# Patient Record
Sex: FEMALE | Race: OTHER | HISPANIC OR LATINO
[De-identification: names, ages, dates, MRNs, and addresses within clinical notes are randomized per-mention and may not be internally consistent; named-entity substitution may affect disease eponyms.]

---

## 2018-02-05 ENCOUNTER — RESULT REVIEW (OUTPATIENT)
Age: 63
End: 2018-02-05

## 2018-05-09 ENCOUNTER — APPOINTMENT (OUTPATIENT)
Dept: CARDIOLOGY | Facility: CLINIC | Age: 63
End: 2018-05-09

## 2018-05-09 VITALS
BODY MASS INDEX: 25.76 KG/M2 | HEIGHT: 62 IN | TEMPERATURE: 98.3 F | SYSTOLIC BLOOD PRESSURE: 126 MMHG | WEIGHT: 140 LBS | DIASTOLIC BLOOD PRESSURE: 65 MMHG | OXYGEN SATURATION: 99 % | HEART RATE: 81 BPM

## 2018-05-09 DIAGNOSIS — Z87.39 PERSONAL HISTORY OF OTHER DISEASES OF THE MUSCULOSKELETAL SYSTEM AND CONNECTIVE TISSUE: ICD-10-CM

## 2018-05-09 DIAGNOSIS — Z87.440 PERSONAL HISTORY OF URINARY (TRACT) INFECTIONS: ICD-10-CM

## 2018-05-09 DIAGNOSIS — Z86.39 PERSONAL HISTORY OF OTHER ENDOCRINE, NUTRITIONAL AND METABOLIC DISEASE: ICD-10-CM

## 2018-05-09 DIAGNOSIS — Z92.89 PERSONAL HISTORY OF OTHER MEDICAL TREATMENT: ICD-10-CM

## 2018-05-09 DIAGNOSIS — Z83.49 FAMILY HISTORY OF OTHER ENDOCRINE, NUTRITIONAL AND METABOLIC DISEASES: ICD-10-CM

## 2018-05-09 DIAGNOSIS — R51 HEADACHE: ICD-10-CM

## 2018-05-09 DIAGNOSIS — Z83.3 FAMILY HISTORY OF DIABETES MELLITUS: ICD-10-CM

## 2018-05-09 DIAGNOSIS — Z82.49 FAMILY HISTORY OF ISCHEMIC HEART DISEASE AND OTHER DISEASES OF THE CIRCULATORY SYSTEM: ICD-10-CM

## 2018-05-09 RX ORDER — IBUPROFEN 200 MG/1
200 TABLET, COATED ORAL
Refills: 0 | Status: ACTIVE | COMMUNITY

## 2018-05-10 ENCOUNTER — NON-APPOINTMENT (OUTPATIENT)
Age: 63
End: 2018-05-10

## 2018-05-10 PROBLEM — Z82.49 FAMILY HISTORY OF HYPERTENSION: Status: ACTIVE | Noted: 2018-05-10

## 2018-05-10 PROBLEM — Z92.89 HISTORY OF PFTS: Status: RESOLVED | Noted: 2018-05-10 | Resolved: 2018-05-10

## 2018-05-10 PROBLEM — Z86.39 HISTORY OF TYPE 2 DIABETES MELLITUS: Status: RESOLVED | Noted: 2018-05-10 | Resolved: 2018-05-10

## 2018-05-10 PROBLEM — Z83.3 FAMILY HISTORY OF TYPE 2 DIABETES MELLITUS: Status: ACTIVE | Noted: 2018-05-10

## 2018-05-10 PROBLEM — Z82.49 FAMILY HISTORY OF MYOCARDIAL INFARCTION: Status: ACTIVE | Noted: 2018-05-10

## 2018-05-10 PROBLEM — R51 CHRONIC HEADACHES: Status: RESOLVED | Noted: 2018-05-10 | Resolved: 2018-05-10

## 2018-05-10 PROBLEM — Z87.39 HISTORY OF ARTHRITIS: Status: RESOLVED | Noted: 2018-05-10 | Resolved: 2018-05-10

## 2018-05-10 PROBLEM — Z87.440 HISTORY OF UTI: Status: RESOLVED | Noted: 2018-05-10 | Resolved: 2018-05-10

## 2018-05-10 PROBLEM — Z83.49 FAMILY HISTORY OF HYPERLIPIDEMIA: Status: ACTIVE | Noted: 2018-05-10

## 2018-06-05 ENCOUNTER — APPOINTMENT (OUTPATIENT)
Dept: CARDIOLOGY | Facility: CLINIC | Age: 63
End: 2018-06-05

## 2018-06-14 ENCOUNTER — APPOINTMENT (OUTPATIENT)
Dept: CARDIOLOGY | Facility: CLINIC | Age: 63
End: 2018-06-14

## 2018-06-14 VITALS
DIASTOLIC BLOOD PRESSURE: 68 MMHG | WEIGHT: 145 LBS | BODY MASS INDEX: 26.52 KG/M2 | SYSTOLIC BLOOD PRESSURE: 138 MMHG | OXYGEN SATURATION: 99 % | HEART RATE: 82 BPM

## 2018-06-14 DIAGNOSIS — Z87.09 PERSONAL HISTORY OF OTHER DISEASES OF THE RESPIRATORY SYSTEM: ICD-10-CM

## 2018-11-15 ENCOUNTER — RECORD ABSTRACTING (OUTPATIENT)
Age: 63
End: 2018-11-15

## 2018-11-15 DIAGNOSIS — Z78.9 OTHER SPECIFIED HEALTH STATUS: ICD-10-CM

## 2018-11-15 DIAGNOSIS — N30.90 CYSTITIS, UNSPECIFIED W/OUT HEMATURIA: ICD-10-CM

## 2018-11-15 DIAGNOSIS — Z86.79 PERSONAL HISTORY OF OTHER DISEASES OF THE CIRCULATORY SYSTEM: ICD-10-CM

## 2018-11-15 DIAGNOSIS — R10.9 UNSPECIFIED ABDOMINAL PAIN: ICD-10-CM

## 2018-11-15 DIAGNOSIS — Z83.3 FAMILY HISTORY OF DIABETES MELLITUS: ICD-10-CM

## 2018-11-15 RX ORDER — ADHESIVE TAPE 3"X 2.3 YD
500 TAPE, NON-MEDICATED TOPICAL
Refills: 0 | Status: ACTIVE | COMMUNITY

## 2018-12-14 ENCOUNTER — APPOINTMENT (OUTPATIENT)
Dept: CARDIOLOGY | Facility: CLINIC | Age: 63
End: 2018-12-14

## 2018-12-28 ENCOUNTER — MEDICATION RENEWAL (OUTPATIENT)
Age: 63
End: 2018-12-28

## 2019-01-03 ENCOUNTER — RECORD ABSTRACTING (OUTPATIENT)
Age: 64
End: 2019-01-03

## 2019-01-07 ENCOUNTER — APPOINTMENT (OUTPATIENT)
Dept: FAMILY MEDICINE | Facility: CLINIC | Age: 64
End: 2019-01-07
Payer: MEDICARE

## 2019-01-07 VITALS
WEIGHT: 137 LBS | OXYGEN SATURATION: 100 % | DIASTOLIC BLOOD PRESSURE: 70 MMHG | SYSTOLIC BLOOD PRESSURE: 110 MMHG | BODY MASS INDEX: 25.21 KG/M2 | HEIGHT: 62 IN | HEART RATE: 69 BPM

## 2019-01-07 PROCEDURE — 36415 COLL VENOUS BLD VENIPUNCTURE: CPT

## 2019-01-07 PROCEDURE — 99214 OFFICE O/P EST MOD 30 MIN: CPT | Mod: 25

## 2019-01-07 RX ORDER — ATENOLOL AND CHLORTHALIDONE 100; 25 MG/1; MG/1
100-25 TABLET ORAL
Refills: 0 | Status: DISCONTINUED | COMMUNITY
End: 2019-01-07

## 2019-01-07 RX ORDER — ASPIRIN 81 MG
81 TABLET, DELAYED RELEASE (ENTERIC COATED) ORAL DAILY
Refills: 0 | Status: DISCONTINUED | COMMUNITY
End: 2019-01-07
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
## 2019-01-07 NOTE — PHYSICAL EXAM
[No Acute Distress] : no acute distress [Well Nourished] : well nourished [Well Developed] : well developed [Well-Appearing] : well-appearing [No JVD] : no jugular venous distention [Supple] : supple [No Respiratory Distress] : no respiratory distress  [Normal Rate] : normal rate  [Regular Rhythm] : with a regular rhythm [Normal S1, S2] : normal S1 and S2 [Speech Grossly Normal] : speech grossly normal [Normal Affect] : the affect was normal [Normal Mood] : the mood was normal [Normal Insight/Judgement] : insight and judgment were intact [de-identified] : Pain with movement of the upper back and neck.  Tightness in neck and shoulder muscles

## 2019-01-07 NOTE — REVIEW OF SYSTEMS
[Fatigue] : fatigue [Joint Pain] : joint pain [Joint Stiffness] : joint stiffness [Muscle Pain] : muscle pain [Back Pain] : back pain [Negative] : Psychiatric

## 2019-01-09 ENCOUNTER — APPOINTMENT (OUTPATIENT)
Dept: PAIN MANAGEMENT | Facility: CLINIC | Age: 64
End: 2019-01-09
Payer: MEDICARE

## 2019-01-09 ENCOUNTER — RECORD ABSTRACTING (OUTPATIENT)
Age: 64
End: 2019-01-09

## 2019-01-09 VITALS
SYSTOLIC BLOOD PRESSURE: 126 MMHG | WEIGHT: 138 LBS | HEIGHT: 62 IN | BODY MASS INDEX: 25.4 KG/M2 | DIASTOLIC BLOOD PRESSURE: 74 MMHG

## 2019-01-09 DIAGNOSIS — Z86.39 PERSONAL HISTORY OF OTHER ENDOCRINE, NUTRITIONAL AND METABOLIC DISEASE: ICD-10-CM

## 2019-01-09 DIAGNOSIS — Z82.49 FAMILY HISTORY OF ISCHEMIC HEART DISEASE AND OTHER DISEASES OF THE CIRCULATORY SYSTEM: ICD-10-CM

## 2019-01-09 DIAGNOSIS — Z78.9 OTHER SPECIFIED HEALTH STATUS: ICD-10-CM

## 2019-01-09 DIAGNOSIS — Z83.3 FAMILY HISTORY OF DIABETES MELLITUS: ICD-10-CM

## 2019-01-09 PROCEDURE — 99204 OFFICE O/P NEW MOD 45 MIN: CPT

## 2019-01-09 NOTE — REVIEW OF SYSTEMS
[Abdominal Pain] : abdominal pain [Constipation] : constipation [Joint Pain] : joint pain [Negative] : Heme/Lymph [Back Pain] : back pain [Neck Pain] : neck pain [Muscle Pain] : muscle pain [Joint Stiffness] : joint stiffness [Decreased ROM] : decreased range of motion [FreeTextEntry2] : fatigue [FreeTextEntry5] : chest pain, sob, swelling,palpitations [FreeTextEntry7] : nausea [FreeTextEntry9] : joint swelling

## 2019-01-09 NOTE — CONSULT LETTER
[Dear  ___] : Dear  [unfilled], [Consult Letter:] : I had the pleasure of evaluating your patient, [unfilled]. [Consult Closing:] : Thank you very much for allowing me to participate in the care of this patient.  If you have any questions, please do not hesitate to contact me. [Sincerely,] : Sincerely, [FreeTextEntry3] : Blake Chandler
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
## 2019-01-09 NOTE — PHYSICAL EXAM
[de-identified] : Constitutional: Well-developed, in no acute distress\par Eyes: Pupil- Right: normal, Left: normal\par Neck exam: Inspection normal\par Respiratory: Normal effort, speaking in full sentences\par Cardiovascular: Regular rate and rhythm\par Vascular: Dorsal pedis pulses normal and equal bilaterally\par Abdomen: Inspection normal, no distension\par Skin: Inspection normal, no bruising noted\par Musculoskeletal:\par Cervical Spine:   \par Gait: Antalgic\par Inspection: Normal curvature, no abnormal kyphosis or scoliosis\par \par Facet loading: pain bilaterally\par \par Palpation:\par Cervical paraspinal muscles: pain bilaterally\par 		\par Muscle Strength:\par Deltoid: 5/5 bilaterally\par Biceps: 5/5 bilaterally\par Triceps: 5/5 bilaterally\par Adductor pollicis: 5/5 bilaterally\par \par Sensation: normal and equal in bilateral upper extremities\par \par Reflexes:\par Biceps (C5): 2+ bilaterally\par Brachioradialis (C6): 2+ bilaterally\par Triceps (C7): 2+ bilaterally\par \par Extremity: no edema noted\par Neurological: Memory normal, AAO x 3, Cranial nerves II - XII grossly normal\par Psychiatric: Appropriate mood and affect, oriented to time, place, person, and situation\par
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
## 2019-01-10 LAB
ALBUMIN SERPL ELPH-MCNC: 4.3 G/DL
ALP BLD-CCNC: 57 U/L
ALT SERPL-CCNC: 20 U/L
ANION GAP SERPL CALC-SCNC: 13 MMOL/L
AST SERPL-CCNC: 20 U/L
BASOPHILS # BLD AUTO: 0.03 K/UL
BASOPHILS NFR BLD AUTO: 0.4 %
BILIRUB SERPL-MCNC: 0.3 MG/DL
BUN SERPL-MCNC: 15 MG/DL
CALCIUM SERPL-MCNC: 9.6 MG/DL
CHLORIDE SERPL-SCNC: 102 MMOL/L
CHOLEST SERPL-MCNC: 133 MG/DL
CHOLEST/HDLC SERPL: 3 RATIO
CO2 SERPL-SCNC: 28 MMOL/L
CREAT SERPL-MCNC: 0.55 MG/DL
EOSINOPHIL # BLD AUTO: 0.11 K/UL
EOSINOPHIL NFR BLD AUTO: 1.3 %
GLUCOSE SERPL-MCNC: 79 MG/DL
HBA1C MFR BLD HPLC: 7.1 %
HCT VFR BLD CALC: 37.4 %
HDLC SERPL-MCNC: 45 MG/DL
HGB BLD-MCNC: 11.8 G/DL
IMM GRANULOCYTES NFR BLD AUTO: 0.1 %
LDLC SERPL CALC-MCNC: 62 MG/DL
LYMPHOCYTES # BLD AUTO: 2.64 K/UL
LYMPHOCYTES NFR BLD AUTO: 31.2 %
MAN DIFF?: NORMAL
MCHC RBC-ENTMCNC: 26.6 PG
MCHC RBC-ENTMCNC: 31.6 GM/DL
MCV RBC AUTO: 84.4 FL
MONOCYTES # BLD AUTO: 0.53 K/UL
MONOCYTES NFR BLD AUTO: 6.3 %
NEUTROPHILS # BLD AUTO: 5.13 K/UL
NEUTROPHILS NFR BLD AUTO: 60.7 %
PLATELET # BLD AUTO: 328 K/UL
POTASSIUM SERPL-SCNC: 4.1 MMOL/L
PROT SERPL-MCNC: 6.9 G/DL
RBC # BLD: 4.43 M/UL
RBC # FLD: 15 %
SODIUM SERPL-SCNC: 143 MMOL/L
TRIGL SERPL-MCNC: 131 MG/DL
TSH SERPL-ACNC: 0.31 UIU/ML
WBC # FLD AUTO: 8.45 K/UL

## 2019-01-14 ENCOUNTER — LABORATORY RESULT (OUTPATIENT)
Age: 64
End: 2019-01-14

## 2019-01-14 ENCOUNTER — APPOINTMENT (OUTPATIENT)
Dept: FAMILY MEDICINE | Facility: CLINIC | Age: 64
End: 2019-01-14
Payer: MEDICARE

## 2019-01-14 VITALS
HEART RATE: 72 BPM | TEMPERATURE: 97.9 F | SYSTOLIC BLOOD PRESSURE: 124 MMHG | WEIGHT: 138 LBS | HEIGHT: 62 IN | BODY MASS INDEX: 25.4 KG/M2 | DIASTOLIC BLOOD PRESSURE: 86 MMHG

## 2019-01-14 DIAGNOSIS — K59.09 OTHER CONSTIPATION: ICD-10-CM

## 2019-01-14 DIAGNOSIS — K57.32 DIVERTICULITIS OF LARGE INTESTINE W/OUT PERFORATION OR ABSCESS W/OUT BLEEDING: ICD-10-CM

## 2019-01-14 DIAGNOSIS — R10.9 UNSPECIFIED ABDOMINAL PAIN: ICD-10-CM

## 2019-01-14 DIAGNOSIS — K57.90 DIVERTICULOSIS OF INTESTINE, PART UNSPECIFIED, W/OUT PERFORATION OR ABSCESS W/OUT BLEEDING: ICD-10-CM

## 2019-01-14 LAB
BILIRUB UR QL STRIP: NEGATIVE
CLARITY UR: NORMAL
GLUCOSE UR-MCNC: NEGATIVE
HCG UR QL: 0.2 EU/DL
HGB UR QL STRIP.AUTO: NORMAL
KETONES UR-MCNC: NEGATIVE
LEUKOCYTE ESTERASE UR QL STRIP: NEGATIVE
NITRITE UR QL STRIP: NEGATIVE
PH UR STRIP: 6.5
PROT UR STRIP-MCNC: NEGATIVE
SP GR UR STRIP: 1.01

## 2019-01-14 PROCEDURE — 81003 URINALYSIS AUTO W/O SCOPE: CPT | Mod: QW

## 2019-01-14 PROCEDURE — 99214 OFFICE O/P EST MOD 30 MIN: CPT | Mod: 25

## 2019-01-14 NOTE — PLAN
[FreeTextEntry1] : Increase fiber and fluids in her diet. Avoid seeds and nuts. If sxs persist or worsen, she should follow-up either in the office or the ER.

## 2019-01-14 NOTE — REVIEW OF SYSTEMS
[Abdominal Pain] : abdominal pain [Constipation] : constipation [Back Pain] : back pain [Negative] : Psychiatric [Fever] : no fever [Chills] : no chills [Nausea] : no nausea [Vomiting] : no vomiting
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
## 2019-01-14 NOTE — PHYSICAL EXAM
[No Acute Distress] : no acute distress [Well Nourished] : well nourished [Well Developed] : well developed [Clear to Auscultation] : lungs were clear to auscultation bilaterally [Normal Rate] : normal rate  [Regular Rhythm] : with a regular rhythm [Normal S1, S2] : normal S1 and S2 [Soft] : abdomen soft [de-identified] : Tenderness in RUQ, LUQ and LLQ. [de-identified] : left flank tenderness; tenderness along lumbar spine with palpation.

## 2019-01-14 NOTE — DATA REVIEWED
[FreeTextEntry1] : POCT UA: Positive for blood ( known hx of microscopic hematuria)- discussed with patient.

## 2019-01-15 LAB
APPEARANCE: CLEAR
BILIRUBIN URINE: NEGATIVE
BLOOD URINE: ABNORMAL
COLOR: YELLOW
GLUCOSE QUALITATIVE U: NEGATIVE MG/DL
KETONES URINE: NEGATIVE
LEUKOCYTE ESTERASE URINE: NEGATIVE
NITRITE URINE: NEGATIVE
PH URINE: 6.5
PROTEIN URINE: NEGATIVE MG/DL
SPECIFIC GRAVITY URINE: 1.01
UROBILINOGEN URINE: NEGATIVE MG/DL

## 2019-01-16 ENCOUNTER — NON-APPOINTMENT (OUTPATIENT)
Age: 64
End: 2019-01-16

## 2019-01-16 ENCOUNTER — APPOINTMENT (OUTPATIENT)
Dept: CARDIOLOGY | Facility: CLINIC | Age: 64
End: 2019-01-16
Payer: MEDICARE

## 2019-01-16 VITALS
OXYGEN SATURATION: 99 % | WEIGHT: 139.44 LBS | BODY MASS INDEX: 25.5 KG/M2 | SYSTOLIC BLOOD PRESSURE: 122 MMHG | HEART RATE: 76 BPM | DIASTOLIC BLOOD PRESSURE: 62 MMHG

## 2019-01-16 DIAGNOSIS — Z87.898 PERSONAL HISTORY OF OTHER SPECIFIED CONDITIONS: ICD-10-CM

## 2019-01-16 PROCEDURE — 93000 ELECTROCARDIOGRAM COMPLETE: CPT

## 2019-01-16 PROCEDURE — 99214 OFFICE O/P EST MOD 30 MIN: CPT

## 2019-01-21 ENCOUNTER — APPOINTMENT (OUTPATIENT)
Dept: PAIN MANAGEMENT | Facility: CLINIC | Age: 64
End: 2019-01-21

## 2019-01-28 ENCOUNTER — RESULT REVIEW (OUTPATIENT)
Age: 64
End: 2019-01-28

## 2019-01-31 ENCOUNTER — APPOINTMENT (OUTPATIENT)
Dept: PAIN MANAGEMENT | Facility: CLINIC | Age: 64
End: 2019-01-31
Payer: MEDICARE

## 2019-01-31 VITALS
DIASTOLIC BLOOD PRESSURE: 80 MMHG | BODY MASS INDEX: 25.58 KG/M2 | WEIGHT: 139 LBS | SYSTOLIC BLOOD PRESSURE: 122 MMHG | HEIGHT: 62 IN

## 2019-01-31 PROCEDURE — 99214 OFFICE O/P EST MOD 30 MIN: CPT

## 2019-01-31 NOTE — DATA REVIEWED
[FreeTextEntry1] : MRI CERVICAL SPINE (01/28/19):					\par \par There is reversal of the cervical lordosis. There is minimal degenerative retrolisthesis of C3 on C4 and C4 on C5. The marrow signal is overall within normal limits with no focal marrow replacing lesion identified. The vertebral body heights are maintained and there is no evidence of acute fracture or subluxation. There is no abnormal vertebral or paraspinal edema. The visualized paraspinal soft tissues appear grossly unremarkable.\par \par No abnormal signal is identified in the cervical spinal cord. The visualized posterior fossa structures are unremarkable.\par \par C2-3: No significant disc bulge or herniation. No significant central canal or foraminal stenosis.\par \par C3-4: Central disc protrusion superimposed on mild disc bulge with marginal and uncovertebral osteophyte formation. No significant central canal stenosis, however disc herniation mildly impinges upon the ventral spinal cord. Moderate left foraminal stenosis and mild right foraminal stenosis.\par \par C4-5: Right paracentral disc/osteophyte superimposed on a mild disc bulge with marginal osteophyte formation. No significant central canal stenosis, however disc/osteophyte mildly impinges upon the right ventral spinal cord. Moderate bilateral foraminal stenosis.\par \par C5-6: Mild disc bulge with marginal and uncovertebral osteophyte formation. No significant central canal stenosis. Mild/moderate right foraminal stenosis and mild left foraminal stenosis.\par \par C6-7: Mild disc bulge. No significant central canal or foraminal stenosis.\par \par C7-T1: Minimal disc bulge. No significant central canal or foraminal stenosis.\par \par \par \par IMPRESSION:\par \par Cervical spondylosis with central disc herniation at C3-4 and right paracentral disc/osteophyte at C4-5. No significant central canal stenosis, however there is mild impingement of the ventral spinal cord at C3-4 and C4-5. Varying degrees of foraminal stenosis as above, most pronounced at C3-4 and the left and C4-5 bilaterally.\par

## 2019-01-31 NOTE — REVIEW OF SYSTEMS
[Abdominal Pain] : abdominal pain [Constipation] : constipation [Back Pain] : back pain [Neck Pain] : neck pain [Muscle Pain] : muscle pain [Joint Pain] : joint pain [Joint Stiffness] : joint stiffness [Decreased ROM] : decreased range of motion [Negative] : Heme/Lymph [FreeTextEntry2] : fatigue [FreeTextEntry5] : chest pain, sob, swelling,palpitations [FreeTextEntry7] : nausea [FreeTextEntry9] : joint swelling

## 2019-01-31 NOTE — PHYSICAL EXAM
[de-identified] : Constitutional: Well-developed, in no acute distress\par Eyes: Pupil- Right: normal, Left: normal\par Neck exam: Inspection normal\par Respiratory: Normal effort, speaking in full sentences\par Cardiovascular: Regular rate and rhythm\par Vascular: Dorsal pedis pulses normal and equal bilaterally\par Abdomen: Inspection normal, no distension\par Skin: Inspection normal, no bruising noted\par Musculoskeletal:\par Cervical Spine:   \par Gait: Antalgic\par Inspection: Normal curvature, no abnormal kyphosis or scoliosis\par \par Facet loading: pain bilaterally\par \par Palpation:\par Cervical paraspinal muscles: pain bilaterally\par 		\par Muscle Strength:\par Deltoid: 5/5 bilaterally\par Biceps: 5/5 bilaterally\par Triceps: 5/5 bilaterally\par Adductor pollicis: 5/5 bilaterally\par \par Sensation: normal and equal in bilateral upper extremities\par \par Reflexes:\par Biceps (C5): 2+ bilaterally\par Brachioradialis (C6): 2+ bilaterally\par Triceps (C7): 2+ bilaterally\par \par Extremity: no edema noted\par Neurological: Memory normal, AAO x 3, Cranial nerves II - XII grossly normal\par Psychiatric: Appropriate mood and affect, oriented to time, place, person, and situation\par
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
## 2019-02-08 ENCOUNTER — APPOINTMENT (OUTPATIENT)
Dept: CARDIOLOGY | Facility: CLINIC | Age: 64
End: 2019-02-08
Payer: MEDICARE

## 2019-02-08 VITALS
HEART RATE: 74 BPM | BODY MASS INDEX: 25.24 KG/M2 | WEIGHT: 138 LBS | OXYGEN SATURATION: 98 % | DIASTOLIC BLOOD PRESSURE: 78 MMHG | SYSTOLIC BLOOD PRESSURE: 130 MMHG

## 2019-02-08 DIAGNOSIS — R20.0 ANESTHESIA OF SKIN: ICD-10-CM

## 2019-02-08 PROBLEM — Z87.898 HISTORY OF PALPITATIONS: Status: RESOLVED | Noted: 2019-01-16 | Resolved: 2019-02-08

## 2019-02-08 PROCEDURE — 93000 ELECTROCARDIOGRAM COMPLETE: CPT

## 2019-02-08 PROCEDURE — 99215 OFFICE O/P EST HI 40 MIN: CPT

## 2019-02-08 NOTE — REASON FOR VISIT
[Dyspnea] : dyspnea [Hyperlipidemia] : hyperlipidemia [Hypertension] : hypertension [Palpitations] : palpitations [Follow-Up - Clinic] : a clinic follow-up of [Chest Pain] : chest pain

## 2019-02-08 NOTE — REVIEW OF SYSTEMS
[Lower Back Pain] : lower back pain [Joint Pain] : joint pain [see HPI] : see HPI [Negative] : Heme/Lymph

## 2019-02-11 ENCOUNTER — CLINICAL ADVICE (OUTPATIENT)
Age: 64
End: 2019-02-11

## 2019-02-14 ENCOUNTER — APPOINTMENT (OUTPATIENT)
Dept: CARDIOLOGY | Facility: CLINIC | Age: 64
End: 2019-02-14
Payer: MEDICARE

## 2019-02-14 VITALS
HEIGHT: 62 IN | DIASTOLIC BLOOD PRESSURE: 69 MMHG | HEART RATE: 72 BPM | WEIGHT: 138 LBS | BODY MASS INDEX: 25.4 KG/M2 | SYSTOLIC BLOOD PRESSURE: 120 MMHG | OXYGEN SATURATION: 100 %

## 2019-02-14 DIAGNOSIS — R07.89 OTHER CHEST PAIN: ICD-10-CM

## 2019-02-14 PROCEDURE — 99214 OFFICE O/P EST MOD 30 MIN: CPT

## 2019-02-15 ENCOUNTER — INBOUND DOCUMENT (OUTPATIENT)
Age: 64
End: 2019-02-15

## 2019-02-25 PROBLEM — R07.89 ATYPICAL CHEST PAIN: Status: RESOLVED | Noted: 2018-05-10 | Resolved: 2019-02-25
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
## 2019-02-25 NOTE — REASON FOR VISIT
[Follow-Up - From Hospitalization] : follow-up of a recent hospitalization for [Chest Pain] : chest pain [Admitted for Heart Failure] : patient was not admitted for heart failure [FreeTextEntry1] : TIA
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
## 2019-02-25 NOTE — HISTORY OF PRESENT ILLNESS
[FreeTextEntry1] : The patient walked in to be seen because of chest pain\par \par She said that yesterday she was walking up [address] and had to sit because of chest pain under her left breast.  Again, she had another episode which woke her up this morning about 1:00, in the same area, worse with deep breathing and reproducible with manual pressure. The pain lasted about 20 minutes and went away\par She woke up otherwise okay. At 9 AM she developed numbness in her right upper extremity, from the fingers up to the shoulder, her neck, the right side of her face up to her cheeks as well as her whole tongue.  She denies any other symptoms except for the bit of nausea but no dizziness or vertiginous symptoms\par \par She is currently chest pain free\par
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
## 2019-02-25 NOTE — PHYSICAL EXAM
[General Appearance - Well Developed] : well developed [General Appearance - Well Nourished] : well nourished [Normal Conjunctiva] : the conjunctiva exhibited no abnormalities [Auscultation Breath Sounds / Voice Sounds] : lungs were clear to auscultation bilaterally [Heart Rate And Rhythm] : heart rate and rhythm were normal [Murmurs] : no murmurs present [Bowel Sounds] : normal bowel sounds [Abdomen Soft] : soft [Abdomen Tenderness] : non-tender [Abnormal Walk] : normal gait [Nail Clubbing] : no clubbing of the fingernails [Skin Color & Pigmentation] : normal skin color and pigmentation [Oriented To Time, Place, And Person] : oriented to person, place, and time [FreeTextEntry1] : tenderness to manual pressure under left breast

## 2019-02-25 NOTE — HISTORY OF PRESENT ILLNESS
[FreeTextEntry1] : She was sent by me to the hospital on 2/8/19 with neurological symptoms concerning for CVA. Specifically, she has numbness in her right upper extremity, right neck/right face and tongue. She was seen by neurology and felt to have had a TIA. \par \par Her symptoms are mostly resolved and she is to follow up with Dr. Yi\par "Little CP', headaches\par \par Allergy symptoms -. zyrtec
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
## 2019-02-25 NOTE — PHYSICAL EXAM
[General Appearance - Well Developed] : well developed [General Appearance - Well Nourished] : well nourished [Normal Conjunctiva] : the conjunctiva exhibited no abnormalities [Auscultation Breath Sounds / Voice Sounds] : lungs were clear to auscultation bilaterally [Heart Rate And Rhythm] : heart rate and rhythm were normal [Murmurs] : no murmurs present [Bowel Sounds] : normal bowel sounds [Abdomen Soft] : soft [Abdomen Tenderness] : non-tender [Abnormal Walk] : normal gait [Nail Clubbing] : no clubbing of the fingernails [Skin Color & Pigmentation] : normal skin color and pigmentation [Oriented To Time, Place, And Person] : oriented to person, place, and time [FreeTextEntry1] : No JVd

## 2019-03-04 ENCOUNTER — RESULT REVIEW (OUTPATIENT)
Age: 64
End: 2019-03-04

## 2019-03-10 ENCOUNTER — RX RENEWAL (OUTPATIENT)
Age: 64
End: 2019-03-10

## 2019-03-21 ENCOUNTER — RX RENEWAL (OUTPATIENT)
Age: 64
End: 2019-03-21

## 2019-03-27 ENCOUNTER — APPOINTMENT (OUTPATIENT)
Dept: OBGYN | Facility: CLINIC | Age: 64
End: 2019-03-27
Payer: MEDICARE

## 2019-03-27 VITALS
SYSTOLIC BLOOD PRESSURE: 114 MMHG | DIASTOLIC BLOOD PRESSURE: 66 MMHG | HEIGHT: 62 IN | BODY MASS INDEX: 25.4 KG/M2 | WEIGHT: 138 LBS

## 2019-03-27 PROCEDURE — 99396 PREV VISIT EST AGE 40-64: CPT

## 2019-03-27 PROCEDURE — 81003 URINALYSIS AUTO W/O SCOPE: CPT | Mod: QW

## 2019-04-05 LAB
BILIRUB UR QL STRIP: NEGATIVE
CLARITY UR: CLEAR
COLLECTION METHOD: NORMAL
GLUCOSE UR-MCNC: 100
HCG UR QL: 0.2 EU/DL
HGB UR QL STRIP.AUTO: NORMAL
KETONES UR-MCNC: NEGATIVE
LEUKOCYTE ESTERASE UR QL STRIP: NEGATIVE
NITRITE UR QL STRIP: NEGATIVE
PH UR STRIP: 8.5
PROT UR STRIP-MCNC: NEGATIVE
SP GR UR STRIP: 1.01

## 2019-04-23 ENCOUNTER — APPOINTMENT (OUTPATIENT)
Dept: CARDIOLOGY | Facility: CLINIC | Age: 64
End: 2019-04-23

## 2019-05-23 ENCOUNTER — APPOINTMENT (OUTPATIENT)
Dept: FAMILY MEDICINE | Facility: CLINIC | Age: 64
End: 2019-05-23
Payer: MEDICARE

## 2019-05-23 VITALS
SYSTOLIC BLOOD PRESSURE: 140 MMHG | DIASTOLIC BLOOD PRESSURE: 84 MMHG | TEMPERATURE: 97.9 F | WEIGHT: 138 LBS | HEART RATE: 81 BPM | HEIGHT: 62 IN | BODY MASS INDEX: 25.4 KG/M2

## 2019-05-23 PROCEDURE — 36415 COLL VENOUS BLD VENIPUNCTURE: CPT

## 2019-05-23 PROCEDURE — 99214 OFFICE O/P EST MOD 30 MIN: CPT | Mod: 25

## 2019-05-23 NOTE — PHYSICAL EXAM
[No Acute Distress] : no acute distress [Well Nourished] : well nourished [Well Developed] : well developed [Normal Outer Ear/Nose] : the outer ears and nose were normal in appearance [Normal Oropharynx] : the oropharynx was normal [No JVD] : no jugular venous distention [No Respiratory Distress] : no respiratory distress  [Clear to Auscultation] : lungs were clear to auscultation bilaterally [No Accessory Muscle Use] : no accessory muscle use [Normal Rate] : normal rate  [Regular Rhythm] : with a regular rhythm [Normal S1, S2] : normal S1 and S2 [No Edema] : there was no peripheral edema [Normal Gait] : normal gait [Coordination Grossly Intact] : coordination grossly intact [Speech Grossly Normal] : speech grossly normal [Normal Affect] : the affect was normal [Normal Insight/Judgement] : insight and judgment were intact [de-identified] : Nasal d/c, discomfort to palp of frontal sinuses

## 2019-05-23 NOTE — REVIEW OF SYSTEMS
[Fatigue] : fatigue [Pain] : pain [Itching] : itching [Earache] : earache [Hoarseness] : hoarseness [Nasal Discharge] : nasal discharge [Postnasal Drip] : postnasal drip [Wheezing] : wheezing [Joint Pain] : joint pain [Joint Stiffness] : joint stiffness [Muscle Pain] : muscle pain [Negative] : Musculoskeletal

## 2019-05-23 NOTE — HISTORY OF PRESENT ILLNESS
[FreeTextEntry8] : Pt here for f/u.\mallory has DM.  Takes meds as directed.  No SE or problems.  Wonders if she should be using a glucometer. Feels like diabetes is well controlled. \par Sometimes checks sugars with 's glucometer.  120 in the morning.  \par c/o 2 weeks with symptoms. Some laryngitis.  Saw allergist recently.  Got two different pills--xyzal and singuair.  Takes two of each daily.  Still getting symptoms.\par Also uses flonase nasal spray..\par Some chest congestion.  Some tighter breathing. Has used an inhaler that helped.\mallory Takes all other meds as directed.  Feels well.\par Multiple joints across her body continue to ache.  Has seen Dr. Moncada in past and has another appointment coming up.\par

## 2019-06-05 ENCOUNTER — RX RENEWAL (OUTPATIENT)
Age: 64
End: 2019-06-05

## 2019-06-05 ENCOUNTER — APPOINTMENT (OUTPATIENT)
Dept: RHEUMATOLOGY | Facility: CLINIC | Age: 64
End: 2019-06-05
Payer: MEDICARE

## 2019-06-05 VITALS
DIASTOLIC BLOOD PRESSURE: 62 MMHG | HEIGHT: 62 IN | BODY MASS INDEX: 25.4 KG/M2 | WEIGHT: 138 LBS | SYSTOLIC BLOOD PRESSURE: 120 MMHG

## 2019-06-05 DIAGNOSIS — R52 PAIN, UNSPECIFIED: ICD-10-CM

## 2019-06-05 DIAGNOSIS — M19.042 PRIMARY OSTEOARTHRITIS, RIGHT HAND: ICD-10-CM

## 2019-06-05 DIAGNOSIS — M19.041 PRIMARY OSTEOARTHRITIS, RIGHT HAND: ICD-10-CM

## 2019-06-05 PROCEDURE — 96372 THER/PROPH/DIAG INJ SC/IM: CPT | Mod: RT

## 2019-06-05 PROCEDURE — 99213 OFFICE O/P EST LOW 20 MIN: CPT | Mod: 25

## 2019-06-05 RX ORDER — KETOROLAC TROMETHAMINE 30 MG/ML
30 INJECTION, SOLUTION INTRAMUSCULAR; INTRAVENOUS
Qty: 1 | Refills: 0 | Status: COMPLETED | OUTPATIENT
Start: 2019-06-05

## 2019-06-05 RX ORDER — METHYLPRED ACET/NACL,ISO-OS/PF 40 MG/ML
40 VIAL (ML) INJECTION
Qty: 1 | Refills: 0 | Status: COMPLETED | OUTPATIENT
Start: 2019-06-05

## 2019-06-05 RX ADMIN — METHYLPREDNISOLONE ACETATE 0 MG/ML: 40 INJECTION, SUSPENSION INTRA-ARTICULAR; INTRALESIONAL; INTRAMUSCULAR; SOFT TISSUE at 00:00

## 2019-06-05 RX ADMIN — KETOROLAC TROMETHAMINE 0 MG/ML: 30 INJECTION, SOLUTION INTRAMUSCULAR; INTRAVENOUS at 00:00

## 2019-06-05 NOTE — REVIEW OF SYSTEMS
[Feeling Poorly] : feeling poorly [Joint Pain] : joint pain [Feeling Tired] : feeling tired [Negative] : Psychiatric [FreeTextEntry9] : muscle pains

## 2019-06-05 NOTE — HISTORY OF PRESENT ILLNESS
[FreeTextEntry1] : 64 yo female here for f/u joint pains.  She reports pain in her legs, back, neck/trapezius, arms.\par Dr. Yi prescribed Gabapentin 100 mg TID, which doesn't help.  She needs to take Advil 3 tabs, which sometimes  helps.  She has been on Gabapentin for about a year.

## 2019-06-05 NOTE — PROCEDURE
[FreeTextEntry1] : Area cleaned with alcohol. Depomedrol 40 mg and Toradol 30 mg [id number] deltoid.Pt tolerated procedure well.

## 2019-06-06 LAB
ALBUMIN SERPL ELPH-MCNC: 4.6 G/DL
ALP BLD-CCNC: 63 U/L
ALT SERPL-CCNC: 15 U/L
ANION GAP SERPL CALC-SCNC: 14 MMOL/L
AST SERPL-CCNC: 17 U/L
BILIRUB SERPL-MCNC: 0.2 MG/DL
BUN SERPL-MCNC: 16 MG/DL
CALCIUM SERPL-MCNC: 10.2 MG/DL
CHLORIDE SERPL-SCNC: 101 MMOL/L
CHOLEST SERPL-MCNC: 135 MG/DL
CHOLEST/HDLC SERPL: 3.2 RATIO
CO2 SERPL-SCNC: 27 MMOL/L
CREAT SERPL-MCNC: 0.66 MG/DL
ESTIMATED AVERAGE GLUCOSE: 163 MG/DL
GLUCOSE SERPL-MCNC: 92 MG/DL
HBA1C MFR BLD HPLC: 7.3 %
HDLC SERPL-MCNC: 42 MG/DL
LDLC SERPL CALC-MCNC: 69 MG/DL
POTASSIUM SERPL-SCNC: 3.6 MMOL/L
PROT SERPL-MCNC: 6.9 G/DL
SODIUM SERPL-SCNC: 142 MMOL/L
TRIGL SERPL-MCNC: 120 MG/DL
TSH SERPL-ACNC: 1.39 UIU/ML

## 2019-06-15 ENCOUNTER — RX RENEWAL (OUTPATIENT)
Age: 64
End: 2019-06-15

## 2019-09-20 ENCOUNTER — APPOINTMENT (OUTPATIENT)
Dept: RHEUMATOLOGY | Facility: CLINIC | Age: 64
End: 2019-09-20
Payer: MEDICARE

## 2019-09-20 VITALS
HEIGHT: 62 IN | WEIGHT: 132 LBS | SYSTOLIC BLOOD PRESSURE: 100 MMHG | DIASTOLIC BLOOD PRESSURE: 70 MMHG | BODY MASS INDEX: 24.29 KG/M2

## 2019-09-20 DIAGNOSIS — M15.0 PRIMARY GENERALIZED (OSTEO)ARTHRITIS: ICD-10-CM

## 2019-09-20 PROCEDURE — 36415 COLL VENOUS BLD VENIPUNCTURE: CPT

## 2019-09-20 PROCEDURE — 99214 OFFICE O/P EST MOD 30 MIN: CPT | Mod: 25

## 2019-09-20 RX ORDER — DULOXETINE HYDROCHLORIDE 30 MG/1
30 CAPSULE, DELAYED RELEASE PELLETS ORAL
Qty: 90 | Refills: 1 | Status: DISCONTINUED | COMMUNITY
Start: 2019-06-05 | End: 2019-09-20

## 2019-09-23 LAB
ALBUMIN SERPL ELPH-MCNC: 4.1 G/DL
ALP BLD-CCNC: 51 U/L
ALT SERPL-CCNC: 12 U/L
ANION GAP SERPL CALC-SCNC: 15 MMOL/L
AST SERPL-CCNC: 16 U/L
BASOPHILS # BLD AUTO: 0.03 K/UL
BASOPHILS NFR BLD AUTO: 0.4 %
BILIRUB SERPL-MCNC: 0.3 MG/DL
BUN SERPL-MCNC: 14 MG/DL
CALCIUM SERPL-MCNC: 9.8 MG/DL
CHLORIDE SERPL-SCNC: 101 MMOL/L
CO2 SERPL-SCNC: 28 MMOL/L
CREAT SERPL-MCNC: 0.58 MG/DL
CRP SERPL-MCNC: <0.1 MG/DL
EOSINOPHIL # BLD AUTO: 0.12 K/UL
EOSINOPHIL NFR BLD AUTO: 1.6 %
ERYTHROCYTE [SEDIMENTATION RATE] IN BLOOD BY WESTERGREN METHOD: 11 MM/HR
GLUCOSE SERPL-MCNC: 128 MG/DL
HCT VFR BLD CALC: 36.2 %
HGB BLD-MCNC: 11 G/DL
IMM GRANULOCYTES NFR BLD AUTO: 0.3 %
LYMPHOCYTES # BLD AUTO: 2.16 K/UL
LYMPHOCYTES NFR BLD AUTO: 29.5 %
MAN DIFF?: NORMAL
MCHC RBC-ENTMCNC: 25.4 PG
MCHC RBC-ENTMCNC: 30.4 GM/DL
MCV RBC AUTO: 83.6 FL
MONOCYTES # BLD AUTO: 0.66 K/UL
MONOCYTES NFR BLD AUTO: 9 %
NEUTROPHILS # BLD AUTO: 4.32 K/UL
NEUTROPHILS NFR BLD AUTO: 59.2 %
PLATELET # BLD AUTO: 280 K/UL
POTASSIUM SERPL-SCNC: 3.6 MMOL/L
PROT SERPL-MCNC: 6.6 G/DL
RBC # BLD: 4.33 M/UL
RBC # FLD: 16.6 %
RHEUMATOID FACT SER QL: <10 IU/ML
SODIUM SERPL-SCNC: 144 MMOL/L
WBC # FLD AUTO: 7.31 K/UL

## 2019-09-24 NOTE — HISTORY OF PRESENT ILLNESS
[FreeTextEntry1] : She took Duloxetine 30 mg one time and developed vomiting and abdominal pain.\par She c/o pain in her arms and legs.  Her left arm hurts more than the right and interferes with sleep.  She has low back pain that radiates down the back of her right thigh.\par She takes Gabapentin 300 mg TID with no benefit.  She also c/o frequent headaches in the back of her head.  When she touches her scalp it hurts.  Today she has a temporal headache.\par She sees Dr. Montoya for glaucoma and has been prescribed drops.\par She had participated in PT for her left shoulder pain, which relieved the pain, but after completing the course, the pain returned.  She does do exercises at home.

## 2019-10-02 LAB
CCP AB SER IA-ACNC: <8 UNITS
RF+CCP IGG SER-IMP: NEGATIVE

## 2019-10-07 ENCOUNTER — APPOINTMENT (OUTPATIENT)
Dept: FAMILY MEDICINE | Facility: CLINIC | Age: 64
End: 2019-10-07
Payer: MEDICARE

## 2019-10-07 VITALS
BODY MASS INDEX: 25.03 KG/M2 | DIASTOLIC BLOOD PRESSURE: 70 MMHG | HEIGHT: 62 IN | SYSTOLIC BLOOD PRESSURE: 120 MMHG | WEIGHT: 136 LBS | HEART RATE: 79 BPM | OXYGEN SATURATION: 99 %

## 2019-10-07 DIAGNOSIS — R10.12 LEFT UPPER QUADRANT PAIN: ICD-10-CM

## 2019-10-07 PROCEDURE — 36415 COLL VENOUS BLD VENIPUNCTURE: CPT

## 2019-10-07 PROCEDURE — 99214 OFFICE O/P EST MOD 30 MIN: CPT | Mod: 25

## 2019-10-07 RX ORDER — ATENOLOL AND CHLORTHALIDONE 100; 25 MG/1; MG/1
100-25 TABLET ORAL
Refills: 0 | Status: DISCONTINUED | COMMUNITY
End: 2019-10-07

## 2019-10-07 NOTE — HISTORY OF PRESENT ILLNESS
[FreeTextEntry1] : Follow up on diabetes and medications [de-identified] : Pt here for f/u.\par Recently had UTI.  Treated by gyn.  Pt took all abx and feels well.\par Saw Dr. Moncada.  Had lab tests for inflammation.  Still has joint and extremity pain.  Intermittent.\par Was in hospital in 2017 for a pain in the spine, left flank.  Had sepsis.  Had to get PICC line for IV abx.  Still has intermittent pain in the LUQ of the abdomen at same site.  Less than once a week.  Lasts short amount of time.  No bloating.  No changes in stools.  No n/f/f/c.\par Taking diabetes meds as directed.  Watches what she eats. Feels educated about DM.\par Declines flu shot.

## 2019-10-07 NOTE — PHYSICAL EXAM
[Normal] : soft, non-tender, non-distended, no masses palpated, no HSM and normal bowel sounds [de-identified] : Focal area of pain in the LUQ.  Non radiating.  Only discomfort is to deep palpation. No guiarding.  No masses.

## 2019-10-08 LAB
CHOLEST SERPL-MCNC: 160 MG/DL
CHOLEST/HDLC SERPL: 3.5 RATIO
ESTIMATED AVERAGE GLUCOSE: 160 MG/DL
HBA1C MFR BLD HPLC: 7.2 %
HDLC SERPL-MCNC: 46 MG/DL
LDLC SERPL CALC-MCNC: 64 MG/DL
TRIGL SERPL-MCNC: 249 MG/DL

## 2019-10-18 ENCOUNTER — NON-APPOINTMENT (OUTPATIENT)
Age: 64
End: 2019-10-18

## 2019-10-18 ENCOUNTER — APPOINTMENT (OUTPATIENT)
Dept: CARDIOLOGY | Facility: CLINIC | Age: 64
End: 2019-10-18
Payer: MEDICARE

## 2019-10-18 VITALS
DIASTOLIC BLOOD PRESSURE: 66 MMHG | OXYGEN SATURATION: 99 % | HEART RATE: 78 BPM | WEIGHT: 139 LBS | SYSTOLIC BLOOD PRESSURE: 116 MMHG | BODY MASS INDEX: 25.42 KG/M2

## 2019-10-18 PROCEDURE — 93000 ELECTROCARDIOGRAM COMPLETE: CPT

## 2019-10-18 PROCEDURE — 99215 OFFICE O/P EST HI 40 MIN: CPT

## 2019-10-18 NOTE — DISCUSSION/SUMMARY
[FreeTextEntry1] : \par \par 2. SOB.  Again, patient has a long history of shortness of breath/dyspnea on exertion, with unremarkable workup in the past except for mild pulmonary hypertension. She had normal PFTs back in 2004\par I repeated the workup as mentioned above. \par The echo from 6/6/18 showed normal pulmonary pressure, and the stress echo showed no ischemia\par In 6/18, she reported improvement in her dyspnea, without any intervention. I wondered if it has to do with her allergies\par Today, she cont to deny sign dyspnea, except for episodes which sound like PND -> not every night and no other symptoms/signs of fluid overload -> clinically, no CHF however\par Rec:\par Same medical regimen\par \par \par \par 4.  Hyperlipidemia\par Treated\par Lipids from 1/7/19 showed chol 133, trigl 131, HDL 45, LDL 62 (from 82 in 2017)\par Rec:\par same management\par \par 5.  Palpitations\par Long history of palpitations, intermittent\par Rec:\par Follow\par \par

## 2019-10-18 NOTE — PHYSICAL EXAM
[General Appearance - Well Developed] : well developed [General Appearance - Well Nourished] : well nourished [Normal Conjunctiva] : the conjunctiva exhibited no abnormalities [Auscultation Breath Sounds / Voice Sounds] : lungs were clear to auscultation bilaterally [Murmurs] : no murmurs present [Heart Rate And Rhythm] : heart rate and rhythm were normal [Bowel Sounds] : normal bowel sounds [Abdomen Soft] : soft [Abdomen Tenderness] : non-tender [Abnormal Walk] : normal gait [Oriented To Time, Place, And Person] : oriented to person, place, and time [Skin Color & Pigmentation] : normal skin color and pigmentation [Nail Clubbing] : no clubbing of the fingernails [FreeTextEntry1] : tenderness to manual pressure under left breast

## 2019-10-18 NOTE — REASON FOR VISIT
[Follow-Up - Clinic] : a clinic follow-up of [Chest Pain] : chest pain [Hypertension] : hypertension [Hyperlipidemia] : hyperlipidemia [FreeTextEntry1] : hx TIA, DM

## 2019-10-18 NOTE — HISTORY OF PRESENT ILLNESS
[FreeTextEntry1] : Doing well\par [place] 3 months (came back on 9/18/19)\par \par Had CP 2 weeks ago, intermittently, at rest, better with NSAIDs\par Walks for about 1 hr, a few times a week, w/o symptoms \par \par Sees Dr Sams for allergies\par \par Headache all the time -. sees Dr Yi\par \par Has ingrown nail in right big toe -. seeing Podiatrist

## 2019-11-20 ENCOUNTER — APPOINTMENT (OUTPATIENT)
Dept: RHEUMATOLOGY | Facility: CLINIC | Age: 64
End: 2019-11-20

## 2019-11-25 ENCOUNTER — APPOINTMENT (OUTPATIENT)
Dept: FAMILY MEDICINE | Facility: CLINIC | Age: 64
End: 2019-11-25
Payer: MEDICARE

## 2019-11-25 VITALS
SYSTOLIC BLOOD PRESSURE: 124 MMHG | WEIGHT: 139 LBS | HEART RATE: 72 BPM | HEIGHT: 62 IN | DIASTOLIC BLOOD PRESSURE: 78 MMHG | BODY MASS INDEX: 25.58 KG/M2

## 2019-11-25 PROCEDURE — 99397 PER PM REEVAL EST PAT 65+ YR: CPT

## 2019-11-25 RX ORDER — GABAPENTIN 100 MG/1
100 CAPSULE ORAL 3 TIMES DAILY
Refills: 0 | Status: DISCONTINUED | COMMUNITY
End: 2019-11-25

## 2019-11-25 NOTE — PHYSICAL EXAM
[Pedal Pulses Present] : the pedal pulses are present [No Edema] : there was no peripheral edema [No Extremity Clubbing/Cyanosis] : no extremity clubbing/cyanosis [Normal] : affect was normal and insight and judgment were intact

## 2019-11-25 NOTE — REVIEW OF SYSTEMS
[Joint Pain] : joint pain [Chest Pain] : chest pain [Fatigue] : fatigue [Negative] : Psychiatric [FreeTextEntry9] : Chronic pain

## 2019-11-25 NOTE — HISTORY OF PRESENT ILLNESS
[FreeTextEntry1] : PE [de-identified] : Pt here for PE.\par Feels well.\par c/o two days with some headache.  Saw Dr. Yi for headache.  Got gabapentin but not helping with headache.  Stopped gabapentin.  Wants to see a new neurologist for a second opinion.\par Had seen Dr. Alston in past for kidneys.  Just saw her last week.  Did urine testing and cystoscopy and was told it was normal.  Told her to f/u in 6 months.\par Pt reports she never takes immunizations.  Aware of pneumoccal, influenze, and zoster.  Refuses all three.\par Had a bone density test over ten years ago.  None since.  Thinks the test was normal back then.  Not sure why she got it at the time.

## 2019-11-25 NOTE — HEALTH RISK ASSESSMENT
[Patient reported colonoscopy was normal] : Patient reported colonoscopy was normal [ColonoscopyDate] : 04/15 [ColonoscopyComments] : Dr. Bull.  Every 5 years.

## 2019-12-03 ENCOUNTER — NON-APPOINTMENT (OUTPATIENT)
Age: 64
End: 2019-12-03

## 2019-12-03 ENCOUNTER — APPOINTMENT (OUTPATIENT)
Dept: CARDIOLOGY | Facility: CLINIC | Age: 64
End: 2019-12-03
Payer: MEDICARE

## 2019-12-03 VITALS
OXYGEN SATURATION: 100 % | DIASTOLIC BLOOD PRESSURE: 76 MMHG | WEIGHT: 137.06 LBS | SYSTOLIC BLOOD PRESSURE: 143 MMHG | HEART RATE: 71 BPM | BODY MASS INDEX: 25.07 KG/M2

## 2019-12-03 PROCEDURE — 93000 ELECTROCARDIOGRAM COMPLETE: CPT

## 2019-12-03 PROCEDURE — 99215 OFFICE O/P EST HI 40 MIN: CPT

## 2019-12-03 NOTE — PHYSICAL EXAM
[General Appearance - Well Developed] : well developed [General Appearance - Well Nourished] : well nourished [Normal Conjunctiva] : the conjunctiva exhibited no abnormalities [Heart Rate And Rhythm] : heart rate and rhythm were normal [Auscultation Breath Sounds / Voice Sounds] : lungs were clear to auscultation bilaterally [Murmurs] : no murmurs present [Bowel Sounds] : normal bowel sounds [Abdomen Soft] : soft [Abdomen Tenderness] : non-tender [Abnormal Walk] : normal gait [Nail Clubbing] : no clubbing of the fingernails [Skin Color & Pigmentation] : normal skin color and pigmentation [Oriented To Time, Place, And Person] : oriented to person, place, and time [FreeTextEntry1] : NCAT

## 2019-12-03 NOTE — HISTORY OF PRESENT ILLNESS
[FreeTextEntry1] : This past Sunday, she had severe headache (had had it all week, but worse that day), followed by SOB -> chest pressure and dizziness\par BP was 163/63, P 47\par \par Has continued with headaches since\par Also , has continued with chest pain since, continuously\par \par She has an appointment in 1/120 with neuro for headaches\par \par She tries to walk about 3 times a week without exertional chest pain

## 2019-12-16 ENCOUNTER — RX RENEWAL (OUTPATIENT)
Age: 64
End: 2019-12-16

## 2019-12-16 ENCOUNTER — APPOINTMENT (OUTPATIENT)
Dept: NEUROLOGY | Facility: CLINIC | Age: 64
End: 2019-12-16
Payer: MEDICARE

## 2019-12-16 VITALS
OXYGEN SATURATION: 97 % | BODY MASS INDEX: 25.03 KG/M2 | DIASTOLIC BLOOD PRESSURE: 58 MMHG | TEMPERATURE: 97.2 F | HEIGHT: 62 IN | SYSTOLIC BLOOD PRESSURE: 112 MMHG | WEIGHT: 136 LBS | HEART RATE: 85 BPM

## 2019-12-16 DIAGNOSIS — G44.40 DRUG-INDUCED HEADACHE, NOT ELSEWHERE CLASSIFIED, NOT INTRACTABLE: ICD-10-CM

## 2019-12-16 DIAGNOSIS — Z87.442 PERSONAL HISTORY OF URINARY CALCULI: ICD-10-CM

## 2019-12-16 PROCEDURE — 99215 OFFICE O/P EST HI 40 MIN: CPT

## 2019-12-16 RX ORDER — LEVOCETIRIZINE DIHYDROCHLORIDE 5 MG/1
5 TABLET ORAL
Qty: 60 | Refills: 0 | Status: DISCONTINUED | COMMUNITY
Start: 2019-03-13 | End: 2019-12-16

## 2019-12-16 RX ORDER — LORATADINE 5 MG
17 TABLET,CHEWABLE ORAL
Refills: 0 | Status: DISCONTINUED | COMMUNITY
End: 2019-12-16

## 2019-12-16 NOTE — REVIEW OF SYSTEMS
[Feeling Poorly] : feeling poorly [Feeling Tired] : feeling tired [Migraine Headache] : migraine headaches [Cluster Headache] : cluster headaches [Dizziness] : dizziness [Tension Headache] : tension-type headaches [Chest Pain] : chest pain [Palpitations] : palpitations [Joint Pain] : joint pain [Limb Pain] : limb pain [Easy Bruising] : a tendency for easy bruising [Joint Stiffness] : joint stiffness [Feelings Of Weakness] : feelings of weakness [Negative] : Integumentary [FreeTextEntry2] : Weakness [FreeTextEntry5] : Chest pressure, Leg swelling [FreeTextEntry8] : blood in urine [FreeTextEntry7] : Nausea [FreeTextEntry9] : Neck pain, Back pain

## 2019-12-16 NOTE — REASON FOR VISIT
[Consultation] : a consultation visit [Family Member] : family member [FreeTextEntry1] : Pt is having headaches everyday. Symptoms of naeusea, dizziness, off balance, sensitivity to light and shaking.

## 2019-12-16 NOTE — PHYSICAL EXAM
[FreeTextEntry1] : Physical examination \par General: No acute distress, Awake, Alert. \par Fundus: Unable\par Neck: no Carotid bruit. \par Cardiovascular: Normal rate, Regular rhythm, No murmur. \par Chest - clear bilaterally\par \par Mental status \par Awake, alert, and oriented to person, time and place, Normal attention span and concentration, Recent and remote memory intact, Language intact, Fund of knowledge intact. \par Cranial Nerves \par II: VFF \par III, IV, VI: PERRL, EOMI. \par V: Facial sensation is normal B/L. \par VII: Facial strength is normal B/L. \par VIII: Gross hearing is intact. \par IX, X: Palate is midline and elevates symmetrically. \par XI: Trapezius normal strength. \par XII: Tongue midline without atrophy or fasciculations. \par \par Motor exam \par Muscle tone - no evidence of rigidity or resistance in all 4 extremities. \par No atrophy or fasciculations \par Muscle Strength: arms and legs, proximal and distal flexors and extensors are normal \par No UE drift.\par \par Reflexes \par All present, normal, and symmetrical. \par Plantars right: mute. \par Plantars left: mute. \par Absent ankle jerks. \par \par Coordination \par Finger to nose: Normal. \par Heel to shin: Normal. \par \par Sensory \par Intact sensation to vibration and cold.\par \par Gait \par Normal\par \par Bilateral trapezius muscle spasm.

## 2019-12-16 NOTE — ASSESSMENT
[FreeTextEntry1] : She has severe medication overuse component to her headaches. She also describes migraines with aura. I explained to her that if she continues to take Motrin or Advil at the rate she is currently taking it will be very harmful for her kidneys as well as her GI system. She is to refrain from taking these medications as obviously they're not helping and making her headaches more refractory period for prevention, she will take amitriptyline. I advised her to drink more water daily. \par Continue Aspirin 81 mg daily.\par She will see me in follow up in 2 months.

## 2019-12-16 NOTE — HISTORY OF PRESENT ILLNESS
[FreeTextEntry1] : This is a 56-year-old right-handed woman who is being seen in neurologic consultation for evaluation of headaches. Interestingly she saw me for a TIA in February, 2019 [facility].Patient states she's had headaches for 5-6 years. She notes almost daily headaches. These can be unilateral or bilateral frontal and occipital and location. There associated with seeing lights in her visual field most of the time but not always. There is intense light sensitivity. She has dizziness and nausea. She does endorse some neck pain with her headaches. She takes Motrin and Advil which helps transiently. Sometimes she takes 3 tablets daily. Other times she will take 3 tablets 3-4 times a day. She does not drink enough water. She did not sleep very well at night according to her daughter and according to the patient herself. She has never tried preventative medications before.\par MRI of the brain and CT angiogram head and neck were normal in February, 2019 the hospital.

## 2020-01-06 ENCOUNTER — APPOINTMENT (OUTPATIENT)
Dept: CARDIOLOGY | Facility: CLINIC | Age: 65
End: 2020-01-06
Payer: MEDICARE

## 2020-01-06 VITALS
WEIGHT: 139 LBS | HEART RATE: 79 BPM | DIASTOLIC BLOOD PRESSURE: 70 MMHG | BODY MASS INDEX: 25.42 KG/M2 | SYSTOLIC BLOOD PRESSURE: 112 MMHG | OXYGEN SATURATION: 100 %

## 2020-01-06 PROCEDURE — 99214 OFFICE O/P EST MOD 30 MIN: CPT

## 2020-01-06 NOTE — HISTORY OF PRESENT ILLNESS
[FreeTextEntry1] : Patient saw Dr. Renteria for her migraines. She was started on amitriptyline 10 mg which was then increased to 20 mg after a week\par Patient says she started having good relief at the 20 mg dose. She gets mild symptoms but nothing like what she had before. She was dizzy one day last week , rather than on a daily basis like she used to be with her headaches\par No other symptoms\par No recurrent chest pain

## 2020-01-06 NOTE — REASON FOR VISIT
[Follow-Up - Clinic] : a clinic follow-up of [Hyperlipidemia] : hyperlipidemia [Chest Pain] : chest pain [Hypertension] : hypertension [FreeTextEntry1] : DM

## 2020-01-06 NOTE — PHYSICAL EXAM
[General Appearance - Well Developed] : well developed [Normal Conjunctiva] : the conjunctiva exhibited no abnormalities [General Appearance - Well Nourished] : well nourished [Auscultation Breath Sounds / Voice Sounds] : lungs were clear to auscultation bilaterally [Heart Rate And Rhythm] : heart rate and rhythm were normal [Murmurs] : no murmurs present [FreeTextEntry1] : tenderness to manual pressure under left breast [Bowel Sounds] : normal bowel sounds [Abdomen Soft] : soft [Abdomen Tenderness] : non-tender [Nail Clubbing] : no clubbing of the fingernails [Abnormal Walk] : normal gait [Oriented To Time, Place, And Person] : oriented to person, place, and time [Skin Color & Pigmentation] : normal skin color and pigmentation

## 2020-02-04 ENCOUNTER — APPOINTMENT (OUTPATIENT)
Age: 65
End: 2020-02-04
Payer: MEDICARE

## 2020-02-04 VITALS
HEART RATE: 92 BPM | BODY MASS INDEX: 25.4 KG/M2 | DIASTOLIC BLOOD PRESSURE: 68 MMHG | OXYGEN SATURATION: 100 % | SYSTOLIC BLOOD PRESSURE: 110 MMHG | WEIGHT: 138 LBS | HEIGHT: 62 IN | TEMPERATURE: 98.4 F

## 2020-02-04 VITALS — HEART RATE: 96 BPM | SYSTOLIC BLOOD PRESSURE: 112 MMHG | OXYGEN SATURATION: 100 % | DIASTOLIC BLOOD PRESSURE: 70 MMHG

## 2020-02-04 VITALS — DIASTOLIC BLOOD PRESSURE: 66 MMHG | SYSTOLIC BLOOD PRESSURE: 106 MMHG

## 2020-02-04 DIAGNOSIS — Z87.19 PERSONAL HISTORY OF OTHER DISEASES OF THE DIGESTIVE SYSTEM: ICD-10-CM

## 2020-02-04 PROCEDURE — 99213 OFFICE O/P EST LOW 20 MIN: CPT

## 2020-02-04 NOTE — HISTORY OF PRESENT ILLNESS
[FreeTextEntry8] : 64 yr old female with history of migraines, arthritis, type 2 diabetes \par presents with 4 days history of improving intermittent dizziness, headache and single day of abdominal pain \par Saturday \par headache- bilateral intermittent improved with medication; anterior posterior; POUND SNOOP neg \par no headache today; This morning had headache  which again imrpoved not as bad as Saturday headache\par dizziness\par describes as light headed not associated with movement \par improves with sitting \par no nausea ,vomiting dirrhea\par no fever \par multiple changes in medication including two antihistamine medications and amitryptiline for migraines \par \par Sunday \par stomach pain- largely resolved  since Sunday \par no vomit  \par 2 hrs after eating that day \par none today \par had decreased appetite\par \par Patient notes chronic dry mouth \par sometimes tongue sticks to roof of mouth \par no dry eyes really no difficulty swallowing

## 2020-02-04 NOTE — PHYSICAL EXAM
[Normal Oropharynx] : the oropharynx was normal [Normal TMs] : both tympanic membranes were normal [No JVD] : no jugular venous distention [No Lymphadenopathy] : no lymphadenopathy [Supple] : supple [Normal] : normal gait, coordination grossly intact, no focal deficits and deep tendon reflexes were 2+ and symmetric [1+] : left 1+ [de-identified] : dry mucous membranes

## 2020-02-05 LAB
ALBUMIN SERPL ELPH-MCNC: 4.4 G/DL
ALP BLD-CCNC: 56 U/L
ALT SERPL-CCNC: 12 U/L
ANION GAP SERPL CALC-SCNC: 19 MMOL/L
AST SERPL-CCNC: 15 U/L
BASOPHILS # BLD AUTO: 0.04 K/UL
BASOPHILS NFR BLD AUTO: 0.5 %
BILIRUB SERPL-MCNC: 0.2 MG/DL
BUN SERPL-MCNC: 12 MG/DL
CALCIUM SERPL-MCNC: 9.4 MG/DL
CHLORIDE SERPL-SCNC: 99 MMOL/L
CO2 SERPL-SCNC: 25 MMOL/L
CREAT SERPL-MCNC: 0.58 MG/DL
EOSINOPHIL # BLD AUTO: 0.1 K/UL
EOSINOPHIL NFR BLD AUTO: 1.3 %
GLUCOSE SERPL-MCNC: 186 MG/DL
HCT VFR BLD CALC: 37 %
HGB BLD-MCNC: 11.1 G/DL
IMM GRANULOCYTES NFR BLD AUTO: 0.1 %
LYMPHOCYTES # BLD AUTO: 2.28 K/UL
LYMPHOCYTES NFR BLD AUTO: 30.4 %
MAN DIFF?: NORMAL
MCHC RBC-ENTMCNC: 23.7 PG
MCHC RBC-ENTMCNC: 30 GM/DL
MCV RBC AUTO: 79.1 FL
MONOCYTES # BLD AUTO: 0.58 K/UL
MONOCYTES NFR BLD AUTO: 7.7 %
NEUTROPHILS # BLD AUTO: 4.49 K/UL
NEUTROPHILS NFR BLD AUTO: 60 %
PLATELET # BLD AUTO: 358 K/UL
POTASSIUM SERPL-SCNC: 3.7 MMOL/L
PROT SERPL-MCNC: 6.7 G/DL
RBC # BLD: 4.68 M/UL
RBC # FLD: 16.2 %
SODIUM SERPL-SCNC: 143 MMOL/L
WBC # FLD AUTO: 7.5 K/UL

## 2020-02-19 ENCOUNTER — APPOINTMENT (OUTPATIENT)
Dept: GASTROENTEROLOGY | Facility: CLINIC | Age: 65
End: 2020-02-19
Payer: MEDICARE

## 2020-02-19 ENCOUNTER — APPOINTMENT (OUTPATIENT)
Dept: NEUROLOGY | Facility: CLINIC | Age: 65
End: 2020-02-19
Payer: MEDICARE

## 2020-02-19 VITALS
BODY MASS INDEX: 25.4 KG/M2 | WEIGHT: 138 LBS | DIASTOLIC BLOOD PRESSURE: 64 MMHG | SYSTOLIC BLOOD PRESSURE: 110 MMHG | TEMPERATURE: 98 F | HEART RATE: 75 BPM | HEIGHT: 62 IN

## 2020-02-19 VITALS
HEART RATE: 90 BPM | DIASTOLIC BLOOD PRESSURE: 70 MMHG | OXYGEN SATURATION: 98 % | BODY MASS INDEX: 25.4 KG/M2 | HEIGHT: 62 IN | SYSTOLIC BLOOD PRESSURE: 108 MMHG | WEIGHT: 138 LBS

## 2020-02-19 PROCEDURE — 99214 OFFICE O/P EST MOD 30 MIN: CPT

## 2020-02-19 NOTE — CONSULT LETTER
[Dear  ___] : Dear  [unfilled], [Courtesy Letter:] : I had the pleasure of seeing your patient, [unfilled], in my office today. [Please see my note below.] : Please see my note below. [Consult Closing:] : Thank you very much for allowing me to participate in the care of this patient.  If you have any questions, please do not hesitate to contact me. [FreeTextEntry3] : Cisco Bull MD\par tel: [phone]\par fax: [phone]\par  [Sincerely,] : Sincerely,

## 2020-02-19 NOTE — REASON FOR VISIT
[Follow-Up: _____] : a [unfilled] follow-up visit [Family Member] : family member [FreeTextEntry1] : Follow up

## 2020-02-19 NOTE — ASSESSMENT
[FreeTextEntry1] : 1. ANEMIA:  EGD  and colonoscopy planned\par \par 2. GERD:  Dietary and lifestyle modification\par \par 3. CONSTIPATION:  Increase dietary water and fiber

## 2020-02-19 NOTE — REVIEW OF SYSTEMS
[Feeling Poorly] : feeling poorly [Feeling Tired] : feeling tired [Dizziness] : dizziness [Cluster Headache] : cluster headaches [Tension Headache] : tension-type headaches [Migraine Headache] : migraine headaches [Chest Pain] : chest pain [Palpitations] : palpitations [Joint Pain] : joint pain [Joint Stiffness] : joint stiffness [Feelings Of Weakness] : feelings of weakness [Limb Pain] : limb pain [Easy Bruising] : a tendency for easy bruising [Negative] : Integumentary [FreeTextEntry2] : Weakness [FreeTextEntry5] : Chest pressure, Leg swelling [FreeTextEntry9] : Neck pain, Back pain [FreeTextEntry7] : Nausea [FreeTextEntry8] : blood in urine

## 2020-02-19 NOTE — HISTORY OF PRESENT ILLNESS
[FreeTextEntry1] : 2/19/2020\par Reports essential resolution of her headaches. She will now have maybe one headache every other week. Mild dizziness. She is endorsing some dryness in her mouth.\par \par 12/16/19\par This is a 56-year-old right-handed woman who is being seen in neurologic consultation for evaluation of headaches. Interestingly she saw me for a TIA in February, 2019 [facility].Patient states she's had headaches for 5-6 years. She notes almost daily headaches. These can be unilateral or bilateral frontal and occipital and location. There associated with seeing lights in her visual field most of the time but not always. There is intense light sensitivity. She has dizziness and nausea. She does endorse some neck pain with her headaches. She takes Motrin and Advil which helps transiently. Sometimes she takes 3 tablets daily. Other times she will take 3 tablets 3-4 times a day. She does not drink enough water. She did not sleep very well at night according to her daughter and according to the patient herself. She has never tried preventative medications before.\par MRI of the brain and CT angiogram head and neck were normal in February, 2019 the hospital.

## 2020-02-19 NOTE — HISTORY OF PRESENT ILLNESS
[de-identified] : Presents on referral Aleisha for a microcytic  mild anemia. additionally c/o long standing constipation and epigastric discomfort ( mild) . Denies nausea, vomiting, fever, chills, melena, hematemesis, BRBPR. Last seen 12/2017 in  follow up for rt. back / rt. flank pain that prompted ER visit 12/4/17.  CT scan notable for fatty liver (report reviewed by me) . Pain was eventually alleviated by a large bowel movement ( after mag. citrate). EGD / colonoscopy 4/2015 revealed H. pylori negative gastritis / esophagitis / hemorrhoids and diverticulosis. Indication for procedures was constipation / bloating / dysphagia. Digestive advantage recommended as well with Linzess to be considered if sxs persisted. Admitted to minimal water / fiber daily. Seen in 12/12/2011 for persistent epigastric pain. Omeprazole was increased to BID. SONO/CAT scan was significant for fatty liver and a fatty growth on the kidney for which she was referred to Dr. Alston. At the 2011 visit the patient reported a EGD / colonoscopy in 2009 but did not know the name of the gastroenterologist. An EGD / colonoscopy performed by me in 2004/2002 respectively revealed melanosis coli and gastritis / hiatal hernia. \par

## 2020-02-19 NOTE — ASSESSMENT
[FreeTextEntry1] : Discuss medication overuse again. She reassures me she's not taking too much Tylenol or Motrin.\par She may decrease amitriptyline to 10 mg at night and see if this improves her dry mouth. If she gets more headaches, she will increase it back up to 20 mg daily. She will see me in 6 months.\par Continue Aspirin 81 mg daily.

## 2020-02-19 NOTE — PHYSICAL EXAM
[FreeTextEntry1] : Physical examination \par General: No acute distress, Awake, Alert. \par Cardiovascular: Normal rate, Regular rhythm, No murmur. \par Chest - clear bilaterally\par \par Mental status \par Awake, alert, and oriented to person, time and place, Normal attention span and concentration, Recent and remote memory intact, Language intact, Fund of knowledge intact. \par Cranial Nerves \par II: VFF \par III, IV, VI: PERRL, EOMI. \par V: Facial sensation is normal B/L. \par VII: Facial strength is normal B/L. \par VIII: Gross hearing is intact. \par IX, X: Palate is midline and elevates symmetrically. \par XI: Trapezius normal strength. \par XII: Tongue midline without atrophy or fasciculations. \par \par Motor exam \par Muscle tone - no evidence of rigidity or resistance in all 4 extremities. \par No atrophy or fasciculations \par Muscle Strength: arms and legs, proximal and distal flexors and extensors are normal \par No UE drift.\par \par \par Coordination \par Finger to nose: Normal. \par Heel to shin: Normal. \par \par \par Gait \par Normal\par \par Bilateral trapezius muscle spasm.

## 2020-03-05 ENCOUNTER — APPOINTMENT (OUTPATIENT)
Dept: OBGYN | Facility: CLINIC | Age: 65
End: 2020-03-05

## 2020-03-16 ENCOUNTER — RESULT REVIEW (OUTPATIENT)
Age: 65
End: 2020-03-16

## 2020-03-17 ENCOUNTER — APPOINTMENT (OUTPATIENT)
Dept: GASTROENTEROLOGY | Facility: HOSPITAL | Age: 65
End: 2020-03-17

## 2020-04-13 ENCOUNTER — APPOINTMENT (OUTPATIENT)
Dept: CARDIOLOGY | Facility: CLINIC | Age: 65
End: 2020-04-13

## 2020-05-14 PROBLEM — R10.12 LEFT UPPER QUADRANT PAIN: Status: ACTIVE | Noted: 2019-10-07

## 2020-05-24 ENCOUNTER — TRANSCRIPTION ENCOUNTER (OUTPATIENT)
Age: 65
End: 2020-05-24

## 2020-06-04 ENCOUNTER — NON-APPOINTMENT (OUTPATIENT)
Age: 65
End: 2020-06-04

## 2020-06-04 ENCOUNTER — APPOINTMENT (OUTPATIENT)
Dept: CARDIOLOGY | Facility: CLINIC | Age: 65
End: 2020-06-04
Payer: MEDICARE

## 2020-06-04 VITALS
WEIGHT: 137 LBS | BODY MASS INDEX: 25.06 KG/M2 | SYSTOLIC BLOOD PRESSURE: 112 MMHG | HEART RATE: 93 BPM | OXYGEN SATURATION: 98 % | DIASTOLIC BLOOD PRESSURE: 70 MMHG

## 2020-06-04 PROCEDURE — 93000 ELECTROCARDIOGRAM COMPLETE: CPT | Mod: 59

## 2020-06-04 PROCEDURE — 0296T: CPT

## 2020-06-04 PROCEDURE — 99214 OFFICE O/P EST MOD 30 MIN: CPT

## 2020-06-04 NOTE — HISTORY OF PRESENT ILLNESS
[FreeTextEntry1] : Recently, she started having episodes where she wakes up in the middle of the night -> feeling heart beating slowly, and needing to take deep breath -> resume sleep after drinking some water\par It happed 4 times 2 weeks ago, 3 times last week and twice this week.\par No orthopnea or AMBROSE, SOB \par \par She used to have the above in the past -. had sleep study\par \par No CP\par Walks 1 hr to and from daughter's house every day -. no CP, SOB

## 2020-06-04 NOTE — PHYSICAL EXAM
[General Appearance - Well Developed] : well developed [Normal Conjunctiva] : the conjunctiva exhibited no abnormalities [General Appearance - Well Nourished] : well nourished [Auscultation Breath Sounds / Voice Sounds] : lungs were clear to auscultation bilaterally [Murmurs] : no murmurs present [Heart Rate And Rhythm] : heart rate and rhythm were normal [Bowel Sounds] : normal bowel sounds [Abdomen Soft] : soft [Abdomen Tenderness] : non-tender [Abnormal Walk] : normal gait [Nail Clubbing] : no clubbing of the fingernails [Oriented To Time, Place, And Person] : oriented to person, place, and time [Skin Color & Pigmentation] : normal skin color and pigmentation [FreeTextEntry1] : tenderness to manual pressure under left breast

## 2020-06-11 ENCOUNTER — LABORATORY RESULT (OUTPATIENT)
Age: 65
End: 2020-06-11

## 2020-06-11 ENCOUNTER — APPOINTMENT (OUTPATIENT)
Dept: FAMILY MEDICINE | Facility: CLINIC | Age: 65
End: 2020-06-11
Payer: MEDICARE

## 2020-06-11 VITALS
SYSTOLIC BLOOD PRESSURE: 128 MMHG | DIASTOLIC BLOOD PRESSURE: 76 MMHG | HEIGHT: 62 IN | HEART RATE: 88 BPM | OXYGEN SATURATION: 98 % | BODY MASS INDEX: 25.21 KG/M2 | WEIGHT: 137 LBS

## 2020-06-11 DIAGNOSIS — R51 HEADACHE: ICD-10-CM

## 2020-06-11 DIAGNOSIS — M54.2 CERVICALGIA: ICD-10-CM

## 2020-06-11 PROCEDURE — 99214 OFFICE O/P EST MOD 30 MIN: CPT | Mod: 25

## 2020-06-11 PROCEDURE — 36415 COLL VENOUS BLD VENIPUNCTURE: CPT

## 2020-06-11 NOTE — HISTORY OF PRESENT ILLNESS
[FreeTextEntry1] : Follow up on multiple complaints [de-identified] : Pt here for f/u.\par c/o pain in the anterior neck for a week.  Takes motrin, tylenol, advil, aleve.  Helps for two hours then comes back. Independent of moving or swallowing (although those can make it worse).   Also behind the left ear lobe on the skull.  For a week.  [place]\par Had allergic reaction a week ago.  Probably after eating virgie seeds.  The mouth and tongue swole up.  Went to urgent care.  they gave her four injections (epi?  steroid?).  Took OTC \par Saw neuro.  Got pills for HA.  Takes them at night.  Helping a lot.\par Wearing Zio patch for eight days.\par Taking meds as directed for diabetes.  Watches her nutrition.  Exercising and staying active.\par Taking BP med.  No SE or concerns.

## 2020-06-11 NOTE — PHYSICAL EXAM
[Normal] : affect was normal and insight and judgment were intact [de-identified] : Discomfort to palpation of the area just lateral to the left of the thyroid tissue.  No nodules or enlarged tissue [de-identified] : Area behind left ear with normal exam even at site of pt's perceived pain [de-identified] : Has Zio patch in place on left chest wall

## 2020-06-16 ENCOUNTER — RESULT REVIEW (OUTPATIENT)
Age: 65
End: 2020-06-16

## 2020-06-18 LAB
ALBUMIN SERPL ELPH-MCNC: 4.3 G/DL
ALP BLD-CCNC: 58 U/L
ALT SERPL-CCNC: 16 U/L
ANION GAP SERPL CALC-SCNC: 16 MMOL/L
AST SERPL-CCNC: 20 U/L
BASOPHILS # BLD AUTO: 0.02 K/UL
BASOPHILS NFR BLD AUTO: 0.3 %
BILIRUB SERPL-MCNC: 0.4 MG/DL
BUN SERPL-MCNC: 10 MG/DL
CALCIUM SERPL-MCNC: 9.2 MG/DL
CHLORIDE SERPL-SCNC: 101 MMOL/L
CHOLEST SERPL-MCNC: 132 MG/DL
CHOLEST/HDLC SERPL: 2.7 RATIO
CO2 SERPL-SCNC: 24 MMOL/L
CREAT SERPL-MCNC: 0.67 MG/DL
EOSINOPHIL # BLD AUTO: 0.08 K/UL
EOSINOPHIL NFR BLD AUTO: 1.1 %
ESTIMATED AVERAGE GLUCOSE: 163 MG/DL
GLUCOSE SERPL-MCNC: 95 MG/DL
HBA1C MFR BLD HPLC: 7.3 %
HCT VFR BLD CALC: 39.6 %
HDLC SERPL-MCNC: 48 MG/DL
HGB BLD-MCNC: 11.3 G/DL
IMM GRANULOCYTES NFR BLD AUTO: 0.1 %
LDLC SERPL CALC-MCNC: 58 MG/DL
LYMPHOCYTES # BLD AUTO: 1.86 K/UL
LYMPHOCYTES NFR BLD AUTO: 26.6 %
MAN DIFF?: NORMAL
MCHC RBC-ENTMCNC: 24.6 PG
MCHC RBC-ENTMCNC: 28.5 GM/DL
MCV RBC AUTO: 86.3 FL
MONOCYTES # BLD AUTO: 0.53 K/UL
MONOCYTES NFR BLD AUTO: 7.6 %
NEUTROPHILS # BLD AUTO: 4.49 K/UL
NEUTROPHILS NFR BLD AUTO: 64.3 %
PLATELET # BLD AUTO: 318 K/UL
POTASSIUM SERPL-SCNC: 4.3 MMOL/L
PROT SERPL-MCNC: 6.5 G/DL
RBC # BLD: 4.59 M/UL
RBC # FLD: 17.4 %
SODIUM SERPL-SCNC: 142 MMOL/L
TRIGL SERPL-MCNC: 127 MG/DL
TSH SERPL-ACNC: 0.83 UIU/ML
WBC # FLD AUTO: 6.99 K/UL

## 2020-06-23 ENCOUNTER — RESULT REVIEW (OUTPATIENT)
Age: 65
End: 2020-06-23

## 2020-06-24 ENCOUNTER — APPOINTMENT (OUTPATIENT)
Dept: NEUROLOGY | Facility: CLINIC | Age: 65
End: 2020-06-24
Payer: MEDICARE

## 2020-06-24 DIAGNOSIS — Z98.890 OTHER SPECIFIED POSTPROCEDURAL STATES: ICD-10-CM

## 2020-06-24 DIAGNOSIS — G44.209 TENSION-TYPE HEADACHE, UNSPECIFIED, NOT INTRACTABLE: ICD-10-CM

## 2020-06-24 PROCEDURE — 99442: CPT

## 2020-06-25 ENCOUNTER — RESULT REVIEW (OUTPATIENT)
Age: 65
End: 2020-06-25

## 2020-06-25 PROBLEM — Z98.890 HISTORY OF HOLTER MONITORING: Status: RESOLVED | Noted: 2020-06-25 | Resolved: 2020-06-25

## 2020-06-25 PROBLEM — G44.209 TENSION HEADACHE: Status: ACTIVE | Noted: 2020-06-25

## 2020-06-25 NOTE — ASSESSMENT
[FreeTextEntry1] : For now she'll continue amitriptyline 20 mg at bedtime. She will see me in followup in a month and if her headaches are still just as frequently we may need to increase or change the medication.

## 2020-06-25 NOTE — HISTORY OF PRESENT ILLNESS
[Other Location: e.g. Home (Enter Location, City,State)___] : at [unfilled] [Home] : at home, [unfilled] , at the time of the visit. [Verbal consent obtained from patient] : the patient, [unfilled] [FreeTextEntry1] : In February, patient reported that her headaches were in good control. Over the last 3 weeks she has been having a sore throat and difficulty swallowing. She underwent ultrasound of the thyroid which shows an abnormality. She is scheduled for a biopsy this Friday. Her headaches have increased since then. She thinks it may be due to the stress she is experiencing from her current diagnosis and workup. She does not have any significant neck pain. Intermittently she has pain behind the right ear. The headaches continue to be bifrontal. They are helped with Tylenol or Advil. She has been compliant with amitriptyline. She would like a refill on this.

## 2020-06-26 ENCOUNTER — RESULT REVIEW (OUTPATIENT)
Age: 65
End: 2020-06-26

## 2020-07-01 ENCOUNTER — LABORATORY RESULT (OUTPATIENT)
Age: 65
End: 2020-07-01

## 2020-07-01 ENCOUNTER — APPOINTMENT (OUTPATIENT)
Dept: ENDOCRINOLOGY | Facility: CLINIC | Age: 65
End: 2020-07-01
Payer: MEDICARE

## 2020-07-01 VITALS
OXYGEN SATURATION: 98 % | WEIGHT: 135 LBS | HEIGHT: 62 IN | DIASTOLIC BLOOD PRESSURE: 78 MMHG | BODY MASS INDEX: 24.84 KG/M2 | SYSTOLIC BLOOD PRESSURE: 120 MMHG | HEART RATE: 86 BPM

## 2020-07-01 PROCEDURE — 99203 OFFICE O/P NEW LOW 30 MIN: CPT | Mod: 25

## 2020-07-01 PROCEDURE — 36415 COLL VENOUS BLD VENIPUNCTURE: CPT

## 2020-07-02 LAB — ERYTHROCYTE [SEDIMENTATION RATE] IN BLOOD BY WESTERGREN METHOD: 39 MM/HR

## 2020-07-02 NOTE — PHYSICAL EXAM
[Alert] : alert [Well Nourished] : well nourished [Well Developed] : well developed [No Acute Distress] : no acute distress [Healthy Appearance] : healthy appearance [No Lid Lag] : no lid lag [No Proptosis] : no proptosis [Regular Rhythm] : with a regular rhythm [No Respiratory Distress] : no respiratory distress [Normal Rate] : heart rate was normal [No Stigmata of Cushings Syndrome] : no stigmata of Cushings Syndrome [No Tremors] : no tremors [No Involuntary Movements] : no involuntary movements were seen [Normal Insight/Judgement] : insight and judgment were intact [Oriented x3] : oriented to person, place, and time [Normal Affect] : the affect was normal [Normal Mood] : the mood was normal [de-identified] : Thyroid is enlarged, L > R , tender to palpation

## 2020-07-02 NOTE — HISTORY OF PRESENT ILLNESS
[FreeTextEntry1] : Jul 01, 2020      in person     \par \par PCP:  Dr. Meenu Weaver\par           Neuro:  Dr. Jarad Renteria (for HA)\par           Card:  Dr. Adrienne Pugh (palpitations, wake her from sleep)\par           GI:  Dr. Cisco Bull (anemia)\par           Rheum:  Dr. Sophia Moncada (joint pain)\par           Gyn:  Dr. Gladys Moran\par \par CC:  Tender swollen thyroid\par         (Type 2 diabetes, A1c 7.3%)\par       \par 65 yo accompanied by her daughter.   Patient reports that she started to notice discomfort and swelling in the area of the thyroid about a month ago.  While the tenderness and discomfort was bilateral, it was more noticeable on the left.   The swelling and pain are still present, she reports that both are improving.   She is currently taking Tylenol with some benefit.    There is no previous history of thyroid problem.   Her daughter has been treated for a thyroid condition.  There is no history of face or neck irradiation.  \par \par Lab tests on\par 6/11/2020 included\par TSH 0.83\par \par 6/16/2020 Ultrasound Thyroid at [facility]:\par .\par "The right thyroid lobe measures 4.6 x 1.2 x 2 cm, the isthmus up to 2 mm AP in the midline, and the left thyroid lobe measures 3.8 x 1.4 x 2.1 cm. A large poorly-defined heterogeneous hypoechoic area in the left mid-lower pole measures 3 x 1.3 x 1.3 cm. A couple of small hypoechoic right mid pole nodules measure 4 x 2 x 3 mm and 4 x 2 x 3 mm. Several small left cervical lymph nodes measure 1.0 x 0.6 x 0.9 cm, 1.3 x 0.6 x 0.9 cm, and 1.1 x 0.5 x 0.5 cm, and are diffusely hypoechoic, without clearly defined echogenic indira.\par \par \par IMPRESSION: In view of the history of left-sided neck pain, the large poorly-defined heterogeneous hypoechoic area in the left mid-lower pole may be secondary to thyroiditis, with the possibility of mass not excluded. Several small left-sided lymph nodes could be inflammatory or neoplastic.\par \par \par ***Electronically Signed ***\par -----------------------------------------------\par Demond Valdez MD              06/16/20  "\par \par .\par 6/29/2020 underwent FNAB of the ~ 3 cm L thyroid lesion:\par \par "FINAL DIAGNOSIS\par  \par Left thyroid mid to lower pole area:\par BENIGN FOLLICULAR NODULE WITH CYSTIC CHANGE ([place] Category II)\par A few Hurthle cells and crushed lymphocytes, suggestive of chronic lymphocytic thyroiditis.\par  \par \par MICROSCOPIC DESCRIPTION  \par  \par The Diff-Quik and Papanicolaou stained direct smears show sheets and groups of benign follicular cells with small nuclei, inconspicuous nucleoli and moderate cytoplasm in a hemorrhagic background with bare nuclei, macrophages and thin colloid. Mild metaplastic changes and rare crushed lymphoid cells are noted. "\par \par On exam, the thyroid is slightly enlarged, L more than R and is slightly tender to palpation.  \par \par \par Impression:  Her careful evaluation is most consistent with subacute thyroiditis.  The inflammation of subacute thyroiditis usually causes enough release of thyroid hormone to cause suppression of TSH, which has not yet been seen here, so close follow up by means of history, exam, labs and ultrasound will be the current strategy.    The trajectory of her symptoms appears to be that of improvement, so that is reassuring.   Symptoms from subacute thyroiditis may take several months to completely resolve.  \par \par Plan:  Repeat TSH today, repeat US thyroid in about 4-5 weeks and ROV here after that.

## 2020-07-02 NOTE — ASSESSMENT
[FreeTextEntry1] : Symptoms of thyroid swelling, tenderness, cytology report all consistent with subacute thyroiditis; however, it\par is not so common for TSH to not be suppressed.   Close serial monitoring will be the strategy with repeat US thyroid in 4-5 weeks and ROV after that.     Another TSH today.

## 2020-07-02 NOTE — CONSULT LETTER
[Dear  ___] : Dear  [unfilled], [Please see my note below.] : Please see my note below. [Consult Letter:] : I had the pleasure of evaluating your patient, [unfilled]. [Consult Closing:] : Thank you very much for allowing me to participate in the care of this patient.  If you have any questions, please do not hesitate to contact me. [Sincerely,] : Sincerely, [FreeTextEntry3] : James Hellerman, MD

## 2020-07-26 ENCOUNTER — RX RENEWAL (OUTPATIENT)
Age: 65
End: 2020-07-26

## 2020-07-29 ENCOUNTER — RESULT REVIEW (OUTPATIENT)
Age: 65
End: 2020-07-29

## 2020-08-03 ENCOUNTER — APPOINTMENT (OUTPATIENT)
Dept: ENDOCRINOLOGY | Facility: CLINIC | Age: 65
End: 2020-08-03
Payer: MEDICARE

## 2020-08-03 VITALS
SYSTOLIC BLOOD PRESSURE: 118 MMHG | HEART RATE: 82 BPM | WEIGHT: 135 LBS | HEIGHT: 63 IN | OXYGEN SATURATION: 98 % | BODY MASS INDEX: 23.92 KG/M2 | DIASTOLIC BLOOD PRESSURE: 70 MMHG

## 2020-08-03 PROCEDURE — 36415 COLL VENOUS BLD VENIPUNCTURE: CPT

## 2020-08-03 PROCEDURE — 99214 OFFICE O/P EST MOD 30 MIN: CPT | Mod: 25

## 2020-08-07 NOTE — ASSESSMENT
[FreeTextEntry1] : Appears to be an atypical form of subacute thyroiditis, "marching" from L to R and without suppressed TSH but with rising ESR.  \par Close monitoring will be the strategy

## 2020-08-07 NOTE — HISTORY OF PRESENT ILLNESS
[FreeTextEntry1] : Aug 03, 2020   in person\par \par PCP:  Dr. Meenu Weaver\par           Neuro:  Dr. Jarad Renteria (for HA)\par           Card:  Dr. Adrienne Pugh (palpitations, wake her from sleep)\par           GI:  Dr. Cisco Bull (anemia)\par           Rheum:  Dr. Sophia Moncada (joint pain)\par           Gyn:  Dr. Gladys Moran\par \par CC:  Tender swollen thyroid\par         (Type 2 diabetes, A1c 7.3%)\par       \par \par Recent labs at [facility] from\par 7/29/2020 include:\par \par ESR 52 (up from 39 on July 2 and 11 on Sep 20)\par TSH  1.2\par Thyroid antibodies (TPO, TgAb) remain negative\par \par Biopsy of left thyroid "nodule":  "benign follicular nodule with cystic change"\par \par Follow up US:\par \par CLINICAL HISTORY: Painful enlarged thyroid with nodules, recent benign biopsy of a left lower pole masslike asymmetry\par \par COMPARISON: 6/26/2020\par \par FINDINGS:\par \par Thyroid isthmus measures 2.8 mm.\par \par Right lobe of the thyroid measures 4.9 x 1.7 x 1.7 cm and is diffusely heterogeneous with interval development of a masslike area of asymmetry in the midpole measuring 2.4 x 1.4 x 1.3 cm.\par \par Left lobe of the thyroid measures 3.7 x 1.5 x 1.5 cm diffusely heterogeneous with previously biopsied area of masslike asymmetrical density now measuring 2.7 x 1.3 x 1.3 cm. This is not significantly changed.\par \par There is mild increased vascularity of the thyroid.\par \par Small lymph nodes are seen in the cervical chain bilaterally largest on the left measuring 7.7 x 5.6 x 5.1 mm and the largest on the right measuring 7.9 x 6.8 x 4.6 mm.\par \par \par IMPRESSION: Heterogeneous thyroid gland is again seen with interval development of a masslike area of asymmetry in the right mid pole thyroid gland. No change in the left lobe of the thyroid.\par \par Recommend short interval follow-up perhaps in 3-6 months time.\par \par ***Electronically Signed ***\par -----------------------------------------------\par Eric Pablo MD              07/29/20 \par \par \par Jul 01, 2020      in person     \par \par PCP:  Dr. Meenu Weaver\par           Neuro:  Dr. Jarad Renteria (for HA)\par           Card:  Dr. Adrienne Pugh (palpitations, wake her from sleep)\par           GI:  Dr. Cisco Bull (anemia)\par           Rheum:  Dr. Sophia Moncada (joint pain)\par           Gyn:  Dr. Gladys Moran\par \par CC:  Tender swollen thyroid\par         (Type 2 diabetes, A1c 7.3%)\par       \par 65 yo accompanied by her daughter.   Patient reports that she started to notice discomfort and swelling in the area of the thyroid about a month ago.  While the tenderness and discomfort was bilateral, it was more noticeable on the left.   The swelling and pain are still present, she reports that both are improving.   She is currently taking Tylenol with some benefit.    There is no previous history of thyroid problem.   Her daughter has been treated for a thyroid condition.  There is no history of face or neck irradiation.  \par \par Lab tests on\par 6/11/2020 included\par TSH 0.83\par \par 6/16/2020 Ultrasound Thyroid at [facility]:\par .\par "The right thyroid lobe measures 4.6 x 1.2 x 2 cm, the isthmus up to 2 mm AP in the midline, and the left thyroid lobe measures 3.8 x 1.4 x 2.1 cm. A large poorly-defined heterogeneous hypoechoic area in the left mid-lower pole measures 3 x 1.3 x 1.3 cm. A couple of small hypoechoic right mid pole nodules measure 4 x 2 x 3 mm and 4 x 2 x 3 mm. Several small left cervical lymph nodes measure 1.0 x 0.6 x 0.9 cm, 1.3 x 0.6 x 0.9 cm, and 1.1 x 0.5 x 0.5 cm, and are diffusely hypoechoic, without clearly defined echogenic indira.\par \par \par IMPRESSION: In view of the history of left-sided neck pain, the large poorly-defined heterogeneous hypoechoic area in the left mid-lower pole may be secondary to thyroiditis, with the possibility of mass not excluded. Several small left-sided lymph nodes could be inflammatory or neoplastic.\par \par \par ***Electronically Signed ***\par -----------------------------------------------\par Demond Valdez MD              06/16/20  "\par \par .\par 6/29/2020 underwent FNAB of the ~ 3 cm L thyroid lesion:\par \par "FINAL DIAGNOSIS\par  \par Left thyroid mid to lower pole area:\par BENIGN FOLLICULAR NODULE WITH CYSTIC CHANGE ([place] Category II)\par A few Hurthle cells and crushed lymphocytes, suggestive of chronic lymphocytic thyroiditis.\par  \par \par MICROSCOPIC DESCRIPTION  \par  \par The Diff-Quik and Papanicolaou stained direct smears show sheets and groups of benign follicular cells with small nuclei, inconspicuous nucleoli and moderate cytoplasm in a hemorrhagic background with bare nuclei, macrophages and thin colloid. Mild metaplastic changes and rare crushed lymphoid cells are noted. "\par \par On exam, the thyroid is slightly enlarged, L more than R and is slightly tender to palpation.  \par \par \par Impression:  Her careful evaluation is most consistent with subacute thyroiditis.  The inflammation of subacute thyroiditis usually causes enough release of thyroid hormone to cause suppression of TSH, which has not yet been seen here, so close follow up by means of history, exam, labs and ultrasound will be the current strategy.    The trajectory of her symptoms appears to be that of improvement, so that is reassuring.   Symptoms from subacute thyroiditis may take several months to completely resolve.  \par \par Plan:  Repeat TSH today, repeat US thyroid in about 4-5 weeks and ROV here after that.

## 2020-08-14 ENCOUNTER — RX RENEWAL (OUTPATIENT)
Age: 65
End: 2020-08-14

## 2020-08-16 ENCOUNTER — RX RENEWAL (OUTPATIENT)
Age: 65
End: 2020-08-16

## 2020-08-16 LAB
ANION GAP SERPL CALC-SCNC: 15 MMOL/L
BUN SERPL-MCNC: 16 MG/DL
CALCIUM SERPL-MCNC: 10.2 MG/DL
CHLORIDE SERPL-SCNC: 100 MMOL/L
CO2 SERPL-SCNC: 26 MMOL/L
CREAT SERPL-MCNC: 0.75 MG/DL
ERYTHROCYTE [SEDIMENTATION RATE] IN BLOOD BY WESTERGREN METHOD: 52 MM/HR
FRUCTOSAMINE SERPL-MCNC: 243 UMOL/L
GLUCOSE SERPL-MCNC: 191 MG/DL
POTASSIUM SERPL-SCNC: 4.8 MMOL/L
SODIUM SERPL-SCNC: 141 MMOL/L
THYROGLOB AB SERPL-ACNC: <20 IU/ML
THYROGLOB AB SERPL-ACNC: <20 IU/ML
THYROPEROXIDASE AB SERPL IA-ACNC: <10 IU/ML
THYROPEROXIDASE AB SERPL IA-ACNC: <10 IU/ML
TSH SERPL-ACNC: 1.2 UIU/ML
TSI ACT/NOR SER: <0.1 IU/L

## 2020-08-25 ENCOUNTER — APPOINTMENT (OUTPATIENT)
Dept: CARDIOLOGY | Facility: CLINIC | Age: 65
End: 2020-08-25
Payer: MEDICARE

## 2020-08-25 ENCOUNTER — NON-APPOINTMENT (OUTPATIENT)
Age: 65
End: 2020-08-25

## 2020-08-25 VITALS
OXYGEN SATURATION: 100 % | HEART RATE: 83 BPM | SYSTOLIC BLOOD PRESSURE: 119 MMHG | DIASTOLIC BLOOD PRESSURE: 61 MMHG | TEMPERATURE: 98.2 F | WEIGHT: 132 LBS | BODY MASS INDEX: 23.38 KG/M2

## 2020-08-25 PROCEDURE — 99215 OFFICE O/P EST HI 40 MIN: CPT

## 2020-08-25 PROCEDURE — 93000 ELECTROCARDIOGRAM COMPLETE: CPT

## 2020-08-25 NOTE — PHYSICAL EXAM
[General Appearance - Well Developed] : well developed [General Appearance - Well Nourished] : well nourished [Normal Conjunctiva] : the conjunctiva exhibited no abnormalities [Auscultation Breath Sounds / Voice Sounds] : lungs were clear to auscultation bilaterally [Murmurs] : no murmurs present [Heart Rate And Rhythm] : heart rate and rhythm were normal [Abdomen Soft] : soft [Bowel Sounds] : normal bowel sounds [Abdomen Tenderness] : non-tender [Abnormal Walk] : normal gait [Nail Clubbing] : no clubbing of the fingernails [Skin Color & Pigmentation] : normal skin color and pigmentation [Oriented To Time, Place, And Person] : oriented to person, place, and time [FreeTextEntry1] : tenderness to manual pressure under left breast

## 2020-08-25 NOTE — HISTORY OF PRESENT ILLNESS
[FreeTextEntry1] : Patient reports dyspnea at rest and on exertion, for the last week or so\par Still with the night time episodes which she reported last time and which has been going on for a while -. as mentioned last time, she said she used to have them -. ha sleep study -> results\par \par No weight gain \par Mild AMBROSE for a week, but no orthopnea or PND.  AMBROSE worse yesterday\par No CP\par "A lot of palpitations", lasting up to 20 minutes.  Didn't have them when she carried the monitor\par "Subacute thyroiditis" -.> seeing Dr Hellerman\par \par She's also been having abdominal pain and headaches

## 2020-08-26 ENCOUNTER — RESULT REVIEW (OUTPATIENT)
Age: 65
End: 2020-08-26

## 2020-09-15 ENCOUNTER — RESULT REVIEW (OUTPATIENT)
Age: 65
End: 2020-09-15

## 2020-09-15 ENCOUNTER — LABORATORY RESULT (OUTPATIENT)
Age: 65
End: 2020-09-15

## 2020-09-17 ENCOUNTER — APPOINTMENT (OUTPATIENT)
Dept: CARDIOLOGY | Facility: CLINIC | Age: 65
End: 2020-09-17
Payer: MEDICARE

## 2020-09-17 DIAGNOSIS — R94.39 ABNORMAL RESULT OF OTHER CARDIOVASCULAR FUNCTION STUDY: ICD-10-CM

## 2020-09-17 DIAGNOSIS — Z92.89 PERSONAL HISTORY OF OTHER MEDICAL TREATMENT: ICD-10-CM

## 2020-09-17 PROCEDURE — 93015 CV STRESS TEST SUPVJ I&R: CPT

## 2020-09-22 ENCOUNTER — APPOINTMENT (OUTPATIENT)
Dept: ENDOCRINOLOGY | Facility: CLINIC | Age: 65
End: 2020-09-22
Payer: MEDICARE

## 2020-09-22 VITALS
DIASTOLIC BLOOD PRESSURE: 80 MMHG | OXYGEN SATURATION: 98 % | HEIGHT: 63 IN | SYSTOLIC BLOOD PRESSURE: 140 MMHG | HEART RATE: 82 BPM | WEIGHT: 134 LBS | BODY MASS INDEX: 23.74 KG/M2

## 2020-09-22 PROCEDURE — 99214 OFFICE O/P EST MOD 30 MIN: CPT

## 2020-09-23 LAB
ALBUMIN SERPL ELPH-MCNC: 4.6 G/DL
ALP BLD-CCNC: 56 U/L
ALT SERPL-CCNC: 22 U/L
ANION GAP SERPL CALC-SCNC: 13 MMOL/L
AST SERPL-CCNC: 23 U/L
BASOPHILS # BLD AUTO: 0.04 K/UL
BASOPHILS NFR BLD AUTO: 0.5 %
BILIRUB DIRECT SERPL-MCNC: 0.1 MG/DL
BILIRUB INDIRECT SERPL-MCNC: 0.2 MG/DL
BILIRUB SERPL-MCNC: 0.3 MG/DL
BUN SERPL-MCNC: 15 MG/DL
CALCIUM SERPL-MCNC: 10.6 MG/DL
CHLORIDE SERPL-SCNC: 100 MMOL/L
CO2 SERPL-SCNC: 25 MMOL/L
CREAT SERPL-MCNC: 0.82 MG/DL
EOSINOPHIL # BLD AUTO: 0.11 K/UL
EOSINOPHIL NFR BLD AUTO: 1.4 %
ERYTHROCYTE [SEDIMENTATION RATE] IN BLOOD BY WESTERGREN METHOD: 37 MM/HR
FRUCTOSAMINE SERPL-MCNC: 223 UMOL/L
GLUCOSE SERPL-MCNC: 214 MG/DL
HCT VFR BLD CALC: 39.2 %
HGB BLD-MCNC: 11.6 G/DL
IMM GRANULOCYTES NFR BLD AUTO: 0.5 %
LYMPHOCYTES # BLD AUTO: 1.95 K/UL
LYMPHOCYTES NFR BLD AUTO: 24.8 %
MAN DIFF?: NORMAL
MCHC RBC-ENTMCNC: 24.5 PG
MCHC RBC-ENTMCNC: 29.6 GM/DL
MCV RBC AUTO: 82.7 FL
MONOCYTES # BLD AUTO: 0.52 K/UL
MONOCYTES NFR BLD AUTO: 6.6 %
NEUTROPHILS # BLD AUTO: 5.2 K/UL
NEUTROPHILS NFR BLD AUTO: 66.2 %
PLATELET # BLD AUTO: 345 K/UL
POTASSIUM SERPL-SCNC: 4.2 MMOL/L
PROT SERPL-MCNC: 6.7 G/DL
RBC # BLD: 4.74 M/UL
RBC # FLD: 15.9 %
SODIUM SERPL-SCNC: 138 MMOL/L
T3 SERPL-MCNC: 96 NG/DL
T4 SERPL-MCNC: 8.9 UG/DL
THYROGLOB AB SERPL IA-ACNC: 20 IU/ML
THYROGLOB AB SERPL-ACNC: 52.3 IU/ML
THYROPEROXIDASE AB SERPL IA-ACNC: <10 IU/ML
TSH SERPL-ACNC: 2.1 UIU/ML
WBC # FLD AUTO: 7.86 K/UL

## 2020-09-23 NOTE — ASSESSMENT
[FreeTextEntry1] : Subacute thyroiditis is the working diagnosis.\par Continued close surveillance by means of history, exam, follow up ultrasound thyroid and lab tests will be the strategy for now.

## 2020-09-23 NOTE — PHYSICAL EXAM
[Alert] : alert [Well Nourished] : well nourished [Healthy Appearance] : healthy appearance [No Acute Distress] : no acute distress [Well Developed] : well developed [No Proptosis] : no proptosis [No Lid Lag] : no lid lag [No Neck Mass] : no neck mass was observed [No Respiratory Distress] : no respiratory distress [Normal Rate] : heart rate was normal [Regular Rhythm] : with a regular rhythm [No Stigmata of Cushings Syndrome] : no stigmata of Cushings Syndrome [No Involuntary Movements] : no involuntary movements were seen [No Tremors] : no tremors [Oriented x3] : oriented to person, place, and time [Normal Affect] : the affect was normal [Normal Insight/Judgement] : insight and judgment were intact [Normal Mood] : the mood was normal [de-identified] : Thyroid is enlarged, L > R , tender to palpation

## 2020-09-23 NOTE — HISTORY OF PRESENT ILLNESS
[FreeTextEntry1] : Sep 22, 2020   in person accompanied by her daughter, Emelina.   \par \par PCP:  Dr. Meenu Weaver\par           Neuro:  Dr. Jarad Renteria (for HA)\par           Card:  Dr. Adrienne Pugh (palpitations, wake her from sleep)\par           GI:  Dr. Cicso Bull (anemia)\par           Rheum:  Dr. Sophia Moncada (joint pain)\par           Gyn:  Dr. Gladys Moran\par \par CC:  Tender swollen thyroid   (her daughter is treated for Graves disease)\par         (Type 2 diabetes, A1c 7.3%)\par       \par 66 yo Type 2 diabetes, visits in follow up for swollen, tender thyroid.  Symptoms began on Left side of thyroid and then marched to the Right.  \par \par Most recent lab tests on\par September 15, 2020 include\par ESR 37 (had been 52 July 29, 39 July 2 and 11 Sep 20, 2019\par T4 8.9\par calcium 10.6 ***\par TSH 2.1 (up from 1.2 ion July 29, 0.70 July 2)\par TPO remains neg\par TgAb 52.3 (<10.0);  had been < 20 previously\par Tg 20 (<1.8)   [of course abs may impact\par \par \par Impression:  In general, thyroid enlargement appears to be slowly resolving.  US thyroid no longer demonstrates mass R thyroid lobe.    Her findings are most consistent with subacute thyroiditis; however, there are many atypical features, including the lack of TSH suppression and the unusually long course.    Continued close monitoring will be the strategy, including f/u US thyroid and lab tests.  \par \par Aug 03, 2020   in person\par \par PCP:  Dr. Meenu Weaver\par           Neuro:  Dr. Jarad Renteria (for HA)\par           Card:  Dr. Adrienne Pugh (palpitations, wake her from sleep)\par           GI:  Dr. Cisco Bull (anemia)\par           Rheum:  Dr. Sophia Moncada (joint pain)\par           Gyn:  Dr. Gladys Moran\par \par CC:  Tender swollen thyroid\par         (Type 2 diabetes, A1c 7.3%)\par       \par \par Recent labs at [facility] from\par 7/29/2020 include:\par \par ESR 52 (up from 39 on July 2 and 11 on Sep 20)\par TSH  1.2\par Thyroid antibodies (TPO, TgAb) remain negative\par \par Biopsy of left thyroid "nodule":  "benign follicular nodule with cystic change"\par \par Follow up US:\par \par CLINICAL HISTORY: Painful enlarged thyroid with nodules, recent benign biopsy of a left lower pole masslike asymmetry\par \par COMPARISON: 6/26/2020\par \par FINDINGS:\par \par Thyroid isthmus measures 2.8 mm.\par \par Right lobe of the thyroid measures 4.9 x 1.7 x 1.7 cm and is diffusely heterogeneous with interval development of a masslike area of asymmetry in the midpole measuring 2.4 x 1.4 x 1.3 cm.\par \par Left lobe of the thyroid measures 3.7 x 1.5 x 1.5 cm diffusely heterogeneous with previously biopsied area of masslike asymmetrical density now measuring 2.7 x 1.3 x 1.3 cm. This is not significantly changed.\par \par There is mild increased vascularity of the thyroid.\par \par Small lymph nodes are seen in the cervical chain bilaterally largest on the left measuring 7.7 x 5.6 x 5.1 mm and the largest on the right measuring 7.9 x 6.8 x 4.6 mm.\par \par \par IMPRESSION: Heterogeneous thyroid gland is again seen with interval development of a masslike area of asymmetry in the right mid pole thyroid gland. No change in the left lobe of the thyroid.\par \par Recommend short interval follow-up perhaps in 3-6 months time.\par \par ***Electronically Signed ***\par -----------------------------------------------\par Eric Pablo MD              07/29/20 \par \par \par Jul 01, 2020      in person     \par \par PCP:  Dr. Meenu Weaver\par           Neuro:  Dr. Jarad Renteria (for HA)\par           Card:  Dr. Adrienne Pugh (palpitations, wake her from sleep)\par           GI:  Dr. Cisco Bull (anemia)\par           Rheum:  Dr. Sophia Moncada (joint pain)\par           Gyn:  Dr. Gladys Moran\par \par CC:  Tender swollen thyroid\par         (Type 2 diabetes, A1c 7.3%)\par       \par 65 yo accompanied by her daughter.   Patient reports that she started to notice discomfort and swelling in the area of the thyroid about a month ago.  While the tenderness and discomfort was bilateral, it was more noticeable on the left.   The swelling and pain are still present, she reports that both are improving.   She is currently taking Tylenol with some benefit.    There is no previous history of thyroid problem.   Her daughter has been treated for a thyroid condition.  There is no history of face or neck irradiation.  \par \par Lab tests on\par 6/11/2020 included\par TSH 0.83\par \par 6/16/2020 Ultrasound Thyroid at [place]:\par .\par "The right thyroid lobe measures 4.6 x 1.2 x 2 cm, the isthmus up to 2 mm AP in the midline, and the left thyroid lobe measures 3.8 x 1.4 x 2.1 cm. A large poorly-defined heterogeneous hypoechoic area in the left mid-lower pole measures 3 x 1.3 x 1.3 cm. A couple of small hypoechoic right mid pole nodules measure 4 x 2 x 3 mm and 4 x 2 x 3 mm. Several small left cervical lymph nodes measure 1.0 x 0.6 x 0.9 cm, 1.3 x 0.6 x 0.9 cm, and 1.1 x 0.5 x 0.5 cm, and are diffusely hypoechoic, without clearly defined echogenic indira.\par \par \par IMPRESSION: In view of the history of left-sided neck pain, the large poorly-defined heterogeneous hypoechoic area in the left mid-lower pole may be secondary to thyroiditis, with the possibility of mass not excluded. Several small left-sided lymph nodes could be inflammatory or neoplastic.\par \par \par ***Electronically Signed ***\par -----------------------------------------------\par Demond Valdez MD              06/16/20  "\par \par .\par 6/29/2020 underwent FNAB of the ~ 3 cm L thyroid lesion:\par \par "FINAL DIAGNOSIS\par  \par Left thyroid mid to lower pole area:\par BENIGN FOLLICULAR NODULE WITH CYSTIC CHANGE ([place] Category II)\par A few Hurthle cells and crushed lymphocytes, suggestive of chronic lymphocytic thyroiditis.\par  \par \par MICROSCOPIC DESCRIPTION  \par  \par The Diff-Quik and Papanicolaou stained direct smears show sheets and groups of benign follicular cells with small nuclei, inconspicuous nucleoli and moderate cytoplasm in a hemorrhagic background with bare nuclei, macrophages and thin colloid. Mild metaplastic changes and rare crushed lymphoid cells are noted. "\par \par On exam, the thyroid is slightly enlarged, L more than R and is slightly tender to palpation.  \par \par \par Impression:  Her careful evaluation is most consistent with subacute thyroiditis.  The inflammation of subacute thyroiditis usually causes enough release of thyroid hormone to cause suppression of TSH, which has not yet been seen here, so close follow up by means of history, exam, labs and ultrasound will be the current strategy.    The trajectory of her symptoms appears to be that of improvement, so that is reassuring.   Symptoms from subacute thyroiditis may take several months to completely resolve.  \par \par Plan:  Repeat TSH today, repeat US thyroid in about 4-5 weeks and ROV here after that.

## 2020-09-29 ENCOUNTER — APPOINTMENT (OUTPATIENT)
Dept: CARDIOLOGY | Facility: CLINIC | Age: 65
End: 2020-09-29
Payer: MEDICARE

## 2020-09-29 PROCEDURE — 93306 TTE W/DOPPLER COMPLETE: CPT

## 2020-10-02 ENCOUNTER — RESULT REVIEW (OUTPATIENT)
Age: 65
End: 2020-10-02

## 2020-10-02 DIAGNOSIS — Z92.89 PERSONAL HISTORY OF OTHER MEDICAL TREATMENT: ICD-10-CM

## 2020-10-06 ENCOUNTER — APPOINTMENT (OUTPATIENT)
Dept: CARDIOLOGY | Facility: CLINIC | Age: 65
End: 2020-10-06

## 2020-10-06 ENCOUNTER — APPOINTMENT (OUTPATIENT)
Dept: CARDIOLOGY | Facility: CLINIC | Age: 65
End: 2020-10-06
Payer: MEDICARE

## 2020-10-06 ENCOUNTER — NON-APPOINTMENT (OUTPATIENT)
Age: 65
End: 2020-10-06

## 2020-10-06 VITALS
WEIGHT: 140 LBS | SYSTOLIC BLOOD PRESSURE: 140 MMHG | TEMPERATURE: 98.7 F | BODY MASS INDEX: 24.8 KG/M2 | OXYGEN SATURATION: 100 % | DIASTOLIC BLOOD PRESSURE: 68 MMHG | HEART RATE: 98 BPM

## 2020-10-06 VITALS
SYSTOLIC BLOOD PRESSURE: 140 MMHG | WEIGHT: 140 LBS | TEMPERATURE: 98.7 F | DIASTOLIC BLOOD PRESSURE: 68 MMHG | BODY MASS INDEX: 24.8 KG/M2 | OXYGEN SATURATION: 100 % | HEART RATE: 98 BPM | RESPIRATION RATE: 14 BRPM

## 2020-10-06 DIAGNOSIS — Z92.89 PERSONAL HISTORY OF OTHER MEDICAL TREATMENT: ICD-10-CM

## 2020-10-06 PROCEDURE — 99214 OFFICE O/P EST MOD 30 MIN: CPT

## 2020-10-06 NOTE — HISTORY OF PRESENT ILLNESS
[FreeTextEntry1] : Still with MCWILLIAMS, also at rest sometimes\par She doesn't snore\par \par She walks 2-3 days a week or an hour, to her daughter's house

## 2020-10-14 ENCOUNTER — APPOINTMENT (OUTPATIENT)
Dept: INTERNAL MEDICINE | Facility: CLINIC | Age: 65
End: 2020-10-14
Payer: MEDICARE

## 2020-10-14 VITALS
SYSTOLIC BLOOD PRESSURE: 120 MMHG | HEART RATE: 76 BPM | OXYGEN SATURATION: 99 % | DIASTOLIC BLOOD PRESSURE: 80 MMHG | WEIGHT: 140 LBS | HEIGHT: 63 IN | BODY MASS INDEX: 24.8 KG/M2

## 2020-10-14 PROCEDURE — 99203 OFFICE O/P NEW LOW 30 MIN: CPT

## 2020-10-14 NOTE — PHYSICAL EXAM
[No Acute Distress] : no acute distress [Well Nourished] : well nourished [Well Developed] : well developed [Well-Appearing] : well-appearing [Normal Outer Ear/Nose] : the outer ears and nose were normal in appearance [Normal Sclera/Conjunctiva] : normal sclera/conjunctiva [PERRL] : pupils equal round and reactive to light [EOMI] : extraocular movements intact [No JVD] : no jugular venous distention [Normal Oropharynx] : the oropharynx was normal [Supple] : supple [Thyroid Normal, No Nodules] : the thyroid was normal and there were no nodules present [No Lymphadenopathy] : no lymphadenopathy [No Respiratory Distress] : no respiratory distress  [No Accessory Muscle Use] : no accessory muscle use [Clear to Auscultation] : lungs were clear to auscultation bilaterally [Normal Rate] : normal rate  [Regular Rhythm] : with a regular rhythm [Normal S1, S2] : normal S1 and S2 [No Murmur] : no murmur heard [No Carotid Bruits] : no carotid bruits [No Varicosities] : no varicosities [No Abdominal Bruit] : a ~M bruit was not heard ~T in the abdomen [Pedal Pulses Present] : the pedal pulses are present [No Edema] : there was no peripheral edema [No Palpable Aorta] : no palpable aorta [Soft] : abdomen soft [No Extremity Clubbing/Cyanosis] : no extremity clubbing/cyanosis [Non Tender] : non-tender [No Masses] : no abdominal mass palpated [No HSM] : no HSM [Non-distended] : non-distended [Normal Posterior Cervical Nodes] : no posterior cervical lymphadenopathy [Normal Bowel Sounds] : normal bowel sounds [Normal Anterior Cervical Nodes] : no anterior cervical lymphadenopathy [No CVA Tenderness] : no CVA  tenderness [No Spinal Tenderness] : no spinal tenderness [No Rash] : no rash [Grossly Normal Strength/Tone] : grossly normal strength/tone [No Joint Swelling] : no joint swelling [No Focal Deficits] : no focal deficits [Coordination Grossly Intact] : coordination grossly intact [Normal Affect] : the affect was normal [Normal Gait] : normal gait [Normal Insight/Judgement] : insight and judgment were intact

## 2020-10-14 NOTE — HISTORY OF PRESENT ILLNESS
[FreeTextEntry1] : Evaluation for dyspnea [de-identified] : Patient is a 65-year-old female who complains of shortness of breath episodic over the past year or 2. She is a nonsmoker. She rarely coughs. She rarely wheezes. She complains of symptoms of intermittent shortness of breath which occur possibly once or twice per week. These episodes occur both at rest and occasionally with exertion. The episodes can occur again even when she is just watching television. She will suddenly get short of breath and appeared to hyperventilate. Her cardiac workup is essentially unremarkable. The chest x-ray on August 26 is within normal limits. She does have a history of allergies to grass tree, and with rhinitis. She gets very tired and fatigued when she walks a block or 2 as well as occasional shortness of breath. Her past medical history is significant for diabetes hyperlipidemia hypertension and thyroid nodule. Her medications were reviewed. The patient does not exercise at all. She is a retired . Review of systems the patient complains of feeling generally very tired with chronic fatigue. Her legs feel heavy with walking. Current O2 sat 99 repair there is no history of anxiety or depression.

## 2020-10-14 NOTE — REVIEW OF SYSTEMS
[Fatigue] : fatigue [Shortness Of Breath] : shortness of breath [Muscle Weakness] : muscle weakness [Negative] : Psychiatric

## 2020-10-14 NOTE — ASSESSMENT
[FreeTextEntry1] : The etiology of dyspnea is unclear. Lung fields are perfectly clear with an O2 sat of 99 at rest. Chest x-ray is within normal limits. I question whether we may be dealing with psychogenic dyspnea. This is based on the finding of dyspnea which occurs at rest. Patient also probably has an element of deconditioning as she does not exercise very much at all. I will complete pulmonary function tests for further evaluation. I will discuss this case with the patient and her daughter after the PFTs performed.

## 2020-10-19 ENCOUNTER — LABORATORY RESULT (OUTPATIENT)
Age: 65
End: 2020-10-19

## 2020-10-19 ENCOUNTER — RESULT REVIEW (OUTPATIENT)
Age: 65
End: 2020-10-19

## 2020-10-26 ENCOUNTER — RESULT REVIEW (OUTPATIENT)
Age: 65
End: 2020-10-26

## 2020-10-27 RX ORDER — OMEPRAZOLE 20 MG/1
20 CAPSULE, DELAYED RELEASE ORAL
Qty: 90 | Refills: 3 | Status: DISCONTINUED | COMMUNITY
Start: 2019-12-16 | End: 2020-10-27

## 2020-11-17 ENCOUNTER — APPOINTMENT (OUTPATIENT)
Dept: ENDOCRINOLOGY | Facility: CLINIC | Age: 65
End: 2020-11-17
Payer: MEDICARE

## 2020-11-17 PROCEDURE — 99443: CPT

## 2020-11-17 NOTE — HISTORY OF PRESENT ILLNESS
[Home] : at home, [unfilled] , at the time of the visit. [Medical Office: ([facility])___] : at the medical office located in  [Verbal consent obtained from patient] : the patient, [unfilled] [FreeTextEntry1] : Nov 17, 2020     Teoephone     \par \par PCP:  Dr. Meenu Weaver\par           Neuro:  Dr. Jarad Renteria (for HA)\par           Card:  Dr. Adrienne Pugh (palpitations, wake her from sleep)\par           GI:  Dr. Cisco Bull (anemia)\par           Rheum:  Dr. Sophia Moncada (joint pain)\par           Gyn:  Dr. Gladys Moran\par \par CC:  Tender swollen thyroid   (her daughter is treated for Graves disease)\par         (Type 2 diabetes, A1c 7.3%)\par       \par 66 yo who presented with a tender swollen thyroid with symptoms "marching" across the gland from L to R.\par Today has no symptoms, but last night she had some symptoms.\par \par Trajectory of ESR was\par 9/19  11;  7/2/20  39;  7/29/20  52;  9/15/20  37   10/19/20  19 ***      \par \par \par ESR Trajectory:\par 7/2/20  < 20;  7/29/20:  < 20;  9/15/20:  52;  10/19/20:  50.4   ****\par \par 9/15/20   PC ;      10/19/10    (post prandial) ***\par \par TSH:   0.31 - 2.1     (never suppressed)  \par \par 10/19/2020 Ultrasound thyroid:  heterogeneous gland.  R lobe 4.9 cm;   L lobe 4 cm\par       Has had 5 US thyroid and FNAB of heterogeneous area L lobe when there was tenderness in that region.  \par \par Impression:  Although she has had the thyroid condition for several months, and it behaves like an unusual variant of subacute (painful) thyroiditis, the tSH has never been low.     The ESR went up and came down, the TPO ab went from negative to positive, all common with subacute thyroiditis.   "Marching" across the gland is also not that unusual.   In sum, she reports, that even though she still gets some pain, it is much better than when this all started.     So continued active surveiillance will be the strategy.\par \par Incidentally noted is the elevation of post prandial glucose, so I have taken the liberty of asking her to return for instruction in slef blood glucose monitoring as well as some dietary overview.  \par \par \par \par \par \par Sep 22, 2020   in person accompanied by her daughter, Emelina.   \par \par PCP:  Dr. Meenu Weaver\par           Neuro:  Dr. Jarad Renteria (for HA)\par           Card:  Dr. Adrienne Pugh (palpitations, wake her from sleep)\par           GI:  Dr. Cisco Bull (anemia)\par           Rheum:  Dr. Sophia Moncada (joint pain)\par           Gyn:  Dr. Gladys Moran\par \par CC:  Tender swollen thyroid   (her daughter is treated for Graves disease)\par         (Type 2 diabetes, A1c 7.3%)\par       \par 66 yo Type 2 diabetes, visits in follow up for swollen, tender thyroid.  Symptoms began on Left side of thyroid and then marched to the Right.  \par \par Most recent lab tests on\par September 15, 2020 include\par ESR 37 (had been 52 July 29, 39 July 2 and 11 Sep 20, 2019\par T4 8.9\par calcium 10.6 ***\par TSH 2.1 (up from 1.2 ion July 29, 0.70 July 2)\par TPO remains neg\par TgAb 52.3 (<10.0);  had been < 20 previously\par Tg 20 (<1.8)   [of course abs may impact\par \par \par Impression:  In general, thyroid enlargement appears to be slowly resolving.  US thyroid no longer demonstrates mass R thyroid lobe.    Her findings are most consistent with subacute thyroiditis; however, there are many atypical features, including the lack of TSH suppression and the unusually long course.    Continued close monitoring will be the strategy, including f/u US thyroid and lab tests.  \par \par Aug 03, 2020   in person\par \par PCP:  Dr. Meenu Weaver\par           Neuro:  Dr. Jarad Renteria (for HA)\par           Card:  Dr. Adrienne Pugh (palpitations, wake her from sleep)\par           GI:  Dr. Cisco Bull (anemia)\par           Rheum:  Dr. Sophia Moncada (joint pain)\par           Gyn:  Dr. Gladys Moran\par \par CC:  Tender swollen thyroid\par         (Type 2 diabetes, A1c 7.3%)\par       \par \par Recent labs at [facility] from\par 7/29/2020 include:\par \par ESR 52 (up from 39 on July 2 and 11 on Sep 20)\par TSH  1.2\par Thyroid antibodies (TPO, TgAb) remain negative\par \par Biopsy of left thyroid "nodule":  "benign follicular nodule with cystic change"\par \par Follow up US:\par \par CLINICAL HISTORY: Painful enlarged thyroid with nodules, recent benign biopsy of a left lower pole masslike asymmetry\par \par COMPARISON: 6/26/2020\par \par FINDINGS:\par \par Thyroid isthmus measures 2.8 mm.\par \par Right lobe of the thyroid measures 4.9 x 1.7 x 1.7 cm and is diffusely heterogeneous with interval development of a masslike area of asymmetry in the midpole measuring 2.4 x 1.4 x 1.3 cm.\par \par Left lobe of the thyroid measures 3.7 x 1.5 x 1.5 cm diffusely heterogeneous with previously biopsied area of masslike asymmetrical density now measuring 2.7 x 1.3 x 1.3 cm. This is not significantly changed.\par \par There is mild increased vascularity of the thyroid.\par \par Small lymph nodes are seen in the cervical chain bilaterally largest on the left measuring 7.7 x 5.6 x 5.1 mm and the largest on the right measuring 7.9 x 6.8 x 4.6 mm.\par \par \par IMPRESSION: Heterogeneous thyroid gland is again seen with interval development of a masslike area of asymmetry in the right mid pole thyroid gland. No change in the left lobe of the thyroid.\par \par Recommend short interval follow-up perhaps in 3-6 months time.\par \par ***Electronically Signed ***\par -----------------------------------------------\par Eric Pablo MD              07/29/20 \par \par \par Jul 01, 2020      in person     \par \par PCP:  Dr. Meenu Weaver\par           Neuro:  Dr. Jarad Renteria (for HA)\par           Card:  Dr. Adrienne Pugh (palpitations, wake her from sleep)\par           GI:  Dr. Cisco Bull (anemia)\par           Rheum:  Dr. Sophia Moncada (joint pain)\par           Gyn:  Dr. Gladys Moran\par \par CC:  Tender swollen thyroid\par         (Type 2 diabetes, A1c 7.3%)\par       \par 65 yo accompanied by her daughter.   Patient reports that she started to notice discomfort and swelling in the area of the thyroid about a month ago.  While the tenderness and discomfort was bilateral, it was more noticeable on the left.   The swelling and pain are still present, she reports that both are improving.   She is currently taking Tylenol with some benefit.    There is no previous history of thyroid problem.   Her daughter has been treated for a thyroid condition.  There is no history of face or neck irradiation.  \par \par Lab tests on\par 6/11/2020 included\par TSH 0.83\par \par 6/16/2020 Ultrasound Thyroid at [facility]:\par .\par "The right thyroid lobe measures 4.6 x 1.2 x 2 cm, the isthmus up to 2 mm AP in the midline, and the left thyroid lobe measures 3.8 x 1.4 x 2.1 cm. A large poorly-defined heterogeneous hypoechoic area in the left mid-lower pole measures 3 x 1.3 x 1.3 cm. A couple of small hypoechoic right mid pole nodules measure 4 x 2 x 3 mm and 4 x 2 x 3 mm. Several small left cervical lymph nodes measure 1.0 x 0.6 x 0.9 cm, 1.3 x 0.6 x 0.9 cm, and 1.1 x 0.5 x 0.5 cm, and are diffusely hypoechoic, without clearly defined echogenic indira.\par \par \par IMPRESSION: In view of the history of left-sided neck pain, the large poorly-defined heterogeneous hypoechoic area in the left mid-lower pole may be secondary to thyroiditis, with the possibility of mass not excluded. Several small left-sided lymph nodes could be inflammatory or neoplastic.\par \par \par ***Electronically Signed ***\par -----------------------------------------------\par Demond Valdez MD              06/16/20  "\par \par .\par 6/29/2020 underwent FNAB of the ~ 3 cm L thyroid lesion:\par \par "FINAL DIAGNOSIS\par  \par Left thyroid mid to lower pole area:\par BENIGN FOLLICULAR NODULE WITH CYSTIC CHANGE ([place] Category II)\par A few Hurthle cells and crushed lymphocytes, suggestive of chronic lymphocytic thyroiditis.\par  \par \par MICROSCOPIC DESCRIPTION  \par  \par The Diff-Quik and Papanicolaou stained direct smears show sheets and groups of benign follicular cells with small nuclei, inconspicuous nucleoli and moderate cytoplasm in a hemorrhagic background with bare nuclei, macrophages and thin colloid. Mild metaplastic changes and rare crushed lymphoid cells are noted. "\par \par On exam, the thyroid is slightly enlarged, L more than R and is slightly tender to palpation.  \par \par \par Impression:  Her careful evaluation is most consistent with subacute thyroiditis.  The inflammation of subacute thyroiditis usually causes enough release of thyroid hormone to cause suppression of TSH, which has not yet been seen here, so close follow up by means of history, exam, labs and ultrasound will be the current strategy.    The trajectory of her symptoms appears to be that of improvement, so that is reassuring.   Symptoms from subacute thyroiditis may take several months to completely resolve.  \par \par Plan:  Repeat TSH today, repeat US thyroid in about 4-5 weeks and ROV here after that.

## 2020-12-03 ENCOUNTER — APPOINTMENT (OUTPATIENT)
Dept: ENDOCRINOLOGY | Facility: CLINIC | Age: 65
End: 2020-12-03
Payer: MEDICARE

## 2020-12-03 VITALS
DIASTOLIC BLOOD PRESSURE: 80 MMHG | BODY MASS INDEX: 24.8 KG/M2 | HEIGHT: 63 IN | SYSTOLIC BLOOD PRESSURE: 135 MMHG | WEIGHT: 140 LBS | OXYGEN SATURATION: 99 % | HEART RATE: 89 BPM

## 2020-12-03 PROCEDURE — 99214 OFFICE O/P EST MOD 30 MIN: CPT | Mod: 25

## 2020-12-03 PROCEDURE — 36415 COLL VENOUS BLD VENIPUNCTURE: CPT

## 2020-12-03 PROCEDURE — 99072 ADDL SUPL MATRL&STAF TM PHE: CPT

## 2020-12-03 NOTE — HISTORY OF PRESENT ILLNESS
[FreeTextEntry1] : Dec 03, 2020   in person\par \par PCP:  Dr. Meenu Weaver\par           Neuro:  Dr. Jarad Renteria (for HA)\par           Card:  Dr. Adrienne Pugh (palpitations, wake her from sleep)\par           GI:  Dr. Cisco Bull (anemia)\par           Rheum:  Dr. Sophia Moncada (joint pain)\par           Gyn:  Dr. Gladys Moran\par \par CC:  Tender swollen thyroid   (her daughter is treated for Graves disease)\par         (Type 2 diabetes, 1/2/2017 A1c 8.3 %)\par       \par Followed for tender thyroid.  Pain has marched across the gland similar to subacute thyroiditis.\par The TSH has never been suppressed.\par The ESR went up and came down followed by positive TPO abs.\par \par : :\par Constitutional:  Alert, well nourished, healthy appearance, no acute distress \par Eyes:  No proptosis, no stare\par Thyroid:\par Pulmonary:  No respiratory distress, no accessory muscle use; normal rate and effort\par Cardiac:  Normal rate\par Vascular: \par Endocrine:  No stigmata of Cushing’s Syndrome\par Musculoskeletal:  Normal gait, no involuntary movements\par Neurology:  No tremors\par Affect/Mood/Psych:  Oriented x 3; normal affect, normal insight/judgement, normal mood \par .\par  Impression:  She feels thyroid discomfort has continued to slowly improve.\par \par Plan:  TFTs today and ROV early March 2021.  \par \par \par The right thyroid lobe measures 4.9 x 1.6 x 1.7 cm, the isthmus up to 3 mm AP in the midline, and the left thyroid lobe measures 4 x 1.2 x 1.6 cm. There has been no change in lobulated thyroid contour and diffuse heterogeneity of thyroid parenchymal echogenicity. Color-flow Doppler evaluation demonstrates mild hypervascularity of the gland. . There are several very small colloid cyst in the left lobe.\par \par \par IMPRESSION: No significant change from 9/15/2020.\par \par \par ***Electronically Signed ***\par -----------------------------------------------\par Demond Valdez MD              10/19/20 \par \par \par Nov 17, 2020     Te;ephone     \par \par PCP:  Dr. Meenu Weaver\par           Neuro:  Dr. Jarad Renteria (for HA)\par           Card:  Dr. Adrienne Pugh (palpitations, wake her from sleep)\par           GI:  Dr. Cisco Bull (anemia)\par           Rheum:  Dr. Sophia Moncada (joint pain)\par           Gyn:  Dr. Gladys Moran\par \par CC:  Tender swollen thyroid   (her daughter is treated for Graves disease)\par         (Type 2 diabetes, A1c 7.3%)\par       \par 66 yo who presented with a tender swollen thyroid with symptoms "marching" across the gland from L to R.\par Today has no symptoms, but last night she had some symptoms.\par \par Trajectory of ESR was\par 9/19  11;  7/2/20  39;  7/29/20  52;  9/15/20  37   10/19/20  19 ***      \par \par \par ESR Trajectory:\par 7/2/20  < 20;  7/29/20:  < 20;  9/15/20:  52;  10/19/20:  50.4   ****\par \par 9/15/20   PC ;      10/19/10    (post prandial) ***\par \par TSH:   0.31 - 2.1     (never suppressed)  \par \par 10/19/2020 Ultrasound thyroid:  heterogeneous gland.  R lobe 4.9 cm;   L lobe 4 cm\par       Has had 5 US thyroid and FNAB of heterogeneous area L lobe when there was tenderness in that region.  \par \par Impression:  Although she has had the thyroid condition for several months, and it behaves like an unusual variant of subacute (painful) thyroiditis, the tSH has never been low.     The ESR went up and came down, the TPO ab went from negative to positive, all common with subacute thyroiditis.   "Marching" across the gland is also not that unusual.   In sum, she reports, that even though she still gets some pain, it is much better than when this all started.     So continued active surveiillance will be the strategy.\par \par Incidentally noted is the elevation of post prandial glucose, so I have taken the liberty of asking her to return for instruction in slef blood glucose monitoring as well as some dietary overview.  \par \par \par \par \par \par Sep 22, 2020   in person accompanied by her daughter, Emelina.   \par \par PCP:  Dr. Meenu Weaver\par           Neuro:  Dr. Jarad Renteria (for HA)\par           Card:  Dr. Adrienne Pugh (palpitations, wake her from sleep)\par           GI:  Dr. Cisco Bull (anemia)\par           Rheum:  Dr. Sophia Moncada (joint pain)\par           Gyn:  Dr. Gladys Moran\par \par CC:  Tender swollen thyroid   (her daughter is treated for Graves disease)\par         (Type 2 diabetes, A1c 7.3%)\par       \par 66 yo Type 2 diabetes, visits in follow up for swollen, tender thyroid.  Symptoms began on Left side of thyroid and then marched to the Right.  \par \par Most recent lab tests on\par September 15, 2020 include\par ESR 37 (had been 52 July 29, 39 July 2 and 11 Sep 20, 2019\par T4 8.9\par calcium 10.6 ***\par TSH 2.1 (up from 1.2 ion July 29, 0.70 July 2)\par TPO remains neg\par TgAb 52.3 (<10.0);  had been < 20 previously\par Tg 20 (<1.8)   [of course abs may impact\par \par \par Impression:  In general, thyroid enlargement appears to be slowly resolving.  US thyroid no longer demonstrates mass R thyroid lobe.    Her findings are most consistent with subacute thyroiditis; however, there are many atypical features, including the lack of TSH suppression and the unusually long course.    Continued close monitoring will be the strategy, including f/u US thyroid and lab tests.  \par \par Aug 03, 2020   in person\par \par PCP:  Dr. Meenu Weaver\par           Neuro:  Dr. Jarad Renteria (for HA)\par           Card:  Dr. Adrienne Pugh (palpitations, wake her from sleep)\par           GI:  Dr. Cisco Bull (anemia)\par           Rheum:  Dr. Sophia Moncada (joint pain)\par           Gyn:  Dr. Gladys Moran\par \par CC:  Tender swollen thyroid\par         (Type 2 diabetes, A1c 7.3%)\par       \par \par Recent labs at [facility] from\par 7/29/2020 include:\par \par ESR 52 (up from 39 on July 2 and 11 on Sep 20)\par TSH  1.2\par Thyroid antibodies (TPO, TgAb) remain negative\par \par Biopsy of left thyroid "nodule":  "benign follicular nodule with cystic change"\par \par Follow up US:\par \par CLINICAL HISTORY: Painful enlarged thyroid with nodules, recent benign biopsy of a left lower pole masslike asymmetry\par \par COMPARISON: 6/26/2020\par \par FINDINGS:\par \par Thyroid isthmus measures 2.8 mm.\par \par Right lobe of the thyroid measures 4.9 x 1.7 x 1.7 cm and is diffusely heterogeneous with interval development of a masslike area of asymmetry in the midpole measuring 2.4 x 1.4 x 1.3 cm.\par \par Left lobe of the thyroid measures 3.7 x 1.5 x 1.5 cm diffusely heterogeneous with previously biopsied area of masslike asymmetrical density now measuring 2.7 x 1.3 x 1.3 cm. This is not significantly changed.\par \par There is mild increased vascularity of the thyroid.\par \par Small lymph nodes are seen in the cervical chain bilaterally largest on the left measuring 7.7 x 5.6 x 5.1 mm and the largest on the right measuring 7.9 x 6.8 x 4.6 mm.\par \par \par IMPRESSION: Heterogeneous thyroid gland is again seen with interval development of a masslike area of asymmetry in the right mid pole thyroid gland. No change in the left lobe of the thyroid.\par \par Recommend short interval follow-up perhaps in 3-6 months time.\par \par ***Electronically Signed ***\par -----------------------------------------------\par Eric Pablo MD              07/29/20 \par \par \par Jul 01, 2020      in person     \par \par PCP:  Dr. Meenu Weaver\par           Neuro:  Dr. Jarad Renteria (for HA)\par           Card:  Dr. Adrienne Pugh (palpitations, wake her from sleep)\par           GI:  Dr. Cisco Bull (anemia)\par           Rheum:  Dr. Sophia Moncada (joint pain)\par           Gyn:  Dr. Gladys Moran\par \par CC:  Tender swollen thyroid\par         (Type 2 diabetes, A1c 7.3%)\par       \par 65 yo accompanied by her daughter.   Patient reports that she started to notice discomfort and swelling in the area of the thyroid about a month ago.  While the tenderness and discomfort was bilateral, it was more noticeable on the left.   The swelling and pain are still present, she reports that both are improving.   She is currently taking Tylenol with some benefit.    There is no previous history of thyroid problem.   Her daughter has been treated for a thyroid condition.  There is no history of face or neck irradiation.  \par \par Lab tests on\par 6/11/2020 included\par TSH 0.83\par \par 6/16/2020 Ultrasound Thyroid at [facility]:\par .\par "The right thyroid lobe measures 4.6 x 1.2 x 2 cm, the isthmus up to 2 mm AP in the midline, and the left thyroid lobe measures 3.8 x 1.4 x 2.1 cm. A large poorly-defined heterogeneous hypoechoic area in the left mid-lower pole measures 3 x 1.3 x 1.3 cm. A couple of small hypoechoic right mid pole nodules measure 4 x 2 x 3 mm and 4 x 2 x 3 mm. Several small left cervical lymph nodes measure 1.0 x 0.6 x 0.9 cm, 1.3 x 0.6 x 0.9 cm, and 1.1 x 0.5 x 0.5 cm, and are diffusely hypoechoic, without clearly defined echogenic indira.\par \par \par IMPRESSION: In view of the history of left-sided neck pain, the large poorly-defined heterogeneous hypoechoic area in the left mid-lower pole may be secondary to thyroiditis, with the possibility of mass not excluded. Several small left-sided lymph nodes could be inflammatory or neoplastic.\par \par \par ***Electronically Signed ***\par -----------------------------------------------\par Demond Valdez MD              06/16/20  "\par \par .\par 6/29/2020 underwent FNAB of the ~ 3 cm L thyroid lesion:\par \par "FINAL DIAGNOSIS\par  \par Left thyroid mid to lower pole area:\par BENIGN FOLLICULAR NODULE WITH CYSTIC CHANGE ([place] Category II)\par A few Hurthle cells and crushed lymphocytes, suggestive of chronic lymphocytic thyroiditis.\par  \par \par MICROSCOPIC DESCRIPTION  \par  \par The Diff-Quik and Papanicolaou stained direct smears show sheets and groups of benign follicular cells with small nuclei, inconspicuous nucleoli and moderate cytoplasm in a hemorrhagic background with bare nuclei, macrophages and thin colloid. Mild metaplastic changes and rare crushed lymphoid cells are noted. "\par \par On exam, the thyroid is slightly enlarged, L more than R and is slightly tender to palpation.  \par \par \par Impression:  Her careful evaluation is most consistent with subacute thyroiditis.  The inflammation of subacute thyroiditis usually causes enough release of thyroid hormone to cause suppression of TSH, which has not yet been seen here, so close follow up by means of history, exam, labs and ultrasound will be the current strategy.    The trajectory of her symptoms appears to be that of improvement, so that is reassuring.   Symptoms from subacute thyroiditis may take several months to completely resolve.  \par \par Plan:  Repeat TSH today, repeat US thyroid in about 4-5 weeks and ROV here after that.

## 2020-12-03 NOTE — ASSESSMENT
[FreeTextEntry1] : Diabetes \par Subacute thyroiditis.\par Continued surveillance\par ROV March 2021

## 2020-12-22 ENCOUNTER — APPOINTMENT (OUTPATIENT)
Dept: OBGYN | Facility: CLINIC | Age: 65
End: 2020-12-22
Payer: MEDICARE

## 2020-12-22 VITALS
SYSTOLIC BLOOD PRESSURE: 120 MMHG | WEIGHT: 140 LBS | HEIGHT: 63 IN | TEMPERATURE: 98.7 F | BODY MASS INDEX: 24.8 KG/M2 | DIASTOLIC BLOOD PRESSURE: 80 MMHG

## 2020-12-22 PROCEDURE — 99072 ADDL SUPL MATRL&STAF TM PHE: CPT

## 2020-12-22 PROCEDURE — G0101: CPT

## 2020-12-28 NOTE — HISTORY OF PRESENT ILLNESS
[FreeTextEntry1] : 65 y o P6 female presents for routine annual GYN care. Her last annual GYN visit was in 3/2019 and last pap smear was several years ago and benign.\par She is s/p SONIDO several years ago due to fibroids and no longer requires pap smears.\par She states she is well and overall in good health. She reports normal bowel and bladder function. She is sexually active intermittently in a stable monogamous relationship with her spouse without complaints.\par Breast imaging in 3/2020 was benign BI-RADS 1\par Colonoscopy/endoscopy in 3/2020 was benign

## 2021-01-04 NOTE — REASON FOR VISIT
Detail Level: Detailed Quality 130: Documentation Of Current Medications In The Medical Record: Current Medications Documented Quality 226: Preventive Care And Screening: Tobacco Use: Screening And Cessation Intervention: Tobacco Screening not Performed for Unknown Reasons [Follow-Up: _____] : a [unfilled] follow-up visit Quality 110: Preventive Care And Screening: Influenza Immunization: Influenza immunization was not ordered or administered, reason not given Quality 431: Preventive Care And Screening: Unhealthy Alcohol Use - Screening: Unhealthy alcohol use screening not performed, reason not otherwise specified

## 2021-01-07 ENCOUNTER — NON-APPOINTMENT (OUTPATIENT)
Age: 66
End: 2021-01-07

## 2021-01-07 ENCOUNTER — APPOINTMENT (OUTPATIENT)
Dept: CARDIOLOGY | Facility: CLINIC | Age: 66
End: 2021-01-07
Payer: MEDICARE

## 2021-01-07 VITALS
DIASTOLIC BLOOD PRESSURE: 63 MMHG | TEMPERATURE: 97.7 F | SYSTOLIC BLOOD PRESSURE: 116 MMHG | BODY MASS INDEX: 24.45 KG/M2 | HEART RATE: 84 BPM | WEIGHT: 138 LBS | OXYGEN SATURATION: 100 %

## 2021-01-07 PROCEDURE — 99072 ADDL SUPL MATRL&STAF TM PHE: CPT

## 2021-01-07 PROCEDURE — 99214 OFFICE O/P EST MOD 30 MIN: CPT

## 2021-01-07 PROCEDURE — 93000 ELECTROCARDIOGRAM COMPLETE: CPT

## 2021-01-07 NOTE — HISTORY OF PRESENT ILLNESS
[FreeTextEntry1] : Pt saw Dr Prakash in 10/20 -. ? etiology of dyspnea\par PFTs on 10/29/20 were normal \par \par Still with [facility] \par Used to walk daily until 10/20.  Not now.\par \par No CP\par Palpitations for 10-20 minutes at rest -. self=resolved\par Drinks 2 small cups a day

## 2021-01-25 ENCOUNTER — APPOINTMENT (OUTPATIENT)
Dept: FAMILY MEDICINE | Facility: CLINIC | Age: 66
End: 2021-01-25
Payer: MEDICARE

## 2021-01-25 VITALS
TEMPERATURE: 97.6 F | DIASTOLIC BLOOD PRESSURE: 80 MMHG | WEIGHT: 138 LBS | HEART RATE: 80 BPM | BODY MASS INDEX: 24.45 KG/M2 | HEIGHT: 63 IN | SYSTOLIC BLOOD PRESSURE: 120 MMHG

## 2021-01-25 DIAGNOSIS — Z11.59 ENCOUNTER FOR SCREENING FOR OTHER VIRAL DISEASES: ICD-10-CM

## 2021-01-25 PROCEDURE — G0009: CPT

## 2021-01-25 PROCEDURE — 90732 PPSV23 VACC 2 YRS+ SUBQ/IM: CPT

## 2021-01-25 PROCEDURE — 99214 OFFICE O/P EST MOD 30 MIN: CPT | Mod: 25

## 2021-01-25 PROCEDURE — 36415 COLL VENOUS BLD VENIPUNCTURE: CPT

## 2021-01-25 PROCEDURE — 99072 ADDL SUPL MATRL&STAF TM PHE: CPT

## 2021-01-25 RX ORDER — KETOROLAC TROMETHAMINE 5 MG/ML
0.5 SOLUTION OPHTHALMIC
Qty: 5 | Refills: 0 | Status: ACTIVE | COMMUNITY
Start: 2021-01-21

## 2021-01-25 RX ORDER — KETOROLAC TROMETHAMINE 4 MG/ML
0.4 SOLUTION/ DROPS OPHTHALMIC
Qty: 5 | Refills: 0 | Status: DISCONTINUED | COMMUNITY
Start: 2018-08-07 | End: 2021-01-25

## 2021-01-25 NOTE — HISTORY OF PRESENT ILLNESS
[FreeTextEntry1] : Pt here for f/u [de-identified] : Pt here for f/u.\par Saw cards and endo recently.\par Worries about hepatitis.  Wants to be tested today.\par Recently saw pulm, cards, endo (for thyroid).\par Wants pneumonia and flu shots today.\par Hasn't received shot against Shingles or COVID\par Pt continues with pain in arms and legs.  Ongoing.  Intermittent. Not worsening.\par Taking all meds as directed.\par Is seeing rheumatologist for arthritis.  Using topical cream on the knees.  Feels well.\par

## 2021-01-29 ENCOUNTER — MED ADMIN CHARGE (OUTPATIENT)
Age: 66
End: 2021-01-29

## 2021-01-29 LAB
ALBUMIN SERPL ELPH-MCNC: 4.7 G/DL
ALP BLD-CCNC: 63 U/L
ALT SERPL-CCNC: 17 U/L
ANION GAP SERPL CALC-SCNC: 16 MMOL/L
AST SERPL-CCNC: 19 U/L
BILIRUB SERPL-MCNC: 0.4 MG/DL
BUN SERPL-MCNC: 15 MG/DL
CALCIUM SERPL-MCNC: 10.5 MG/DL
CHLORIDE SERPL-SCNC: 99 MMOL/L
CHOLEST SERPL-MCNC: 160 MG/DL
CO2 SERPL-SCNC: 24 MMOL/L
CREAT SERPL-MCNC: 0.82 MG/DL
ESTIMATED AVERAGE GLUCOSE: 157 MG/DL
GLUCOSE SERPL-MCNC: 124 MG/DL
HBA1C MFR BLD HPLC: 7.1 %
HBV CORE IGG+IGM SER QL: NONREACTIVE
HBV SURFACE AB SER QL: NONREACTIVE
HBV SURFACE AG SER QL: NONREACTIVE
HCV AB SER QL: NONREACTIVE
HCV S/CO RATIO: 0.64 S/CO
HDLC SERPL-MCNC: 48 MG/DL
HEPATITIS A IGG ANTIBODY: REACTIVE
LDLC SERPL CALC-MCNC: 77 MG/DL
NONHDLC SERPL-MCNC: 112 MG/DL
POTASSIUM SERPL-SCNC: 4.1 MMOL/L
PROT SERPL-MCNC: 7 G/DL
SODIUM SERPL-SCNC: 140 MMOL/L
TRIGL SERPL-MCNC: 174 MG/DL

## 2021-02-08 ENCOUNTER — NON-APPOINTMENT (OUTPATIENT)
Age: 66
End: 2021-02-08

## 2021-03-02 ENCOUNTER — APPOINTMENT (OUTPATIENT)
Dept: ENDOCRINOLOGY | Facility: CLINIC | Age: 66
End: 2021-03-02
Payer: MEDICARE

## 2021-03-02 VITALS
DIASTOLIC BLOOD PRESSURE: 70 MMHG | OXYGEN SATURATION: 99 % | BODY MASS INDEX: 24.8 KG/M2 | HEIGHT: 63 IN | WEIGHT: 140 LBS | SYSTOLIC BLOOD PRESSURE: 120 MMHG | HEART RATE: 54 BPM

## 2021-03-02 DIAGNOSIS — Z86.39 PERSONAL HISTORY OF OTHER ENDOCRINE, NUTRITIONAL AND METABOLIC DISEASE: ICD-10-CM

## 2021-03-02 LAB
ANION GAP SERPL CALC-SCNC: 13 MMOL/L
BUN SERPL-MCNC: 13 MG/DL
CALCIUM SERPL-MCNC: 10.2 MG/DL
CHLORIDE SERPL-SCNC: 100 MMOL/L
CO2 SERPL-SCNC: 27 MMOL/L
CREAT SERPL-MCNC: 0.73 MG/DL
CREAT SPEC-SCNC: 85 MG/DL
ERYTHROCYTE [SEDIMENTATION RATE] IN BLOOD BY WESTERGREN METHOD: 18 MM/HR
ESTIMATED AVERAGE GLUCOSE: 151 MG/DL
GLUCOSE SERPL-MCNC: 179 MG/DL
HBA1C MFR BLD HPLC: 6.9 %
MICROALBUMIN 24H UR DL<=1MG/L-MCNC: <1.2 MG/DL
MICROALBUMIN/CREAT 24H UR-RTO: NORMAL MG/G
POTASSIUM SERPL-SCNC: 4.2 MMOL/L
SODIUM SERPL-SCNC: 141 MMOL/L
T3 SERPL-MCNC: 92 NG/DL
T4 SERPL-MCNC: 8.5 UG/DL
THYROGLOB AB SERPL-ACNC: 83 IU/ML
THYROPEROXIDASE AB SERPL IA-ACNC: <10 IU/ML
TSH SERPL-ACNC: 1.28 UIU/ML

## 2021-03-02 PROCEDURE — 99072 ADDL SUPL MATRL&STAF TM PHE: CPT

## 2021-03-02 PROCEDURE — 36415 COLL VENOUS BLD VENIPUNCTURE: CPT

## 2021-03-02 PROCEDURE — 99214 OFFICE O/P EST MOD 30 MIN: CPT | Mod: 25

## 2021-03-05 PROBLEM — Z86.39 HISTORY OF HYPOGLYCEMIA: Status: RESOLVED | Noted: 2021-03-02 | Resolved: 2021-03-05

## 2021-03-05 LAB
T4 SERPL-MCNC: 8.1 UG/DL
TSH SERPL-ACNC: 1 UIU/ML

## 2021-03-05 NOTE — HISTORY OF PRESENT ILLNESS
[FreeTextEntry1] : Mar 02, 2021    in person\par \par PCP:  Dr. Meenu Weaver\par           Neuro:  Dr. Jarad Renteria (for HA)\par           Card:  Dr. Adrienne Pugh (palpitations, wake her from sleep)\par           GI:  Dr. Cisco Bull (anemia)\par \par           Rheum:  Dr. Sophia Moncada (joint pain)\par           Gyn:  Dr. Gladys Moran\par \par CC:  Tender swollen thyroid  [started ~ May 2020] (her daughter is treated for Graves disease)\par         (Type 2 diabetes, 1/2/2017 A1c 8.3 %)\par       \par Followed for tender thyroid.  Pain has marched across the gland similar to subacute thyroiditis.  Associated with development of elevated ESR (9/19: 11,  7/2/20:  39,  8/29/20:  52) that then returned to normal.   Also developed anti-thyroglobulin antibodies (7/2/20 < 20 -> 12/4/20: 83).  However, apparently never hyperthyroid enough to generate a suppressed TSH.\par \par In the initial phase US thyroid 6/16/20 showed a large poorly defined heterogeneous hypoelchoic area in the left mid lower pole felt possibly related to thyroiditis but possibility of mass "not excluded" and FNAB revealed no suspicious cytology.\par \par On exam, thyroid normal size, non-tender, without palpable nodule or lymphadenopathy.\par \par Impression:  An atypical presentation of subacute thyroiditis and although she still notes occasional discomfort in the region of the thyroid, this is infrequent and much milder than when she first presented.   \par \par Plan:  TFTs today and the long term strategy will be surveillance with follow up here in about 4 - 6 months.  \par \par \par \par Dec 03, 2020   in person\par \par PCP:  Dr. Meenu Weaver\par           Neuro:  Dr. Jarad Renteria (for HA)\par           Card:  Dr. Adrienne Pugh (palpitations, wake her from sleep)\par           GI:  Dr. Cisco Bull (anemia)\par           Rheum:  Dr. Sophia Moncada (joint pain)\par           Gyn:  Dr. Gladys Moran\par \par CC:  Tender swollen thyroid   (her daughter is treated for Graves disease)\par         (Type 2 diabetes, 1/2/2017 A1c 8.3 %)\par       \par Followed for tender thyroid.  Pain has marched across the gland (from L to R) similar to subacute thyroiditis.\par The TSH has never been suppressed.\par The ESR went up and came down followed by positive TPO abs.\par \par : :\par Constitutional:  Alert, well nourished, healthy appearance, no acute distress \par Eyes:  No proptosis, no stare\par Thyroid:\par Pulmonary:  No respiratory distress, no accessory muscle use; normal rate and effort\par Cardiac:  Normal rate\par Vascular: \par Endocrine:  No stigmata of Cushing’s Syndrome\par Musculoskeletal:  Normal gait, no involuntary movements\par Neurology:  No tremors\par Affect/Mood/Psych:  Oriented x 3; normal affect, normal insight/judgement, normal mood \par .\par  Impression:  She feels thyroid discomfort has continued to slowly improve.\par \par Plan:  TFTs today and ROV early March 2021.  \par \par \par The right thyroid lobe measures 4.9 x 1.6 x 1.7 cm, the isthmus up to 3 mm AP in the midline, and the left thyroid lobe measures 4 x 1.2 x 1.6 cm. There has been no change in lobulated thyroid contour and diffuse heterogeneity of thyroid parenchymal echogenicity. Color-flow Doppler evaluation demonstrates mild hypervascularity of the gland. . There are several very small colloid cyst in the left lobe.\par \par \par IMPRESSION: No significant change from 9/15/2020.\par \par \par ***Electronically Signed ***\par -----------------------------------------------\par Demond Valdez MD              10/19/20 \par \par \par Nov 17, 2020     Te;ephone     \par \par PCP:  Dr. Meenu Weaver\par           Neuro:  Dr. Jarad Renteria (for HA)\par           Card:  Dr. Adrienne Pugh (palpitations, wake her from sleep)\par           GI:  Dr. Cisco Bull (anemia)\par           Rheum:  Dr. Sophia Moncada (joint pain)\par           Gyn:  Dr. Gladys Moran\par \par CC:  Tender swollen thyroid   (her daughter is treated for Graves disease)\par         (Type 2 diabetes, A1c 7.3%)\par       \par 64 yo who presented with a tender swollen thyroid with symptoms "marching" across the gland from L to R.\par Today has no symptoms, but last night she had some symptoms.\par \par Trajectory of ESR was\par 9/19  11;  7/2/20  39;  7/29/20  52;  9/15/20  37   10/19/20  19 ***      \par \par \par ESR Trajectory:\par 7/2/20  < 20;  7/29/20:  < 20;  9/15/20:  52;  10/19/20:  50.4   ****\par \par 9/15/20   PC ;      10/19/10    (post prandial) ***\par \par TSH:   0.31 - 2.1     (never suppressed)  \par \par 10/19/2020 Ultrasound thyroid:  heterogeneous gland.  R lobe 4.9 cm;   L lobe 4 cm\par       Has had 5 US thyroid and FNAB of heterogeneous area L lobe when there was tenderness in that region.  \par \par Impression:  Although she has had the thyroid condition for several months, and it behaves like an unusual variant of subacute (painful) thyroiditis, the tSH has never been low.     The ESR went up and came down, the TPO ab went from negative to positive, all common with subacute thyroiditis.   "Marching" across the gland is also not that unusual.   In sum, she reports, that even though she still gets some pain, it is much better than when this all started.     So continued active surveiillance will be the strategy.\par \par Incidentally noted is the elevation of post prandial glucose, so I have taken the liberty of asking her to return for instruction in slef blood glucose monitoring as well as some dietary overview.  \par \par \par \par \par \par Sep 22, 2020   in person accompanied by her daughter, Emelina.   \par \par PCP:  Dr. Meenu Weaver\par           Neuro:  Dr. Jarad Renteria (for HA)\par           Card:  Dr. Adrienne Pugh (palpitations, wake her from sleep)\par           GI:  Dr. Cisco Bull (anemia)\par           Rheum:  Dr. Sophia Moncada (joint pain)\par           Gyn:  Dr. Gladys Moran\par \par CC:  Tender swollen thyroid   (her daughter is treated for Graves disease)\par         (Type 2 diabetes, A1c 7.3%)\par       \par 64 yo Type 2 diabetes, visits in follow up for swollen, tender thyroid.  Symptoms began on Left side of thyroid and then marched to the Right.  \par \par Most recent lab tests on\par September 15, 2020 include\par ESR 37 (had been 52 July 29, 39 July 2 and 11 Sep 20, 2019\par T4 8.9\par calcium 10.6 ***\par TSH 2.1 (up from 1.2 ion July 29, 0.70 July 2)\par TPO remains neg\par TgAb 52.3 (<10.0);  had been < 20 previously\par Tg 20 (<1.8)   [of course abs may impact\par \par \par Impression:  In general, thyroid enlargement appears to be slowly resolving.  US thyroid no longer demonstrates mass R thyroid lobe.    Her findings are most consistent with subacute thyroiditis; however, there are many atypical features, including the lack of TSH suppression and the unusually long course.    Continued close monitoring will be the strategy, including f/u US thyroid and lab tests.  \par \par Aug 03, 2020   in person\par \par PCP:  Dr. Meenu Weaver\par           Neuro:  Dr. Jaard Renteria (for HA)\par           Card:  Dr. Adrienne Pugh (palpitations, wake her from sleep)\par           GI:  Dr. Cisco Bull (anemia)\par           Rheum:  Dr. Sophia Moncada (joint pain)\par           Gyn:  Dr. Gladys Moran\par \par CC:  Tender swollen thyroid\par         (Type 2 diabetes, A1c 7.3%)\par       \par \par Recent labs at [facility] from\par 7/29/2020 include:\par \par ESR 52 (up from 39 on July 2 and 11 on Sep 20)\par TSH  1.2\par Thyroid antibodies (TPO, TgAb) remain negative\par \par Biopsy of left thyroid "nodule":  "benign follicular nodule with cystic change"\par \par Follow up US:\par \par CLINICAL HISTORY: Painful enlarged thyroid with nodules, recent benign biopsy of a left lower pole masslike asymmetry\par \par COMPARISON: 6/26/2020\par \par FINDINGS:\par \par Thyroid isthmus measures 2.8 mm.\par \par Right lobe of the thyroid measures 4.9 x 1.7 x 1.7 cm and is diffusely heterogeneous with interval development of a masslike area of asymmetry in the midpole measuring 2.4 x 1.4 x 1.3 cm.\par \par Left lobe of the thyroid measures 3.7 x 1.5 x 1.5 cm diffusely heterogeneous with previously biopsied area of masslike asymmetrical density now measuring 2.7 x 1.3 x 1.3 cm. This is not significantly changed.\par \par There is mild increased vascularity of the thyroid.\par \par Small lymph nodes are seen in the cervical chain bilaterally largest on the left measuring 7.7 x 5.6 x 5.1 mm and the largest on the right measuring 7.9 x 6.8 x 4.6 mm.\par \par \par IMPRESSION: Heterogeneous thyroid gland is again seen with interval development of a masslike area of asymmetry in the right mid pole thyroid gland. No change in the left lobe of the thyroid.\par \par Recommend short interval follow-up perhaps in 3-6 months time.\par \par ***Electronically Signed ***\par -----------------------------------------------\par Eric Pablo MD              07/29/20 \par \par \par Jul 01, 2020      in person     \par \par PCP:  Dr. Meenu Weaver\par           Neuro:  Dr. Jarad Renteria (for HA)\par           Card:  Dr. Adrienne Pugh (palpitations, wake her from sleep)\par           GI:  Dr. Cisco Bull (anemia)\par           Rheum:  Dr. Sophia Moncada (joint pain)\par           Gyn:  Dr. Gladys Moran\par \par CC:  Tender swollen thyroid\par         (Type 2 diabetes, A1c 7.3%)\par       \par 63 yo accompanied by her daughter.   Patient reports that she started to notice discomfort and swelling in the area of the thyroid about a month ago.  While the tenderness and discomfort was bilateral, it was more noticeable on the left.   The swelling and pain are still present, she reports that both are improving.   She is currently taking Tylenol with some benefit.    There is no previous history of thyroid problem.   Her daughter has been treated for a thyroid condition.  There is no history of face or neck irradiation.  \par \par Lab tests on\par 6/11/2020 included\par TSH 0.83\par \par 6/16/2020 Ultrasound Thyroid at [facility]:\par .\par "The right thyroid lobe measures 4.6 x 1.2 x 2 cm, the isthmus up to 2 mm AP in the midline, and the left thyroid lobe measures 3.8 x 1.4 x 2.1 cm. A large poorly-defined heterogeneous hypoechoic area in the left mid-lower pole measures 3 x 1.3 x 1.3 cm. A couple of small hypoechoic right mid pole nodules measure 4 x 2 x 3 mm and 4 x 2 x 3 mm. Several small left cervical lymph nodes measure 1.0 x 0.6 x 0.9 cm, 1.3 x 0.6 x 0.9 cm, and 1.1 x 0.5 x 0.5 cm, and are diffusely hypoechoic, without clearly defined echogenic indira.\par \par \par IMPRESSION: In view of the history of left-sided neck pain, the large poorly-defined heterogeneous hypoechoic area in the left mid-lower pole may be secondary to thyroiditis, with the possibility of mass not excluded. Several small left-sided lymph nodes could be inflammatory or neoplastic.\par \par \par ***Electronically Signed ***\par -----------------------------------------------\par Demond Valdez MD              06/16/20  "\par \par .\par 6/29/2020 underwent FNAB of the ~ 3 cm L thyroid lesion:\par \par "FINAL DIAGNOSIS\par  \par Left thyroid mid to lower pole area:\par BENIGN FOLLICULAR NODULE WITH CYSTIC CHANGE ([place] Category II)\par A few Hurthle cells and crushed lymphocytes, suggestive of chronic lymphocytic thyroiditis.\par  \par \par MICROSCOPIC DESCRIPTION  \par  \par The Diff-Quik and Papanicolaou stained direct smears show sheets and groups of benign follicular cells with small nuclei, inconspicuous nucleoli and moderate cytoplasm in a hemorrhagic background with bare nuclei, macrophages and thin colloid. Mild metaplastic changes and rare crushed lymphoid cells are noted. "\par \par On exam, the thyroid is slightly enlarged, L more than R and is slightly tender to palpation.  \par \par \par Impression:  Her careful evaluation is most consistent with subacute thyroiditis.  The inflammation of subacute thyroiditis usually causes enough release of thyroid hormone to cause suppression of TSH, which has not yet been seen here, so close follow up by means of history, exam, labs and ultrasound will be the current strategy.    The trajectory of her symptoms appears to be that of improvement, so that is reassuring.   Symptoms from subacute thyroiditis may take several months to completely resolve.  \par \par Plan:  Repeat TSH today, repeat US thyroid in about 4-5 weeks and ROV here after that.

## 2021-03-05 NOTE — HISTORY OF PRESENT ILLNESS
[FreeTextEntry1] : Mar 02, 2021    in person\par \par PCP:  Dr. Meenu Weaver\par           Neuro:  Dr. Jarad Renteria (for HA)\par           Card:  Dr. Adrienne Pugh (palpitations, wake her from sleep)\par           GI:  Dr. Cisco Bull (anemia)\par \par           Rheum:  Dr. Sophia Moncada (joint pain)\par           Gyn:  Dr. Gladys Moran\par \par CC:  Tender swollen thyroid  [started ~ May 2020] (her daughter is treated for Graves disease)\par         (Type 2 diabetes, 1/2/2017 A1c 8.3 %)\par       \par Followed for tender thyroid.  Pain has marched across the gland similar to subacute thyroiditis.  Associated with development of elevated ESR (9/19: 11,  7/2/20:  39,  8/29/20:  52) that then returned to normal.   Also developed anti-thyroglobulin antibodies (7/2/20 < 20 -> 12/4/20: 83).  However, apparently never hyperthyroid enough to generate a suppressed TSH.\par \par In the initial phase US thyroid 6/16/20 showed a large poorly defined heterogeneous hypoelchoic area in the left mid lower pole felt possibly related to thyroiditis but possibility of mass "not excluded" and FNAB revealed no suspicious cytology.\par \par On exam, thyroid normal size, non-tender, without palpable nodule or lymphadenopathy.\par \par Impression:  An atypical presentation of subacute thyroiditis and although she still notes occasional discomfort in the region of the thyroid, this is infrequent and much milder than when she first presented.   \par \par Plan:  TFTs today and the long term strategy will be surveillance with follow up here in about 4 - 6 months.  \par \par \par \par Dec 03, 2020   in person\par \par PCP:  Dr. Meenu Weaver\par           Neuro:  Dr. Jarad Renteria (for HA)\par           Card:  Dr. Adrienne Pugh (palpitations, wake her from sleep)\par           GI:  Dr. Cisco Bull (anemia)\par           Rheum:  Dr. Sophia Moncada (joint pain)\par           Gyn:  Dr. Gladys Moran\par \par CC:  Tender swollen thyroid   (her daughter is treated for Graves disease)\par         (Type 2 diabetes, 1/2/2017 A1c 8.3 %)\par       \par Followed for tender thyroid.  Pain has marched across the gland (from L to R) similar to subacute thyroiditis.\par The TSH has never been suppressed.\par The ESR went up and came down followed by positive TPO abs.\par \par : :\par Constitutional:  Alert, well nourished, healthy appearance, no acute distress \par Eyes:  No proptosis, no stare\par Thyroid:\par Pulmonary:  No respiratory distress, no accessory muscle use; normal rate and effort\par Cardiac:  Normal rate\par Vascular: \par Endocrine:  No stigmata of Cushing’s Syndrome\par Musculoskeletal:  Normal gait, no involuntary movements\par Neurology:  No tremors\par Affect/Mood/Psych:  Oriented x 3; normal affect, normal insight/judgement, normal mood \par .\par  Impression:  She feels thyroid discomfort has continued to slowly improve.\par \par Plan:  TFTs today and ROV early March 2021.  \par \par \par The right thyroid lobe measures 4.9 x 1.6 x 1.7 cm, the isthmus up to 3 mm AP in the midline, and the left thyroid lobe measures 4 x 1.2 x 1.6 cm. There has been no change in lobulated thyroid contour and diffuse heterogeneity of thyroid parenchymal echogenicity. Color-flow Doppler evaluation demonstrates mild hypervascularity of the gland. . There are several very small colloid cyst in the left lobe.\par \par \par IMPRESSION: No significant change from 9/15/2020.\par \par \par ***Electronically Signed ***\par -----------------------------------------------\par Demond Valdez MD              10/19/20 \par \par \par Nov 17, 2020     Te;ephone     \par \par PCP:  Dr. Meenu Weaver\par           Neuro:  Dr. Jarad Renteria (for HA)\par           Card:  Dr. Adrienne Pugh (palpitations, wake her from sleep)\par           GI:  Dr. Cisco Bull (anemia)\par           Rheum:  Dr. Sophia Moncada (joint pain)\par           Gyn:  Dr. Gladys Moran\par \par CC:  Tender swollen thyroid   (her daughter is treated for Graves disease)\par         (Type 2 diabetes, A1c 7.3%)\par       \par 64 yo who presented with a tender swollen thyroid with symptoms "marching" across the gland from L to R.\par Today has no symptoms, but last night she had some symptoms.\par \par Trajectory of ESR was\par 9/19  11;  7/2/20  39;  7/29/20  52;  9/15/20  37   10/19/20  19 ***      \par \par \par ESR Trajectory:\par 7/2/20  < 20;  7/29/20:  < 20;  9/15/20:  52;  10/19/20:  50.4   ****\par \par 9/15/20   PC ;      10/19/10    (post prandial) ***\par \par TSH:   0.31 - 2.1     (never suppressed)  \par \par 10/19/2020 Ultrasound thyroid:  heterogeneous gland.  R lobe 4.9 cm;   L lobe 4 cm\par       Has had 5 US thyroid and FNAB of heterogeneous area L lobe when there was tenderness in that region.  \par \par Impression:  Although she has had the thyroid condition for several months, and it behaves like an unusual variant of subacute (painful) thyroiditis, the tSH has never been low.     The ESR went up and came down, the TPO ab went from negative to positive, all common with subacute thyroiditis.   "Marching" across the gland is also not that unusual.   In sum, she reports, that even though she still gets some pain, it is much better than when this all started.     So continued active surveiillance will be the strategy.\par \par Incidentally noted is the elevation of post prandial glucose, so I have taken the liberty of asking her to return for instruction in slef blood glucose monitoring as well as some dietary overview.  \par \par \par \par \par \par Sep 22, 2020   in person accompanied by her daughter, Emelina.   \par \par PCP:  Dr. Meenu Weaver\par           Neuro:  Dr. Jarad Renteria (for HA)\par           Card:  Dr. Adrienne Pugh (palpitations, wake her from sleep)\par           GI:  Dr. Cisco Bull (anemia)\par           Rheum:  Dr. Sophia Moncada (joint pain)\par           Gyn:  Dr. Gladys Moran\par \par CC:  Tender swollen thyroid   (her daughter is treated for Graves disease)\par         (Type 2 diabetes, A1c 7.3%)\par       \par 64 yo Type 2 diabetes, visits in follow up for swollen, tender thyroid.  Symptoms began on Left side of thyroid and then marched to the Right.  \par \par Most recent lab tests on\par September 15, 2020 include\par ESR 37 (had been 52 July 29, 39 July 2 and 11 Sep 20, 2019\par T4 8.9\par calcium 10.6 ***\par TSH 2.1 (up from 1.2 ion July 29, 0.70 July 2)\par TPO remains neg\par TgAb 52.3 (<10.0);  had been < 20 previously\par Tg 20 (<1.8)   [of course abs may impact\par \par \par Impression:  In general, thyroid enlargement appears to be slowly resolving.  US thyroid no longer demonstrates mass R thyroid lobe.    Her findings are most consistent with subacute thyroiditis; however, there are many atypical features, including the lack of TSH suppression and the unusually long course.    Continued close monitoring will be the strategy, including f/u US thyroid and lab tests.  \par \par Aug 03, 2020   in person\par \par PCP:  Dr. Meenu Weaver\par           Neuro:  Dr. Jarad Renteria (for HA)\par           Card:  Dr. Adrienne Pugh (palpitations, wake her from sleep)\par           GI:  Dr. Cisco Bull (anemia)\par           Rheum:  Dr. Sophia Moncada (joint pain)\par           Gyn:  Dr. Gladys Moran\par \par CC:  Tender swollen thyroid\par         (Type 2 diabetes, A1c 7.3%)\par       \par \par Recent labs at [facility] from\par 7/29/2020 include:\par \par ESR 52 (up from 39 on July 2 and 11 on Sep 20)\par TSH  1.2\par Thyroid antibodies (TPO, TgAb) remain negative\par \par Biopsy of left thyroid "nodule":  "benign follicular nodule with cystic change"\par \par Follow up US:\par \par CLINICAL HISTORY: Painful enlarged thyroid with nodules, recent benign biopsy of a left lower pole masslike asymmetry\par \par COMPARISON: 6/26/2020\par \par FINDINGS:\par \par Thyroid isthmus measures 2.8 mm.\par \par Right lobe of the thyroid measures 4.9 x 1.7 x 1.7 cm and is diffusely heterogeneous with interval development of a masslike area of asymmetry in the midpole measuring 2.4 x 1.4 x 1.3 cm.\par \par Left lobe of the thyroid measures 3.7 x 1.5 x 1.5 cm diffusely heterogeneous with previously biopsied area of masslike asymmetrical density now measuring 2.7 x 1.3 x 1.3 cm. This is not significantly changed.\par \par There is mild increased vascularity of the thyroid.\par \par Small lymph nodes are seen in the cervical chain bilaterally largest on the left measuring 7.7 x 5.6 x 5.1 mm and the largest on the right measuring 7.9 x 6.8 x 4.6 mm.\par \par \par IMPRESSION: Heterogeneous thyroid gland is again seen with interval development of a masslike area of asymmetry in the right mid pole thyroid gland. No change in the left lobe of the thyroid.\par \par Recommend short interval follow-up perhaps in 3-6 months time.\par \par ***Electronically Signed ***\par -----------------------------------------------\par Eric Pablo MD              07/29/20 \par \par \par Jul 01, 2020      in person     \par \par PCP:  Dr. Meenu Weaver\par           Neuro:  Dr. Jarad Renteria (for HA)\par           Card:  Dr. Adrienne Pugh (palpitations, wake her from sleep)\par           GI:  Dr. Cisco Bull (anemia)\par           Rheum:  Dr. Sophia Moncada (joint pain)\par           Gyn:  Dr. Gladys Moran\par \par CC:  Tender swollen thyroid\par         (Type 2 diabetes, A1c 7.3%)\par       \par 63 yo accompanied by her daughter.   Patient reports that she started to notice discomfort and swelling in the area of the thyroid about a month ago.  While the tenderness and discomfort was bilateral, it was more noticeable on the left.   The swelling and pain are still present, she reports that both are improving.   She is currently taking Tylenol with some benefit.    There is no previous history of thyroid problem.   Her daughter has been treated for a thyroid condition.  There is no history of face or neck irradiation.  \par \par Lab tests on\par 6/11/2020 included\par TSH 0.83\par \par 6/16/2020 Ultrasound Thyroid at [facility]:\par .\par "The right thyroid lobe measures 4.6 x 1.2 x 2 cm, the isthmus up to 2 mm AP in the midline, and the left thyroid lobe measures 3.8 x 1.4 x 2.1 cm. A large poorly-defined heterogeneous hypoechoic area in the left mid-lower pole measures 3 x 1.3 x 1.3 cm. A couple of small hypoechoic right mid pole nodules measure 4 x 2 x 3 mm and 4 x 2 x 3 mm. Several small left cervical lymph nodes measure 1.0 x 0.6 x 0.9 cm, 1.3 x 0.6 x 0.9 cm, and 1.1 x 0.5 x 0.5 cm, and are diffusely hypoechoic, without clearly defined echogenic indira.\par \par \par IMPRESSION: In view of the history of left-sided neck pain, the large poorly-defined heterogeneous hypoechoic area in the left mid-lower pole may be secondary to thyroiditis, with the possibility of mass not excluded. Several small left-sided lymph nodes could be inflammatory or neoplastic.\par \par \par ***Electronically Signed ***\par -----------------------------------------------\par Demond Valdez MD              06/16/20  "\par \par .\par 6/29/2020 underwent FNAB of the ~ 3 cm L thyroid lesion:\par \par "FINAL DIAGNOSIS\par  \par Left thyroid mid to lower pole area:\par BENIGN FOLLICULAR NODULE WITH CYSTIC CHANGE ([place] Category II)\par A few Hurthle cells and crushed lymphocytes, suggestive of chronic lymphocytic thyroiditis.\par  \par \par MICROSCOPIC DESCRIPTION  \par  \par The Diff-Quik and Papanicolaou stained direct smears show sheets and groups of benign follicular cells with small nuclei, inconspicuous nucleoli and moderate cytoplasm in a hemorrhagic background with bare nuclei, macrophages and thin colloid. Mild metaplastic changes and rare crushed lymphoid cells are noted. "\par \par On exam, the thyroid is slightly enlarged, L more than R and is slightly tender to palpation.  \par \par \par Impression:  Her careful evaluation is most consistent with subacute thyroiditis.  The inflammation of subacute thyroiditis usually causes enough release of thyroid hormone to cause suppression of TSH, which has not yet been seen here, so close follow up by means of history, exam, labs and ultrasound will be the current strategy.    The trajectory of her symptoms appears to be that of improvement, so that is reassuring.   Symptoms from subacute thyroiditis may take several months to completely resolve.  \par \par Plan:  Repeat TSH today, repeat US thyroid in about 4-5 weeks and ROV here after that.

## 2021-03-06 ENCOUNTER — NON-APPOINTMENT (OUTPATIENT)
Age: 66
End: 2021-03-06

## 2021-03-10 ENCOUNTER — RX RENEWAL (OUTPATIENT)
Age: 66
End: 2021-03-10

## 2021-03-11 DIAGNOSIS — M75.42 IMPINGEMENT SYNDROME OF LEFT SHOULDER: ICD-10-CM

## 2021-03-18 ENCOUNTER — RESULT REVIEW (OUTPATIENT)
Age: 66
End: 2021-03-18

## 2021-03-18 ENCOUNTER — APPOINTMENT (OUTPATIENT)
Dept: INTERNAL MEDICINE | Facility: CLINIC | Age: 66
End: 2021-03-18

## 2021-04-01 ENCOUNTER — APPOINTMENT (OUTPATIENT)
Dept: INTERNAL MEDICINE | Facility: CLINIC | Age: 66
End: 2021-04-01
Payer: MEDICARE

## 2021-04-01 PROCEDURE — 90746 HEPB VACCINE 3 DOSE ADULT IM: CPT

## 2021-04-01 PROCEDURE — 99072 ADDL SUPL MATRL&STAF TM PHE: CPT

## 2021-04-01 PROCEDURE — 90471 IMMUNIZATION ADMIN: CPT

## 2021-04-25 ENCOUNTER — LABORATORY RESULT (OUTPATIENT)
Age: 66
End: 2021-04-25

## 2021-04-27 ENCOUNTER — APPOINTMENT (OUTPATIENT)
Dept: RHEUMATOLOGY | Facility: CLINIC | Age: 66
End: 2021-04-27
Payer: MEDICARE

## 2021-04-27 VITALS
HEIGHT: 63 IN | DIASTOLIC BLOOD PRESSURE: 80 MMHG | BODY MASS INDEX: 24.8 KG/M2 | SYSTOLIC BLOOD PRESSURE: 110 MMHG | WEIGHT: 140 LBS

## 2021-04-27 DIAGNOSIS — M76.61 ACHILLES TENDINITIS, RIGHT LEG: ICD-10-CM

## 2021-04-27 PROCEDURE — 99072 ADDL SUPL MATRL&STAF TM PHE: CPT

## 2021-04-27 PROCEDURE — 99213 OFFICE O/P EST LOW 20 MIN: CPT

## 2021-05-01 PROBLEM — M76.61 ACHILLES TENDINITIS OF RIGHT LOWER EXTREMITY: Status: ACTIVE | Noted: 2021-05-01

## 2021-05-01 NOTE — HISTORY OF PRESENT ILLNESS
[FreeTextEntry1] : Last seen 2019 for FM.  Has not followed up since.\par She c/o pain in her arms, back, legs and hands.  She notes increased bumps of her DIP's.\par She takes Advil, Tylenol or Bufferin, which helps for 2-3 hours, and then the pain worsens.\par Pain interferes with her sleep.\par Diclofenac gel helps.  She uses 2-3 times a day.\par \par She was unable to tolerate Cymbalta.  She stopped taking Gabapentin.  She was on it for 2 years, but stopped this year bc she ran out of meds.  She was taking Gabapentin 100 mg TID.  No side effects.\par \par + swelling of hands\par No stiffness\par No rashes\par \par In Oct 2020 she developed pain in her left Achilles.  It was very swollen.  She did PT for 6 weeks and the swelling has decreased.

## 2021-05-03 ENCOUNTER — RX RENEWAL (OUTPATIENT)
Age: 66
End: 2021-05-03

## 2021-05-04 ENCOUNTER — APPOINTMENT (OUTPATIENT)
Dept: ENDOCRINOLOGY | Facility: CLINIC | Age: 66
End: 2021-05-04
Payer: MEDICARE

## 2021-05-04 VITALS
WEIGHT: 138 LBS | OXYGEN SATURATION: 98 % | HEIGHT: 63 IN | HEART RATE: 103 BPM | BODY MASS INDEX: 24.45 KG/M2 | DIASTOLIC BLOOD PRESSURE: 72 MMHG | SYSTOLIC BLOOD PRESSURE: 120 MMHG

## 2021-05-04 PROCEDURE — 99214 OFFICE O/P EST MOD 30 MIN: CPT

## 2021-05-04 PROCEDURE — 99072 ADDL SUPL MATRL&STAF TM PHE: CPT

## 2021-05-04 NOTE — HISTORY OF PRESENT ILLNESS
[FreeTextEntry1] : May 04, 2021    in prson\par \par PCP:  Dr. Meenu Weaver\par           Neuro:  Dr. Jarad Renteria (for HA)\par           Card:  Dr. Adrienne Pugh (palpitations, wake her from sleep)\par           GI:  Dr. Cisco Bull (anemia)\par           Rheum:  Dr. Sophia Moncada (arthritis)\par \par           Rheum:  Dr. Sophia Moncada (joint pain)\par           Gyn:  Dr. Gladys Moran\par \par CC:  Tender swollen thyroid  [started ~ May 2020] (her daughter is treated for Graves disease)\par         (Type 2 diabetes, 1/2/2017 A1c 8.3 %)\par       \par Followed for tender thyroid.  Pain has marched across the gland similar to subacute thyroiditis.  \par \par Most recent lab tests at [facility] on\par 4/26/201 included:\par \par TSH 1.10   (lowest was 0.31 in Jan 2019)\par glucose 170 mg/dl\par insulin 38 (not fasting)\par \par On exam, thyroid slightly tender.\par No palpable nodules or adenopathy.\par \par Impression:  "Smouldering thyroiditis" slowly stuttering into baseline.\par Most recent US thyroid from 10/19/2020: diffuse heterogeneity of echogenicity, mild hypervascularity....no significant change from 9/15/2020"\par \par Doing well.\par Euthyroid.\par Reassuring US thyroid.\par \par Plan:  f/u US thyroid by 9 2021 and ROV October.\par She borrows her 's OneTouch Ultra, but will try to get her own and test sugars at different times of the day.\par If results not in range and not amenable to dietary change, additional medications which appear to be covedred include\par Farxiga, Trulicity.  \par \par She reports  mg/dl while on metformin 1000 mg BID.  \par \par \par \par \par \par \par \par \par Mar 02, 2021    in person\par \par PCP:  Dr. Meenu Weaver\par           Neuro:  Dr. Jarad Renteria (for HA)\par           Card:  Dr. Adrienne Pugh (palpitations, wake her from sleep)\par           GI:  Dr. Cisco Bull (anemia)\par \par           Rheum:  Dr. Sophia Moncada (joint pain)\par           Gyn:  Dr. Gladys Moran\par \par CC:  Tender swollen thyroid  [started ~ May 2020] (her daughter is treated for Graves disease)\par         (Type 2 diabetes, 1/2/2017 A1c 8.3 %)\par       \par Followed for tender thyroid.  Pain has marched across the gland similar to subacute thyroiditis.  Associated with development of elevated ESR (9/19: 11,  7/2/20:  39,  8/29/20:  52) that then returned to normal.   Also developed anti-thyroglobulin antibodies (7/2/20 < 20 -> 12/4/20: 83).  However, apparently never hyperthyroid enough to generate a suppressed TSH.\par \par In the initial phase US thyroid 6/16/20 showed a large poorly defined heterogeneous hypoelchoic area in the left mid lower pole felt possibly related to thyroiditis but possibility of mass "not excluded" and FNAB revealed no suspicious cytology.\par \par On exam, thyroid normal size, non-tender, without palpable nodule or lymphadenopathy.\par \par Impression:  An atypical presentation of subacute thyroiditis and although she still notes occasional discomfort in the region of the thyroid, this is infrequent and much milder than when she first presented.   \par \par Plan:  TFTs today and the long term strategy will be surveillance with follow up here in about 4 - 6 months.  \par \par \par \par Dec 03, 2020   in person\par \par PCP:  Dr. Meenu Weaver\par           Neuro:  Dr. Jarad Renteria (for HA)\par           Card:  Dr. Adrienne Pugh (palpitations, wake her from sleep)\par           GI:  Dr. Cisco Bull (anemia)\par           Rheum:  Dr. Sophia Moncada (joint pain)\par           Gyn:  Dr. Gladys Moran\par \par CC:  Tender swollen thyroid   (her daughter is treated for Graves disease)\par         (Type 2 diabetes, 1/2/2017 A1c 8.3 %)\par       \par Followed for tender thyroid.  Pain has marched across the gland (from L to R) similar to subacute thyroiditis.\par The TSH has never been suppressed.\par The ESR went up and came down followed by positive TPO abs.\par \par : :\par Constitutional:  Alert, well nourished, healthy appearance, no acute distress \par Eyes:  No proptosis, no stare\par Thyroid:\par Pulmonary:  No respiratory distress, no accessory muscle use; normal rate and effort\par Cardiac:  Normal rate\par Vascular: \par Endocrine:  No stigmata of Cushing’s Syndrome\par Musculoskeletal:  Normal gait, no involuntary movements\par Neurology:  No tremors\par Affect/Mood/Psych:  Oriented x 3; normal affect, normal insight/judgement, normal mood \par .\par  Impression:  She feels thyroid discomfort has continued to slowly improve.\par \par Plan:  TFTs today and ROV early March 2021.  \par \par \par The right thyroid lobe measures 4.9 x 1.6 x 1.7 cm, the isthmus up to 3 mm AP in the midline, and the left thyroid lobe measures 4 x 1.2 x 1.6 cm. There has been no change in lobulated thyroid contour and diffuse heterogeneity of thyroid parenchymal echogenicity. Color-flow Doppler evaluation demonstrates mild hypervascularity of the gland. . There are several very small colloid cyst in the left lobe.\par \par \par IMPRESSION: No significant change from 9/15/2020.\par \par \par ***Electronically Signed ***\par -----------------------------------------------\par Demond Valdez MD              10/19/20 \par \par \par Nov 17, 2020     Te;ephone     \par \par PCP:  Dr. Meenu Weaver\par           Neuro:  Dr. Jarad Renteria (for HA)\par           Card:  Dr. Adrienne Pugh (palpitations, wake her from sleep)\par           GI:  Dr. Cisco Bull (anemia)\par           Rheum:  Dr. Sophia Moncada (joint pain)\par           Gyn:  Dr. Gladys Moran\par \par CC:  Tender swollen thyroid   (her daughter is treated for Graves disease)\par         (Type 2 diabetes, A1c 7.3%)\par       \par 66 yo who presented with a tender swollen thyroid with symptoms "marching" across the gland from L to R.\par Today has no symptoms, but last night she had some symptoms.\par \par Trajectory of ESR was\par 9/19  11;  7/2/20  39;  7/29/20  52;  9/15/20  37   10/19/20  19 ***      \par \par \par ESR Trajectory:\par 7/2/20  < 20;  7/29/20:  < 20;  9/15/20:  52;  10/19/20:  50.4   ****\par \par 9/15/20   PC ;      10/19/10    (post prandial) ***\par \par TSH:   0.31 - 2.1     (never suppressed)  \par \par 10/19/2020 Ultrasound thyroid:  heterogeneous gland.  R lobe 4.9 cm;   L lobe 4 cm\par       Has had 5 US thyroid and FNAB of heterogeneous area L lobe when there was tenderness in that region.  \par \par Impression:  Although she has had the thyroid condition for several months, and it behaves like an unusual variant of subacute (painful) thyroiditis, the tSH has never been low.     The ESR went up and came down, the TPO ab went from negative to positive, all common with subacute thyroiditis.   "Marching" across the gland is also not that unusual.   In sum, she reports, that even though she still gets some pain, it is much better than when this all started.     So continued active surveiillance will be the strategy.\par \par Incidentally noted is the elevation of post prandial glucose, so I have taken the liberty of asking her to return for instruction in slef blood glucose monitoring as well as some dietary overview.  \par \par \par \par \par \par Sep 22, 2020   in person accompanied by her daughter, Emelina.   \par \par PCP:  Dr. Meenu Weaver\par           Neuro:  Dr. Jarad Renteria (for HA)\par           Card:  Dr. Adrienne Pugh (palpitations, wake her from sleep)\par           GI:  Dr. Cisco Bull (anemia)\par           Rheum:  Dr. Sophia Moncada (joint pain)\par           Gyn:  Dr. Gladys Moran\par \par CC:  Tender swollen thyroid   (her daughter is treated for Graves disease)\par         (Type 2 diabetes, A1c 7.3%)\par       \par 66 yo Type 2 diabetes, visits in follow up for swollen, tender thyroid.  Symptoms began on Left side of thyroid and then marched to the Right.  \par \par Most recent lab tests on\par September 15, 2020 include\par ESR 37 (had been 52 July 29, 39 July 2 and 11 Sep 20, 2019\par T4 8.9\par calcium 10.6 ***\par TSH 2.1 (up from 1.2 ion July 29, 0.70 July 2)\par TPO remains neg\par TgAb 52.3 (<10.0);  had been < 20 previously\par Tg 20 (<1.8)   [of course abs may impact\par \par \par Impression:  In general, thyroid enlargement appears to be slowly resolving.  US thyroid no longer demonstrates mass R thyroid lobe.    Her findings are most consistent with subacute thyroiditis; however, there are many atypical features, including the lack of TSH suppression and the unusually long course.    Continued close monitoring will be the strategy, including f/u US thyroid and lab tests.  \par \par Aug 03, 2020   in person\par \par PCP:  Dr. Meenu Weaver\par           Neuro:  Dr. Jarad Renteria (for HA)\par           Card:  Dr. Adrienne Pugh (palpitations, wake her from sleep)\par           GI:  Dr. Cisco Bull (anemia)\par           Rheum:  Dr. Sophia Moncada (joint pain)\par           Gyn:  Dr. Gladys Moran\par \par CC:  Tender swollen thyroid\par         (Type 2 diabetes, A1c 7.3%)\par       \par \par Recent labs at [facility] from\par 7/29/2020 include:\par \par ESR 52 (up from 39 on July 2 and 11 on Sep 20)\par TSH  1.2\par Thyroid antibodies (TPO, TgAb) remain negative\par \par Biopsy of left thyroid "nodule":  "benign follicular nodule with cystic change"\par \par Follow up US:\par \par CLINICAL HISTORY: Painful enlarged thyroid with nodules, recent benign biopsy of a left lower pole masslike asymmetry\par \par COMPARISON: 6/26/2020\par \par FINDINGS:\par \par Thyroid isthmus measures 2.8 mm.\par \par Right lobe of the thyroid measures 4.9 x 1.7 x 1.7 cm and is diffusely heterogeneous with interval development of a masslike area of asymmetry in the midpole measuring 2.4 x 1.4 x 1.3 cm.\par \par Left lobe of the thyroid measures 3.7 x 1.5 x 1.5 cm diffusely heterogeneous with previously biopsied area of masslike asymmetrical density now measuring 2.7 x 1.3 x 1.3 cm. This is not significantly changed.\par \par There is mild increased vascularity of the thyroid.\par \par Small lymph nodes are seen in the cervical chain bilaterally largest on the left measuring 7.7 x 5.6 x 5.1 mm and the largest on the right measuring 7.9 x 6.8 x 4.6 mm.\par \par \par IMPRESSION: Heterogeneous thyroid gland is again seen with interval development of a masslike area of asymmetry in the right mid pole thyroid gland. No change in the left lobe of the thyroid.\par \par Recommend short interval follow-up perhaps in 3-6 months time.\par \par ***Electronically Signed ***\par -----------------------------------------------\par Eric Pablo MD              07/29/20 \par \par \par Jul 01, 2020      in person     \par \par PCP:  Dr. Meenu Weaver\par           Neuro:  Dr. Jarad Renteria (for HA)\par           Card:  Dr. Adrienne Pugh (palpitations, wake her from sleep)\par           GI:  Dr. Cisco Bull (anemia)\par           Rheum:  Dr. Sophia Moncada (joint pain)\par           Gyn:  Dr. Gladys Moran\par \par CC:  Tender swollen thyroid\par         (Type 2 diabetes, A1c 7.3%)\par       \par 65 yo accompanied by her daughter.   Patient reports that she started to notice discomfort and swelling in the area of the thyroid about a month ago.  While the tenderness and discomfort was bilateral, it was more noticeable on the left.   The swelling and pain are still present, she reports that both are improving.   She is currently taking Tylenol with some benefit.    There is no previous history of thyroid problem.   Her daughter has been treated for a thyroid condition.  There is no history of face or neck irradiation.  \par \par Lab tests on\par 6/11/2020 included\par TSH 0.83\par \par 6/16/2020 Ultrasound Thyroid at [facility]:\par .\par "The right thyroid lobe measures 4.6 x 1.2 x 2 cm, the isthmus up to 2 mm AP in the midline, and the left thyroid lobe measures 3.8 x 1.4 x 2.1 cm. A large poorly-defined heterogeneous hypoechoic area in the left mid-lower pole measures 3 x 1.3 x 1.3 cm. A couple of small hypoechoic right mid pole nodules measure 4 x 2 x 3 mm and 4 x 2 x 3 mm. Several small left cervical lymph nodes measure 1.0 x 0.6 x 0.9 cm, 1.3 x 0.6 x 0.9 cm, and 1.1 x 0.5 x 0.5 cm, and are diffusely hypoechoic, without clearly defined echogenic indira.\par \par \par IMPRESSION: In view of the history of left-sided neck pain, the large poorly-defined heterogeneous hypoechoic area in the left mid-lower pole may be secondary to thyroiditis, with the possibility of mass not excluded. Several small left-sided lymph nodes could be inflammatory or neoplastic.\par \par \par ***Electronically Signed ***\par -----------------------------------------------\par Demond Valdez MD              06/16/20  "\par \par .\par 6/29/2020 underwent FNAB of the ~ 3 cm L thyroid lesion:\par \par "FINAL DIAGNOSIS\par  \par Left thyroid mid to lower pole area:\par BENIGN FOLLICULAR NODULE WITH CYSTIC CHANGE ([place] Category II)\par A few Hurthle cells and crushed lymphocytes, suggestive of chronic lymphocytic thyroiditis.\par  \par \par MICROSCOPIC DESCRIPTION  \par  \par The Diff-Quik and Papanicolaou stained direct smears show sheets and groups of benign follicular cells with small nuclei, inconspicuous nucleoli and moderate cytoplasm in a hemorrhagic background with bare nuclei, macrophages and thin colloid. Mild metaplastic changes and rare crushed lymphoid cells are noted. "\par \par On exam, the thyroid is slightly enlarged, L more than R and is slightly tender to palpation.  \par \par \par Impression:  Her careful evaluation is most consistent with subacute thyroiditis.  The inflammation of subacute thyroiditis usually causes enough release of thyroid hormone to cause suppression of TSH, which has not yet been seen here, so close follow up by means of history, exam, labs and ultrasound will be the current strategy.    The trajectory of her symptoms appears to be that of improvement, so that is reassuring.   Symptoms from subacute thyroiditis may take several months to completely resolve.  \par \par Plan:  Repeat TSH today, repeat US thyroid in about 4-5 weeks and ROV here after that.

## 2021-05-04 NOTE — ASSESSMENT
[FreeTextEntry1] : Thyroiditis continues to stutter into improvement, but even now, with occasional discomfort.\par \par F/U TFTs, US in September.

## 2021-05-24 ENCOUNTER — APPOINTMENT (OUTPATIENT)
Dept: FAMILY MEDICINE | Facility: CLINIC | Age: 66
End: 2021-05-24
Payer: MEDICARE

## 2021-05-24 VITALS
HEART RATE: 72 BPM | RESPIRATION RATE: 16 BRPM | WEIGHT: 136 LBS | OXYGEN SATURATION: 98 % | SYSTOLIC BLOOD PRESSURE: 122 MMHG | TEMPERATURE: 97.6 F | HEIGHT: 63 IN | BODY MASS INDEX: 24.1 KG/M2 | DIASTOLIC BLOOD PRESSURE: 70 MMHG

## 2021-05-24 DIAGNOSIS — G47.09 OTHER INSOMNIA: ICD-10-CM

## 2021-05-24 LAB
BILIRUB UR QL STRIP: NEGATIVE
CLARITY UR: CLEAR
COLLECTION METHOD: NORMAL
GLUCOSE UR-MCNC: NEGATIVE
HCG UR QL: 0.2 EU/DL
HGB UR QL STRIP.AUTO: NORMAL
KETONES UR-MCNC: NEGATIVE
LEUKOCYTE ESTERASE UR QL STRIP: NEGATIVE
NITRITE UR QL STRIP: NEGATIVE
PH UR STRIP: 5
PROT UR STRIP-MCNC: NEGATIVE
SP GR UR STRIP: 1.03

## 2021-05-24 PROCEDURE — 90746 HEPB VACCINE 3 DOSE ADULT IM: CPT

## 2021-05-24 PROCEDURE — 90471 IMMUNIZATION ADMIN: CPT

## 2021-05-24 PROCEDURE — 81002 URINALYSIS NONAUTO W/O SCOPE: CPT

## 2021-05-24 PROCEDURE — 99214 OFFICE O/P EST MOD 30 MIN: CPT | Mod: 25

## 2021-05-24 RX ORDER — DICLOFENAC SODIUM 10 MG/G
1 GEL TOPICAL
Qty: 1 | Refills: 3 | Status: DISCONTINUED | COMMUNITY
End: 2021-05-24

## 2021-05-24 NOTE — HISTORY OF PRESENT ILLNESS
[FreeTextEntry1] : Follow up on pain and possible urine infection [de-identified] : Pt here for f/u\par C/o possible infection of urine.  Happens often.  Has some discomfort anteriorly and in the back.  No n/v.  Hurts a little when she pees. No blood or mucus in urine. No f/c.\par Bad headache pain.  INtermittnet. Has meds she uses that can help. \par Back pain.  Uses heat . Sometimes ice.  Stable.  Just got script for meloxicam but hasn't used it yet.\par Not sleeping well.  Takes tea de keely. \par Getting allergy symptoms.  Takes claritin or allegra prn.\par

## 2021-05-26 LAB — BACTERIA UR CULT: NORMAL

## 2021-05-28 ENCOUNTER — ASOB RESULT (OUTPATIENT)
Age: 66
End: 2021-05-28

## 2021-05-28 ENCOUNTER — APPOINTMENT (OUTPATIENT)
Dept: OBGYN | Facility: CLINIC | Age: 66
End: 2021-05-28
Payer: MEDICARE

## 2021-05-28 VITALS
WEIGHT: 136 LBS | SYSTOLIC BLOOD PRESSURE: 118 MMHG | BODY MASS INDEX: 24.1 KG/M2 | HEIGHT: 63 IN | DIASTOLIC BLOOD PRESSURE: 74 MMHG

## 2021-05-28 PROCEDURE — 99213 OFFICE O/P EST LOW 20 MIN: CPT

## 2021-05-28 PROCEDURE — 76830 TRANSVAGINAL US NON-OB: CPT

## 2021-06-01 LAB
CANDIDA VAG CYTO: NOT DETECTED
G VAGINALIS+PREV SP MTYP VAG QL MICRO: NOT DETECTED
T VAGINALIS VAG QL WET PREP: NOT DETECTED

## 2021-06-04 ENCOUNTER — APPOINTMENT (OUTPATIENT)
Dept: GASTROENTEROLOGY | Facility: CLINIC | Age: 66
End: 2021-06-04
Payer: MEDICARE

## 2021-06-04 VITALS
HEIGHT: 63 IN | SYSTOLIC BLOOD PRESSURE: 124 MMHG | OXYGEN SATURATION: 98 % | WEIGHT: 138 LBS | TEMPERATURE: 97.1 F | BODY MASS INDEX: 24.45 KG/M2 | HEART RATE: 79 BPM | DIASTOLIC BLOOD PRESSURE: 84 MMHG

## 2021-06-04 LAB — BACTERIA UR CULT: NORMAL

## 2021-06-04 PROCEDURE — 99214 OFFICE O/P EST MOD 30 MIN: CPT | Mod: 25

## 2021-06-04 PROCEDURE — 36415 COLL VENOUS BLD VENIPUNCTURE: CPT

## 2021-06-04 NOTE — PHYSICAL EXAM
[General Appearance - Alert] : alert [General Appearance - In No Acute Distress] : in no acute distress [Sclera] : the sclera and conjunctiva were normal [Outer Ear] : the ears and nose were normal in appearance [] : no respiratory distress [Abdomen Soft] : soft [No CVA Tenderness] : no ~M costovertebral angle tenderness [Abnormal Walk] : normal gait [No Focal Deficits] : no focal deficits [Oriented To Time, Place, And Person] : oriented to person, place, and time [FreeTextEntry1] : deferred

## 2021-06-04 NOTE — HISTORY OF PRESENT ILLNESS
[de-identified] : Presents with a  c/o 2-3  weeks of  LUQ and suprapubic  pain 9 mild / non radiating / no clear  precipitating or  alleviating factors) . GYN evaluation ( 5/28/21) reviewed and was unremarkable  ( including pelvic  sonogram) \par \par Denies nausea, vomiting, fever, chills, diarrhea, melena, hematemesis\par \par \par Last evaluated 2/2020 for a microcytic  mild anemia and long standing constipation and epigastric discomfort ( mild) .  EGD / colonoscopy revealed gastritis / hemorrhoids / diverticulosis. PPI and digestive  advantage  continued\par \par - CT scan  ( 12/2017) notable for fatty liver (report reviewed by me) . \par -EGD / colonoscopy 4/2015 revealed H. pylori negative gastritis / esophagitis / hemorrhoids and diverticulosis. Indication for procedures was constipation / bloating / dysphagia. Digestive advantage recommended as well with Linzess to be considered if sxs persisted.\par \par  SONO/CAT scan ( 2011) was significant for fatty liver and a fatty growth on the kidney for which she was referred to Dr. Alston. \par \par An EGD / colonoscopy performed by me in 2004/2002 respectively revealed melanosis coli and gastritis / hiatal hernia. \par

## 2021-06-04 NOTE — ASSESSMENT
[FreeTextEntry1] : LUQ / suprapubic  pain:  Etiology unclear. CT scan ordered / Labs ordered.  Plans  pending results\par \par The ordered labs/ bloods  were drawn / collected in my office\par

## 2021-06-07 LAB
ALBUMIN SERPL ELPH-MCNC: 4.4 G/DL
ALP BLD-CCNC: 64 U/L
ALT SERPL-CCNC: 18 U/L
AMYLASE/CREAT SERPL: 122 U/L
ANION GAP SERPL CALC-SCNC: 15 MMOL/L
AST SERPL-CCNC: 19 U/L
BASOPHILS # BLD AUTO: 0.03 K/UL
BASOPHILS NFR BLD AUTO: 0.4 %
BILIRUB DIRECT SERPL-MCNC: 0.1 MG/DL
BILIRUB INDIRECT SERPL-MCNC: 0.3 MG/DL
BILIRUB SERPL-MCNC: 0.4 MG/DL
BUN SERPL-MCNC: 18 MG/DL
CALCIUM SERPL-MCNC: 10.4 MG/DL
CHLORIDE SERPL-SCNC: 99 MMOL/L
CO2 SERPL-SCNC: 23 MMOL/L
CREAT SERPL-MCNC: 0.76 MG/DL
EOSINOPHIL # BLD AUTO: 0.09 K/UL
EOSINOPHIL NFR BLD AUTO: 1.3 %
GLUCOSE SERPL-MCNC: 242 MG/DL
HCT VFR BLD CALC: 36.4 %
HGB BLD-MCNC: 10.7 G/DL
IMM GRANULOCYTES NFR BLD AUTO: 0.3 %
LPL SERPL-CCNC: 27 U/L
LYMPHOCYTES # BLD AUTO: 2.02 K/UL
LYMPHOCYTES NFR BLD AUTO: 30.2 %
MAN DIFF?: NORMAL
MCHC RBC-ENTMCNC: 23.6 PG
MCHC RBC-ENTMCNC: 29.4 GM/DL
MCV RBC AUTO: 80.2 FL
MONOCYTES # BLD AUTO: 0.52 K/UL
MONOCYTES NFR BLD AUTO: 7.8 %
NEUTROPHILS # BLD AUTO: 4.01 K/UL
NEUTROPHILS NFR BLD AUTO: 60 %
PLATELET # BLD AUTO: 354 K/UL
POTASSIUM SERPL-SCNC: 4.6 MMOL/L
PROT SERPL-MCNC: 6.8 G/DL
RBC # BLD: 4.54 M/UL
RBC # FLD: 18.2 %
SODIUM SERPL-SCNC: 137 MMOL/L
WBC # FLD AUTO: 6.69 K/UL

## 2021-06-13 ENCOUNTER — NON-APPOINTMENT (OUTPATIENT)
Age: 66
End: 2021-06-13

## 2021-06-14 ENCOUNTER — RX RENEWAL (OUTPATIENT)
Age: 66
End: 2021-06-14

## 2021-06-16 ENCOUNTER — RESULT REVIEW (OUTPATIENT)
Age: 66
End: 2021-06-16

## 2021-06-17 ENCOUNTER — NON-APPOINTMENT (OUTPATIENT)
Age: 66
End: 2021-06-17

## 2021-06-25 ENCOUNTER — NON-APPOINTMENT (OUTPATIENT)
Age: 66
End: 2021-06-25

## 2021-06-28 ENCOUNTER — NON-APPOINTMENT (OUTPATIENT)
Age: 66
End: 2021-06-28

## 2021-06-28 ENCOUNTER — APPOINTMENT (OUTPATIENT)
Dept: FAMILY MEDICINE | Facility: CLINIC | Age: 66
End: 2021-06-28

## 2021-07-07 ENCOUNTER — APPOINTMENT (OUTPATIENT)
Dept: RHEUMATOLOGY | Facility: CLINIC | Age: 66
End: 2021-07-07

## 2021-07-08 ENCOUNTER — APPOINTMENT (OUTPATIENT)
Dept: CARDIOLOGY | Facility: CLINIC | Age: 66
End: 2021-07-08

## 2021-11-01 NOTE — PHYSICAL EXAM
[General Appearance - In No Acute Distress] : in no acute distress [General Appearance - Alert] : alert [Sclera] : the sclera and conjunctiva were normal [Outer Ear] : the ears and nose were normal in appearance [] : no respiratory distress [FreeTextEntry1] : deferred [Abdomen Soft] : soft [No CVA Tenderness] : no ~M costovertebral angle tenderness [Abnormal Walk] : normal gait [No Focal Deficits] : no focal deficits [Oriented To Time, Place, And Person] : oriented to person, place, and time [facility] Pregnancy And Lactation Text: This medication is Pregnancy Category C and it isn't known if it is safe during pregnancy. It is unknown if it is excreted in breast milk.

## 2021-11-18 ENCOUNTER — APPOINTMENT (OUTPATIENT)
Dept: ENDOCRINOLOGY | Facility: CLINIC | Age: 66
End: 2021-11-18
Payer: MEDICARE

## 2021-11-18 ENCOUNTER — LABORATORY RESULT (OUTPATIENT)
Age: 66
End: 2021-11-18

## 2021-11-18 ENCOUNTER — NON-APPOINTMENT (OUTPATIENT)
Age: 66
End: 2021-11-18

## 2021-11-18 VITALS
DIASTOLIC BLOOD PRESSURE: 76 MMHG | HEIGHT: 63 IN | HEART RATE: 90 BPM | SYSTOLIC BLOOD PRESSURE: 138 MMHG | WEIGHT: 134 LBS | BODY MASS INDEX: 23.74 KG/M2 | OXYGEN SATURATION: 98 %

## 2021-11-18 PROCEDURE — 36415 COLL VENOUS BLD VENIPUNCTURE: CPT

## 2021-11-18 PROCEDURE — 99214 OFFICE O/P EST MOD 30 MIN: CPT | Mod: 25

## 2021-11-18 NOTE — HISTORY OF PRESENT ILLNESS
[FreeTextEntry1] : Nov 18, 2021    in person\par \par PCP:  Dr. Meenu Weaver\par           Neuro:  Dr. Jarad Renteria (for HA)\par           Card:  Dr. Adrienne Pugh (palpitations, wake her from sleep)\par           GI:  Dr. Cisco Bull (anemia)\par           Rheum:  Dr. Sophia Moncada (arthritis - ?[facility])          \par           Gyn:  Dr. Gladys Moran\par \par CC:  Tender swollen thyroid  [started ~ May 2020] (her daughter is treated for Graves disease)\par         (Type 2 diabetes, 1/2/2017 A1c 8.3 %)\par       \par 67 yo mother of six.   Diagnosed with diabetes about 10 years ago on routine exam.\par Takes metformin 1000 mg in AM and glipizide  ER 10 mg at lunch.\par Monitors her sugars with her 's meter; however, he is out of town.  \par Today after broccoli, cauliflorer, beans, corn and rice  the fingerstick BS is 274 mg/dl\par So she will start using the Verio Flextouch to check her sugars 3X a day, before meals.\par Also reviewed with her a lower carb diet.\par \par But the primary reason she stopped in today is pain R thyroid bed which started a few weeks ago.   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  \par \par   \par \par \par \par \par \par May 04, 2021    in prson\par \par PCP:  Dr. Meenu Weaver\par           Neuro:  Dr. Jarad Renteria (for HA)\par           Card:  Dr. Adrienne Pugh (palpitations, wake her from sleep)\par           GI:  Dr. Cisco Bull (anemia)\par           Rheum:  Dr. Sophia Moncada (arthritis)\par \par           Rheum:  Dr. Sophia Moncada (joint pain)\par           Gyn:  Dr. Gladys Moran\par \par CC:  Tender swollen thyroid  [started ~ May 2020] (her daughter is treated for Graves disease)\par         (Type 2 diabetes, 1/2/2017 A1c 8.3 %)\par       \par Followed for tender thyroid.  Pain has marched across the gland similar to subacute thyroiditis.  \par \par Most recent lab tests at [facility] on\par 4/26/201 included:\par \par TSH 1.10   (lowest was 0.31 in Jan 2019)\par glucose 170 mg/dl\par insulin 38 (not fasting)\par \par On exam, thyroid slightly tender.\par No palpable nodules or adenopathy.\par \par Impression:  "Smouldering thyroiditis" slowly stuttering into baseline.\par Most recent US thyroid from 10/19/2020: diffuse heterogeneity of echogenicity, mild hypervascularity....no significant change from 9/15/2020"\par \par Doing well.\par Euthyroid.\par Reassuring US thyroid.\par \par Plan:  f/u US thyroid by 9 2021 and ROV October.\par She borrows her 's OneTouch Ultra, but will try to get her own and test sugars at different times of the day.\par If results not in range and not amenable to dietary change, additional medications which appear to be covedred include\par Farxiga, Trulicity.  \par \par She reports  mg/dl while on metformin 1000 mg BID.  \par \par \par \par \par \par \par \par \par Mar 02, 2021    in person\par \par PCP:  Dr. Meenu Weaver\par           Neuro:  Dr. Jarad Renteria (for HA)\par           Card:  Dr. Adrienne Pugh (palpitations, wake her from sleep)\par           GI:  Dr. Cisco Bull (anemia)\par \par           Rheum:  Dr. Sophia Moncada (joint pain)\par           Gyn:  Dr. Gladys Moran\par \par CC:  Tender swollen thyroid  [started ~ May 2020] (her daughter is treated for Graves disease)\par         (Type 2 diabetes, 1/2/2017 A1c 8.3 %)\par       \par Followed for tender thyroid.  Pain has marched across the gland similar to subacute thyroiditis.  Associated with development of elevated ESR (9/19: 11,  7/2/20:  39,  8/29/20:  52) that then returned to normal.   Also developed anti-thyroglobulin antibodies (7/2/20 < 20 -> 12/4/20: 83).  However, apparently never hyperthyroid enough to generate a suppressed TSH.\par \par In the initial phase US thyroid 6/16/20 showed a large poorly defined heterogeneous hypoelchoic area in the left mid lower pole felt possibly related to thyroiditis but possibility of mass "not excluded" and FNAB revealed no suspicious cytology.\par \par On exam, thyroid normal size, non-tender, without palpable nodule or lymphadenopathy.\par \par Impression:  An atypical presentation of subacute thyroiditis and although she still notes occasional discomfort in the region of the thyroid, this is infrequent and much milder than when she first presented.   \par \par Plan:  TFTs today and the long term strategy will be surveillance with follow up here in about 4 - 6 months.  \par \par \par \par Dec 03, 2020   in person\par \par PCP:  Dr. Meenu eWaver\par           Neuro:  Dr. Jarad Renteria (for HA)\par           Card:  Dr. Adrienne Pugh (palpitations, wake her from sleep)\par           GI:  Dr. Cisco Bull (anemia)\par           Rheum:  Dr. Sophia Moncada (joint pain)\par           Gyn:  Dr. Gladys Moran\par \par CC:  Tender swollen thyroid   (her daughter is treated for Graves disease)\par         (Type 2 diabetes, 1/2/2017 A1c 8.3 %)\par       \par Followed for tender thyroid.  Pain has marched across the gland (from L to R) similar to subacute thyroiditis.\par The TSH has never been suppressed.\par The ESR went up and came down followed by positive TPO abs.\par \par : :\par Constitutional:  Alert, well nourished, healthy appearance, no acute distress \par Eyes:  No proptosis, no stare\par Thyroid:\par Pulmonary:  No respiratory distress, no accessory muscle use; normal rate and effort\par Cardiac:  Normal rate\par Vascular: \par Endocrine:  No stigmata of Cushing’s Syndrome\par Musculoskeletal:  Normal gait, no involuntary movements\par Neurology:  No tremors\par Affect/Mood/Psych:  Oriented x 3; normal affect, normal insight/judgement, normal mood \par .\par  Impression:  She feels thyroid discomfort has continued to slowly improve.\par \par Plan:  TFTs today and ROV early March 2021.  \par \par \par The right thyroid lobe measures 4.9 x 1.6 x 1.7 cm, the isthmus up to 3 mm AP in the midline, and the left thyroid lobe measures 4 x 1.2 x 1.6 cm. There has been no change in lobulated thyroid contour and diffuse heterogeneity of thyroid parenchymal echogenicity. Color-flow Doppler evaluation demonstrates mild hypervascularity of the gland. . There are several very small colloid cyst in the left lobe.\par \par \par IMPRESSION: No significant change from 9/15/2020.\par \par \par ***Electronically Signed ***\par -----------------------------------------------\par Demond Valdez MD              10/19/20 \par \par \par Nov 17, 2020     Te;ephone     \par \par PCP:  Dr. Meenu Weaver\par           Neuro:  Dr. Jarad Renteria (for HA)\par           Card:  Dr. Adrienne Pugh (palpitations, wake her from sleep)\par           GI:  Dr. Cisco Bull (anemia)\par           Rheum:  Dr. Sophia Moncada (joint pain)\par           Gyn:  Dr. Gladys Moran\par \par CC:  Tender swollen thyroid   (her daughter is treated for Graves disease)\par         (Type 2 diabetes, A1c 7.3%)\par       \par 64 yo who presented with a tender swollen thyroid with symptoms "marching" across the gland from L to R.\par Today has no symptoms, but last night she had some symptoms.\par \par Trajectory of ESR was\par 9/19  11;  7/2/20  39;  7/29/20  52;  9/15/20  37   10/19/20  19 ***      \par \par \par ESR Trajectory:\par 7/2/20  < 20;  7/29/20:  < 20;  9/15/20:  52;  10/19/20:  50.4   ****\par \par 9/15/20   PC ;      10/19/10    (post prandial) ***\par \par TSH:   0.31 - 2.1     (never suppressed)  \par \par 10/19/2020 Ultrasound thyroid:  heterogeneous gland.  R lobe 4.9 cm;   L lobe 4 cm\par       Has had 5 US thyroid and FNAB of heterogeneous area L lobe when there was tenderness in that region.  \par \par Impression:  Although she has had the thyroid condition for several months, and it behaves like an unusual variant of subacute (painful) thyroiditis, the tSH has never been low.     The ESR went up and came down, the TPO ab went from negative to positive, all common with subacute thyroiditis.   "Marching" across the gland is also not that unusual.   In sum, she reports, that even though she still gets some pain, it is much better than when this all started.     So continued active surveiillance will be the strategy.\par \par Incidentally noted is the elevation of post prandial glucose, so I have taken the liberty of asking her to return for instruction in slef blood glucose monitoring as well as some dietary overview.  \par \par \par \par \par \par Sep 22, 2020   in person accompanied by her daughter, Emelina.   \par \par PCP:  Dr. Meenu Weaver\par           Neuro:  Dr. Jarad Renteria (for HA)\par           Card:  Dr. Adrienne Pugh (palpitations, wake her from sleep)\par           GI:  Dr. Cisco Bull (anemia)\par           Rheum:  Dr. Sophia Moncada (joint pain)\par           Gyn:  Dr. Gladys Moran\par \par CC:  Tender swollen thyroid   (her daughter is treated for Graves disease)\par         (Type 2 diabetes, A1c 7.3%)\par       \par 64 yo Type 2 diabetes, visits in follow up for swollen, tender thyroid.  Symptoms began on Left side of thyroid and then marched to the Right.  \par \par Most recent lab tests on\par September 15, 2020 include\par ESR 37 (had been 52 July 29, 39 July 2 and 11 Sep 20, 2019\par T4 8.9\par calcium 10.6 ***\par TSH 2.1 (up from 1.2 ion July 29, 0.70 July 2)\par TPO remains neg\par TgAb 52.3 (<10.0);  had been < 20 previously\par Tg 20 (<1.8)   [of course abs may impact\par \par \par Impression:  In general, thyroid enlargement appears to be slowly resolving.  US thyroid no longer demonstrates mass R thyroid lobe.    Her findings are most consistent with subacute thyroiditis; however, there are many atypical features, including the lack of TSH suppression and the unusually long course.    Continued close monitoring will be the strategy, including f/u US thyroid and lab tests.  \par \par Aug 03, 2020   in person\par \par PCP:  Dr. Meenu Weaver\par           Neuro:  Dr. Jarad Renteria (for HA)\par           Card:  Dr. Adrienne Pugh (palpitations, wake her from sleep)\par           GI:  Dr. Cisco Bull (anemia)\par           Rheum:  Dr. Sophia Moncada (joint pain)\par           Gyn:  Dr. Gladys Moran\par \par CC:  Tender swollen thyroid\par         (Type 2 diabetes, A1c 7.3%)\par       \par \par Recent labs at [facility] from\par 7/29/2020 include:\par \par ESR 52 (up from 39 on July 2 and 11 on Sep 20)\par TSH  1.2\par Thyroid antibodies (TPO, TgAb) remain negative\par \par Biopsy of left thyroid "nodule":  "benign follicular nodule with cystic change"\par \par Follow up US:\par \par CLINICAL HISTORY: Painful enlarged thyroid with nodules, recent benign biopsy of a left lower pole masslike asymmetry\par \par COMPARISON: 6/26/2020\par \par FINDINGS:\par \par Thyroid isthmus measures 2.8 mm.\par \par Right lobe of the thyroid measures 4.9 x 1.7 x 1.7 cm and is diffusely heterogeneous with interval development of a masslike area of asymmetry in the midpole measuring 2.4 x 1.4 x 1.3 cm.\par \par Left lobe of the thyroid measures 3.7 x 1.5 x 1.5 cm diffusely heterogeneous with previously biopsied area of masslike asymmetrical density now measuring 2.7 x 1.3 x 1.3 cm. This is not significantly changed.\par \par There is mild increased vascularity of the thyroid.\par \par Small lymph nodes are seen in the cervical chain bilaterally largest on the left measuring 7.7 x 5.6 x 5.1 mm and the largest on the right measuring 7.9 x 6.8 x 4.6 mm.\par \par \par IMPRESSION: Heterogeneous thyroid gland is again seen with interval development of a masslike area of asymmetry in the right mid pole thyroid gland. No change in the left lobe of the thyroid.\par \par Recommend short interval follow-up perhaps in 3-6 months time.\par \par ***Electronically Signed ***\par -----------------------------------------------\par Eric Pablo MD              07/29/20 \par \par \par Jul 01, 2020      in person     \par \par PCP:  Dr. Meenu Weaver\par           Neuro:  Dr. Jarad Renteria (for HA)\par           Card:  Dr. Adrienne Pugh (palpitations, wake her from sleep)\par           GI:  Dr. Cisco Bull (anemia)\par           Rheum:  Dr. Sophia Moncada (joint pain)\par           Gyn:  Dr. Gladys Moran\par \par CC:  Tender swollen thyroid\par         (Type 2 diabetes, A1c 7.3%)\par       \par 63 yo accompanied by her daughter.   Patient reports that she started to notice discomfort and swelling in the area of the thyroid about a month ago.  While the tenderness and discomfort was bilateral, it was more noticeable on the left.   The swelling and pain are still present, she reports that both are improving.   She is currently taking Tylenol with some benefit.    There is no previous history of thyroid problem.   Her daughter has been treated for a thyroid condition.  There is no history of face or neck irradiation.  \par \par Lab tests on\par 6/11/2020 included\par TSH 0.83\par \par 6/16/2020 Ultrasound Thyroid at [facility]:\par .\par "The right thyroid lobe measures 4.6 x 1.2 x 2 cm, the isthmus up to 2 mm AP in the midline, and the left thyroid lobe measures 3.8 x 1.4 x 2.1 cm. A large poorly-defined heterogeneous hypoechoic area in the left mid-lower pole measures 3 x 1.3 x 1.3 cm. A couple of small hypoechoic right mid pole nodules measure 4 x 2 x 3 mm and 4 x 2 x 3 mm. Several small left cervical lymph nodes measure 1.0 x 0.6 x 0.9 cm, 1.3 x 0.6 x 0.9 cm, and 1.1 x 0.5 x 0.5 cm, and are diffusely hypoechoic, without clearly defined echogenic indira.\par \par \par IMPRESSION: In view of the history of left-sided neck pain, the large poorly-defined heterogeneous hypoechoic area in the left mid-lower pole may be secondary to thyroiditis, with the possibility of mass not excluded. Several small left-sided lymph nodes could be inflammatory or neoplastic.\par \par \par ***Electronically Signed ***\par -----------------------------------------------\par Demond Valdez MD              06/16/20  "\par \par .\par 6/29/2020 underwent FNAB of the ~ 3 cm L thyroid lesion:\par \par "FINAL DIAGNOSIS\par  \par Left thyroid mid to lower pole area:\par BENIGN FOLLICULAR NODULE WITH CYSTIC CHANGE ([place] Category II)\par A few Hurthle cells and crushed lymphocytes, suggestive of chronic lymphocytic thyroiditis.\par  \par \par MICROSCOPIC DESCRIPTION  \par  \par The Diff-Quik and Papanicolaou stained direct smears show sheets and groups of benign follicular cells with small nuclei, inconspicuous nucleoli and moderate cytoplasm in a hemorrhagic background with bare nuclei, macrophages and thin colloid. Mild metaplastic changes and rare crushed lymphoid cells are noted. "\par \par On exam, the thyroid is slightly enlarged, L more than R and is slightly tender to palpation.  \par \par \par Impression:  Her careful evaluation is most consistent with subacute thyroiditis.  The inflammation of subacute thyroiditis usually causes enough release of thyroid hormone to cause suppression of TSH, which has not yet been seen here, so close follow up by means of history, exam, labs and ultrasound will be the current strategy.    The trajectory of her symptoms appears to be that of improvement, so that is reassuring.   Symptoms from subacute thyroiditis may take several months to completely resolve.  \par \par Plan:  Repeat TSH today, repeat US thyroid in about 4-5 weeks and ROV here after that.

## 2021-11-19 ENCOUNTER — RX RENEWAL (OUTPATIENT)
Age: 66
End: 2021-11-19

## 2021-11-20 LAB
ALBUMIN SERPL ELPH-MCNC: 4.4 G/DL
ALP BLD-CCNC: 55 U/L
ALT SERPL-CCNC: 15 U/L
ANION GAP SERPL CALC-SCNC: 16 MMOL/L
AST SERPL-CCNC: 17 U/L
BILIRUB DIRECT SERPL-MCNC: 0.1 MG/DL
BILIRUB INDIRECT SERPL-MCNC: 0.2 MG/DL
BILIRUB SERPL-MCNC: 0.3 MG/DL
BUN SERPL-MCNC: 16 MG/DL
CALCIUM SERPL-MCNC: 9.6 MG/DL
CHLORIDE SERPL-SCNC: 103 MMOL/L
CO2 SERPL-SCNC: 24 MMOL/L
CREAT SERPL-MCNC: 0.78 MG/DL
ERYTHROCYTE [SEDIMENTATION RATE] IN BLOOD BY WESTERGREN METHOD: 22 MM/HR
GLUCOSE SERPL-MCNC: 232 MG/DL
POTASSIUM SERPL-SCNC: 4.1 MMOL/L
PROT SERPL-MCNC: 6.5 G/DL
SODIUM SERPL-SCNC: 142 MMOL/L
T3 SERPL-MCNC: 111 NG/DL
T4 SERPL-MCNC: 8.9 UG/DL
THYROGLOB AB SERPL-ACNC: <20 IU/ML
THYROGLOB SERPL-MCNC: 6.12 NG/ML
TSH SERPL-ACNC: 0.72 UIU/ML

## 2021-11-22 ENCOUNTER — APPOINTMENT (OUTPATIENT)
Dept: CARDIOLOGY | Facility: CLINIC | Age: 66
End: 2021-11-22
Payer: MEDICARE

## 2021-11-22 VITALS
HEIGHT: 63 IN | HEART RATE: 74 BPM | SYSTOLIC BLOOD PRESSURE: 150 MMHG | DIASTOLIC BLOOD PRESSURE: 75 MMHG | WEIGHT: 134 LBS | OXYGEN SATURATION: 100 % | BODY MASS INDEX: 23.74 KG/M2

## 2021-11-22 DIAGNOSIS — R06.02 SHORTNESS OF BREATH: ICD-10-CM

## 2021-11-22 PROCEDURE — 93000 ELECTROCARDIOGRAM COMPLETE: CPT

## 2021-11-22 PROCEDURE — 99214 OFFICE O/P EST MOD 30 MIN: CPT

## 2021-11-23 ENCOUNTER — NON-APPOINTMENT (OUTPATIENT)
Age: 66
End: 2021-11-23

## 2021-11-23 PROBLEM — R06.02 SHORTNESS OF BREATH: Status: ACTIVE | Noted: 2020-06-04

## 2021-11-23 NOTE — REASON FOR VISIT
[Symptom and Test Evaluation] : symptom and test evaluation [Hyperlipidemia] : hyperlipidemia [Hypertension] : hypertension [FreeTextEntry3] : Dr Yang

## 2021-11-23 NOTE — HISTORY OF PRESENT ILLNESS
[FreeTextEntry1] : Doing ok, but gets stressed from family issues\par \par Just lost her mother a month ago\par Has had a headache for days\par

## 2021-11-29 ENCOUNTER — RESULT REVIEW (OUTPATIENT)
Age: 66
End: 2021-11-29

## 2021-11-30 ENCOUNTER — NON-APPOINTMENT (OUTPATIENT)
Age: 66
End: 2021-11-30

## 2021-11-30 RX ORDER — BLOOD SUGAR DIAGNOSTIC
STRIP MISCELLANEOUS
Qty: 100 | Refills: 11 | Status: ACTIVE | COMMUNITY
Start: 2021-11-30 | End: 1900-01-01

## 2021-12-03 ENCOUNTER — APPOINTMENT (OUTPATIENT)
Dept: FAMILY MEDICINE | Facility: CLINIC | Age: 66
End: 2021-12-03
Payer: MEDICARE

## 2021-12-03 VITALS
HEIGHT: 63 IN | HEART RATE: 86 BPM | WEIGHT: 134 LBS | TEMPERATURE: 97.8 F | BODY MASS INDEX: 23.74 KG/M2 | SYSTOLIC BLOOD PRESSURE: 138 MMHG | OXYGEN SATURATION: 97 % | DIASTOLIC BLOOD PRESSURE: 80 MMHG

## 2021-12-03 DIAGNOSIS — R42 DIZZINESS AND GIDDINESS: ICD-10-CM

## 2021-12-03 DIAGNOSIS — R39.9 UNSPECIFIED SYMPTOMS AND SIGNS INVOLVING THE GENITOURINARY SYSTEM: ICD-10-CM

## 2021-12-03 DIAGNOSIS — Z92.29 PERSONAL HISTORY OF OTHER DRUG THERAPY: ICD-10-CM

## 2021-12-03 DIAGNOSIS — Z87.898 PERSONAL HISTORY OF OTHER SPECIFIED CONDITIONS: ICD-10-CM

## 2021-12-03 DIAGNOSIS — N39.9 DISORDER OF URINARY SYSTEM, UNSPECIFIED: ICD-10-CM

## 2021-12-03 PROCEDURE — 99213 OFFICE O/P EST LOW 20 MIN: CPT

## 2021-12-06 PROBLEM — Z92.29 HISTORY OF PNEUMOCOCCAL VACCINATION: Status: RESOLVED | Noted: 2021-01-25 | Resolved: 2021-12-06

## 2021-12-06 PROBLEM — Z87.898 HISTORY OF ABDOMINAL PAIN: Status: RESOLVED | Noted: 2021-06-04 | Resolved: 2021-12-06

## 2021-12-06 PROBLEM — R39.9 UTI SYMPTOMS: Status: RESOLVED | Noted: 2020-12-22 | Resolved: 2021-12-06

## 2021-12-06 PROBLEM — N39.9 URINARY PROBLEM IN FEMALE: Status: RESOLVED | Noted: 2019-12-16 | Resolved: 2021-12-06

## 2021-12-06 PROBLEM — R42 LIGHTHEADEDNESS: Status: RESOLVED | Noted: 2020-02-04 | Resolved: 2021-12-06

## 2021-12-06 NOTE — HISTORY OF PRESENT ILLNESS
[FreeTextEntry8] : Pt is a 65 y/o F with PMH of DMII, that presents to clinic c/o a recent flareup of her chronic back and neck pain. She was previously seen by pain management, and tried several different types of medications but nothing has worked well for her. She refuses to have spine surgery. \par Pt says she would consider acupuncture or physical therapy. Reports that steroids have helped the pain the most in the past although is aware this can raise her blood sugar. HgA1c usually 7.1-7.4

## 2021-12-06 NOTE — HEALTH RISK ASSESSMENT
[No] : No [No falls in past year] : Patient reported no falls in the past year [0] : 2) Feeling down, depressed, or hopeless: Not at all (0) [] : No [[id number]] : 0

## 2022-01-27 ENCOUNTER — APPOINTMENT (OUTPATIENT)
Dept: CARDIOLOGY | Facility: CLINIC | Age: 67
End: 2022-01-27
Payer: MEDICARE

## 2022-01-27 VITALS
WEIGHT: 138 LBS | DIASTOLIC BLOOD PRESSURE: 70 MMHG | BODY MASS INDEX: 24.45 KG/M2 | HEART RATE: 86 BPM | SYSTOLIC BLOOD PRESSURE: 138 MMHG | OXYGEN SATURATION: 100 %

## 2022-01-27 DIAGNOSIS — R60.0 LOCALIZED EDEMA: ICD-10-CM

## 2022-01-27 PROCEDURE — 99214 OFFICE O/P EST MOD 30 MIN: CPT

## 2022-01-30 PROBLEM — R60.0 BILATERAL LEG EDEMA: Status: ACTIVE | Noted: 2020-08-25

## 2022-01-30 NOTE — HISTORY OF PRESENT ILLNESS
[FreeTextEntry1] : Was in the Dr from 6/21 to 11/21\par \par Doing ok overall but still with HAs and nausea sometimes\par Muscle tenderness (arm and thigh)\par \par Has chest pain, centrally, for 2 days straight\par \par Itch followed by discoloration in b/l lower legs, lateral aspect\par

## 2022-02-15 ENCOUNTER — APPOINTMENT (OUTPATIENT)
Dept: RHEUMATOLOGY | Facility: CLINIC | Age: 67
End: 2022-02-15
Payer: MEDICARE

## 2022-02-15 ENCOUNTER — APPOINTMENT (OUTPATIENT)
Dept: ENDOCRINOLOGY | Facility: CLINIC | Age: 67
End: 2022-02-15
Payer: MEDICARE

## 2022-02-15 VITALS
BODY MASS INDEX: 23.92 KG/M2 | OXYGEN SATURATION: 98 % | SYSTOLIC BLOOD PRESSURE: 125 MMHG | HEART RATE: 85 BPM | WEIGHT: 135 LBS | HEIGHT: 63 IN | DIASTOLIC BLOOD PRESSURE: 80 MMHG

## 2022-02-15 VITALS
HEART RATE: 85 BPM | WEIGHT: 136 LBS | HEIGHT: 63 IN | OXYGEN SATURATION: 98 % | SYSTOLIC BLOOD PRESSURE: 125 MMHG | BODY MASS INDEX: 24.1 KG/M2 | DIASTOLIC BLOOD PRESSURE: 80 MMHG

## 2022-02-15 DIAGNOSIS — M19.90 UNSPECIFIED OSTEOARTHRITIS, UNSPECIFIED SITE: ICD-10-CM

## 2022-02-15 PROCEDURE — 99214 OFFICE O/P EST MOD 30 MIN: CPT | Mod: 25

## 2022-02-15 PROCEDURE — 36415 COLL VENOUS BLD VENIPUNCTURE: CPT

## 2022-02-15 PROCEDURE — 99214 OFFICE O/P EST MOD 30 MIN: CPT

## 2022-02-16 ENCOUNTER — RX RENEWAL (OUTPATIENT)
Age: 67
End: 2022-02-16

## 2022-02-16 ENCOUNTER — APPOINTMENT (OUTPATIENT)
Dept: ENDOCRINOLOGY | Facility: CLINIC | Age: 67
End: 2022-02-16

## 2022-02-16 LAB
APPEARANCE: CLEAR
BILIRUBIN URINE: NEGATIVE
BLOOD URINE: NEGATIVE
COLOR: NORMAL
ESTIMATED AVERAGE GLUCOSE: 157 MG/DL
FRUCTOSAMINE SERPL-MCNC: 229 UMOL/L
GLUCOSE QUALITATIVE U: NEGATIVE
HBA1C MFR BLD HPLC: 7.1 %
INSULIN SERPL-MCNC: 40.8 UU/ML
KETONES URINE: NEGATIVE
LEUKOCYTE ESTERASE URINE: NEGATIVE
NITRITE URINE: NEGATIVE
PH URINE: 6.5
PROTEIN URINE: NEGATIVE
SPECIFIC GRAVITY URINE: 1.01
TSH SERPL-ACNC: 0.82 UIU/ML
UROBILINOGEN URINE: NORMAL

## 2022-02-16 NOTE — HISTORY OF PRESENT ILLNESS
[FreeTextEntry1] : Feb 15, 2022     in person       she has an android w/o nfc\par \par PCP:  Dr. Meenu Weaver\par           Neuro:  Dr. Jarad Renteria (for HA)\par           Card:  Dr. Adrienne Pugh (palpitations, wake her from sleep)\par           GI:  Dr. Cisco Bull (anemia)\par           Rheum:  Dr. Sophia Moncada (arthritis - ?[facility])          \par           Gyn:  Dr. Gladys Moran\par \par CC:  Tender swollen thyroid  [started ~ May 2020] (her daughter is treated for Graves disease)\par         (Type 2 diabetes, 1/2/2017 A1c 8.3 %)\par       \par 65 yo mother of six. \par Originally seen for pain in region of thyroid - clinically thyroiditis, although euthyroid, now much improved.\par \par   Diagnosed with diabetes about 10 years ago on routine exam.\par Takes metformin 1000 mg in BID and glipizide  ER 10 mg at lunch.\par \par Reports FBS around 180 and after dinner around 180\par \par She tests with a Freestyle Lite meter.\par \par Still has some L thyroid bed discomfort.\par Last US thyroid very reassuring.\par \par Plan:  TFTs, ESR, A1c today.\par Bring meter to next visit \par Consider brief trial of adding CGM (no NFC) \par ROV in about one month.  \par \par \par Nov 18, 2021    in person\par \par PCP:  Dr. Meenu Weaver\par           Neuro:  Dr. Jarad Renteria (for HA)\par           Card:  Dr. Adrienne Pugh (palpitations, wake her from sleep)\par           GI:  Dr. Cisco Bull (anemia)\par           Rheum:  Dr. Sophia Moncada (arthritis - ?[facility]'s)          \par           Gyn:  Dr. Gladys Moran\par \par CC:  Tender swollen thyroid  [started ~ May 2020] (her daughter is treated for Graves disease)\par         (Type 2 diabetes, 1/2/2017 A1c 8.3 %)\par       \par 65 yo mother of six.   Diagnosed with diabetes about 10 years ago on routine exam.\par Takes metformin 1000 mg in AM and glipizide  ER 10 mg at lunch.\par Monitors her sugars with her 's meter; however, he is out of town.  \par Today after broccoli, cauliflorer, beans, corn and rice  the fingerstick BS is 274 mg/dl\par So she will start using the Verio Flextouch to check her sugars 3X a day, before meals.\par Also reviewed with her a lower carb diet.\par \par But the primary reason she stopped in today is pain R thyroid bed which started a few weeks ago.   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  \par \par   \par \par \par \par \par \par May 04, 2021    in prson\par \par PCP:  Dr. Meenu Weaver\par           Neuro:  Dr. Jarad Renteria (for HA)\par           Card:  Dr. Adrienne Pugh (palpitations, wake her from sleep)\par           GI:  Dr. Cisco Bull (anemia)\par           Rheum:  Dr. Sophia Moncada (arthritis)\par \par           Rheum:  Dr. Sophia Moncada (joint pain)\par           Gyn:  Dr. Gladys Moran\par \par CC:  Tender swollen thyroid  [started ~ May 2020] (her daughter is treated for Graves disease)\par         (Type 2 diabetes, 1/2/2017 A1c 8.3 %)\par       \par Followed for tender thyroid.  Pain has marched across the gland similar to subacute thyroiditis.  \par \par Most recent lab tests at [facility] on\par 4/26/201 included:\par \par TSH 1.10   (lowest was 0.31 in Jan 2019)\par glucose 170 mg/dl\par insulin 38 (not fasting)\par \par On exam, thyroid slightly tender.\par No palpable nodules or adenopathy.\par \par Impression:  "Smouldering thyroiditis" slowly stuttering into baseline.\par Most recent US thyroid from 10/19/2020: diffuse heterogeneity of echogenicity, mild hypervascularity....no significant change from 9/15/2020"\par \par Doing well.\par Euthyroid.\par Reassuring US thyroid.\par \par Plan:  f/u US thyroid by 9 2021 and ROV October.\par She borrows her 's OneTouch Ultra, but will try to get her own and test sugars at different times of the day.\par If results not in range and not amenable to dietary change, additional medications which appear to be covedred include\par Farxiga, Trulicity.  \par \par She reports  mg/dl while on metformin 1000 mg BID.  \par \par \par \par \par \par \par \par \par Mar 02, 2021    in person\par \par PCP:  Dr. Meenu Weaver\par           Neuro:  Dr. Jarad Renteria (for HA)\par           Card:  Dr. Adrienne Pugh (palpitations, wake her from sleep)\par           GI:  Dr. Cisco Bull (anemia)\par \par           Rheum:  Dr. Sophia Moncada (joint pain)\par           Gyn:  Dr. Gladys Moran\par \par CC:  Tender swollen thyroid  [started ~ May 2020] (her daughter is treated for Graves disease)\par         (Type 2 diabetes, 1/2/2017 A1c 8.3 %)\par       \par Followed for tender thyroid.  Pain has marched across the gland similar to subacute thyroiditis.  Associated with development of elevated ESR (9/19: 11,  7/2/20:  39,  8/29/20:  52) that then returned to normal.   Also developed anti-thyroglobulin antibodies (7/2/20 < 20 -> 12/4/20: 83).  However, apparently never hyperthyroid enough to generate a suppressed TSH.\par \par In the initial phase US thyroid 6/16/20 showed a large poorly defined heterogeneous hypoelchoic area in the left mid lower pole felt possibly related to thyroiditis but possibility of mass "not excluded" and FNAB revealed no suspicious cytology.\par \par On exam, thyroid normal size, non-tender, without palpable nodule or lymphadenopathy.\par \par Impression:  An atypical presentation of subacute thyroiditis and although she still notes occasional discomfort in the region of the thyroid, this is infrequent and much milder than when she first presented.   \par \par Plan:  TFTs today and the long term strategy will be surveillance with follow up here in about 4 - 6 months.  \par \par \par \par Dec 03, 2020   in person\par \par PCP:  Dr. Meenu Weaver\par           Neuro:  Dr. Jarad Renteria (for HA)\par           Card:  Dr. Adrienne Pugh (palpitations, wake her from sleep)\par           GI:  Dr. Cisco Bull (anemia)\par           Rheum:  Dr. Sophia Moncada (joint pain)\par           Gyn:  Dr. Gladys Moran\par \par CC:  Tender swollen thyroid   (her daughter is treated for Graves disease)\par         (Type 2 diabetes, 1/2/2017 A1c 8.3 %)\par       \par Followed for tender thyroid.  Pain has marched across the gland (from L to R) similar to subacute thyroiditis.\par The TSH has never been suppressed.\par The ESR went up and came down followed by positive TPO abs.\par \par : :\par Constitutional:  Alert, well nourished, healthy appearance, no acute distress \par Eyes:  No proptosis, no stare\par Thyroid:\par Pulmonary:  No respiratory distress, no accessory muscle use; normal rate and effort\par Cardiac:  Normal rate\par Vascular: \par Endocrine:  No stigmata of Cushing’s Syndrome\par Musculoskeletal:  Normal gait, no involuntary movements\par Neurology:  No tremors\par Affect/Mood/Psych:  Oriented x 3; normal affect, normal insight/judgement, normal mood \par .\par  Impression:  She feels thyroid discomfort has continued to slowly improve.\par \par Plan:  TFTs today and ROV early March 2021.  \par \par \par The right thyroid lobe measures 4.9 x 1.6 x 1.7 cm, the isthmus up to 3 mm AP in the midline, and the left thyroid lobe measures 4 x 1.2 x 1.6 cm. There has been no change in lobulated thyroid contour and diffuse heterogeneity of thyroid parenchymal echogenicity. Color-flow Doppler evaluation demonstrates mild hypervascularity of the gland. . There are several very small colloid cyst in the left lobe.\par \par \par IMPRESSION: No significant change from 9/15/2020.\par \par \par ***Electronically Signed ***\par -----------------------------------------------\par Demond Valdez MD              10/19/20 \par \par \par Nov 17, 2020     Te;ephone     \par \par PCP:  Dr. Meenu Weaver\par           Neuro:  Dr. Jarad Renteria (for HA)\par           Card:  Dr. Adrienne Pugh (palpitations, wake her from sleep)\par           GI:  Dr. Cisco Bull (anemia)\par           Rheum:  Dr. Sophia Moncada (joint pain)\par           Gyn:  Dr. Gladys Moran\par \par CC:  Tender swollen thyroid   (her daughter is treated for Graves disease)\par         (Type 2 diabetes, A1c 7.3%)\par       \par 64 yo who presented with a tender swollen thyroid with symptoms "marching" across the gland from L to R.\par Today has no symptoms, but last night she had some symptoms.\par \par Trajectory of ESR was\par 9/19  11;  7/2/20  39;  7/29/20  52;  9/15/20  37   10/19/20  19 ***      \par \par \par ESR Trajectory:\par 7/2/20  < 20;  7/29/20:  < 20;  9/15/20:  52;  10/19/20:  50.4   ****\par \par 9/15/20   PC ;      10/19/10    (post prandial) ***\par \par TSH:   0.31 - 2.1     (never suppressed)  \par \par 10/19/2020 Ultrasound thyroid:  heterogeneous gland.  R lobe 4.9 cm;   L lobe 4 cm\par       Has had 5 US thyroid and FNAB of heterogeneous area L lobe when there was tenderness in that region.  \par \par Impression:  Although she has had the thyroid condition for several months, and it behaves like an unusual variant of subacute (painful) thyroiditis, the tSH has never been low.     The ESR went up and came down, the TPO ab went from negative to positive, all common with subacute thyroiditis.   "Marching" across the gland is also not that unusual.   In sum, she reports, that even though she still gets some pain, it is much better than when this all started.     So continued active surveiillance will be the strategy.\par \par Incidentally noted is the elevation of post prandial glucose, so I have taken the liberty of asking her to return for instruction in slef blood glucose monitoring as well as some dietary overview.  \par \par \par \par \par \par Sep 22, 2020   in person accompanied by her daughter, Emelina.   \par \par PCP:  Dr. Meenu Weaver\par           Neuro:  Dr. Jarad Renteria (for HA)\par           Card:  Dr. Adrienne Pugh (palpitations, wake her from sleep)\par           GI:  Dr. Cisco Bull (anemia)\par           Rheum:  Dr. Sophia Moncada (joint pain)\par           Gyn:  Dr. Gladys Moran\par \par CC:  Tender swollen thyroid   (her daughter is treated for Graves disease)\par         (Type 2 diabetes, A1c 7.3%)\par       \par 64 yo Type 2 diabetes, visits in follow up for swollen, tender thyroid.  Symptoms began on Left side of thyroid and then marched to the Right.  \par \par Most recent lab tests on\par September 15, 2020 include\par ESR 37 (had been 52 July 29, 39 July 2 and 11 Sep 20, 2019\par T4 8.9\par calcium 10.6 ***\par TSH 2.1 (up from 1.2 ion July 29, 0.70 July 2)\par TPO remains neg\par TgAb 52.3 (<10.0);  had been < 20 previously\par Tg 20 (<1.8)   [of course abs may impact\par \par \par Impression:  In general, thyroid enlargement appears to be slowly resolving.  US thyroid no longer demonstrates mass R thyroid lobe.    Her findings are most consistent with subacute thyroiditis; however, there are many atypical features, including the lack of TSH suppression and the unusually long course.    Continued close monitoring will be the strategy, including f/u US thyroid and lab tests.  \par \par Aug 03, 2020   in person\par \par PCP:  Dr. eMenu Weaver\par           Neuro:  Dr. Jarad Renteria (for HA)\par           Card:  Dr. Adrienne Pugh (palpitations, wake her from sleep)\par           GI:  Dr. Cisco Bull (anemia)\par           Rheum:  Dr. Sophia Moncada (joint pain)\par           Gyn:  Dr. Gladys Moran\par \par CC:  Tender swollen thyroid\par         (Type 2 diabetes, A1c 7.3%)\par       \par \par Recent labs at [facility] from\par 7/29/2020 include:\par \par ESR 52 (up from 39 on July 2 and 11 on Sep 20)\par TSH  1.2\par Thyroid antibodies (TPO, TgAb) remain negative\par \par Biopsy of left thyroid "nodule":  "benign follicular nodule with cystic change"\par \par Follow up US:\par \par CLINICAL HISTORY: Painful enlarged thyroid with nodules, recent benign biopsy of a left lower pole masslike asymmetry\par \par COMPARISON: 6/26/2020\par \par FINDINGS:\par \par Thyroid isthmus measures 2.8 mm.\par \par Right lobe of the thyroid measures 4.9 x 1.7 x 1.7 cm and is diffusely heterogeneous with interval development of a masslike area of asymmetry in the midpole measuring 2.4 x 1.4 x 1.3 cm.\par \par Left lobe of the thyroid measures 3.7 x 1.5 x 1.5 cm diffusely heterogeneous with previously biopsied area of masslike asymmetrical density now measuring 2.7 x 1.3 x 1.3 cm. This is not significantly changed.\par \par There is mild increased vascularity of the thyroid.\par \par Small lymph nodes are seen in the cervical chain bilaterally largest on the left measuring 7.7 x 5.6 x 5.1 mm and the largest on the right measuring 7.9 x 6.8 x 4.6 mm.\par \par \par IMPRESSION: Heterogeneous thyroid gland is again seen with interval development of a masslike area of asymmetry in the right mid pole thyroid gland. No change in the left lobe of the thyroid.\par \par Recommend short interval follow-up perhaps in 3-6 months time.\par \par ***Electronically Signed ***\par -----------------------------------------------\par Eric Pablo MD              07/29/20 \par \par \par Jul 01, 2020      in person     \par \par PCP:  Dr. Meenu Weaver\par           Neuro:  Dr. Jarad Renteria (for HA)\par           Card:  Dr. Adrienne Pugh (palpitations, wake her from sleep)\par           GI:  Dr. Cisco Bull (anemia)\par           Rheum:  Dr. Sophia Moncada (joint pain)\par           Gyn:  Dr. Gladys Moran\par \par CC:  Tender swollen thyroid\par         (Type 2 diabetes, A1c 7.3%)\par       \par 65 yo accompanied by her daughter.   Patient reports that she started to notice discomfort and swelling in the area of the thyroid about a month ago.  While the tenderness and discomfort was bilateral, it was more noticeable on the left.   The swelling and pain are still present, she reports that both are improving.   She is currently taking Tylenol with some benefit.    There is no previous history of thyroid problem.   Her daughter has been treated for a thyroid condition.  There is no history of face or neck irradiation.  \par \par Lab tests on\par 6/11/2020 included\par TSH 0.83\par \par 6/16/2020 Ultrasound Thyroid at [facility]:\par .\par "The right thyroid lobe measures 4.6 x 1.2 x 2 cm, the isthmus up to 2 mm AP in the midline, and the left thyroid lobe measures 3.8 x 1.4 x 2.1 cm. A large poorly-defined heterogeneous hypoechoic area in the left mid-lower pole measures 3 x 1.3 x 1.3 cm. A couple of small hypoechoic right mid pole nodules measure 4 x 2 x 3 mm and 4 x 2 x 3 mm. Several small left cervical lymph nodes measure 1.0 x 0.6 x 0.9 cm, 1.3 x 0.6 x 0.9 cm, and 1.1 x 0.5 x 0.5 cm, and are diffusely hypoechoic, without clearly defined echogenic indira.\par \par \par IMPRESSION: In view of the history of left-sided neck pain, the large poorly-defined heterogeneous hypoechoic area in the left mid-lower pole may be secondary to thyroiditis, with the possibility of mass not excluded. Several small left-sided lymph nodes could be inflammatory or neoplastic.\par \par \par ***Electronically Signed ***\par -----------------------------------------------\par Demond Valdez MD              06/16/20  "\par \par .\par 6/29/2020 underwent FNAB of the ~ 3 cm L thyroid lesion:\par \par "FINAL DIAGNOSIS\par  \par Left thyroid mid to lower pole area:\par BENIGN FOLLICULAR NODULE WITH CYSTIC CHANGE ([place] Category II)\par A few Hurthle cells and crushed lymphocytes, suggestive of chronic lymphocytic thyroiditis.\par  \par \par MICROSCOPIC DESCRIPTION  \par  \par The Diff-Quik and Papanicolaou stained direct smears show sheets and groups of benign follicular cells with small nuclei, inconspicuous nucleoli and moderate cytoplasm in a hemorrhagic background with bare nuclei, macrophages and thin colloid. Mild metaplastic changes and rare crushed lymphoid cells are noted. "\par \par On exam, the thyroid is slightly enlarged, L more than R and is slightly tender to palpation.  \par \par \par Impression:  Her careful evaluation is most consistent with subacute thyroiditis.  The inflammation of subacute thyroiditis usually causes enough release of thyroid hormone to cause suppression of TSH, which has not yet been seen here, so close follow up by means of history, exam, labs and ultrasound will be the current strategy.    The trajectory of her symptoms appears to be that of improvement, so that is reassuring.   Symptoms from subacute thyroiditis may take several months to completely resolve.  \par \par Plan:  Repeat TSH today, repeat US thyroid in about 4-5 weeks and ROV here after that.

## 2022-02-17 NOTE — HISTORY OF PRESENT ILLNESS
[FreeTextEntry1] : She received her COVID booster.  She  had Omicron and had headache for a few days.\par \par She has a lot of pain in her arms.\par She has poor sleep and cant stay asleep bc of the pain.\par \par She takes Gabapentin 300 mg BID.  She takes Motrin or Aleve every day.

## 2022-03-10 ENCOUNTER — APPOINTMENT (OUTPATIENT)
Dept: FAMILY MEDICINE | Facility: CLINIC | Age: 67
End: 2022-03-10
Payer: MEDICARE

## 2022-03-10 VITALS
HEART RATE: 84 BPM | TEMPERATURE: 97.8 F | WEIGHT: 135 LBS | SYSTOLIC BLOOD PRESSURE: 130 MMHG | BODY MASS INDEX: 23.92 KG/M2 | HEIGHT: 63 IN | OXYGEN SATURATION: 98 % | DIASTOLIC BLOOD PRESSURE: 80 MMHG

## 2022-03-10 DIAGNOSIS — Z00.00 ENCOUNTER FOR GENERAL ADULT MEDICAL EXAMINATION W/OUT ABNORMAL FINDINGS: ICD-10-CM

## 2022-03-10 DIAGNOSIS — R68.2 DRY MOUTH, UNSPECIFIED: ICD-10-CM

## 2022-03-10 DIAGNOSIS — Z23 ENCOUNTER FOR IMMUNIZATION: ICD-10-CM

## 2022-03-10 PROCEDURE — 90746 HEPB VACCINE 3 DOSE ADULT IM: CPT | Mod: GY

## 2022-03-10 PROCEDURE — G0439: CPT

## 2022-03-10 PROCEDURE — 90670 PCV13 VACCINE IM: CPT

## 2022-03-10 PROCEDURE — G0009: CPT | Mod: 59

## 2022-03-10 PROCEDURE — 99213 OFFICE O/P EST LOW 20 MIN: CPT | Mod: 25

## 2022-03-10 PROCEDURE — 36415 COLL VENOUS BLD VENIPUNCTURE: CPT

## 2022-03-10 PROCEDURE — 90472 IMMUNIZATION ADMIN EACH ADD: CPT

## 2022-03-10 PROCEDURE — 90715 TDAP VACCINE 7 YRS/> IM: CPT | Mod: GY

## 2022-03-10 RX ORDER — RAMIPRIL 1.25 MG/1
1.25 CAPSULE ORAL
Qty: 85 | Refills: 0 | Status: DISCONTINUED | COMMUNITY
Start: 2021-05-03 | End: 2022-03-10

## 2022-03-10 RX ORDER — METHYLPREDNISOLONE 4 MG/1
4 TABLET ORAL
Qty: 1 | Refills: 0 | Status: DISCONTINUED | COMMUNITY
Start: 2021-12-03 | End: 2022-03-10

## 2022-03-10 NOTE — HEALTH RISK ASSESSMENT
[Good] : ~his/her~  mood as  good [Never] : Never [No] : No [No falls in past year] : Patient reported no falls in the past year [Patient reported colonoscopy was normal] : Patient reported colonoscopy was normal [de-identified] : walking [de-identified] : none [ColonoscopyDate] : 03/20 [ColonoscopyComments] : Dr. Bull.  Repeat 5 years.

## 2022-03-10 NOTE — ASSESSMENT
[FreeTextEntry1] : Up to date with colonoscopy, mammo, and pap per patient report.\par Immunizations today. Agrees to get second Shingrix shot at pharmacy.

## 2022-03-10 NOTE — HISTORY OF PRESENT ILLNESS
[FreeTextEntry1] : Pt here for PE [de-identified] : Pt here for PE.\par Still has pain in the back (upper and lower), neck, shoulders, arms, and lower back including hips.\par Pt's  had pos treponema palladium screen by Balbina Yu at [facility] in Feb 13th when admitted to hospital.  Patient wants to have the test done on her.  Pt interested in having visit with ID, Huseyin Guzmanruizgeorgiana.\par c/o very dry mouth.  Has to have ice or water in mouth otherwise it gets uncomfortably dry.  Makes it hard to talk when it's so dry. FOr three years.  Has been taking amitriptyline up to three years.  Helps with head pain from neuro Dr. Renteria.\par Patient has been seeing Dr. Hellerman for DM.  Patient reports being at 300 for her sugar on some nights.\par Colonoscopy: 3/12/20.  Dr. Bull.  Repeat 5 years.\par Mammo: appt next week\par Pap: 2021.

## 2022-03-15 ENCOUNTER — APPOINTMENT (OUTPATIENT)
Dept: ENDOCRINOLOGY | Facility: CLINIC | Age: 67
End: 2022-03-15
Payer: MEDICARE

## 2022-03-15 VITALS
DIASTOLIC BLOOD PRESSURE: 78 MMHG | SYSTOLIC BLOOD PRESSURE: 110 MMHG | OXYGEN SATURATION: 98 % | WEIGHT: 134 LBS | BODY MASS INDEX: 23.74 KG/M2 | HEART RATE: 82 BPM | HEIGHT: 63 IN

## 2022-03-15 LAB
ALBUMIN SERPL ELPH-MCNC: 4.6 G/DL
ALP BLD-CCNC: 66 U/L
ALT SERPL-CCNC: 14 U/L
ANION GAP SERPL CALC-SCNC: 13 MMOL/L
AST SERPL-CCNC: 19 U/L
BASOPHILS # BLD AUTO: 0.04 K/UL
BASOPHILS NFR BLD AUTO: 0.5 %
BILIRUB SERPL-MCNC: 0.4 MG/DL
BUN SERPL-MCNC: 12 MG/DL
CALCIUM SERPL-MCNC: 9.9 MG/DL
CHLORIDE SERPL-SCNC: 104 MMOL/L
CHOLEST SERPL-MCNC: 142 MG/DL
CO2 SERPL-SCNC: 25 MMOL/L
CREAT SERPL-MCNC: 0.68 MG/DL
CREAT SPEC-SCNC: 41 MG/DL
EGFR: 95 ML/MIN/1.73M2
EOSINOPHIL # BLD AUTO: 0.19 K/UL
EOSINOPHIL NFR BLD AUTO: 2.2 %
ESTIMATED AVERAGE GLUCOSE: 163 MG/DL
GLUCOSE SERPL-MCNC: 76 MG/DL
HBA1C MFR BLD HPLC: 7.3 %
HCT VFR BLD CALC: 35.5 %
HDLC SERPL-MCNC: 42 MG/DL
HGB BLD-MCNC: 10.5 G/DL
HIV1+2 AB SPEC QL IA.RAPID: NONREACTIVE
IMM GRANULOCYTES NFR BLD AUTO: 0.3 %
LDLC SERPL CALC-MCNC: 72 MG/DL
LYMPHOCYTES # BLD AUTO: 1.91 K/UL
LYMPHOCYTES NFR BLD AUTO: 22.3 %
MAN DIFF?: NORMAL
MCHC RBC-ENTMCNC: 22.2 PG
MCHC RBC-ENTMCNC: 29.6 GM/DL
MCV RBC AUTO: 75.1 FL
MICROALBUMIN 24H UR DL<=1MG/L-MCNC: <1.2 MG/DL
MICROALBUMIN/CREAT 24H UR-RTO: NORMAL MG/G
MONOCYTES # BLD AUTO: 0.62 K/UL
MONOCYTES NFR BLD AUTO: 7.2 %
NEUTROPHILS # BLD AUTO: 5.79 K/UL
NEUTROPHILS NFR BLD AUTO: 67.5 %
NONHDLC SERPL-MCNC: 100 MG/DL
PLATELET # BLD AUTO: 367 K/UL
POTASSIUM SERPL-SCNC: 4.4 MMOL/L
PROT SERPL-MCNC: 6.9 G/DL
RBC # BLD: 4.73 M/UL
RBC # FLD: 19.3 %
SODIUM SERPL-SCNC: 142 MMOL/L
T PALLIDUM AB SER QL IA: NEGATIVE
TRIGL SERPL-MCNC: 142 MG/DL
WBC # FLD AUTO: 8.58 K/UL

## 2022-03-15 PROCEDURE — 99214 OFFICE O/P EST MOD 30 MIN: CPT

## 2022-03-15 NOTE — HISTORY OF PRESENT ILLNESS
[FreeTextEntry1] : Mar 15, 2022     in person     no NFC \par \par PCP:  Dr. Meenu Weaver\par           Neuro:  Dr. Jarad Renteria (for HA)\par           Card:  Dr. Adrienne Pugh (palpitations, wake her from sleep)\par           GI:  Dr. Cisco Bull (anemia)\par           Rheum:  Dr. Sophia Moncada (arthritis - ?Heber[facility]'s)          \par           Gyn:  Dr. Gladys Moran\par \par CC:  Tender swollen thyroid  [started ~ May 2020] (her daughter is treated for Graves disease)\par         (Type 2 diabetes, 1/2/2017 A1c 8.3 %)\par       \par 65 yo mother of six. \par Originally seen for pain in region of thyroid - clinically thyroiditis, although euthyroid, now much improved.\par \par On exam, still has slight tenderness region of R thyroid lobe.  \par Most recent US thyroid 11/29/21 reassuring.  Will aim for f/u US thyroid about six months after that. \par \par : :\par Constitutional:  Alert, well nourished, healthy appearance, no acute distress \par Eyes:  No proptosis, no stare\par Thyroid:  Slight tenderness R thyroid lobe.  No palpable adenopathy. 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   Continue surveillance will be the strategy.\par A1c of 7.3% noted.     Will try to assist patient in doing some monitoring with CGM.   Her cucrrent smartphone is\par not equipped with the necessary NFC but she believes her son may be able to help in that regard.  \par \par .\par \par Feb 15, 2022     in person       she has an android w/o nfc\par \par PCP:  Dr. Meenu Weaver\par           Neuro:  Dr. Jarad Renteria (for HA)\par           Card:  Dr. Adrienne Pugh (palpitations, wake her from sleep)\par           GI:  Dr. Cisco Bull (anemia)\par           Rheum:  Dr. Sophia Moncada (arthritis - ?[place]'s)          \par           Gyn:  Dr. Gladys Moran\par \par CC:  Tender swollen thyroid  [started ~ May 2020] (her daughter is treated for Graves disease)\par         (Type 2 diabetes, 1/2/2017 A1c 8.3 %)\par       \par 65 yo mother of six. \par Originally seen for pain in region of thyroid - clinically thyroiditis, although euthyroid, now much improved.\par \par   Diagnosed with diabetes about 10 years ago on routine exam.\par Takes metformin 1000 mg in BID and glipizide  ER 10 mg at lunch.\par \par Reports FBS around 180 and after dinner around 180\par \par She tests with a Freestyle Lite meter.\par \par Still has some L thyroid bed discomfort.\par Last US thyroid very reassuring.\par \par Plan:  TFTs, ESR, A1c today.\par Bring meter to next visit \par Consider brief trial of adding CGM (no NFC) \par ROV in about one month.  \par \par \par Nov 18, 2021    in person\par \par PCP:  Dr. Meenu Weaver\par           Neuro:  Dr. Jarad Renteria (for HA)\par           Card:  Dr. Adrienne Pugh (palpitations, wake her from sleep)\par           GI:  Dr. Cisco Bull (anemia)\par           Rheum:  Dr. Sophia Moncada (arthritis - ?[facility]'s)          \par           Gyn:  Dr. Gladys Moran\par \par CC:  Tender swollen thyroid  [started ~ May 2020] (her daughter is treated for Graves disease)\par         (Type 2 diabetes, 1/2/2017 A1c 8.3 %)\par       \par 65 yo mother of six.   Diagnosed with diabetes about 10 years ago on routine exam.\par Takes metformin 1000 mg in AM and glipizide  ER 10 mg at lunch.\par Monitors her sugars with her 's meter; however, he is out of town.  \par Today after broccoli, cauliflorer, beans, corn and rice  the fingerstick BS is 274 mg/dl\par So she will start using the Verio Flextouch to check her sugars 3X a day, before meals.\par Also reviewed with her a lower carb diet.\par \par But the primary reason she stopped in today is pain R thyroid bed which started a few weeks ago.   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  \par \par   \par \par \par \par \par \par May 04, 2021    in prson\par \par PCP:  Dr. Meenu Weaver\par           Neuro:  Dr. Jarad Renteria (for HA)\par           Card:  Dr. Adrienne Pugh (palpitations, wake her from sleep)\par           GI:  Dr. Cisco Bull (anemia)\par           Rheum:  Dr. Sophia Moncada (arthritis)\par \par           Rheum:  Dr. Sophia Moncada (joint pain)\par           Gyn:  Dr. Gladys Moran\par \par CC:  Tender swollen thyroid  [started ~ May 2020] (her daughter is treated for Graves disease)\par         (Type 2 diabetes, 1/2/2017 A1c 8.3 %)\par       \par Followed for tender thyroid.  Pain has marched across the gland similar to subacute thyroiditis.  \par \par Most recent lab tests at [facility] on\par 4/26/201 included:\par \par TSH 1.10   (lowest was 0.31 in Jan 2019)\par glucose 170 mg/dl\par insulin 38 (not fasting)\par \par On exam, thyroid slightly tender.\par No palpable nodules or adenopathy.\par \par Impression:  "Smouldering thyroiditis" slowly stuttering into baseline.\par Most recent US thyroid from 10/19/2020: diffuse heterogeneity of echogenicity, mild hypervascularity....no significant change from 9/15/2020"\par \par Doing well.\par Euthyroid.\par Reassuring US thyroid.\par \par Plan:  f/u US thyroid by 9 2021 and ROV October.\par She borrows her 's OneTouch Ultra, but will try to get her own and test sugars at different times of the day.\par If results not in range and not amenable to dietary change, additional medications which appear to be covedred include\par Farxiga, Trulicity.  \par \par She reports  mg/dl while on metformin 1000 mg BID.  \par \par \par \par \par \par \par \par \par Mar 02, 2021    in person\par \par PCP:  Dr. Meenu Weaver\par           Neuro:  Dr. Jarad Renteria (for HA)\par           Card:  Dr. Adrienne Pugh (palpitations, wake her from sleep)\par           GI:  Dr. Cisco Bull (anemia)\par \par           Rheum:  Dr. Sophia Moncada (joint pain)\par           Gyn:  Dr. Gladys Moran\par \par CC:  Tender swollen thyroid  [started ~ May 2020] (her daughter is treated for Graves disease)\par         (Type 2 diabetes, 1/2/2017 A1c 8.3 %)\par       \par Followed for tender thyroid.  Pain has marched across the gland similar to subacute thyroiditis.  Associated with development of elevated ESR (9/19: 11,  7/2/20:  39,  8/29/20:  52) that then returned to normal.   Also developed anti-thyroglobulin antibodies (7/2/20 < 20 -> 12/4/20: 83).  However, apparently never hyperthyroid enough to generate a suppressed TSH.\par \par In the initial phase US thyroid 6/16/20 showed a large poorly defined heterogeneous hypoelchoic area in the left mid lower pole felt possibly related to thyroiditis but possibility of mass "not excluded" and FNAB revealed no suspicious cytology.\par \par On exam, thyroid normal size, non-tender, without palpable nodule or lymphadenopathy.\par \par Impression:  An atypical presentation of subacute thyroiditis and although she still notes occasional discomfort in the region of the thyroid, this is infrequent and much milder than when she first presented.   \par \par Plan:  TFTs today and the long term strategy will be surveillance with follow up here in about 4 - 6 months.  \par \par \par \par Dec 03, 2020   in person\par \par PCP:  Dr. Meenu Weaver\par           Neuro:  Dr. Jarad Renteria (for HA)\par           Card:  Dr. Adrienne Pugh (palpitations, wake her from sleep)\par           GI:  Dr. Cisco Bull (anemia)\par           Rheum:  Dr. Sophia Moncada (joint pain)\par           Gyn:  Dr. Gladys Moran\par \par CC:  Tender swollen thyroid   (her daughter is treated for Graves disease)\par         (Type 2 diabetes, 1/2/2017 A1c 8.3 %)\par       \par Followed for tender thyroid.  Pain has marched across the gland (from L to R) similar to subacute thyroiditis.\par The TSH has never been suppressed.\par The ESR went up and came down followed by positive TPO abs.\par \par : :\par Constitutional:  Alert, well nourished, healthy appearance, no acute distress \par Eyes:  No proptosis, no stare\par Thyroid:\par Pulmonary:  No respiratory distress, no accessory muscle use; normal rate and effort\par Cardiac:  Normal rate\par Vascular: \par Endocrine:  No stigmata of Cushing’s Syndrome\par Musculoskeletal:  Normal gait, no involuntary movements\par Neurology:  No tremors\par Affect/Mood/Psych:  Oriented x 3; normal affect, normal insight/judgement, normal mood \par .\par  Impression:  She feels thyroid discomfort has continued to slowly improve.\par \par Plan:  TFTs today and ROV early March 2021.  \par \par \par The right thyroid lobe measures 4.9 x 1.6 x 1.7 cm, the isthmus up to 3 mm AP in the midline, and the left thyroid lobe measures 4 x 1.2 x 1.6 cm. There has been no change in lobulated thyroid contour and diffuse heterogeneity of thyroid parenchymal echogenicity. Color-flow Doppler evaluation demonstrates mild hypervascularity of the gland. . There are several very small colloid cyst in the left lobe.\par \par \par IMPRESSION: No significant change from 9/15/2020.\par \par \par ***Electronically Signed ***\par -----------------------------------------------\par Demond Valdez MD              10/19/20 \par \par \par Nov 17, 2020     [place]     \par \par PCP:  Dr. Meenu Weaver\par           Neuro:  Dr. Jarad Renteria (for HA)\par           Card:  Dr. Adrienne Pugh (palpitations, wake her from sleep)\par           GI:  Dr. Cisco Bull (anemia)\par           Rheum:  Dr. Sophia Moncada (joint pain)\par           Gyn:  Dr. Gladys Moran\par \par CC:  Tender swollen thyroid   (her daughter is treated for Graves disease)\par         (Type 2 diabetes, A1c 7.3%)\par       \par 64 yo who presented with a tender swollen thyroid with symptoms "marching" across the gland from L to R.\par Today has no symptoms, but last night she had some symptoms.\par \par Trajectory of ESR was\par 9/19  11;  7/2/20  39;  7/29/20  52;  9/15/20  37   10/19/20  19 ***      \par \par \par ESR Trajectory:\par 7/2/20  < 20;  7/29/20:  < 20;  9/15/20:  52;  10/19/20:  50.4   ****\par \par 9/15/20   PC ;      10/19/10    (post prandial) ***\par \par TSH:   0.31 - 2.1     (never suppressed)  \par \par 10/19/2020 Ultrasound thyroid:  heterogeneous gland.  R lobe 4.9 cm;   L lobe 4 cm\par       Has had 5 US thyroid and FNAB of heterogeneous area L lobe when there was tenderness in that region.  \par \par Impression:  Although she has had the thyroid condition for several months, and it behaves like an unusual variant of subacute (painful) thyroiditis, the tSH has never been low.     The ESR went up and came down, the TPO ab went from negative to positive, all common with subacute thyroiditis.   "Marching" across the gland is also not that unusual.   In sum, she reports, that even though she still gets some pain, it is much better than when this all started.     So continued active surveiillance will be the strategy.\par \par Incidentally noted is the elevation of post prandial glucose, so I have taken the liberty of asking her to return for instruction in slef blood glucose monitoring as well as some dietary overview.  \par \par \par \par \par \par Sep 22, 2020   in person accompanied by her daughter, Emelina.   \par \par PCP:  Dr. Meenu Weaver\par           Neuro:  Dr. Jarad Renteria (for HA)\par           Card:  Dr. Adrienne Pugh (palpitations, wake her from sleep)\par           GI:  Dr. Cisco Bull (anemia)\par           Rheum:  Dr. Sophia Moncada (joint pain)\par           Gyn:  Dr. Gladys Moran\par \par CC:  Tender swollen thyroid   (her daughter is treated for Graves disease)\par         (Type 2 diabetes, A1c 7.3%)\par       \par 64 yo Type 2 diabetes, visits in follow up for swollen, tender thyroid.  Symptoms began on Left side of thyroid and then marched to the Right.  \par \par Most recent lab tests on\par September 15, 2020 include\par ESR 37 (had been 52 July 29, 39 July 2 and 11 Sep 20, 2019\par T4 8.9\par calcium 10.6 ***\par TSH 2.1 (up from 1.2 ion July 29, 0.70 July 2)\par TPO remains neg\par TgAb 52.3 (<10.0);  had been < 20 previously\par Tg 20 (<1.8)   [of course abs may impact\par \par \par Impression:  In general, thyroid enlargement appears to be slowly resolving.  US thyroid no longer demonstrates mass R thyroid lobe.    Her findings are most consistent with subacute thyroiditis; however, there are many atypical features, including the lack of TSH suppression and the unusually long course.    Continued close monitoring will be the strategy, including f/u US thyroid and lab tests.  \par \par Aug 03, 2020   in person\par \par PCP:  Dr. Meenu Weaver\par           Neuro:  Dr. Jarad Renteria (for HA)\par           Card:  Dr. Adrienne Pugh (palpitations, wake her from sleep)\par           GI:  Dr. Cisco Bull (anemia)\par           Rheum:  Dr. Sophia Moncada (joint pain)\par           Gyn:  Dr. Gladys Moran\par \par CC:  Tender swollen thyroid\par         (Type 2 diabetes, A1c 7.3%)\par       \par \par Recent labs at [facility] from\par 7/29/2020 include:\par \par ESR 52 (up from 39 on July 2 and 11 on Sep 20)\par TSH  1.2\par Thyroid antibodies (TPO, TgAb) remain negative\par \par Biopsy of left thyroid "nodule":  "benign follicular nodule with cystic change"\par \par Follow up US:\par \par CLINICAL HISTORY: Painful enlarged thyroid with nodules, recent benign biopsy of a left lower pole masslike asymmetry\par \par COMPARISON: 6/26/2020\par \par FINDINGS:\par \par Thyroid isthmus measures 2.8 mm.\par \par Right lobe of the thyroid measures 4.9 x 1.7 x 1.7 cm and is diffusely heterogeneous with interval development of a masslike area of asymmetry in the midpole measuring 2.4 x 1.4 x 1.3 cm.\par \par Left lobe of the thyroid measures 3.7 x 1.5 x 1.5 cm diffusely heterogeneous with previously biopsied area of masslike asymmetrical density now measuring 2.7 x 1.3 x 1.3 cm. This is not significantly changed.\par \par There is mild increased vascularity of the thyroid.\par \par Small lymph nodes are seen in the cervical chain bilaterally largest on the left measuring 7.7 x 5.6 x 5.1 mm and the largest on the right measuring 7.9 x 6.8 x 4.6 mm.\par \par \par IMPRESSION: Heterogeneous thyroid gland is again seen with interval development of a masslike area of asymmetry in the right mid pole thyroid gland. No change in the left lobe of the thyroid.\par \par Recommend short interval follow-up perhaps in 3-6 months time.\par \par ***Electronically Signed ***\par -----------------------------------------------\par Eric Pablo MD              07/29/20 \par \par \par Jul 01, 2020      in person     \par \par PCP:  Dr. Meenu Weaver\par           Neuro:  Dr. Jarad Renteria (for HA)\par           Card:  Dr. Adrienne Pugh (palpitations, wake her from sleep)\par           GI:  Dr. Cisco Bull (anemia)\par           Rheum:  Dr. Sophia Moncada (joint pain)\par           Gyn:  Dr. Gladys Moran\par \par CC:  Tender swollen thyroid\par         (Type 2 diabetes, A1c 7.3%)\par       \par 65 yo accompanied by her daughter.   Patient reports that she started to notice discomfort and swelling in the area of the thyroid about a month ago.  While the tenderness and discomfort was bilateral, it was more noticeable on the left.   The swelling and pain are still present, she reports that both are improving.   She is currently taking Tylenol with some benefit.    There is no previous history of thyroid problem.   Her daughter has been treated for a thyroid condition.  There is no history of face or neck irradiation.  \par \par Lab tests on\par 6/11/2020 included\par TSH 0.83\par \par 6/16/2020 Ultrasound Thyroid at [facility]:\par .\par "The right thyroid lobe measures 4.6 x 1.2 x 2 cm, the isthmus up to 2 mm AP in the midline, and the left thyroid lobe measures 3.8 x 1.4 x 2.1 cm. A large poorly-defined heterogeneous hypoechoic area in the left mid-lower pole measures 3 x 1.3 x 1.3 cm. A couple of small hypoechoic right mid pole nodules measure 4 x 2 x 3 mm and 4 x 2 x 3 mm. Several small left cervical lymph nodes measure 1.0 x 0.6 x 0.9 cm, 1.3 x 0.6 x 0.9 cm, and 1.1 x 0.5 x 0.5 cm, and are diffusely hypoechoic, without clearly defined echogenic indira.\par \par \par IMPRESSION: In view of the history of left-sided neck pain, the large poorly-defined heterogeneous hypoechoic area in the left mid-lower pole may be secondary to thyroiditis, with the possibility of mass not excluded. Several small left-sided lymph nodes could be inflammatory or neoplastic.\par \par \par ***Electronically Signed ***\par -----------------------------------------------\par Demond Valdez MD              06/16/20  "\par \par .\par 6/29/2020 underwent FNAB of the ~ 3 cm L thyroid lesion:\par \par "FINAL DIAGNOSIS\par  \par Left thyroid mid to lower pole area:\par BENIGN FOLLICULAR NODULE WITH CYSTIC CHANGE ([place] Category II)\par A few Hurthle cells and crushed lymphocytes, suggestive of chronic lymphocytic thyroiditis.\par  \par \par MICROSCOPIC DESCRIPTION  \par  \par The Diff-Quik and Papanicolaou stained direct smears show sheets and groups of benign follicular cells with small nuclei, inconspicuous nucleoli and moderate cytoplasm in a hemorrhagic background with bare nuclei, macrophages and thin colloid. Mild metaplastic changes and rare crushed lymphoid cells are noted. "\par \par On exam, the thyroid is slightly enlarged, L more than R and is slightly tender to palpation.  \par \par \par Impression:  Her careful evaluation is most consistent with subacute thyroiditis.  The inflammation of subacute thyroiditis usually causes enough release of thyroid hormone to cause suppression of TSH, which has not yet been seen here, so close follow up by means of history, exam, labs and ultrasound will be the current strategy.    The trajectory of her symptoms appears to be that of improvement, so that is reassuring.   Symptoms from subacute thyroiditis may take several months to completely resolve.  \par \par Plan:  Repeat TSH today, repeat US thyroid in about 4-5 weeks and ROV here after that.

## 2022-03-17 ENCOUNTER — APPOINTMENT (OUTPATIENT)
Dept: ENDOCRINOLOGY | Facility: CLINIC | Age: 67
End: 2022-03-17

## 2022-03-21 ENCOUNTER — RESULT REVIEW (OUTPATIENT)
Age: 67
End: 2022-03-21

## 2022-03-31 ENCOUNTER — APPOINTMENT (OUTPATIENT)
Dept: NEUROLOGY | Facility: CLINIC | Age: 67
End: 2022-03-31
Payer: MEDICARE

## 2022-03-31 VITALS
WEIGHT: 134 LBS | DIASTOLIC BLOOD PRESSURE: 77 MMHG | OXYGEN SATURATION: 98 % | HEIGHT: 63 IN | BODY MASS INDEX: 23.74 KG/M2 | HEART RATE: 82 BPM | TEMPERATURE: 98.2 F | SYSTOLIC BLOOD PRESSURE: 130 MMHG

## 2022-03-31 PROCEDURE — 99214 OFFICE O/P EST MOD 30 MIN: CPT

## 2022-04-01 NOTE — HISTORY OF PRESENT ILLNESS
[FreeTextEntry1] : Nurys Rooney is a 66 year old woman with a history of migraines, TIA, DM, presenting for a follow up appointment for migraines. \par \par She is able to tolerate Amitriptyline 10mg without side effects. She took one dose last night and reports her  headaches have improved. \par Tongue heaviness with Amitriptyline 20mg. stopped medication two weeks ago. Three and a half weeks prior noted heavy tongue. Heaviness and dry mouth resolved. \par She also has dry eyes 2-3 years and uses lubrication drops. \par \par Headache \par location- bilateral frontal and temple\par description- sharp\par dizziness with it. described as room spinning. no falls.  \par unilateral or bilateral-bilateral\par Nausea or vomiting- none\par Photophobia or phonophobia- none\par warning/aura-none\par nocturnal awakening-\par association with exertion or Valsalva- \par Duration-varies. \par \par Average #/week-daily since stopping the medication. \par  \par history of trauma-none\par  \par Triggers-none\par sleep- 4 hours. \par water intake- 64 ounces\par  \par  \par past medications tried- \par Tylenol\par Advil\par Motrin\par Gabapentin 300mg TID with rheumatology. \par \par Neck pain. Does PT two times per week. \par \par The remaining neurological review of systems is negative \par \par \par 6/2020\par In February, patient reported that her headaches were in good control. Over the last 3 weeks she has been having a sore throat and difficulty swallowing. She underwent ultrasound of the thyroid which shows an abnormality. She is scheduled for a biopsy this Friday. Her headaches have increased since then. She thinks it may be due to the stress she is experiencing from her current diagnosis and workup. She does not have any significant neck pain. Intermittently she has pain behind the right ear. The headaches continue to be bifrontal. They are helped with Tylenol or Advil. She has been compliant with amitriptyline. She would like a refill on this.

## 2022-04-01 NOTE — ASSESSMENT
[FreeTextEntry1] : Nurys Rooney is a 66 year old woman with migraines.\par \par We discussed trial of Amitriptyline 10mg at bedtime or increasing the nighttime dose of Gabapentin to 900mg. She is aware of the potential side effects of Amitriptyline -dry mouth and eyes, and would like to continue taking the lower dose. \par Gabapentin or Amitriptyline can target her sleep as well as her headaches.\par continue oral hydration.\par History of renal calculi- avoid carbonic anhydrase inhibitors.\par Pt already on beta blocker. \par \par Case and management discussed with Dr. Renteria. \par \par Follow up as scheduled with Dr. Renteria. \par \par

## 2022-04-01 NOTE — PHYSICAL EXAM
[FreeTextEntry1] : Physical examination \par General: No acute distress, Awake, Alert.   \par \par Mental status \par Awake, alert, gives detailed history.\par \par Cranial Nerves \par II: VFF  \par III, IV, VI: PERRL, EOMI.   \par V: Facial sensation is normal B/L.   \par VII: Facial strength is normal B/L. \par \par \par VIII: Gross hearing is intact.   \par \par \par IX, X: Palate is midline and elevates symmetrically.   \par XI: Trapezius normal strength.   \par XII: Tongue midline without atrophy or fasciculations. \par \par Motor exam  \par Muscle tone - no evidence of rigidity or resistance in all 4 extremities.  \par No atrophy or fasciculations \par Muscle Strength: arms and legs, proximal and distal flexors and extensors are normal \par \par No UE drift.\par \par Reflexes \par All present, normal, and symmetrical.   \par \par \par Plantars right: mute.   \par Plantars left: mute.   \par \par  \par \par Coordination \par Finger to nose: Normal.  \par Heel to shin: Normal.   \par  \par Sensory \par Intact sensation to vibration and cold. \par \par \par Gait \par Normal including heels, toes, and tandem gait.  \par

## 2022-04-01 NOTE — REVIEW OF SYSTEMS
[Tension Headache] : tension-type headaches [Palpitations] : palpitations [Joint Pain] : joint pain [Joint Stiffness] : joint stiffness [Negative] : Heme/Lymph [Chills] : no chills [Feeling Tired] : not feeling tired [Numbness] : no numbness [Tingling] : no tingling [Seizures] : no convulsions [Dizziness] : no dizziness [Vertigo] : no vertigo [Anxiety] : no anxiety [Depression] : no depression [Eye Pain] : no eye pain [Red Eyes] : eyes not red [Loss Of Hearing] : no hearing loss [Chest Pain] : no chest pain [Shortness Of Breath] : no shortness of breath [Wheezing] : no wheezing [Constipation] : no constipation [Diarrhea] : no diarrhea [Incontinence] : no incontinence [Joint Swelling] : no joint swelling [Itching] : no itching [Muscle Weakness] : no muscle weakness [Easy Bleeding] : no tendency for easy bleeding [Easy Bruising] : no tendency for easy bruising [Swollen Glands] : no swollen glands

## 2022-04-13 ENCOUNTER — APPOINTMENT (OUTPATIENT)
Dept: ENDOCRINOLOGY | Facility: CLINIC | Age: 67
End: 2022-04-13
Payer: MEDICARE

## 2022-04-13 VITALS
SYSTOLIC BLOOD PRESSURE: 100 MMHG | DIASTOLIC BLOOD PRESSURE: 68 MMHG | HEART RATE: 67 BPM | BODY MASS INDEX: 23.57 KG/M2 | HEIGHT: 63 IN | OXYGEN SATURATION: 98 % | WEIGHT: 133 LBS

## 2022-04-13 PROCEDURE — 99214 OFFICE O/P EST MOD 30 MIN: CPT

## 2022-04-13 NOTE — HISTORY OF PRESENT ILLNESS
[FreeTextEntry1] : Apr 13, 2022     in person     no NFC\par \par PCP:  Dr. Meenu Weaver\par           Neuro:  Dr. Jarad Renteria (for HA)\par           Card:  Dr. Adrienne Pugh (palpitations, wake her from sleep)\par           GI:  Dr. Cisco Bull (anemia)\par           Rheum:  Dr. Sophia Moncada (arthritis - ?Heberden's)          \par           Gyn:  Dr. Gladys Moran\par \par CC:  Tender swollen thyroid  [started ~ May 2020] (her daughter is treated for Graves disease)\par         (Type 2 diabetes, 1/2/2017 A1c 8.3 %)\par       \par 67 yo mother of six.  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  TSH never suppressed but ESR went up and\par TgAb went up.   Finally thyroid feels fine to her.    Continued surveillance by means of history, exam, US, TFTs remain prudent with\par next US thyroid about one year after last:  about Dec 19760     TFTs today.  \par \par : :\par Constitutional:  Alert, well nourished, healthy appearance, no acute distress \par Eyes:  No proptosis, no stare\par Thyroid:  Normal to palpation, non-tender \par Pulmonary:  No respiratory distress, no accessory muscle use; normal rate and effort\par Cardiac:  Normal rate\par Vascular: \par Endocrine:  No stigmata of Cushing’s Syndrome\par Musculoskeletal:  Normal gait, no involuntary movements\par Neurology:  No tremors\par Affect/Mood/Psych:  Oriented x 3; normal affect, normal insight/judgement, normal mood \par .\par \par Imp:  Thyroiditis appears to be winding down\par FBS remain a bit elevated.    \par \par Plan:  I suggested she test FBS only twicee a week and the rest of the week do some after meal BS monitoring.\par Also, as FBS and A1c have drifted up a bit, to try moving the glipizide ER 10 mg to take in evening along with the 2nd metformin.  \par \par Mar 15, 2022     in person     no NFC \par \par PCP:  Dr. Meenu Weaver\par           Neuro:  Dr. Jarad Renteria (for HA)\par           Card:  Dr. Adrienne Pugh (palpitations, wake her from sleep)\par           GI:  Dr. Cisco Bull (anemia)\par           Rheum:  Dr. Sophia Moncada (arthritis - ?Heberden's)          \par           Gyn:  Dr. Gladys Moran\par \par CC:  Tender swollen thyroid  [started ~ May 2020] (her daughter is treated for Graves disease)\par         (Type 2 diabetes, 1/2/2017 A1c 8.3 %)\par       \par 67 yo mother of six. \par Originally seen for pain in region of thyroid - clinically thyroiditis, although euthyroid, now much improved.\par \par On exam, still has slight tenderness region of R thyroid lobe.  \par Most recent US thyroid 11/29/21 reassuring.  Will aim for f/u US thyroid about six months after that. \par \par : :\par Constitutional:  Alert, well nourished, healthy appearance, no acute distress \par Eyes:  No proptosis, no stare\par Thyroid:  Slight tenderness R thyroid lobe.  No palpable adenopathy. 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   Continue surveillance will be the strategy.\par A1c of 7.3% noted.     Will try to assist patient in doing some monitoring with CGM.   Her cucrrent smartphone is\par not equipped with the necessary NFC but she believes her son may be able to help in that regard.  \par \par .\par \par Feb 15, 2022     in person       she has an android w/o nfc\par \par PCP:  Dr. Meenu Weaver\par           Neuro:  Dr. Jarad Renteria (for HA)\par           Card:  Dr. Adrienne Pugh (palpitations, wake her from sleep)\par           GI:  Dr. Cisco Bull (anemia)\par           Rheum:  Dr. Sophia Moncada (arthritis - ?Heberden's)          \par           Gyn:  Dr. Gladys Moran\par \par CC:  Tender swollen thyroid  [started ~ May 2020] (her daughter is treated for Graves disease)\par         (Type 2 diabetes, 1/2/2017 A1c 8.3 %)\par       \par 67 yo mother of six. \par Originally seen for pain in region of thyroid - clinically thyroiditis, although euthyroid, now much improved.\par \par   Diagnosed with diabetes about 10 years ago on routine exam.\par Takes metformin 1000 mg in BID and glipizide  ER 10 mg at lunch.\par \par Reports FBS around 180 and after dinner around 180\par \par She tests with a Freestyle Lite meter.\par \par Still has some L thyroid bed discomfort.\par Last US thyroid very reassuring.\par \par Plan:  TFTs, ESR, A1c today.\par Bring meter to next visit \par Consider brief trial of adding CGM (no NFC) \par ROV in about one month.  \par \par \par Nov 18, 2021    in person\par \par PCP:  Dr. Meenu Weaver\par           Neuro:  Dr. Jarad Renteria (for HA)\par           Card:  Dr. Adrienne Pugh (palpitations, wake her from sleep)\par           GI:  Dr. Cisco Bull (anemia)\par           Rheum:  Dr. Sophia Moncada (arthritis - ?[facility]berden's)          \par           Gyn:  Dr. Gladys Moran\par \par CC:  Tender swollen thyroid  [started ~ May 2020] (her daughter is treated for Graves disease)\par         (Type 2 diabetes, 1/2/2017 A1c 8.3 %)\par       \par 67 yo mother of six.   Diagnosed with diabetes about 10 years ago on routine exam.\par Takes metformin 1000 mg in AM and glipizide  ER 10 mg at lunch.\par Monitors her sugars with her 's meter; however, he is out of town.  \par Today after broccoli, cauliflorer, beans, corn and rice  the fingerstick BS is 274 mg/dl\par So she will start using the Verio Flextouch to check her sugars 3X a day, before meals.\par Also reviewed with her a lower carb diet.\par \par But the primary reason she stopped in today is pain R thyroid bed which started a few weeks ago.   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  \par \par   \par \par \par \par \par \par May 04, 2021    in prson\par \par PCP:  Dr. Meenu Weaver\par           Neuro:  Dr. Jarad Renteria (for HA)\par           Card:  Dr. Adrienne Pugh (palpitations, wake her from sleep)\par           GI:  Dr. Cisco Bull (anemia)\par           Rheum:  Dr. Sophia Moncada (arthritis)\par \par           Rheum:  Dr. Sophia Moncada (joint pain)\par           Gyn:  Dr. Gladys Moran\par \par CC:  Tender swollen thyroid  [started ~ May 2020] (her daughter is treated for Graves disease)\par         (Type 2 diabetes, 1/2/2017 A1c 8.3 %)\par       \par Followed for tender thyroid.  Pain has marched across the gland similar to subacute thyroiditis.  \par \par Most recent lab tests at [facility] on\par 4/26/201 included:\par \par TSH 1.10   (lowest was 0.31 in Jan 2019)\par glucose 170 mg/dl\par insulin 38 (not fasting)\par \par On exam, thyroid slightly tender.\par No palpable nodules or adenopathy.\par \par Impression:  "Smouldering thyroiditis" slowly stuttering into baseline.\par Most recent US thyroid from 10/19/2020: diffuse heterogeneity of echogenicity, mild hypervascularity....no significant change from 9/15/2020"\par \par Doing well.\par Euthyroid.\par Reassuring US thyroid.\par \par Plan:  f/u US thyroid by 9 2021 and ROV October.\par She borrows her 's OneTouch Ultra, but will try to get her own and test sugars at different times of the day.\par If results not in range and not amenable to dietary change, additional medications which appear to be covedred include\par Farxiga, Trulicity.  \par \par She reports  mg/dl while on metformin 1000 mg BID.  \par \par \par \par \par \par \par \par \par Mar 02, 2021    in person\par \par PCP:  Dr. Meenu Weaver\par           Neuro:  Dr. Jarad Renteria (for HA)\par           Card:  Dr. Adrienne Pugh (palpitations, wake her from sleep)\par           GI:  Dr. Cisco Bull (anemia)\par \par           Rheum:  Dr. Sophia Moncada (joint pain)\par           Gyn:  Dr. Gladys Moran\par \par CC:  Tender swollen thyroid  [started ~ May 2020] (her daughter is treated for Graves disease)\par         (Type 2 diabetes, 1/2/2017 A1c 8.3 %)\par       \par Followed for tender thyroid.  Pain has marched across the gland similar to subacute thyroiditis.  Associated with development of elevated ESR (9/19: 11,  7/2/20:  39,  8/29/20:  52) that then returned to normal.   Also developed anti-thyroglobulin antibodies (7/2/20 < 20 -> 12/4/20: 83).  However, apparently never hyperthyroid enough to generate a suppressed TSH.\par \par In the initial phase US thyroid 6/16/20 showed a large poorly defined heterogeneous hypoelchoic area in the left mid lower pole felt possibly related to thyroiditis but possibility of mass "not excluded" and FNAB revealed no suspicious cytology.\par \par On exam, thyroid normal size, non-tender, without palpable nodule or lymphadenopathy.\par \par Impression:  An atypical presentation of subacute thyroiditis and although she still notes occasional discomfort in the region of the thyroid, this is infrequent and much milder than when she first presented.   \par \par Plan:  TFTs today and the long term strategy will be surveillance with follow up here in about 4 - 6 months.  \par \par \par \par Dec 03, 2020   in person\par \par PCP:  Dr. Meenu Weaver\par           Neuro:  Dr. Jarad Renteria (for HA)\par           Card:  Dr. Adrienne Pugh (palpitations, wake her from sleep)\par           GI:  Dr. Cisco Bull (anemia)\par           Rheum:  Dr. Sophia Moncada (joint pain)\par           Gyn:  Dr. Gladys Moran\par \par CC:  Tender swollen thyroid   (her daughter is treated for Graves disease)\par         (Type 2 diabetes, 1/2/2017 A1c 8.3 %)\par       \par Followed for tender thyroid.  Pain has marched across the gland (from L to R) similar to subacute thyroiditis.\par The TSH has never been suppressed.\par The ESR went up and came down followed by positive TPO abs.\par \par : :\par Constitutional:  Alert, well nourished, healthy appearance, no acute distress \par Eyes:  No proptosis, no stare\par Thyroid:\par Pulmonary:  No respiratory distress, no accessory muscle use; normal rate and effort\par Cardiac:  Normal rate\par Vascular: \par Endocrine:  No stigmata of Cushing’s Syndrome\par Musculoskeletal:  Normal gait, no involuntary movements\par Neurology:  No tremors\par Affect/Mood/Psych:  Oriented x 3; normal affect, normal insight/judgement, normal mood \par .\par  Impression:  She feels thyroid discomfort has continued to slowly improve.\par \par Plan:  TFTs today and ROV early March 2021.  \par \par \par The right thyroid lobe measures 4.9 x 1.6 x 1.7 cm, the isthmus up to 3 mm AP in the midline, and the left thyroid lobe measures 4 x 1.2 x 1.6 cm. There has been no change in lobulated thyroid contour and diffuse heterogeneity of thyroid parenchymal echogenicity. Color-flow Doppler evaluation demonstrates mild hypervascularity of the gland. . There are several very small colloid cyst in the left lobe.\par \par \par IMPRESSION: No significant change from 9/15/2020.\par \par \par ***Electronically Signed ***\par -----------------------------------------------\par Demond Valdez MD              10/19/20 \par \par \par Nov 17, 2020     Te;ephone     \par \par PCP:  Dr. Meenu Weaver\par           Neuro:  Dr. Jarad Renteria (for HA)\par           Card:  Dr. Adrienne Pugh (palpitations, wake her from sleep)\par           GI:  Dr. Cisco Bull (anemia)\par           Rheum:  Dr. Sophia Moncada (joint pain)\par           Gyn:  Dr. Gladys Moran\par \par CC:  Tender swollen thyroid   (her daughter is treated for Graves disease)\par         (Type 2 diabetes, A1c 7.3%)\par       \par 66 yo who presented with a tender swollen thyroid with symptoms "marching" across the gland from L to R.\par Today has no symptoms, but last night she had some symptoms.\par \par Trajectory of ESR was\par 9/19  11;  7/2/20  39;  7/29/20  52;  9/15/20  37   10/19/20  19 ***      \par \par \par ESR Trajectory:\par 7/2/20  < 20;  7/29/20:  < 20;  9/15/20:  52;  10/19/20:  50.4   ****\par \par 9/15/20   PC ;      10/19/10    (post prandial) ***\par \par TSH:   0.31 - 2.1     (never suppressed)  \par \par 10/19/2020 Ultrasound thyroid:  heterogeneous gland.  R lobe 4.9 cm;   L lobe 4 cm\par       Has had 5 US thyroid and FNAB of heterogeneous area L lobe when there was tenderness in that region.  \par \par Impression:  Although she has had the thyroid condition for several months, and it behaves like an unusual variant of subacute (painful) thyroiditis, the tSH has never been low.     The ESR went up and came down, the TPO ab went from negative to positive, all common with subacute thyroiditis.   "Marching" across the gland is also not that unusual.   In sum, she reports, that even though she still gets some pain, it is much better than when this all started.     So continued active surveiillance will be the strategy.\par \par Incidentally noted is the elevation of post prandial glucose, so I have taken the liberty of asking her to return for instruction in slef blood glucose monitoring as well as some dietary overview.  \par \par \par \par \par \par Sep 22, 2020   in person accompanied by her daughter, Emelina.   \par \par PCP:  Dr. Meenu Weaver\par           Neuro:  Dr. Jarad Renteria (for HA)\par           Card:  Dr. Adrienne Pugh (palpitations, wake her from sleep)\par           GI:  Dr. Cisco Bull (anemia)\par           Rheum:  Dr. Sophia Moncada (joint pain)\par           Gyn:  Dr. Gladys Moran\par \par CC:  Tender swollen thyroid   (her daughter is treated for Graves disease)\par         (Type 2 diabetes, A1c 7.3%)\par       \par 66 yo Type 2 diabetes, visits in follow up for swollen, tender thyroid.  Symptoms began on Left side of thyroid and then marched to the Right.  \par \par Most recent lab tests on\par September 15, 2020 include\par ESR 37 (had been 52 July 29, 39 July 2 and 11 Sep 20, 2019\par T4 8.9\par calcium 10.6 ***\par TSH 2.1 (up from 1.2 ion July 29, 0.70 July 2)\par TPO remains neg\par TgAb 52.3 (<10.0);  had been < 20 previously\par Tg 20 (<1.8)   [of course abs may impact\par \par \par Impression:  In general, thyroid enlargement appears to be slowly resolving.  US thyroid no longer demonstrates mass R thyroid lobe.    Her findings are most consistent with subacute thyroiditis; however, there are many atypical features, including the lack of TSH suppression and the unusually long course.    Continued close monitoring will be the strategy, including f/u US thyroid and lab tests.  \par \par Aug 03, 2020   in person\par \par PCP:  Dr. Meenu Weaver\par           Neuro:  Dr. Jarad Renteria (for HA)\par           Card:  Dr. Adrienne Pugh (palpitations, wake her from sleep)\par           GI:  Dr. Cisco Bull (anemia)\par           Rheum:  Dr. Sophia Moncada (joint pain)\par           Gyn:  Dr. Gladys Moran\par \par CC:  Tender swollen thyroid\par         (Type 2 diabetes, A1c 7.3%)\par       \par \par Recent labs at [facility] from\par 7/29/2020 include:\par \par ESR 52 (up from 39 on July 2 and 11 on Sep 20)\par TSH  1.2\par Thyroid antibodies (TPO, TgAb) remain negative\par \par Biopsy of left thyroid "nodule":  "benign follicular nodule with cystic change"\par \par Follow up US:\par \par CLINICAL HISTORY: Painful enlarged thyroid with nodules, recent benign biopsy of a left lower pole masslike asymmetry\par \par COMPARISON: 6/26/2020\par \par FINDINGS:\par \par Thyroid isthmus measures 2.8 mm.\par \par Right lobe of the thyroid measures 4.9 x 1.7 x 1.7 cm and is diffusely heterogeneous with interval development of a masslike area of asymmetry in the midpole measuring 2.4 x 1.4 x 1.3 cm.\par \par Left lobe of the thyroid measures 3.7 x 1.5 x 1.5 cm diffusely heterogeneous with previously biopsied area of masslike asymmetrical density now measuring 2.7 x 1.3 x 1.3 cm. This is not significantly changed.\par \par There is mild increased vascularity of the thyroid.\par \par Small lymph nodes are seen in the cervical chain bilaterally largest on the left measuring 7.7 x 5.6 x 5.1 mm and the largest on the right measuring 7.9 x 6.8 x 4.6 mm.\par \par \par IMPRESSION: Heterogeneous thyroid gland is again seen with interval development of a masslike area of asymmetry in the right mid pole thyroid gland. No change in the left lobe of the thyroid.\par \par Recommend short interval follow-up perhaps in 3-6 months time.\par \par ***Electronically Signed ***\par -----------------------------------------------\par Eric Pablo MD              07/29/20 \par \par \par Jul 01, 2020      in person     \par \par PCP:  Dr. Meenu Weaver\par           Neuro:  Dr. Jarad Renteria (for HA)\par           Card:  Dr. Adrienne Pugh (palpitations, wake her from sleep)\par           GI:  Dr. Cisco Bull (anemia)\par           Rheum:  Dr. Sophia Moncada (joint pain)\par           Gyn:  Dr. Gladys Moran\par \par CC:  Tender swollen thyroid\par         (Type 2 diabetes, A1c 7.3%)\par       \par 65 yo accompanied by her daughter.   Patient reports that she started to notice discomfort and swelling in the area of the thyroid about a month ago.  While the tenderness and discomfort was bilateral, it was more noticeable on the left.   The swelling and pain are still present, she reports that both are improving.   She is currently taking Tylenol with some benefit.    There is no previous history of thyroid problem.   Her daughter has been treated for a thyroid condition.  There is no history of face or neck irradiation.  \par \par Lab tests on\par 6/11/2020 included\par TSH 0.83\par \par 6/16/2020 Ultrasound Thyroid at [facility]:\par .\par "The right thyroid lobe measures 4.6 x 1.2 x 2 cm, the isthmus up to 2 mm AP in the midline, and the left thyroid lobe measures 3.8 x 1.4 x 2.1 cm. A large poorly-defined heterogeneous hypoechoic area in the left mid-lower pole measures 3 x 1.3 x 1.3 cm. A couple of small hypoechoic right mid pole nodules measure 4 x 2 x 3 mm and 4 x 2 x 3 mm. Several small left cervical lymph nodes measure 1.0 x 0.6 x 0.9 cm, 1.3 x 0.6 x 0.9 cm, and 1.1 x 0.5 x 0.5 cm, and are diffusely hypoechoic, without clearly defined echogenic indira.\par \par \par IMPRESSION: In view of the history of left-sided neck pain, the large poorly-defined heterogeneous hypoechoic area in the left mid-lower pole may be secondary to thyroiditis, with the possibility of mass not excluded. Several small left-sided lymph nodes could be inflammatory or neoplastic.\par \par \par ***Electronically Signed ***\par -----------------------------------------------\par Demond Valdez MD              06/16/20  "\par \par .\par 6/29/2020 underwent FNAB of the ~ 3 cm L thyroid lesion:\par \par "FINAL DIAGNOSIS\par  \par Left thyroid mid to lower pole area:\par BENIGN FOLLICULAR NODULE WITH CYSTIC CHANGE (Perris Category II)\par A few Hurthle cells and crushed lymphocytes, suggestive of chronic lymphocytic thyroiditis.\par  \par \par MICROSCOPIC DESCRIPTION  \par  \par The Diff-Quik and Papanicolaou stained direct smears show sheets and groups of benign follicular cells with small nuclei, inconspicuous nucleoli and moderate cytoplasm in a hemorrhagic background with bare nuclei, macrophages and thin colloid. Mild metaplastic changes and rare crushed lymphoid cells are noted. "\par \par On exam, the thyroid is slightly enlarged, L more than R and is slightly tender to palpation.  \par \par \par Impression:  Her careful evaluation is most consistent with subacute thyroiditis.  The inflammation of subacute thyroiditis usually causes enough release of thyroid hormone to cause suppression of TSH, which has not yet been seen here, so close follow up by means of history, exam, labs and ultrasound will be the current strategy.    The trajectory of her symptoms appears to be that of improvement, so that is reassuring.   Symptoms from subacute thyroiditis may take several months to completely resolve.  \par \par Plan:  Repeat TSH today, repeat US thyroid in about 4-5 weeks and ROV here after that.

## 2022-04-27 ENCOUNTER — APPOINTMENT (OUTPATIENT)
Dept: NEUROLOGY | Facility: CLINIC | Age: 67
End: 2022-04-27
Payer: MEDICARE

## 2022-04-27 VITALS
OXYGEN SATURATION: 99 % | DIASTOLIC BLOOD PRESSURE: 81 MMHG | SYSTOLIC BLOOD PRESSURE: 129 MMHG | HEIGHT: 63 IN | BODY MASS INDEX: 23.74 KG/M2 | WEIGHT: 134 LBS | TEMPERATURE: 98.2 F | HEART RATE: 77 BPM

## 2022-04-27 PROCEDURE — 99214 OFFICE O/P EST MOD 30 MIN: CPT

## 2022-04-27 RX ORDER — AMITRIPTYLINE HYDROCHLORIDE 10 MG/1
10 TABLET, FILM COATED ORAL
Qty: 180 | Refills: 1 | Status: DISCONTINUED | COMMUNITY
Start: 2019-12-16 | End: 2022-04-27

## 2022-04-28 NOTE — ASSESSMENT
[FreeTextEntry1] : Nurys Rooney is a 66 year old woman with migraines.\par \par STOP amitriptyline due to sever xerostomia - unable to tolerate even 10 mg.\par \par Begin Duloxetine in a titrating manner.\par continue gabapentin 600 mg bid.\par \par If no relief- Botox or CGRP inhibitor.\par \par She will see me in f/u after returning from [place].\par \par \par

## 2022-04-28 NOTE — HISTORY OF PRESENT ILLNESS
[FreeTextEntry1] : 4/27/22\par dry mouth\par mo - mouth very dry even on 10 mg qhs.\par headaches were improved on this \par \par Gabapentin 600 twice daily (4 total)\par \par 3/31/22\par Seen by Frances Padilla\par \par \par 6/24/2020\par Nurys Rooney is a 66 year old woman with a history of migraines, TIA, DM, presenting for a follow up appointment for migraines. \par \par She is able to tolerate Amitriptyline 10mg without side effects. She took one dose last night and reports her  headaches have improved. \par Tongue heaviness with Amitriptyline 20mg. stopped medication two weeks ago. Three and a half weeks prior noted heavy tongue. Heaviness and dry mouth resolved. \par She also has dry eyes 2-3 years and uses lubrication drops. \par \par Headache \par location- bilateral frontal and temple\par description- sharp\par dizziness with it. described as room spinning. no falls.  \par unilateral or bilateral-bilateral\par Nausea or vomiting- none\par Photophobia or phonophobia- none\par warning/aura-none\par nocturnal awakening-\par association with exertion or Valsalva- \par Duration-varies. \par \par Average #/week-daily since stopping the medication. \par  \par history of trauma-none\par  \par Triggers-none\par sleep- 4 hours. \par water intake- 64 ounces\par  \par  \par past medications tried- \par Tylenol\par Advil\par Motrin\par Gabapentin 300mg TID with rheumatology. \par \par Neck pain. Does PT two times per week. \par \par The remaining neurological review of systems is negative \par \par \par 6/2020\par In February, patient reported that her headaches were in good control. Over the last 3 weeks she has been having a sore throat and difficulty swallowing. She underwent ultrasound of the thyroid which shows an abnormality. She is scheduled for a biopsy this Friday. Her headaches have increased since then. She thinks it may be due to the stress she is experiencing from her current diagnosis and workup. She does not have any significant neck pain. Intermittently she has pain behind the right ear. The headaches continue to be bifrontal. They are helped with Tylenol or Advil. She has been compliant with amitriptyline. She would like a refill on this.

## 2022-04-29 ENCOUNTER — APPOINTMENT (OUTPATIENT)
Dept: RHEUMATOLOGY | Facility: CLINIC | Age: 67
End: 2022-04-29

## 2022-06-10 ENCOUNTER — RESULT REVIEW (OUTPATIENT)
Age: 67
End: 2022-06-10

## 2022-06-14 ENCOUNTER — APPOINTMENT (OUTPATIENT)
Dept: ENDOCRINOLOGY | Facility: CLINIC | Age: 67
End: 2022-06-14
Payer: MEDICARE

## 2022-06-14 VITALS
OXYGEN SATURATION: 96 % | HEART RATE: 89 BPM | HEIGHT: 63 IN | DIASTOLIC BLOOD PRESSURE: 60 MMHG | SYSTOLIC BLOOD PRESSURE: 120 MMHG | BODY MASS INDEX: 23.74 KG/M2 | WEIGHT: 134 LBS | TEMPERATURE: 98 F

## 2022-06-14 PROCEDURE — 99214 OFFICE O/P EST MOD 30 MIN: CPT

## 2022-06-20 LAB
ESTIMATED AVERAGE GLUCOSE: 163 MG/DL
HBA1C MFR BLD HPLC: 7.3 %

## 2022-06-20 NOTE — HISTORY OF PRESENT ILLNESS
[FreeTextEntry1] : Jun 14, 2022      in person\par \par PCP:  Dr. Meenu Weaver\par           Neuro:  Dr. Jarad Renteria (for HA)\par           Card:  Dr. Adrienne Pugh (palpitations, wake her from sleep)\par           GI:  Dr. Cisco Bull (anemia)\par           Rheum:  Dr. Sophia Moncada (arthritis - ?Heberden's)          \par           Gyn:  Dr. Gladys Moran\par           Pod:  Dr. Lázaro LAUGHLIN achilles tendon swelling\par           Eyes:  Dr. Montoya  \par \par CC:  Tender swollen thyroid  [started ~ May 2020] (her daughter is treated for Graves disease)\par         (Type 2 diabetes, 1/2/2017 A1c 8.3 %)\par       \par 65 yo mother of six.  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  TSH never suppressed but ESR went up and\par TgAb went up.    Most recent US thyroid at [facility] on 6/10/2022:\par \par "COMPARISON: 11/29/2021 \par \par FINDINGS:\par Right Lobe: 3.9 x  4.3 x 1.6 cm. In the upper pole, a hypoechoic nodule is identified, measuring 1.0 x 0.4 x 0.7 cm.\par \par Left Lobe: 4.0 x 1.0 x 1.7 cm. Lower pole spongiform nodule measures 0.6 x 0.3 x 0.6 cm.\par \par Isthmus: 2.2 mm. Hypoechoic nodule in the left isthmus measures 0.5 x 0.3 x 0.4 cm.\par \par Cervical Lymph Nodes: In the region of palpable abnormality on the left, there is a tiny hypoechoic, nonpathologic lymph node measuring 1.0 x 0.3 x 1.0 cm. On the right side, an area of palpable abnormality shows small hypoechoic lymph node, measuring 1.0 x 0.4 x 0.7 cm.\par \par IMPRESSION: \par \par Subcentimeter bilateral nodules.\par \par In the area of bilateral concern, small, nonpathologic lymph nodes.\par \par -----------------------------------------------\par Gary Solis MD              06/13/22 "\par \par \par \par For diabetes, she\par remains on glipizide ER 10  (usually early afternoon\par metformin 1000 mg in AM and again in the afternoon - her preference)\par \par Monitors sugars TID with OneTouch Ultra 2 (not available today)\par \par \par : :\par Constitutional:  Alert, well nourished, healthy appearance, no acute distress \par Eyes:  No proptosis, no stare\par Thyroid:  Normal to palpation  \par Pulmonary:  No respiratory distress, no accessory muscle use; normal rate and effort\par Cardiac:  Normal rate\par Vascular: \par Endocrine:  No stigmata of Cushing’s Syndrome\par Musculoskeletal:  Normal gait, no involuntary movements  Swelling R Achilles tendon\par Neurology:  No tremors\par Affect/Mood/Psych:  Oriented x 3; normal affect, normal insight/judgement, normal mood \par \par \par Impression/Plan:  By her history, diabetes is under excellent control with only rare, mild hypoglycemia.\par Will confirm with A1c. and lower glipizide ER from 10 mg to 5 mg.\par \par Ultrasound of thyroid reassuring and will repeat US ~ one year.\par \par ROV in November. \par .\par \par  \par \par Apr 13, 2022     in person     no NFC\par \par PCP:  Dr. Meenu Weaver\par           Neuro:  Dr. Jarad Renteria (for HA)\par           Card:  Dr. Adrienne Pugh (palpitations, wake her from sleep)\par           GI:  Dr. Cisco Bull (anemia)\par           Rheum:  Dr. Sophia Moncada (arthritis - ?Heberden's)          \par           Gyn:  Dr. Gladys Moran\par \par CC:  Tender swollen thyroid  [started ~ May 2020] (her daughter is treated for Graves disease)\par         (Type 2 diabetes, 1/2/2017 A1c 8.3 %)\par       \par 65 yo mother of six.  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  TSH never suppressed but ESR went up and\par TgAb went up.   Finally thyroid feels fine to her.    Continued surveillance by means of history, exam, US, TFTs remain prudent with\par next US thyroid about one year after last:  about Dec 81741     TFTs today.  \par \par : :\par Constitutional:  Alert, well nourished, healthy appearance, no acute distress \par Eyes:  No proptosis, no stare\par Thyroid:  Normal to palpation, non-tender \par Pulmonary:  No respiratory distress, no accessory muscle use; normal rate and effort\par Cardiac:  Normal rate\par Vascular: \par Endocrine:  No stigmata of Cushing’s Syndrome\par Musculoskeletal:  Normal gait, no involuntary movements\par Neurology:  No tremors\par Affect/Mood/Psych:  Oriented x 3; normal affect, normal insight/judgement, normal mood \par .\par \par Imp:  Thyroiditis appears to be winding down\par FBS remain a bit elevated.    \par \par Plan:  I suggested she test FBS only twicee a week and the rest of the week do some after meal BS monitoring.\par Also, as FBS and A1c have drifted up a bit, to try moving the glipizide ER 10 mg to take in evening along with the 2nd metformin.  \par \par Mar 15, 2022     in person     no NFC \par \par PCP:  Dr. Meenu Weaver\par           Neuro:  Dr. Jarad Renteria (for HA)\par           Card:  Dr. Adrienne Pugh (palpitations, wake her from sleep)\par           GI:  Dr. Cisco Bull (anemia)\par           Rheum:  Dr. Sophia Moncada (arthritis - ?Heberden's)          \par           Gyn:  Dr. Gladys Moran\par \par CC:  Tender swollen thyroid  [started ~ May 2020] (her daughter is treated for Graves disease)\par         (Type 2 diabetes, 1/2/2017 A1c 8.3 %)\par       \par 65 yo mother of six. \par Originally seen for pain in region of thyroid - clinically thyroiditis, although euthyroid, now much improved.\par \par On exam, still has slight tenderness region of R thyroid lobe.  \par Most recent US thyroid 11/29/21 reassuring.  Will aim for f/u US thyroid about six months after that. \par \par : :\par Constitutional:  Alert, well nourished, healthy appearance, no acute distress \par Eyes:  No proptosis, no stare\par Thyroid:  Slight tenderness R thyroid lobe.  No palpable adenopathy. 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   Continue surveillance will be the strategy.\par A1c of 7.3% noted.     Will try to assist patient in doing some monitoring with CGM.   Her cucrrent smartphone is\par not equipped with the necessary NFC but she believes her son may be able to help in that regard.  \par \par .\par \par Feb 15, 2022     in person       she has an android w/o nfc\par \par PCP:  Dr. Meenu Weaver\par           Neuro:  Dr. Jarad Renteria (for HA)\par           Card:  Dr. Adrienne Pugh (palpitations, wake her from sleep)\par           GI:  Dr. Cisco Bull (anemia)\par           Rheum:  Dr. Sophia Moncada (arthritis - ?Heberden's)          \par           Gyn:  Dr. Gladys Moran\par \par CC:  Tender swollen thyroid  [started ~ May 2020] (her daughter is treated for Graves disease)\par         (Type 2 diabetes, 1/2/2017 A1c 8.3 %)\par       \par 65 yo mother of six. \par Originally seen for pain in region of thyroid - clinically thyroiditis, although euthyroid, now much improved.\par \par   Diagnosed with diabetes about 10 years ago on routine exam.\par Takes metformin 1000 mg in BID and glipizide  ER 10 mg at lunch.\par \par Reports FBS around 180 and after dinner around 180\par \par She tests with a Freestyle Lite meter.\par \par Still has some L thyroid bed discomfort.\par Last US thyroid very reassuring.\par \par Plan:  TFTs, ESR, A1c today.\par Bring meter to next visit \par Consider brief trial of adding CGM (no NFC) \par ROV in about one month.  \par \par \par Nov 18, 2021    in person\par \par PCP:  Dr. Meenu Weaver\par           Neuro:  Dr. Jarad Renteria (for HA)\par           Card:  Dr. Adrienne Pugh (palpitations, wake her from sleep)\par           GI:  Dr. Cisco Bull (anemia)\par           Rheum:  Dr. Sophia Moncada (arthritis - ?Heberden's)          \par           Gyn:  Dr. Gladys Moran\par \par CC:  Tender swollen thyroid  [started ~ May 2020] (her daughter is treated for Graves disease)\par         (Type 2 diabetes, 1/2/2017 A1c 8.3 %)\par       \par 65 yo mother of six.   Diagnosed with diabetes about 10 years ago on routine exam.\par Takes metformin 1000 mg in AM and glipizide  ER 10 mg at lunch.\par Monitors her sugars with her 's meter; however, he is out of town.  \par Today after broccoli, cauliflorer, beans, corn and rice  the fingerstick BS is 274 mg/dl\par So she will start using the Verio Flextouch to check her sugars 3X a day, before meals.\par Also reviewed with her a lower carb diet.\par \par But the primary reason she stopped in today is pain R thyroid bed which started a few weeks ago.   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  \par \par   \par \par \par \par \par \par May 04, 2021    in prson\par \par PCP:  Dr. Meenu Weaver\par           Neuro:  Dr. Jarad Renteria (for HA)\par           Card:  Dr. Adrienne Pugh (palpitations, wake her from sleep)\par           GI:  Dr. Cisco Bull (anemia)\par           Rheum:  Dr. Sophia Moncada (arthritis)\par \par           Rheum:  Dr. Sophia Moncada (joint pain)\par           Gyn:  Dr. Gladys Moran\par \par CC:  Tender swollen thyroid  [started ~ May 2020] (her daughter is treated for Graves disease)\par         (Type 2 diabetes, 1/2/2017 A1c 8.3 %)\par       \par Followed for tender thyroid.  Pain has marched across the gland similar to subacute thyroiditis.  \par \par Most recent lab tests at [facility] on\par 4/26/201 included:\par \par TSH 1.10   (lowest was 0.31 in Jan 2019)\par glucose 170 mg/dl\par insulin 38 (not fasting)\par \par On exam, thyroid slightly tender.\par No palpable nodules or adenopathy.\par \par Impression:  "Smouldering thyroiditis" slowly stuttering into baseline.\par Most recent US thyroid from 10/19/2020: diffuse heterogeneity of echogenicity, mild hypervascularity....no significant change from 9/15/2020"\par \par Doing well.\par Euthyroid.\par Reassuring US thyroid.\par \par Plan:  f/u US thyroid by 9 2021 and ROV October.\par She borrows her 's OneTouch Ultra, but will try to get her own and test sugars at different times of the day.\par If results not in range and not amenable to dietary change, additional medications which appear to be covedred include\par Farxiga, Trulicity.  \par \par She reports  mg/dl while on metformin 1000 mg BID.  \par \par \par \par \par \par \par \par \par Mar 02, 2021    in person\par \par PCP:  Dr. Meenu Weaver\par           Neuro:  Dr. Jarad Renteria (for HA)\par           Card:  Dr. Adrienne Pugh (palpitations, wake her from sleep)\par           GI:  Dr. Cisco Bull (anemia)\par \par           Rheum:  Dr. Sophia Moncada (joint pain)\par           Gyn:  Dr. Gladys Moran\par \par CC:  Tender swollen thyroid  [started ~ May 2020] (her daughter is treated for Graves disease)\par         (Type 2 diabetes, 1/2/2017 A1c 8.3 %)\par       \par Followed for tender thyroid.  Pain has marched across the gland similar to subacute thyroiditis.  Associated with development of elevated ESR (9/19: 11,  7/2/20:  39,  8/29/20:  52) that then returned to normal.   Also developed anti-thyroglobulin antibodies (7/2/20 < 20 -> 12/4/20: 83).  However, apparently never hyperthyroid enough to generate a suppressed TSH.\par \par In the initial phase US thyroid 6/16/20 showed a large poorly defined heterogeneous hypoelchoic area in the left mid lower pole felt possibly related to thyroiditis but possibility of mass "not excluded" and FNAB revealed no suspicious cytology.\par \par On exam, thyroid normal size, non-tender, without palpable nodule or lymphadenopathy.\par \par Impression:  An atypical presentation of subacute thyroiditis and although she still notes occasional discomfort in the region of the thyroid, this is infrequent and much milder than when she first presented.   \par \par Plan:  TFTs today and the long term strategy will be surveillance with follow up here in about 4 - 6 months.  \par \par \par \par Dec 03, 2020   in person\par \par PCP:  Dr. Meenu Weaver\par           Neuro:  Dr. Jarad Renteria (for HA)\par           Card:  Dr. Adrienne Pugh (palpitations, wake her from sleep)\par           GI:  Dr. Cisco Bull (anemia)\par           Rheum:  Dr. Sophia Moncada (joint pain)\par           Gyn:  Dr. Gladys Moran\par \par CC:  Tender swollen thyroid   (her daughter is treated for Graves disease)\par         (Type 2 diabetes, 1/2/2017 A1c 8.3 %)\par       \par Followed for tender thyroid.  Pain has marched across the gland (from L to R) similar to subacute thyroiditis.\par The TSH has never been suppressed.\par The ESR went up and came down followed by positive TPO abs.\par \par : :\par Constitutional:  Alert, well nourished, healthy appearance, no acute distress \par Eyes:  No proptosis, no stare\par Thyroid:\par Pulmonary:  No respiratory distress, no accessory muscle use; normal rate and effort\par Cardiac:  Normal rate\par Vascular: \par Endocrine:  No stigmata of Cushing’s Syndrome\par Musculoskeletal:  Normal gait, no involuntary movements\par Neurology:  No tremors\par Affect/Mood/Psych:  Oriented x 3; normal affect, normal insight/judgement, normal mood \par .\par  Impression:  She feels thyroid discomfort has continued to slowly improve.\par \par Plan:  TFTs today and ROV early March 2021.  \par \par \par The right thyroid lobe measures 4.9 x 1.6 x 1.7 cm, the isthmus up to 3 mm AP in the midline, and the left thyroid lobe measures 4 x 1.2 x 1.6 cm. There has been no change in lobulated thyroid contour and diffuse heterogeneity of thyroid parenchymal echogenicity. Color-flow Doppler evaluation demonstrates mild hypervascularity of the gland. . There are several very small colloid cyst in the left lobe.\par \par \par IMPRESSION: No significant change from 9/15/2020.\par \par \par ***Electronically Signed ***\par -----------------------------------------------\par Demond Valdez MD              10/19/20 \par \par \par Nov 17, 2020     Te;ephone     \par \par PCP:  Dr. Meenu Weaver\par           Neuro:  Dr. Jarad Renteria (for HA)\par           Card:  Dr. Adrienne Pugh (palpitations, wake her from sleep)\par           GI:  Dr. Cisco Bull (anemia)\par           Rheum:  Dr. Sophia Moncada (joint pain)\par           Gyn:  Dr. Gladys Moran\par \par CC:  Tender swollen thyroid   (her daughter is treated for Graves disease)\par         (Type 2 diabetes, A1c 7.3%)\par       \par 64 yo who presented with a tender swollen thyroid with symptoms "marching" across the gland from L to R.\par Today has no symptoms, but last night she had some symptoms.\par \par Trajectory of ESR was\par 9/19  11;  7/2/20  39;  7/29/20  52;  9/15/20  37   10/19/20  19 ***      \par \par \par ESR Trajectory:\par 7/2/20  < 20;  7/29/20:  < 20;  9/15/20:  52;  10/19/20:  50.4   ****\par \par 9/15/20   PC ;      10/19/10    (post prandial) ***\par \par TSH:   0.31 - 2.1     (never suppressed)  \par \par 10/19/2020 Ultrasound thyroid:  heterogeneous gland.  R lobe 4.9 cm;   L lobe 4 cm\par       Has had 5 US thyroid and FNAB of heterogeneous area L lobe when there was tenderness in that region.  \par \par Impression:  Although she has had the thyroid condition for several months, and it behaves like an unusual variant of subacute (painful) thyroiditis, the tSH has never been low.     The ESR went up and came down, the TPO ab went from negative to positive, all common with subacute thyroiditis.   "Marching" across the gland is also not that unusual.   In sum, she reports, that even though she still gets some pain, it is much better than when this all started.     So continued active surveiillance will be the strategy.\par \par Incidentally noted is the elevation of post prandial glucose, so I have taken the liberty of asking her to return for instruction in slef blood glucose monitoring as well as some dietary overview.  \par \par \par \par \par \par Sep 22, 2020   in person accompanied by her daughter, Emelina.   \par \par PCP:  Dr. Meenu Weaver\par           Neuro:  Dr. Jarad Renteria (for HA)\par           Card:  Dr. Adrienne Pugh (palpitations, wake her from sleep)\par           GI:  Dr. Cisco Bull (anemia)\par           Rheum:  Dr. Sophia Moncada (joint pain)\par           Gyn:  Dr. Gladys Moran\par \par CC:  Tender swollen thyroid   (her daughter is treated for Graves disease)\par         (Type 2 diabetes, A1c 7.3%)\par       \par 64 yo Type 2 diabetes, visits in follow up for swollen, tender thyroid.  Symptoms began on Left side of thyroid and then marched to the Right.  \par \par Most recent lab tests on\par September 15, 2020 include\par ESR 37 (had been 52 July 29, 39 July 2 and 11 Sep 20, 2019\par T4 8.9\par calcium 10.6 ***\par TSH 2.1 (up from 1.2 ion July 29, 0.70 July 2)\par TPO remains neg\par TgAb 52.3 (<10.0);  had been < 20 previously\par Tg 20 (<1.8)   [of course abs may impact\par \par \par Impression:  In general, thyroid enlargement appears to be slowly resolving.  US thyroid no longer demonstrates mass R thyroid lobe.    Her findings are most consistent with subacute thyroiditis; however, there are many atypical features, including the lack of TSH suppression and the unusually long course.    Continued close monitoring will be the strategy, including f/u US thyroid and lab tests.  \par \par Aug 03, 2020   in person\par \par PCP:  Dr. Meenu Weaver\par           Neuro:  Dr. Jarad Renteria (for HA)\par           Card:  Dr. Adrienne Pugh (palpitations, wake her from sleep)\par           GI:  Dr. Cisco Bull (anemia)\par           Rheum:  Dr. Sophia Moncada (joint pain)\par           Gyn:  Dr. Gladys Moran\par \par CC:  Tender swollen thyroid\par         (Type 2 diabetes, A1c 7.3%)\par       \par \par Recent labs at [facility] from\par 7/29/2020 include:\par \par ESR 52 (up from 39 on July 2 and 11 on Sep 20)\par TSH  1.2\par Thyroid antibodies (TPO, TgAb) remain negative\par \par Biopsy of left thyroid "nodule":  "benign follicular nodule with cystic change"\par \par Follow up US:\par \par CLINICAL HISTORY: Painful enlarged thyroid with nodules, recent benign biopsy of a left lower pole masslike asymmetry\par \par COMPARISON: 6/26/2020\par \par FINDINGS:\par \par Thyroid isthmus measures 2.8 mm.\par \par Right lobe of the thyroid measures 4.9 x 1.7 x 1.7 cm and is diffusely heterogeneous with interval development of a masslike area of asymmetry in the midpole measuring 2.4 x 1.4 x 1.3 cm.\par \par Left lobe of the thyroid measures 3.7 x 1.5 x 1.5 cm diffusely heterogeneous with previously biopsied area of masslike asymmetrical density now measuring 2.7 x 1.3 x 1.3 cm. This is not significantly changed.\par \par There is mild increased vascularity of the thyroid.\par \par Small lymph nodes are seen in the cervical chain bilaterally largest on the left measuring 7.7 x 5.6 x 5.1 mm and the largest on the right measuring 7.9 x 6.8 x 4.6 mm.\par \par \par IMPRESSION: Heterogeneous thyroid gland is again seen with interval development of a masslike area of asymmetry in the right mid pole thyroid gland. No change in the left lobe of the thyroid.\par \par Recommend short interval follow-up perhaps in 3-6 months time.\par \par ***Electronically Signed ***\par -----------------------------------------------\par Eric Pablo MD              07/29/20 \par \par \par Jul 01, 2020      in person     \par \par PCP:  Dr. Meenu Weaver\par           Neuro:  Dr. Jarad Renteria (for HA)\par           Card:  Dr. Adrienne Pugh (palpitations, wake her from sleep)\par           GI:  Dr. Cisco Bull (anemia)\par           Rheum:  Dr. Sophia Moncada (joint pain)\par           Gyn:  Dr. Gladys Moran\par \par CC:  Tender swollen thyroid\par         (Type 2 diabetes, A1c 7.3%)\par       \par 65 yo accompanied by her daughter.   Patient reports that she started to notice discomfort and swelling in the area of the thyroid about a month ago.  While the tenderness and discomfort was bilateral, it was more noticeable on the left.   The swelling and pain are still present, she reports that both are improving.   She is currently taking Tylenol with some benefit.    There is no previous history of thyroid problem.   Her daughter has been treated for a thyroid condition.  There is no history of face or neck irradiation.  \par \par Lab tests on\par 6/11/2020 included\par TSH 0.83\par \par 6/16/2020 Ultrasound Thyroid at [facility]:\par .\par "The right thyroid lobe measures 4.6 x 1.2 x 2 cm, the isthmus up to 2 mm AP in the midline, and the left thyroid lobe measures 3.8 x 1.4 x 2.1 cm. A large poorly-defined heterogeneous hypoechoic area in the left mid-lower pole measures 3 x 1.3 x 1.3 cm. A couple of small hypoechoic right mid pole nodules measure 4 x 2 x 3 mm and 4 x 2 x 3 mm. Several small left cervical lymph nodes measure 1.0 x 0.6 x 0.9 cm, 1.3 x 0.6 x 0.9 cm, and 1.1 x 0.5 x 0.5 cm, and are diffusely hypoechoic, without clearly defined echogenic indira.\par \par \par IMPRESSION: In view of the history of left-sided neck pain, the large poorly-defined heterogeneous hypoechoic area in the left mid-lower pole may be secondary to thyroiditis, with the possibility of mass not excluded. Several small left-sided lymph nodes could be inflammatory or neoplastic.\par \par \par ***Electronically Signed ***\par -----------------------------------------------\par Demond Valdez MD              06/16/20  "\par \par .\par 6/29/2020 underwent FNAB of the ~ 3 cm L thyroid lesion:\par \par "FINAL DIAGNOSIS\par  \par Left thyroid mid to lower pole area:\par BENIGN FOLLICULAR NODULE WITH CYSTIC CHANGE ([place] Category II)\par A few Hurthle cells and crushed lymphocytes, suggestive of chronic lymphocytic thyroiditis.\par  \par \par MICROSCOPIC DESCRIPTION  \par  \par The Diff-Quik and Papanicolaou stained direct smears show sheets and groups of benign follicular cells with small nuclei, inconspicuous nucleoli and moderate cytoplasm in a hemorrhagic background with bare nuclei, macrophages and thin colloid. Mild metaplastic changes and rare crushed lymphoid cells are noted. "\par \par On exam, the thyroid is slightly enlarged, L more than R and is slightly tender to palpation.  \par \par \par Impression:  Her careful evaluation is most consistent with subacute thyroiditis.  The inflammation of subacute thyroiditis usually causes enough release of thyroid hormone to cause suppression of TSH, which has not yet been seen here, so close follow up by means of history, exam, labs and ultrasound will be the current strategy.    The trajectory of her symptoms appears to be that of improvement, so that is reassuring.   Symptoms from subacute thyroiditis may take several months to completely resolve.  \par \par Plan:  Repeat TSH today, repeat US thyroid in about 4-5 weeks and ROV here after that.

## 2022-07-10 LAB
GLUCAGON SERPL-MCNC: 11 PG/ML
GLUCGNRR: NORMAL

## 2022-07-18 ENCOUNTER — APPOINTMENT (OUTPATIENT)
Dept: NEUROLOGY | Facility: CLINIC | Age: 67
End: 2022-07-18

## 2022-09-28 ENCOUNTER — APPOINTMENT (OUTPATIENT)
Dept: NEUROLOGY | Facility: CLINIC | Age: 67
End: 2022-09-28

## 2022-09-28 VITALS
DIASTOLIC BLOOD PRESSURE: 71 MMHG | BODY MASS INDEX: 23.21 KG/M2 | HEIGHT: 63 IN | TEMPERATURE: 98.1 F | OXYGEN SATURATION: 100 % | WEIGHT: 131 LBS | SYSTOLIC BLOOD PRESSURE: 138 MMHG | HEART RATE: 76 BPM

## 2022-09-28 PROCEDURE — 99214 OFFICE O/P EST MOD 30 MIN: CPT

## 2022-09-28 RX ORDER — DULOXETINE HYDROCHLORIDE 30 MG/1
30 CAPSULE, DELAYED RELEASE PELLETS ORAL DAILY
Qty: 14 | Refills: 0 | Status: DISCONTINUED | COMMUNITY
Start: 2022-04-27 | End: 2022-09-28

## 2022-09-28 NOTE — PHYSICAL EXAM
[FreeTextEntry1] : Physical examination \par General: No acute distress, Awake, Alert.   \par \par Mental status \par Awake, alert, gives detailed history.\par \par Cranial Nerves \par II: VFF  \par III, IV, VI: PERRL, EOMI.   \par V: Facial sensation is normal B/L.   \par VII: Facial strength is normal B/L. \par VIII: Gross hearing is intact.   \par IX, X: Palate is midline and elevates symmetrically.   \par XI: Trapezius normal strength.   \par XII: Tongue midline without atrophy or fasciculations. \par \par Motor exam  \par Muscle tone - no evidence of rigidity or resistance in all 4 extremities.  \par No atrophy or fasciculations \par Muscle Strength: arms and legs, proximal and distal flexors and extensors are normal \par No UE drift.\par \par Reflexes \par All present, normal, and symmetrical.   \par Plantars right: mute.   \par Plantars left: mute.   \par \par Coordination \par Finger to nose: Normal.  \par Heel to shin: Normal.   \par  \par Sensory \par Intact sensation to vibration and cold. \par \par \par Gait \par Normal\par \par Bilateral trapezius muscle spasm.\par

## 2022-09-28 NOTE — HISTORY OF PRESENT ILLNESS
[FreeTextEntry1] : Duloxetine stopped 18 days ago he fell.\par Takes Duloxetine in the AM with food\par \par With Duloxetine her headaches resolved -\par has headache today bc no medication (Advil helps a little )\par \par She has chronic neck and low back pain.  Notes a lots of muscle pain.  Tolerating gabapentin.\par \par No lateralizing deficits.\par \par 4/27/22\par dry mouth\par mo - mouth very dry even on 10 mg qhs.\par headaches were improved on this \par \par Gabapentin 600 twice daily (4 total)\par \par 3/31/22\par Seen by Frances Padilla\par \par \par 6/24/2020\par Nurys Rooney is a 66 year old woman with a history of migraines, TIA, DM, presenting for a follow up appointment for migraines. \par \par She is able to tolerate Amitriptyline 10mg without side effects. She took one dose last night and reports her  headaches have improved. \par Tongue heaviness with Amitriptyline 20mg. stopped medication two weeks ago. Three and a half weeks prior noted heavy tongue. Heaviness and dry mouth resolved. \par She also has dry eyes 2-3 years and uses lubrication drops. \par \par Headache \par location- bilateral frontal and temple\par description- sharp\par dizziness with it. described as room spinning. no falls.  \par unilateral or bilateral-bilateral\par Nausea or vomiting- none\par Photophobia or phonophobia- none\par warning/aura-none\par nocturnal awakening-\par association with exertion or Valsalva- \par Duration-varies. \par \par Average #/week-daily since stopping the medication. \par  \par history of trauma-none\par  \par Triggers-none\par sleep- 4 hours. \par water intake- 64 ounces\par  \par  \par past medications tried- \par Tylenol\par Advil\par Motrin\par Gabapentin 300mg TID with rheumatology. \par \par Neck pain. Does PT two times per week. \par \par The remaining neurological review of systems is negative \par \par \par 6/2020\par In February, patient reported that her headaches were in good control. Over the last 3 weeks she has been having a sore throat and difficulty swallowing. She underwent ultrasound of the thyroid which shows an abnormality. She is scheduled for a biopsy this Friday. Her headaches have increased since then. She thinks it may be due to the stress she is experiencing from her current diagnosis and workup. She does not have any significant neck pain. Intermittently she has pain behind the right ear. The headaches continue to be bifrontal. They are helped with Tylenol or Advil. She has been compliant with amitriptyline. She would like a refill on this.

## 2022-09-28 NOTE — ASSESSMENT
[FreeTextEntry1] : Nurys Rooney is a 66 year old woman with migraines.\par \par Continue Duloxetine 60 mg daily.  I gave her plenty of refills so she is not out of her medication as this helped her migraines a lot.\par \par Continue gabapentin 600 mg bid (given by Dr. Moncada)\par \par Neck pain and low back pain\par showed stretching exercises\par Physical therapy.\par \par She will see me in f/u in one year. \par \par \par

## 2022-10-03 ENCOUNTER — APPOINTMENT (OUTPATIENT)
Dept: FAMILY MEDICINE | Facility: CLINIC | Age: 67
End: 2022-10-03

## 2022-10-03 VITALS
HEART RATE: 58 BPM | DIASTOLIC BLOOD PRESSURE: 75 MMHG | HEIGHT: 63 IN | SYSTOLIC BLOOD PRESSURE: 140 MMHG | OXYGEN SATURATION: 100 % | WEIGHT: 131 LBS | BODY MASS INDEX: 23.21 KG/M2 | TEMPERATURE: 97.8 F

## 2022-10-03 DIAGNOSIS — J30.9 ALLERGIC RHINITIS, UNSPECIFIED: ICD-10-CM

## 2022-10-03 DIAGNOSIS — N32.81 OVERACTIVE BLADDER: ICD-10-CM

## 2022-10-03 DIAGNOSIS — Z87.39 PERSONAL HISTORY OF OTHER DISEASES OF THE MUSCULOSKELETAL SYSTEM AND CONNECTIVE TISSUE: ICD-10-CM

## 2022-10-03 PROCEDURE — 36415 COLL VENOUS BLD VENIPUNCTURE: CPT

## 2022-10-03 PROCEDURE — 99214 OFFICE O/P EST MOD 30 MIN: CPT | Mod: 25

## 2022-10-03 RX ORDER — MONTELUKAST 10 MG/1
10 TABLET, FILM COATED ORAL
Qty: 30 | Refills: 0 | Status: DISCONTINUED | COMMUNITY
Start: 2019-03-13 | End: 2022-10-03

## 2022-10-03 RX ORDER — CETIRIZINE HYDROCHLORIDE 10 MG/1
10 TABLET, COATED ORAL
Qty: 30 | Refills: 11 | Status: ACTIVE | COMMUNITY
Start: 2022-10-03 | End: 1900-01-01

## 2022-10-03 NOTE — HISTORY OF PRESENT ILLNESS
[FreeTextEntry1] : Follow up on meds [de-identified] : Pt here for f/u.\par Sees neuro.  Just had f/u visit. Feels really well on duloxetine.  Preventing headaches and doesn't give dry mouth.  Feels like its a great solution.\par Has allergies. Had been taking Claritin but seems to have lost effect.  Wants to check.  Maybe to Zyrtec.  Also likes fluticasone and will cont on the nasal spray.\par c/o pain in the left leg.  Sometimes in the calf.  Sometimes in the upper lateral leg.  Also gets it in the lower back over the sacrum.  Started a year ago.  Recently in  she got a shot in her left buttock for the pain.  Helped just a little.  \par Taking DM meds as directed.  Denies SE.  Tries to eat well and stay active.  Reports no weight gain.\par Taking omeprazole as directed.  Claims that she gets reflux and a full sensation in her epigastric region when she doesn't take it regularly.\par On solifenacin for bladder.  Helps control her urine.  Wants to stay on med.\par \par

## 2022-10-03 NOTE — PHYSICAL EXAM
[Normal] : affect was normal and insight and judgment were intact [de-identified] : Some slight discomfort to palp over the sacral area. [de-identified] : Legs with normal reflexes.  Some pain in the left leg medial calf.  No swelling, redness, heat, cords, or masses.  Walks comfortably.

## 2022-10-07 ENCOUNTER — APPOINTMENT (OUTPATIENT)
Dept: PAIN MANAGEMENT | Facility: CLINIC | Age: 67
End: 2022-10-07

## 2022-10-07 ENCOUNTER — APPOINTMENT (OUTPATIENT)
Dept: RHEUMATOLOGY | Facility: CLINIC | Age: 67
End: 2022-10-07

## 2022-10-07 VITALS
OXYGEN SATURATION: 98 % | SYSTOLIC BLOOD PRESSURE: 120 MMHG | HEART RATE: 79 BPM | BODY MASS INDEX: 23.21 KG/M2 | DIASTOLIC BLOOD PRESSURE: 80 MMHG | WEIGHT: 131 LBS | HEIGHT: 63 IN

## 2022-10-07 VITALS
BODY MASS INDEX: 23.21 KG/M2 | WEIGHT: 131 LBS | HEIGHT: 63 IN | HEART RATE: 67 BPM | OXYGEN SATURATION: 97 % | DIASTOLIC BLOOD PRESSURE: 70 MMHG | SYSTOLIC BLOOD PRESSURE: 110 MMHG

## 2022-10-07 PROCEDURE — 99204 OFFICE O/P NEW MOD 45 MIN: CPT

## 2022-10-07 PROCEDURE — 99214 OFFICE O/P EST MOD 30 MIN: CPT

## 2022-10-07 RX ORDER — GABAPENTIN 400 MG/1
400 CAPSULE ORAL
Qty: 540 | Refills: 1 | Status: ACTIVE | COMMUNITY
Start: 2022-10-07 | End: 1900-01-01

## 2022-10-07 NOTE — REVIEW OF SYSTEMS
[Back Pain] : back pain [Neck Pain] : neck pain [Radiating Pain] : radiating pain [Negative] : Constitutional

## 2022-10-07 NOTE — CONSULT LETTER
[Dear  ___] : Dear  [unfilled], [Consult Letter:] : I had the pleasure of evaluating your patient, [unfilled]. [Please see my note below.] : Please see my note below. [Consult Closing:] : Thank you very much for allowing me to participate in the care of this patient.  If you have any questions, please do not hesitate to contact me. [Sincerely,] : Sincerely, [FreeTextEntry3] : \par Anahy Abrams DO, MBA\par Director, Pain Management Center\par  of Anesthesiology\par [facility] [employer] [facility]\par \par \par

## 2022-10-07 NOTE — HISTORY OF PRESENT ILLNESS
[___ yrs] : [unfilled] year(s) ago [FreeTextEntry1] : HPI\par \par  \par \par Ms. SMITH CUMMINGS is a 67 year F on baby ASA with pmhx of TIA, DM2, HTN, RA, chronic lower back pain x 15 yrs. Lower back pain worsening over the past few months. Remains severe despite medication and physical therapy. Pain to lower back that radiates to anterolaterally, left greater than right. Worst at night when laying. In addition, continued scapular and shoulder pain that radiates down her arms. Pain makes it difficult to do ADL's. Denies any additional weakness, numbness, bowel/bladder dysfunction.\par \par \par \par Previous and current pain medications/doses/effects: Gabapentin 600mg tid, Cymbalta 60mg.\par \par  \par \par Previous Pain Treatments: \par \par  \par \par Previous Pain Injections: Cervical MINO (2004)- Dr Ramos\par \par  \par \par Previous Diagnostic Studies/Images:\par 1/28/2019\par \par MRI CERVICAL SPINE\par Order#:   MRI [id number]\par \par \par \par MRI OF THE CERVICAL SPINE WITHOUT CONTRAST\par \par  INDICATION:  Neck pain\par \par  TECHNIQUE: Noncontrast sagittal T1, T2, STIR, axial T2 and gradient echo, and sagittal T2\par volumetric with axial and coronal reformats.\par \par  CONTRAST: None\par \par  COMPARISON:  No prior studies are available for comparison\par \par  FINDINGS:\par \par  There is reversal of the cervical lordosis. There is minimal degenerative retrolisthesis of C3 on\par C4 and C4 on C5. The marrow signal is overall within normal limits with no focal marrow replacing\par lesion identified. The vertebral body heights are maintained and there is no evidence of acute\par fracture or subluxation. There is no abnormal vertebral or paraspinal edema. The visualized\par paraspinal soft tissues appear grossly unremarkable.\par \par  No abnormal signal is identified in the cervical spinal cord. The visualized posterior fossa\par structures are unremarkable.\par \par  C2-3: No significant disc bulge or herniation. No significant central canal or foraminal stenosis.\par \par  C3-4: Central disc protrusion superimposed on mild disc bulge with marginal and uncovertebral\par osteophyte formation. No significant central canal stenosis, however disc herniation mildly impinges\par upon the ventral spinal cord. Moderate left foraminal stenosis and mild right foraminal stenosis.\par \par  C4-5: Right paracentral disc/osteophyte superimposed on a mild disc bulge with marginal osteophyte\par formation. No significant central canal stenosis, however disc/osteophyte mildly impinges upon the\par right ventral spinal cord. Moderate bilateral foraminal stenosis.\par \par  C5-6: Mild disc bulge with marginal and uncovertebral osteophyte formation. No significant central\par canal stenosis. Mild/moderate right foraminal stenosis and mild left foraminal stenosis.\par \par  C6-7: Mild disc bulge. No significant central canal or foraminal stenosis.\par \par  C7-T1: Minimal disc bulge. No significant central canal or foraminal stenosis.\par \par \par \par  IMPRESSION:\par \par  Cervical spondylosis with central disc herniation at C3-4 and right paracentral disc/osteophyte at\par C4-5. No significant central canal stenosis, however there is mild impingement of the ventral spinal\par cord at C3-4 and C4-5. Varying degrees of foraminal stenosis as above, most pronounced at C3-4 and\par the left and C4-5 bilaterally.\par \par \par \par \par

## 2022-10-07 NOTE — PHYSICAL EXAM
[Normal] : Well developed, in no acute distress, alert and oriented to person, place and time [Spurling] : positive Spurling's Test [Torres's Test] : positive Torres's Test [UE/LE] : Motor: 5/5 motor strength in bilateral upper & lower extremities

## 2022-10-07 NOTE — ASSESSMENT
[FreeTextEntry1] : >> Imaging and Other Studies\par \par Neck and arm pain likely secondary to cervical radiculopathy and discogenic pain vs spondylosis refractory to conservative treatments including 6 consecutive weeks of PT/home exercises, will obtain MRI CS to evaluate for pathology\par \par may consider PT vs intervention pending eval\par \par back and leg pain likely secondary to lumbar radiculopathy and discogenic pain refractory to conservative treatments including 6 consecutive weeks of home exercises/PT, will obtain MRI LS to evaluate for pathology\par \par may consider PT vs intervention pending eval\par \par >> Therapy and Other Modalities\par \par consider PT pending evaluation\par \par >> Medications\par \par acetaminophen 650mg q8h prn pain (caution <3g daily)\par  \par >> Interventions\par \par na\par \par >> Consults\par \par >> Discussion of Risks/Benefits/Alternatives\par \par 	>Regarding any scheduled procedures:\par \par I have discussed in detail with the patient that any interventional pain procedure is associated with potential risks.  The procedure may include an injection of steroids and potentially other medications (local anesthetic and normal saline) into the epidural space or surrounding tissue of the spine.  There are significant risks of this procedure which include and are not limited to infection, bleeding, worsening pain, dural puncture leading to postdural puncture headache, nerve damage, spinal cord injury, paralysis, stroke, and death.  \par \par There is a chance that the procedure does not improve their pain.  \par \par There are risks associated with the steroid being absorbed into the body systemically.  These include dysphoria, difficulty sleeping, mood swings and personality changes.  Premenopausal women may notice an irregularity in her menstrual cycle for 2-3 months following the injection.  Steroids can specifically affect patients with hypertension, diabetes, and peptic ulcers.  The procedure may cause a temporary increase in blood pressure and blood pressure, and may adversely affect a peptic ulcer.  Other, more rare complications, include avascular necrosis of joints, glaucoma and worsening of osteoporosis. \par \par I have discussed the risks of the procedure at length with the patient, and the potential benefits of pain relief.  I have offered alternatives to the procedure.  All questions were answered.  \par \par The patient expressed understanding and wishes to proceed with the procedure.\par \par 	>Regarding COVID19 Pandemic: \par \par Any planned interventional pain procedure are scheduled because further delay may cause harm or negative outcome to patient.  The goal in performing this procedure is to avoid deterioration of function, emergency room visits (which increases exposure) and reliance on opioids.  \par \par r/b/a discussed with patient, lack of evidence to conclusively determine whether pain management procedures have any positive or negative impact on the possibility of shima the virus and/or development of any sequelae. \par \par Patient counselled regarding timing steroid based intervention 2 weeks before or after COVID-19 vaccine administration to avoid any interaction or affect on efficacy of vaccination\par \par Patient demonstrates understanding\par \par Informed patient that risks associated with the COVID-19 infection.  Informed patient steps taken to limit the risks.  We are implementing safety precautions and following protocols consistent with the CDC and state recommendations. All patients and staff will be checked for fever or signs of illness upon entry to the facility. We will limit our steroid dose to the lowest effective therapeutic dose or in some cases steroids will not be injected at all. \par \par Patient agrees to proceed\par \par >> Conclusion\par \par The above diagnosis and treatment plan is medically reasonable and necessary based on the patient encounter \par There were no barriers to communication.\par Informed patient that I would be available for any additional questions.\par Patient was instructed to call with any worsening symptoms including severe pain, new numbness/weakness, or changes in the bowel/bladder function. \par Discussed role of nsaids in pain management and all relevant risks, if patient is continuing to require after 4 weeks the patient should f/u for alternative treatment. \par Instructed patient to maintain pain diary to monitor pain level, mobility, and function.\par \par The referring provider was informed of the above diagnosis and treatment plan.\par

## 2022-10-14 LAB
ALBUMIN SERPL ELPH-MCNC: 4.5 G/DL
ALP BLD-CCNC: 69 U/L
ALT SERPL-CCNC: 13 U/L
ANION GAP SERPL CALC-SCNC: 11 MMOL/L
AST SERPL-CCNC: 16 U/L
BASOPHILS # BLD AUTO: 0.04 K/UL
BASOPHILS NFR BLD AUTO: 0.5 %
BILIRUB SERPL-MCNC: 0.3 MG/DL
BUN SERPL-MCNC: 15 MG/DL
CALCIUM SERPL-MCNC: 10.1 MG/DL
CHLORIDE SERPL-SCNC: 99 MMOL/L
CHOLEST SERPL-MCNC: 164 MG/DL
CO2 SERPL-SCNC: 28 MMOL/L
CREAT SERPL-MCNC: 0.65 MG/DL
EGFR: 96 ML/MIN/1.73M2
EOSINOPHIL # BLD AUTO: 0.08 K/UL
EOSINOPHIL NFR BLD AUTO: 1 %
ESTIMATED AVERAGE GLUCOSE: 171 MG/DL
GLUCOSE SERPL-MCNC: 149 MG/DL
HBA1C MFR BLD HPLC: 7.6 %
HCT VFR BLD CALC: 34.5 %
HDLC SERPL-MCNC: 46 MG/DL
HGB BLD-MCNC: 10.1 G/DL
IMM GRANULOCYTES NFR BLD AUTO: 0.5 %
LDLC SERPL CALC-MCNC: 54 MG/DL
LYMPHOCYTES # BLD AUTO: 2.15 K/UL
LYMPHOCYTES NFR BLD AUTO: 26.1 %
MAN DIFF?: NORMAL
MCHC RBC-ENTMCNC: 22.6 PG
MCHC RBC-ENTMCNC: 29.3 GM/DL
MCV RBC AUTO: 77.4 FL
MONOCYTES # BLD AUTO: 0.81 K/UL
MONOCYTES NFR BLD AUTO: 9.8 %
NEUTROPHILS # BLD AUTO: 5.11 K/UL
NEUTROPHILS NFR BLD AUTO: 62.1 %
NONHDLC SERPL-MCNC: 118 MG/DL
PLATELET # BLD AUTO: 367 K/UL
POTASSIUM SERPL-SCNC: 4.2 MMOL/L
PROT SERPL-MCNC: 6.8 G/DL
RBC # BLD: 4.46 M/UL
RBC # FLD: 19.6 %
SODIUM SERPL-SCNC: 137 MMOL/L
TRIGL SERPL-MCNC: 318 MG/DL
TSH SERPL-ACNC: 1.01 UIU/ML
WBC # FLD AUTO: 8.23 K/UL

## 2022-10-14 NOTE — HISTORY OF PRESENT ILLNESS
[FreeTextEntry1] : She sees pain management, who ordered an MRI of her lower back.\par \par She has pain in her neck and lower back, which radiates down her legs.\par \par Her hands are numb and her fingers cramps.\par \par She has pin in her hands always, worse in the morning.\par + morning, which lasts for a few hours.\par \par She takes Gabapentin and the pain improves.\par \par She takes Advil 3 tabs usually twice a day, which sometimes helps.\par \par She takes Gabapentin 2 capsules of 300 mg TID

## 2022-10-18 ENCOUNTER — APPOINTMENT (OUTPATIENT)
Dept: ENDOCRINOLOGY | Facility: CLINIC | Age: 67
End: 2022-10-18

## 2022-10-18 VITALS
SYSTOLIC BLOOD PRESSURE: 118 MMHG | BODY MASS INDEX: 23.21 KG/M2 | HEART RATE: 64 BPM | WEIGHT: 131 LBS | OXYGEN SATURATION: 98 % | DIASTOLIC BLOOD PRESSURE: 64 MMHG | HEIGHT: 63 IN

## 2022-10-18 PROCEDURE — 99214 OFFICE O/P EST MOD 30 MIN: CPT

## 2022-10-18 NOTE — HISTORY OF PRESENT ILLNESS
[FreeTextEntry1] : Oct 18, 2022     in person\par \par PCP:  Dr. Meenu Weaver\par           Neuro:  Dr. Jarad Renteria (for HA)\par           Card:  Dr. Adrienne Pugh (palpitations, wake her from sleep)\par           GI:  Dr. Cisco Bull (anemia)\par           Rheum:  Dr. Sophia Moncada (arthritis - ?Heberden's)          \par           Gyn:  Dr. Gladys Moran\par           Pod:  Dr. Lázaro LAUGHLIN achilles tendon swelling\par           Eyes:  Dr. Montoya  \par \par CC:  Tender swollen thyroid  [started ~ May 2020] (her daughter is treated for Graves disease)\par         (Type 2 diabetes, 1/2/2017 A1c 8.3 %)\par       \par 67 yo mother of six.  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  TSH never suppressed but ESR went up and\par TgAb went up.    \par \par : :\par Constitutional:  Alert, well nourished, healthy appearance, no acute distress \par Eyes:  No proptosis, no stare\par Thyroid: Normal to palpation, non-tender\par Pulmonary:  No respiratory distress, no accessory muscle use; normal rate and effort\par Cardiac:  Normal rate\par Vascular: \par Endocrine:  No stigmata of Cushing’s Syndrome\par Musculoskeletal:  Normal gait, no involuntary movements\par Neurology:  No tremors\par Affect/Mood/Psych:  Oriented x 3; normal affect, normal insight/judgement, normal mood \par .\par \par Impression:  Rarely notes thyroid discomfort.\par TFTs have remained WnL.\par Surveillance appropriate.\par Next year, f/u US thyroid ~ May 2023 and ROV after that.\par Continue to follow her new, lower carb healthy diet.   \par \par \par Jun 14, 2022      in person\par \par PCP:  Dr. Meenu Weaver\par           Neuro:  Dr. Jarad Renteria (for HA)\par           Card:  Dr. Adrienne Pugh (palpitations, wake her from sleep)\par           GI:  Dr. Cisco Bull (anemia)\par           Rheum:  Dr. Sophia Moncada (arthritis - ?Heberden's)          \par           Gyn:  Dr. Gladys Moran\par           Pod:  Dr. Lázaro LAUGHLIN achilles tendon swelling\par           Eyes:  Dr. Montoya  \par \par CC:  Tender swollen thyroid  [started ~ May 2020] (her daughter is treated for Graves disease)\par         (Type 2 diabetes, 1/2/2017 A1c 8.3 %)\par       \par 67 yo mother of six.  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  TSH never suppressed but ESR went up and\par TgAb went up.    Most recent US thyroid at [facility] on 6/10/2022:\par \par "COMPARISON: 11/29/2021 \par \par FINDINGS:\par Right Lobe: 3.9 x  4.3 x 1.6 cm. In the upper pole, a hypoechoic nodule is identified, measuring 1.0 x 0.4 x 0.7 cm.\par \par Left Lobe: 4.0 x 1.0 x 1.7 cm. Lower pole spongiform nodule measures 0.6 x 0.3 x 0.6 cm.\par \par Isthmus: 2.2 mm. Hypoechoic nodule in the left isthmus measures 0.5 x 0.3 x 0.4 cm.\par \par Cervical Lymph Nodes: In the region of palpable abnormality on the left, there is a tiny hypoechoic, nonpathologic lymph node measuring 1.0 x 0.3 x 1.0 cm. On the right side, an area of palpable abnormality shows small hypoechoic lymph node, measuring 1.0 x 0.4 x 0.7 cm.\par \par IMPRESSION: \par \par Subcentimeter bilateral nodules.\par \par In the area of bilateral concern, small, nonpathologic lymph nodes.\par \par -----------------------------------------------\par Gary Solis MD              06/13/22 "\par \par \par \par For diabetes, she\par remains on glipizide ER 10  (usually early afternoon\par metformin 1000 mg in AM and again in the afternoon - her preference)\par \par Monitors sugars TID with OneTouch Ultra 2 (not available today)\par \par \par : :\par Constitutional:  Alert, well nourished, healthy appearance, no acute distress \par Eyes:  No proptosis, no stare\par Thyroid:  Normal to palpation  \par Pulmonary:  No respiratory distress, no accessory muscle use; normal rate and effort\par Cardiac:  Normal rate\par Vascular: \par Endocrine:  No stigmata of Cushing’s Syndrome\par Musculoskeletal:  Normal gait, no involuntary movements  Swelling R Achilles tendon\par Neurology:  No tremors\par Affect/Mood/Psych:  Oriented x 3; normal affect, normal insight/judgement, normal mood \par \par \par Impression/Plan:  By her history, diabetes is under excellent control with only rare, mild hypoglycemia.\par Will confirm with A1c. and lower glipizide ER from 10 mg to 5 mg.\par \par Ultrasound of thyroid reassuring and will repeat US ~ one year.\par \par ROV in November. \par .\par \par  \par \par Apr 13, 2022     in person     no NFC\par \par PCP:  Dr. Meenu Weaver\par           Neuro:  Dr. Jarad Renteria (for HA)\par           Card:  Dr. Adrienne Pugh (palpitations, wake her from sleep)\par           GI:  Dr. Cisco Bull (anemia)\par           Rheum:  Dr. Sophia Moncada (arthritis - ?Heberden's)          \par           Gyn:  Dr. Gladys Moran\par \par CC:  Tender swollen thyroid  [started ~ May 2020] (her daughter is treated for Graves disease)\par         (Type 2 diabetes, 1/2/2017 A1c 8.3 %)\par       \par 67 yo mother of six.  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  TSH never suppressed but ESR went up and\par TgAb went up.   Finally thyroid feels fine to her.    Continued surveillance by means of history, exam, US, TFTs remain prudent with\par next US thyroid about one year after last:  about Dec 42290     TFTs today.  \par \par : :\par Constitutional:  Alert, well nourished, healthy appearance, no acute distress \par Eyes:  No proptosis, no stare\par Thyroid:  Normal to palpation, non-tender \par Pulmonary:  No respiratory distress, no accessory muscle use; normal rate and effort\par Cardiac:  Normal rate\par Vascular: \par Endocrine:  No stigmata of Cushing’s Syndrome\par Musculoskeletal:  Normal gait, no involuntary movements\par Neurology:  No tremors\par Affect/Mood/Psych:  Oriented x 3; normal affect, normal insight/judgement, normal mood \par .\par \par Imp:  Thyroiditis appears to be winding down\par FBS remain a bit elevated.    \par \par Plan:  I suggested she test FBS only twicee a week and the rest of the week do some after meal BS monitoring.\par Also, as FBS and A1c have drifted up a bit, to try moving the glipizide ER 10 mg to take in evening along with the 2nd metformin.  \par \par Mar 15, 2022     in person     no NFC \par \par PCP:  Dr. Meenu Weaver\par           Neuro:  Dr. Jarad Renteria (for HA)\par           Card:  Dr. Adrienne Pugh (palpitations, wake her from sleep)\par           GI:  Dr. Cisco Bull (anemia)\par           Rheum:  Dr. Sophia Moncada (arthritis - ?Heberden's)          \par           Gyn:  Dr. Gladys Moran\par \par CC:  Tender swollen thyroid  [started ~ May 2020] (her daughter is treated for Graves disease)\par         (Type 2 diabetes, 1/2/2017 A1c 8.3 %)\par       \par 67 yo mother of six. \par Originally seen for pain in region of thyroid - clinically thyroiditis, although euthyroid, now much improved.\par \par On exam, still has slight tenderness region of R thyroid lobe.  \par Most recent US thyroid 11/29/21 reassuring.  Will aim for f/u US thyroid about six months after that. \par \par : :\par Constitutional:  Alert, well nourished, healthy appearance, no acute distress \par Eyes:  No proptosis, no stare\par Thyroid:  Slight tenderness R thyroid lobe.  No palpable adenopathy. 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   Continue surveillance will be the strategy.\par A1c of 7.3% noted.     Will try to assist patient in doing some monitoring with CGM.   Her cucrrent smartphone is\par not equipped with the necessary NFC but she believes her son may be able to help in that regard.  \par \par .\par \par Feb 15, 2022     in person       she has an android w/o nfc\par \par PCP:  Dr. Meenu Weaver\par           Neuro:  Dr. Jarad Renteria (for HA)\par           Card:  Dr. Adrienne Pugh (palpitations, wake her from sleep)\par           GI:  Dr. Cisco Bull (anemia)\par           Rheum:  Dr. Sophia Moncada (arthritis - ?Heberden's)          \par           Gyn:  Dr. Gladys Moran\par \par CC:  Tender swollen thyroid  [started ~ May 2020] (her daughter is treated for Graves disease)\par         (Type 2 diabetes, 1/2/2017 A1c 8.3 %)\par       \par 67 yo mother of six. \par Originally seen for pain in region of thyroid - clinically thyroiditis, although euthyroid, now much improved.\par \par   Diagnosed with diabetes about 10 years ago on routine exam.\par Takes metformin 1000 mg in BID and glipizide  ER 10 mg at lunch.\par \par Reports FBS around 180 and after dinner around 180\par \par She tests with a Freestyle Lite meter.\par \par Still has some L thyroid bed discomfort.\par Last US thyroid very reassuring.\par \par Plan:  TFTs, ESR, A1c today.\par Bring meter to next visit \par Consider brief trial of adding CGM (no NFC) \par ROV in about one month.  \par \par \par Nov 18, 2021    in person\par \par PCP:  Dr. Meenu Weaver\par           Neuro:  Dr. Jarad Renteria (for HA)\par           Card:  Dr. Adrienne Pugh (palpitations, wake her from sleep)\par           GI:  Dr. Cisco Bull (anemia)\par           Rheum:  Dr. Sophia Moncada (arthritis - ?Heberden's)          \par           Gyn:  Dr. Gladys Moran\par \par CC:  Tender swollen thyroid  [started ~ May 2020] (her daughter is treated for Graves disease)\par         (Type 2 diabetes, 1/2/2017 A1c 8.3 %)\par       \par 67 yo mother of six.   Diagnosed with diabetes about 10 years ago on routine exam.\par Takes metformin 1000 mg in AM and glipizide  ER 10 mg at lunch.\par Monitors her sugars with her 's meter; however, he is out of town.  \par Today after broccoli, cauliflorer, beans, corn and rice  the fingerstick BS is 274 mg/dl\par So she will start using the Verio Flextouch to check her sugars 3X a day, before meals.\par Also reviewed with her a lower carb diet.\par \par But the primary reason she stopped in today is pain R thyroid bed which started a few weeks ago.   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  \par \par   \par \par \par \par \par \par May 04, 2021    in prson\par \par PCP:  Dr. eMenu Weaver\par           Neuro:  Dr. Jarad Renteria (for HA)\par           Card:  Dr. Adrienne Pugh (palpitations, wake her from sleep)\par           GI:  Dr. Cisco Bull (anemia)\par           Rheum:  Dr. Sophia Moncada (arthritis)\par \par           Rheum:  Dr. Sophia Moncada (joint pain)\par           Gyn:  Dr. Gladys Moran\par \par CC:  Tender swollen thyroid  [started ~ May 2020] (her daughter is treated for Graves disease)\par         (Type 2 diabetes, 1/2/2017 A1c 8.3 %)\par       \par Followed for tender thyroid.  Pain has marched across the gland similar to subacute thyroiditis.  \par \par Most recent lab tests at [facility] on\par 4/26/201 included:\par \par TSH 1.10   (lowest was 0.31 in Jan 2019)\par glucose 170 mg/dl\par insulin 38 (not fasting)\par \par On exam, thyroid slightly tender.\par No palpable nodules or adenopathy.\par \par Impression:  "Smouldering thyroiditis" slowly stuttering into baseline.\par Most recent US thyroid from 10/19/2020: diffuse heterogeneity of echogenicity, mild hypervascularity....no significant change from 9/15/2020"\par \par Doing well.\par Euthyroid.\par Reassuring US thyroid.\par \par Plan:  f/u US thyroid by 9 2021 and ROV October.\par She borrows her 's OneTouch Ultra, but will try to get her own and test sugars at different times of the day.\par If results not in range and not amenable to dietary change, additional medications which appear to be covedred include\par Farxiga, Trulicity.  \par \par She reports  mg/dl while on metformin 1000 mg BID.  \par \par \par \par \par \par \par \par \par Mar 02, 2021    in person\par \par PCP:  Dr. Meenu Weaver\par           Neuro:  Dr. Jarad Renteria (for HA)\par           Card:  Dr. Adrienne uPgh (palpitations, wake her from sleep)\par           GI:  Dr. Cisco Bull (anemia)\par \par           Rheum:  Dr. Sophia Moncada (joint pain)\par           Gyn:  Dr. Gladys Moran\par \par CC:  Tender swollen thyroid  [started ~ May 2020] (her daughter is treated for Graves disease)\par         (Type 2 diabetes, 1/2/2017 A1c 8.3 %)\par       \par Followed for tender thyroid.  Pain has marched across the gland similar to subacute thyroiditis.  Associated with development of elevated ESR (9/19: 11,  7/2/20:  39,  8/29/20:  52) that then returned to normal.   Also developed anti-thyroglobulin antibodies (7/2/20 < 20 -> 12/4/20: 83).  However, apparently never hyperthyroid enough to generate a suppressed TSH.\par \par In the initial phase US thyroid 6/16/20 showed a large poorly defined heterogeneous hypoelchoic area in the left mid lower pole felt possibly related to thyroiditis but possibility of mass "not excluded" and FNAB revealed no suspicious cytology.\par \par On exam, thyroid normal size, non-tender, without palpable nodule or lymphadenopathy.\par \par Impression:  An atypical presentation of subacute thyroiditis and although she still notes occasional discomfort in the region of the thyroid, this is infrequent and much milder than when she first presented.   \par \par Plan:  TFTs today and the long term strategy will be surveillance with follow up here in about 4 - 6 months.  \par \par \par \par Dec 03, 2020   in person\par \par PCP:  Dr. Meenu Weaver\par           Neuro:  Dr. Jarad Renteria (for HA)\par           Card:  Dr. Adrienne Pugh (palpitations, wake her from sleep)\par           GI:  Dr. Cisco Bull (anemia)\par           Rheum:  Dr. Sophia Moncada (joint pain)\par           Gyn:  Dr. Gladys Moran\par \par CC:  Tender swollen thyroid   (her daughter is treated for Graves disease)\par         (Type 2 diabetes, 1/2/2017 A1c 8.3 %)\par       \par Followed for tender thyroid.  Pain has marched across the gland (from L to R) similar to subacute thyroiditis.\par The TSH has never been suppressed.\par The ESR went up and came down followed by positive TPO abs.\par \par : :\par Constitutional:  Alert, well nourished, healthy appearance, no acute distress \par Eyes:  No proptosis, no stare\par Thyroid:\par Pulmonary:  No respiratory distress, no accessory muscle use; normal rate and effort\par Cardiac:  Normal rate\par Vascular: \par Endocrine:  No stigmata of Cushing’s Syndrome\par Musculoskeletal:  Normal gait, no involuntary movements\par Neurology:  No tremors\par Affect/Mood/Psych:  Oriented x 3; normal affect, normal insight/judgement, normal mood \par .\par  Impression:  She feels thyroid discomfort has continued to slowly improve.\par \par Plan:  TFTs today and ROV early March 2021.  \par \par \par The right thyroid lobe measures 4.9 x 1.6 x 1.7 cm, the isthmus up to 3 mm AP in the midline, and the left thyroid lobe measures 4 x 1.2 x 1.6 cm. There has been no change in lobulated thyroid contour and diffuse heterogeneity of thyroid parenchymal echogenicity. Color-flow Doppler evaluation demonstrates mild hypervascularity of the gland. . There are several very small colloid cyst in the left lobe.\par \par \par IMPRESSION: No significant change from 9/15/2020.\par \par \par ***Electronically Signed ***\par -----------------------------------------------\par Demond Valdez MD              10/19/20 \par \par \par Nov 17, 2020     Te;ephone     \par \par PCP:  Dr. Meenu Weaver\par           Neuro:  Dr. Jarad Renteria (for HA)\par           Card:  Dr. Adrienne Pugh (palpitations, wake her from sleep)\par           GI:  Dr. Cisco Bull (anemia)\par           Rheum:  Dr. Sophia Moncada (joint pain)\par           Gyn:  Dr. Gladys Moran\par \par CC:  Tender swollen thyroid   (her daughter is treated for Graves disease)\par         (Type 2 diabetes, A1c 7.3%)\par       \par 66 yo who presented with a tender swollen thyroid with symptoms "marching" across the gland from L to R.\par Today has no symptoms, but last night she had some symptoms.\par \par Trajectory of ESR was\par 9/19  11;  7/2/20  39;  7/29/20  52;  9/15/20  37   10/19/20  19 ***      \par \par \par ESR Trajectory:\par 7/2/20  < 20;  7/29/20:  < 20;  9/15/20:  52;  10/19/20:  50.4   ****\par \par 9/15/20   PC ;      10/19/10    (post prandial) ***\par \par TSH:   0.31 - 2.1     (never suppressed)  \par \par 10/19/2020 Ultrasound thyroid:  heterogeneous gland.  R lobe 4.9 cm;   L lobe 4 cm\par       Has had 5 US thyroid and FNAB of heterogeneous area L lobe when there was tenderness in that region.  \par \par Impression:  Although she has had the thyroid condition for several months, and it behaves like an unusual variant of subacute (painful) thyroiditis, the tSH has never been low.     The ESR went up and came down, the TPO ab went from negative to positive, all common with subacute thyroiditis.   "Marching" across the gland is also not that unusual.   In sum, she reports, that even though she still gets some pain, it is much better than when this all started.     So continued active surveiillance will be the strategy.\par \par Incidentally noted is the elevation of post prandial glucose, so I have taken the liberty of asking her to return for instruction in slef blood glucose monitoring as well as some dietary overview.  \par \par \par \par \par \par Sep 22, 2020   in person accompanied by her daughter, Emelina.   \par \par PCP:  Dr. Meenu Weaver\par           Neuro:  Dr. Jarad Renteria (for HA)\par           Card:  Dr. Adrienne Pugh (palpitations, wake her from sleep)\par           GI:  Dr. Cisco Bull (anemia)\par           Rheum:  Dr. Sophia Moncada (joint pain)\par           Gyn:  Dr. Gladys Moran\par \par CC:  Tender swollen thyroid   (her daughter is treated for Graves disease)\par         (Type 2 diabetes, A1c 7.3%)\par       \par 66 yo Type 2 diabetes, visits in follow up for swollen, tender thyroid.  Symptoms began on Left side of thyroid and then marched to the Right.  \par \par Most recent lab tests on\par September 15, 2020 include\par ESR 37 (had been 52 July 29, 39 July 2 and 11 Sep 20, 2019\par T4 8.9\par calcium 10.6 ***\par TSH 2.1 (up from 1.2 ion July 29, 0.70 July 2)\par TPO remains neg\par TgAb 52.3 (<10.0);  had been < 20 previously\par Tg 20 (<1.8)   [of course abs may impact\par \par \par Impression:  In general, thyroid enlargement appears to be slowly resolving.  US thyroid no longer demonstrates mass R thyroid lobe.    Her findings are most consistent with subacute thyroiditis; however, there are many atypical features, including the lack of TSH suppression and the unusually long course.    Continued close monitoring will be the strategy, including f/u US thyroid and lab tests.  \par \par Aug 03, 2020   in person\par \par PCP:  Dr. Meenu Weaver\par           Neuro:  Dr. Jarad Renteria (for HA)\par           Card:  Dr. Adrienne Pugh (palpitations, wake her from sleep)\par           GI:  Dr. Cisco Bull (anemia)\par           Rheum:  Dr. Sophia Moncada (joint pain)\par           Gyn:  Dr. Gladys Moran\par \par CC:  Tender swollen thyroid\par         (Type 2 diabetes, A1c 7.3%)\par       \par \par Recent labs at [facility] from\par 7/29/2020 include:\par \par ESR 52 (up from 39 on July 2 and 11 on Sep 20)\par TSH  1.2\par Thyroid antibodies (TPO, TgAb) remain negative\par \par Biopsy of left thyroid "nodule":  "benign follicular nodule with cystic change"\par \par Follow up US:\par \par CLINICAL HISTORY: Painful enlarged thyroid with nodules, recent benign biopsy of a left lower pole masslike asymmetry\par \par COMPARISON: 6/26/2020\par \par FINDINGS:\par \par Thyroid isthmus measures 2.8 mm.\par \par Right lobe of the thyroid measures 4.9 x 1.7 x 1.7 cm and is diffusely heterogeneous with interval development of a masslike area of asymmetry in the midpole measuring 2.4 x 1.4 x 1.3 cm.\par \par Left lobe of the thyroid measures 3.7 x 1.5 x 1.5 cm diffusely heterogeneous with previously biopsied area of masslike asymmetrical density now measuring 2.7 x 1.3 x 1.3 cm. This is not significantly changed.\par \par There is mild increased vascularity of the thyroid.\par \par Small lymph nodes are seen in the cervical chain bilaterally largest on the left measuring 7.7 x 5.6 x 5.1 mm and the largest on the right measuring 7.9 x 6.8 x 4.6 mm.\par \par \par IMPRESSION: Heterogeneous thyroid gland is again seen with interval development of a masslike area of asymmetry in the right mid pole thyroid gland. No change in the left lobe of the thyroid.\par \par Recommend short interval follow-up perhaps in 3-6 months time.\par \par ***Electronically Signed ***\par -----------------------------------------------\par Eric Pablo MD              07/29/20 \par \par \par Jul 01, 2020      in person     \par \par PCP:  Dr. Meenu Weaver\par           Neuro:  Dr. Jarad Renteria (for HA)\par           Card:  Dr. Adrienne Pugh (palpitations, wake her from sleep)\par           GI:  Dr. Cisco Bull (anemia)\par           Rheum:  Dr. Sophia Moncada (joint pain)\par           Gyn:  Dr. Gladys Moran\par \par CC:  Tender swollen thyroid\par         (Type 2 diabetes, A1c 7.3%)\par       \par 65 yo accompanied by her daughter.   Patient reports that she started to notice discomfort and swelling in the area of the thyroid about a month ago.  While the tenderness and discomfort was bilateral, it was more noticeable on the left.   The swelling and pain are still present, she reports that both are improving.   She is currently taking Tylenol with some benefit.    There is no previous history of thyroid problem.   Her daughter has been treated for a thyroid condition.  There is no history of face or neck irradiation.  \par \par Lab tests on\par 6/11/2020 included\par TSH 0.83\par \par 6/16/2020 Ultrasound Thyroid at [facility]:\par .\par "The right thyroid lobe measures 4.6 x 1.2 x 2 cm, the isthmus up to 2 mm AP in the midline, and the left thyroid lobe measures 3.8 x 1.4 x 2.1 cm. A large poorly-defined heterogeneous hypoechoic area in the left mid-lower pole measures 3 x 1.3 x 1.3 cm. A couple of small hypoechoic right mid pole nodules measure 4 x 2 x 3 mm and 4 x 2 x 3 mm. Several small left cervical lymph nodes measure 1.0 x 0.6 x 0.9 cm, 1.3 x 0.6 x 0.9 cm, and 1.1 x 0.5 x 0.5 cm, and are diffusely hypoechoic, without clearly defined echogenic indira.\par \par \par IMPRESSION: In view of the history of left-sided neck pain, the large poorly-defined heterogeneous hypoechoic area in the left mid-lower pole may be secondary to thyroiditis, with the possibility of mass not excluded. Several small left-sided lymph nodes could be inflammatory or neoplastic.\par \par \par ***Electronically Signed ***\par -----------------------------------------------\par Demond Valdez MD              06/16/20  "\par \par .\par 6/29/2020 underwent FNAB of the ~ 3 cm L thyroid lesion:\par \par "FINAL DIAGNOSIS\par  \par Left thyroid mid to lower pole area:\par BENIGN FOLLICULAR NODULE WITH CYSTIC CHANGE ([place] Category II)\par A few Hurthle cells and crushed lymphocytes, suggestive of chronic lymphocytic thyroiditis.\par  \par \par MICROSCOPIC DESCRIPTION  \par  \par The Diff-Quik and Papanicolaou stained direct smears show sheets and groups of benign follicular cells with small nuclei, inconspicuous nucleoli and moderate cytoplasm in a hemorrhagic background with bare nuclei, macrophages and thin colloid. Mild metaplastic changes and rare crushed lymphoid cells are noted. "\par \par On exam, the thyroid is slightly enlarged, L more than R and is slightly tender to palpation.  \par \par \par Impression:  Her careful evaluation is most consistent with subacute thyroiditis.  The inflammation of subacute thyroiditis usually causes enough release of thyroid hormone to cause suppression of TSH, which has not yet been seen here, so close follow up by means of history, exam, labs and ultrasound will be the current strategy.    The trajectory of her symptoms appears to be that of improvement, so that is reassuring.   Symptoms from subacute thyroiditis may take several months to completely resolve.  \par \par Plan:  Repeat TSH today, repeat US thyroid in about 4-5 weeks and ROV here after that.

## 2022-11-01 ENCOUNTER — RESULT REVIEW (OUTPATIENT)
Age: 67
End: 2022-11-01

## 2022-11-17 ENCOUNTER — APPOINTMENT (OUTPATIENT)
Dept: PAIN MANAGEMENT | Facility: CLINIC | Age: 67
End: 2022-11-17

## 2022-11-17 VITALS
HEIGHT: 63 IN | BODY MASS INDEX: 23.21 KG/M2 | TEMPERATURE: 98 F | SYSTOLIC BLOOD PRESSURE: 123 MMHG | WEIGHT: 131 LBS | DIASTOLIC BLOOD PRESSURE: 74 MMHG

## 2022-11-17 PROCEDURE — 99214 OFFICE O/P EST MOD 30 MIN: CPT

## 2022-11-17 NOTE — HISTORY OF PRESENT ILLNESS
[8] : 3. What number best describes how, during the past week, pain has interfered with your general activity? 8/10 pain [___ yrs] : [unfilled] year(s) ago [FreeTextEntry1] : Interval Note:\par \par  \par \par Since last visit the pain not improved with PT.  Continues to have lower back pain that radiates to anterolateral thighs and  scapular and shoulder pain that radiates down her arms. Notes poor glucose control with episodes of hypo and hyperglycemia. (BG's 30's-300's- managed by Dr Gutierres and Dr Hellerman). Denies any additional weakness, numbness, bowel/bladder dysfunction.  Pain intensity is\par \par HPI\par \par  \par \par Ms. SMITH CUMMINGS is a 67 year F on baby ASA with pmhx of TIA, DM2, HTN, RA, chronic lower back pain x 15 yrs. Lower back pain worsening over the past few months. Remains severe despite medication and physical therapy. Pain to lower back that radiates to anterolaterally, left greater than right. Worst at night when laying. In addition, continued scapular and shoulder pain that radiates down her arms. Pain makes it difficult to do ADL's. Denies any additional weakness, numbness, bowel/bladder dysfunction.\par \par \par \par Previous and current pain medications/doses/effects: Gabapentin 600mg tid, Cymbalta 60mg.\par \par  \par \par Previous Pain Treatments: \par \par  \par \par Previous Pain Injections: Cervical MINO (2004)- Dr Ramos\par \par  \par \par Previous Diagnostic Studies/Images:\par \par Exam: MRI LUMBAR SPINE 11/2/22\par Order#: MRI [id number] \par \par \par \par HISTORY: 67 years Female LUMBAR SPINAL STENOSIS MRI \par \par \par  PROCEDURE:MRI lumbar spine without contrast \par \par  COMPARISON: \par \par  TECHNIQUE:MRI lumbosacral spine 1.5 Liza magnet \par \par  FINDINGS: Please note that there is transitional lumbosacral anatomy. Please note that there may be\par discrepancies in spinal level labeling on prior/subsequent imaging as well as clinical/surgical \par documentation and close attention on follow-up is strongly recommended especially in the setting of \par procedural planning. For the purposes of today's exam the transitional level is labeled a \par transitional L5 level. \par \par  The paraspinal musculature including the psoas muscle, multifidus muscles, and immediate para axial\par soft tissues appear within range of normal without evidence of mass or collection. \par \par  There is a maintenance of the normal lumbar lordosis. There is fairly uniform marrow signal on all \par sequences. \par \par  The conus terminates at the T12-L1 level. \par \par  At L5-S1 \par  The disc appears intact. There is no significant central or foraminal stenosis. \par \par  At L4-L5 \par  There is disc desiccation, a midline annular fissure and shallow central disc protrusion mildly \par effacing the ventral epidural space and flattening the ventral thecal sac. Bony overgrowth from \par adjacent facet joints and ligamentum flavum thickening contribute to mild left greater than right \par effacement of the lateral recesses. There is a prominent left-sided intraforaminal component of \par circumferential disc bulging contributing to moderately severe left foraminal stenosis. \par \par  At L3-L4 \par  There is loss of intervertebral disc space height, disc desiccation, with effacement of the lateral\par recesses more so on the right than the left. Mild left foraminal stenosis and moderate to severe \par right foraminal stenosis is present. Bony overgrowth from adjacent facet arthropathy is present at \par this level. \par \par  At L2-L3 \par  There is disc desiccation but no focal disc protrusion. No significant central or foraminal \par stenosis is present. \par \par  At L1-L2 \par  There is mild disc desiccation but no focal disc protrusion \par \par  At T11-T12 there is evidence of degenerative disc disease with a shallow central disc bulge. \par \par \par  IMPRESSION: Please note that there is transitional lumbosacral anatomy. Please note that there may \par be discrepancies in spinal level labeling on prior/subsequent imaging as well as clinical/surgical \par documentation and close attention on follow-up is strongly recommended especially in the setting of \par procedural planning. \par \par  Degenerative changes lumbosacral spine at L4-L5 include midline annular fissure and a shallow \par central disc bulging asymmetrically prominent to the left mildly effacing the left lateral recess \par and contributing to left foraminal stenosis \par \par  Moderately advanced degenerative disc changes at L3-L4 are associated with reactive Modic type \par changes. Disc osteophyte complex results in moderate to severe right foraminal stenosis and mild \par left foraminal stenosis further detailed above \par \par  Other findings outlined above \par \par \par Exam: MRI CERVICAL SPINE 11/2/22\par Order#: MRI [id number] \par \par \par \par HISTORY: 67 years Female CHRONIC CERVICAL RADICULOPATHY MRI \par \par  PROCEDURE: MRI cervical spine without contrast \par \par  COMPARISON: January 28, 2019 \par \par  TECHNIQUE: MRI cervical spine performed 1.5 Liza magnet \par \par  FINDINGS: \par  There is maintenance of the normal cervical lordosis. \par \par  The prevertebral soft tissues appear within range of normal. \par \par  The craniocervical junction and clivus and C1-C2 distance appear within range of normal \par \par  There is uniform marrow signal on all sequences \par \par  The cervical cord is uniform in signal intensity without evidence of syrinx or suggestion of \par myelomalacia. \par \par  At C2-C3 , there is no significant central or foraminal stenosis. \par \par  At C3-C4 there is broad-based disc osteophytic ridging which indents the thecal sac, decreases the \par AP dimension of the thecal sac slightly and contributes to moderate right foraminal stenosis and \par mild to moderate left foraminal stenosis. This may be slightly progressed as compared to the 2019 \par study. \par \par  At C4-S1tmmnd is broad-based disc osteophytic ridging and a right para midline/right foraminal disc\par protrusion which indents the thecal sac and deforms the ventral surface of the cord. There is \par significantly increased mass effect as compared to the 2019 study which now results in moderate to \par severe right foraminal stenosis and moderate left foraminal stenosis with the AP dimension of the \par thecal sac is reduced to 7 mm. \par \par  At C5-C6 , broad-based disc osteophytic ridging has progressed slightly since prior exam. Mild to \par moderate right foraminal stenosis and mild left foraminal stenosis is present. Disc osteophyte comp\par sole asymmetrically prominent to the right. \par \par  At C6-C7 , there is no significant central or foraminal stenosis. \par \par  At C7-T1 , there is no significant central or foraminal stenosis. \par \par \par  IMPRESSION: \par  Multilevel degenerative changes as outlined above have progressed since the 2019 study most notably\par at C4-C5 where there is significantly increased mass effect associated with a right para \par midline/right foraminal disc osteophyte complex \par \par  Other findings discussed above \par \par \par  Thank you for allowing us to participate in the evaluation of this patient. \par \par \par \par MRI LS 11/22\par \par  Please note that there is transitional lumbosacral anatomy. Please note that there may be\par discrepancies in spinal level labeling on prior/subsequent imaging as well as clinical/surgical\par documentation and close attention on follow-up is strongly recommended especially in the setting of\par procedural planning. For the purposes of today's exam the transitional level is labeled a\par transitional L5 level.\par \par  The paraspinal musculature including the psoas muscle, multifidus muscles, and immediate para axial\par soft tissues appear within range of normal without evidence of mass or collection.\par \par  There is a maintenance of the normal lumbar lordosis. There is fairly uniform marrow signal on all\par sequences.\par \par  The conus terminates at the T12-L1 level.\par \par  At L5-S1\par  The disc appears intact. There is no significant central or foraminal stenosis.\par \par  At L4-L5\par  There is disc desiccation, a midline annular fissure and shallow central disc protrusion mildly\par effacing the ventral epidural space and flattening the ventral thecal sac. Bony overgrowth from\par adjacent facet joints and ligamentum flavum thickening contribute to mild left greater than right\par effacement of the lateral recesses. There is a prominent left-sided intraforaminal component of\par circumferential disc bulging contributing to moderately severe left foraminal stenosis.\par \par  At L3-L4\par  There is loss of intervertebral disc space height, disc desiccation, with effacement of the lateral\par recesses more so on the right than the left. Mild left foraminal stenosis and moderate to severe\par right foraminal stenosis is present. Bony overgrowth from adjacent facet arthropathy is present at\par this level.\par \par  At L2-L3\par  There is disc desiccation but no focal disc protrusion. No significant central or foraminal\par stenosis is present.\par \par  At L1-L2\par  There is mild disc desiccation but no focal disc protrusion\par \par  At T11-T12 there is evidence of degenerative disc disease with a shallow central disc bulge.\par \par \par  IMPRESSION: Please note that there is transitional lumbosacral anatomy. Please note that there may\par be discrepancies in spinal level labeling on prior/subsequent imaging as well as clinical/surgical\par documentation and close attention on follow-up is strongly recommended especially in the setting of\par procedural planning.\par \par  Degenerative changes lumbosacral spine at L4-L5 include midline annular fissure and a shallow\par central disc bulging asymmetrically prominent to the left mildly effacing the left lateral recess\par and contributing to left foraminal stenosis\par \par  Moderately advanced degenerative disc changes at L3-L4 are associated with reactive Modic type\par changes. Disc osteophyte complex results in moderate to severe right foraminal stenosis and mild\par left foraminal stenosis further detailed above\par \par MRI CS 11/2/22\par \par There is maintenance of the normal cervical lordosis.\par \par  The prevertebral soft tissues appear within range of normal.\par \par  The craniocervical junction and clivus and C1-C2 distance appear within range of normal\par \par  There is uniform marrow signal on all sequences\par \par  The cervical cord is uniform in signal intensity without evidence of syrinx or suggestion of\par myelomalacia.\par \par  At C2-C3 ,  there is no significant central or foraminal stenosis.\par \par  At C3-C4 there is broad-based disc osteophytic ridging which indents the thecal sac, decreases the\par AP dimension of the thecal sac slightly and contributes to moderate right foraminal stenosis and\par mild to moderate left foraminal stenosis. This may be slightly progressed as compared to the 2019\par study.\par \par  At C4-E3szjkf is broad-based disc osteophytic ridging and a right para midline/right foraminal disc\par protrusion which indents the thecal sac and deforms the ventral surface of the cord. There is\par significantly increased mass effect as compared to the 2019 study which now results in moderate to\par severe right foraminal stenosis and moderate left foraminal stenosis with the AP dimension of the\par thecal sac is reduced to 7 mm.\par \par  At C5-C6 , broad-based disc osteophytic ridging has progressed slightly since prior exam. Mild to\par moderate right foraminal stenosis and mild left foraminal stenosis is present. Disc osteophyte comp\par sole asymmetrically prominent to the right.\par \par  At C6-C7 , there is no significant central or foraminal stenosis.\par \par  At C7-T1 , there is no significant central or foraminal stenosis.\par \par \par  IMPRESSION:\par  Multilevel degenerative changes as outlined above have progressed since the 2019 study most notably\par at C4-C5 where there is significantly increased mass effect associated with a right para\par midline/right foraminal disc osteophyte complex\par \par 1/28/2019\par \par MRI CERVICAL SPINE\par Order#:   MRI [id number]\par \par \par \par MRI OF THE CERVICAL SPINE WITHOUT CONTRAST\par \par  INDICATION:  Neck pain\par \par  TECHNIQUE: Noncontrast sagittal T1, T2, STIR, axial T2 and gradient echo, and sagittal T2\par volumetric with axial and coronal reformats.\par \par  CONTRAST: None\par \par  COMPARISON:  No prior studies are available for comparison\par \par  FINDINGS:\par \par  There is reversal of the cervical lordosis. There is minimal degenerative retrolisthesis of C3 on\par C4 and C4 on C5. The marrow signal is overall within normal limits with no focal marrow replacing\par lesion identified. The vertebral body heights are maintained and there is no evidence of acute\par fracture or subluxation. There is no abnormal vertebral or paraspinal edema. The visualized\par paraspinal soft tissues appear grossly unremarkable.\par \par  No abnormal signal is identified in the cervical spinal cord. The visualized posterior fossa\par structures are unremarkable.\par \par  C2-3: No significant disc bulge or herniation. No significant central canal or foraminal stenosis.\par \par  C3-4: Central disc protrusion superimposed on mild disc bulge with marginal and uncovertebral\par osteophyte formation. No significant central canal stenosis, however disc herniation mildly impinges\par upon the ventral spinal cord. Moderate left foraminal stenosis and mild right foraminal stenosis.\par \par  C4-5: Right paracentral disc/osteophyte superimposed on a mild disc bulge with marginal osteophyte\par formation. No significant central canal stenosis, however disc/osteophyte mildly impinges upon the\par right ventral spinal cord. Moderate bilateral foraminal stenosis.\par \par  C5-6: Mild disc bulge with marginal and uncovertebral osteophyte formation. No significant central\par canal stenosis. Mild/moderate right foraminal stenosis and mild left foraminal stenosis.\par \par  C6-7: Mild disc bulge. No significant central canal or foraminal stenosis.\par \par  C7-T1: Minimal disc bulge. No significant central canal or foraminal stenosis.\par \par \par \par  IMPRESSION:\par \par  Cervical spondylosis with central disc herniation at C3-4 and right paracentral disc/osteophyte at\par C4-5. No significant central canal stenosis, however there is mild impingement of the ventral spinal\par cord at C3-4 and C4-5. Varying degrees of foraminal stenosis as above, most pronounced at C3-4 and\par the left and C4-5 bilaterally.\par \par \par \par \par  [FreeTextEntry2] : 24

## 2022-11-17 NOTE — PHYSICAL EXAM
[Normal] : Well developed, in no acute distress, alert and oriented to person, place and time [Normal muscle bulk without asymmetry] : normal muscle bulk without asymmetry [Facet Tenderness] : facet tenderness [Spine: Flexion to ___ degrees, without pain] : spine: flexion to [unfilled] degrees, without pain

## 2022-11-17 NOTE — ASSESSMENT
[FreeTextEntry1] : >> Imaging and Other Studies\par \par I personally reviewed the relevant imaging.  Discussed and explained to patient the likely source of pathology and pain.  Questions answered. MRI \par \par >> Therapy and Other Modalities\par \par consider PT pending evaluation\par \par >> Medications\par \par acetaminophen 650mg q8h prn pain (caution <3g daily)\par  \par >> Interventions\par \par Significant component of axial back pain secondary to lumbar spondylosis and facet arthropathy demonstrated on MRI LS.  Pain refractory to conservative treatments.  Will schedule BILATERAL L4-sacral ala diagnostic medial branch block (2 joints, 3 nerves on each side) r/b/a discussed (STEROID SPARING)\par \par May consider radiofreqency ablation\par \par \par >> Consults\par \par >> Discussion of Risks/Benefits/Alternatives\par \par 	>Regarding any scheduled procedures:\par \par I have discussed in detail with the patient that any interventional pain procedure is associated with potential risks.  The procedure may include an injection of steroids and potentially other medications (local anesthetic and normal saline) into the epidural space or surrounding tissue of the spine.  There are significant risks of this procedure which include and are not limited to infection, bleeding, worsening pain, dural puncture leading to postdural puncture headache, nerve damage, spinal cord injury, paralysis, stroke, and death.  \par \par There is a chance that the procedure does not improve their pain.  \par \par There are risks associated with the steroid being absorbed into the body systemically.  These include dysphoria, difficulty sleeping, mood swings and personality changes.  Premenopausal women may notice an irregularity in her menstrual cycle for 2-3 months following the injection.  Steroids can specifically affect patients with hypertension, diabetes, and peptic ulcers.  The procedure may cause a temporary increase in blood pressure and blood pressure, and may adversely affect a peptic ulcer.  Other, more rare complications, include avascular necrosis of joints, glaucoma and worsening of osteoporosis. \par \par I have discussed the risks of the procedure at length with the patient, and the potential benefits of pain relief.  I have offered alternatives to the procedure.  All questions were answered.  \par \par The patient expressed understanding and wishes to proceed with the procedure.\par \par 	>Regarding COVID19 Pandemic: \par \par Any planned interventional pain procedure are scheduled because further delay may cause harm or negative outcome to patient.  The goal in performing this procedure is to avoid deterioration of function, emergency room visits (which increases exposure) and reliance on opioids.  \par \par r/b/a discussed with patient, lack of evidence to conclusively determine whether pain management procedures have any positive or negative impact on the possibility of shima the virus and/or development of any sequelae. \par \par Patient counselled regarding timing steroid based intervention 2 weeks before or after COVID-19 vaccine administration to avoid any interaction or affect on efficacy of vaccination\par \par Patient demonstrates understanding\par \par Informed patient that risks associated with the COVID-19 infection.  Informed patient steps taken to limit the risks.  We are implementing safety precautions and following protocols consistent with the CDC and state recommendations. All patients and staff will be checked for fever or signs of illness upon entry to the facility. We will limit our steroid dose to the lowest effective therapeutic dose or in some cases steroids will not be injected at all. \par \par Patient agrees to proceed\par \par >> Conclusion\par \par The above diagnosis and treatment plan is medically reasonable and necessary based on the patient encounter \par There were no barriers to communication.\par Informed patient that I would be available for any additional questions.\par Patient was instructed to call with any worsening symptoms including severe pain, new numbness/weakness, or changes in the bowel/bladder function. \par Discussed role of nsaids in pain management and all relevant risks, if patient is continuing to require after 4 weeks the patient should f/u for alternative treatment. \par Instructed patient to maintain pain diary to monitor pain level, mobility, and function.\par \par \par

## 2022-11-30 ENCOUNTER — APPOINTMENT (OUTPATIENT)
Dept: PAIN MANAGEMENT | Facility: CLINIC | Age: 67
End: 2022-11-30

## 2022-11-30 VITALS
RESPIRATION RATE: 16 BRPM | OXYGEN SATURATION: 100 % | HEART RATE: 76 BPM | SYSTOLIC BLOOD PRESSURE: 142 MMHG | HEIGHT: 63 IN | BODY MASS INDEX: 23.74 KG/M2 | WEIGHT: 134 LBS | DIASTOLIC BLOOD PRESSURE: 84 MMHG

## 2022-11-30 PROCEDURE — 64493 INJ PARAVERT F JNT L/S 1 LEV: CPT | Mod: 50

## 2022-11-30 PROCEDURE — 64494 INJ PARAVERT F JNT L/S 2 LEV: CPT | Mod: 50

## 2022-11-30 NOTE — PROCEDURE
[FreeTextEntry1] : LUMBAR MEDIAL BRANCH BLOCK BILATERAL \par \par The patient was placed in the prone position on the fluoroscopic table with a pillow under the abdomen.  Routine monitors were applied.  The back was draped in the usual sterile fashion and sterile technique was adhered to throughout the entire procedure.\par \par The [LEFT] L4-sacral ala  levels were identified under fluoroscopic guidance.  The vertebral body end plates were aligned and the junction of the superior articular process and the base of the transverse process was brought into view using an oblique angulation of the C-arm.  The skin and subcutaneous tissues were infiltrated with 1% Lidocaine.\par \par At all the levels except for the lumbosacral junction, a 25-gauge 3.5" spinal needle was inserted at the angle between the superior articular process and the base of the transverse process (after local anesthesia).  Oblique angulation was used to guide the needle into position.  The L5 median branch was identified at the lumbosacral junction and a 25-gauge 3.5" was guided fluoroscopically using AP view to the [LEFT ]lumbosacral junction.  1cc of 0.75% Bupivacaine was injected at each level. \par \par \par The [RIGHT] L4-sacral ala  levels were identified under fluoroscopic guidance.  The vertebral body end plates were aligned and the junction of the superior articular process and the base of the transverse process was brought into view using an oblique angulation of the C-arm.  The skin and subcutaneous tissues were infiltrated with 1% Lidocaine.\par \par At all the levels except for the lumbosacral junction, a 25-gauge 3.5" spinal needle was inserted at the angle between the superior articular process and the base of the transverse process (after local anesthesia).  Oblique angulation was used to guide the needle into position.  The L5 median branch was identified at the lumbosacral junction and a 25-gauge 3.5" was guided fluoroscopically using AP view to the [RIGHT ]lumbosacral junction.  1cc of 0.75% Bupivacaine was injected at each level. \par \par The patient tolerated the procedure well.  There was no neurological deficit post procedure.  The patient went to recovery room in stable condition.  Discharge instructions were given to the patient.\par

## 2022-12-01 ENCOUNTER — APPOINTMENT (OUTPATIENT)
Dept: PAIN MANAGEMENT | Facility: CLINIC | Age: 67
End: 2022-12-01

## 2022-12-01 PROCEDURE — 99212 OFFICE O/P EST SF 10 MIN: CPT | Mod: 95

## 2022-12-01 NOTE — PHYSICAL EXAM
[de-identified] : \par Constitutional: Normal, well developed, no acute distress on audio/video examination\par Eyes: Symmetric, External structures on video examination\par ENT: Lips, mucosa and tongue normal on video examination\par Oropharynx: Lips normal, symmetric, no external lesions appreciated appreciated on video examination\par Respiratory: Non-labored breathing, no audible wheezes appreciated on audio/video examination\par Vascular: No cyanosis appreciated or edema appreciated on video examination\par GI:  no jaundice appreciated on video examination\par Neurovascular: CN grossly intact on video/audio examination, alert\par MSK: Normal muscle bulk on video examination\par

## 2022-12-01 NOTE — ASSESSMENT
[FreeTextEntry1] : >> Imaging and Other Studies\par \par I personally reviewed the relevant imaging.  Discussed and explained to patient the likely source of pathology and pain.  Questions answered. MRI \par \par >> Therapy and Other Modalities\par \par consider PT pending evaluation\par \par >> Medications\par \par acetaminophen 650mg q8h prn pain (caution <3g daily)\par  \par >> Interventions\par \par Significant component of axial back pain secondary to lumbar spondylosis and facet arthropathy demonstrated on MRI LS.  Pain refractory to conservative treatments.  sp BILATERAL L4-sacral ala diagnostic medial branch block (2 joints, 3 nerves on each side) r/b/a discussed (STEROID SPARING) without immediate improvement, but improvement in 12 hours\par \par May consider radiofreqency ablation\par \par \par >> Consults\par \par >> Discussion of Risks/Benefits/Alternatives\par \par 	>Regarding any scheduled procedures:\par \par I have discussed in detail with the patient that any interventional pain procedure is associated with potential risks.  The procedure may include an injection of steroids and potentially other medications (local anesthetic and normal saline) into the epidural space or surrounding tissue of the spine.  There are significant risks of this procedure which include and are not limited to infection, bleeding, worsening pain, dural puncture leading to postdural puncture headache, nerve damage, spinal cord injury, paralysis, stroke, and death.  \par \par There is a chance that the procedure does not improve their pain.  \par \par There are risks associated with the steroid being absorbed into the body systemically.  These include dysphoria, difficulty sleeping, mood swings and personality changes.  Premenopausal women may notice an irregularity in her menstrual cycle for 2-3 months following the injection.  Steroids can specifically affect patients with hypertension, diabetes, and peptic ulcers.  The procedure may cause a temporary increase in blood pressure and blood pressure, and may adversely affect a peptic ulcer.  Other, more rare complications, include avascular necrosis of joints, glaucoma and worsening of osteoporosis. \par \par I have discussed the risks of the procedure at length with the patient, and the potential benefits of pain relief.  I have offered alternatives to the procedure.  All questions were answered.  \par \par The patient expressed understanding and wishes to proceed with the procedure.\par \par 	>Regarding COVID19 Pandemic: \par \par Any planned interventional pain procedure are scheduled because further delay may cause harm or negative outcome to patient.  The goal in performing this procedure is to avoid deterioration of function, emergency room visits (which increases exposure) and reliance on opioids.  \par \par r/b/a discussed with patient, lack of evidence to conclusively determine whether pain management procedures have any positive or negative impact on the possibility of shima the virus and/or development of any sequelae. \par \par Patient counselled regarding timing steroid based intervention 2 weeks before or after COVID-19 vaccine administration to avoid any interaction or affect on efficacy of vaccination\par \par Patient demonstrates understanding\par \par Informed patient that risks associated with the COVID-19 infection.  Informed patient steps taken to limit the risks.  We are implementing safety precautions and following protocols consistent with the CDC and state recommendations. All patients and staff will be checked for fever or signs of illness upon entry to the facility. We will limit our steroid dose to the lowest effective therapeutic dose or in some cases steroids will not be injected at all. \par \par Patient agrees to proceed\par \par >> Conclusion\par \par The above diagnosis and treatment plan is medically reasonable and necessary based on the patient encounter \par There were no barriers to communication.\par Informed patient that I would be available for any additional questions.\par Patient was instructed to call with any worsening symptoms including severe pain, new numbness/weakness, or changes in the bowel/bladder function. \par Discussed role of nsaids in pain management and all relevant risks, if patient is continuing to require after 4 weeks the patient should f/u for alternative treatment. \par Instructed patient to maintain pain diary to monitor pain level, mobility, and function.\par \par I explained to patient benefits and limitation of TeleMedicine visits\par \par Patient understands that limitations include inability to perform comprehensive physical exam, which may lead to potential diagnostic inconsistencies.  \par \par Any scheduled procedures are based on history, imaging and limited physical exam performed on TeleHealth visit.  If necessary, additional focal physical exam will be performed on date of procedure\par \par Patient understands that diagnosis and treatment may be limited by these inconsistencies and patient agrees to proceed with care plan\par \par \par \par

## 2022-12-08 RX ORDER — BLOOD-GLUCOSE METER
W/DEVICE KIT MISCELLANEOUS
Qty: 1 | Refills: 0 | Status: ACTIVE | COMMUNITY
Start: 2022-12-08 | End: 1900-01-01

## 2022-12-08 RX ORDER — LANCETS
EACH MISCELLANEOUS
Qty: 100 | Refills: 5 | Status: ACTIVE | COMMUNITY
Start: 2022-12-08 | End: 1900-01-01

## 2022-12-15 ENCOUNTER — APPOINTMENT (OUTPATIENT)
Dept: PAIN MANAGEMENT | Facility: CLINIC | Age: 67
End: 2022-12-15

## 2022-12-22 ENCOUNTER — APPOINTMENT (OUTPATIENT)
Dept: PAIN MANAGEMENT | Facility: CLINIC | Age: 67
End: 2022-12-22

## 2022-12-22 VITALS — DIASTOLIC BLOOD PRESSURE: 74 MMHG | OXYGEN SATURATION: 100 % | HEART RATE: 75 BPM | SYSTOLIC BLOOD PRESSURE: 116 MMHG

## 2022-12-22 DIAGNOSIS — M25.512 PAIN IN LEFT SHOULDER: ICD-10-CM

## 2022-12-22 PROCEDURE — 99214 OFFICE O/P EST MOD 30 MIN: CPT

## 2022-12-22 NOTE — HISTORY OF PRESENT ILLNESS
[___ yrs] : [unfilled] year(s) ago [FreeTextEntry1] : Interval Note:\par \par  sp  BILATERAL L4-sacral ala diagnostic medial branch block (2 joints, 3 nerves on each side) without immediate improvement in axial back pain but she reports that the pain is improved during this visit.  Able to perform adls with minimal back pain. Patient reports that she is much more comfortable now.   Complains of mild left shoulder pain with possible swelling in the posterior shoulder joint. \par \par denies any worsening numbness, weakness, bowel/bladder dysfunction\par \par \par HPI\par \par  \par \par Ms. SMITH CUMMINGS is a 67 year F on baby ASA with pmhx of TIA, DM2 (Notes poor glucose control with episodes of hypo and hyperglycemia. (BG's 30's-300's- managed by Dr Gutierres and Dr Hellerman), HTN, RA, chronic lower back pain x 15 yrs. Lower back pain worsening over the past few months. Remains severe despite medication and physical therapy. Pain to lower back that radiates to anterolaterally, left greater than right. Worst at night when laying. In addition, continued scapular and shoulder pain that radiates down her arms. Pain makes it difficult to do ADL's. Denies any additional weakness, numbness, bowel/bladder dysfunction.\par \par \par \par Previous and current pain medications/doses/effects: Gabapentin 600mg tid, Cymbalta 60mg.\par \par  \par \par Previous Pain Treatments: \par \par  \par \par Previous Pain Injections: Cervical MINO (2004)- Dr Ramos\par \par  \par \par Previous Diagnostic Studies/Images:\par \par Exam: MRI LUMBAR SPINE 11/2/22\par Order#: [id number] \par \par \par \par HISTORY: 67 years Female LUMBAR SPINAL STENOSIS MRI \par \par \par  PROCEDURE:MRI lumbar spine without contrast \par \par  COMPARISON: \par \par  TECHNIQUE:MRI lumbosacral spine 1.5 Liza magnet \par \par  FINDINGS: Please note that there is transitional lumbosacral anatomy. Please note that there may be\par discrepancies in spinal level labeling on prior/subsequent imaging as well as clinical/surgical \par documentation and close attention on follow-up is strongly recommended especially in the setting of \par procedural planning. For the purposes of today's exam the transitional level is labeled a \par transitional L5 level. \par \par  The paraspinal musculature including the psoas muscle, multifidus muscles, and immediate para axial\par soft tissues appear within range of normal without evidence of mass or collection. \par \par  There is a maintenance of the normal lumbar lordosis. There is fairly uniform marrow signal on all \par sequences. \par \par  The conus terminates at the T12-L1 level. \par \par  At L5-S1 \par  The disc appears intact. There is no significant central or foraminal stenosis. \par \par  At L4-L5 \par  There is disc desiccation, a midline annular fissure and shallow central disc protrusion mildly \par effacing the ventral epidural space and flattening the ventral thecal sac. Bony overgrowth from \par adjacent facet joints and ligamentum flavum thickening contribute to mild left greater than right \par effacement of the lateral recesses. There is a prominent left-sided intraforaminal component of \par circumferential disc bulging contributing to moderately severe left foraminal stenosis. \par \par  At L3-L4 \par  There is loss of intervertebral disc space height, disc desiccation, with effacement of the lateral\par recesses more so on the right than the left. Mild left foraminal stenosis and moderate to severe \par right foraminal stenosis is present. Bony overgrowth from adjacent facet arthropathy is present at \par this level. \par \par  At L2-L3 \par  There is disc desiccation but no focal disc protrusion. No significant central or foraminal \par stenosis is present. \par \par  At L1-L2 \par  There is mild disc desiccation but no focal disc protrusion \par \par  At T11-T12 there is evidence of degenerative disc disease with a shallow central disc bulge. \par \par \par  IMPRESSION: Please note that there is transitional lumbosacral anatomy. Please note that there may \par be discrepancies in spinal level labeling on prior/subsequent imaging as well as clinical/surgical \par documentation and close attention on follow-up is strongly recommended especially in the setting of \par procedural planning. \par \par  Degenerative changes lumbosacral spine at L4-L5 include midline annular fissure and a shallow \par central disc bulging asymmetrically prominent to the left mildly effacing the left lateral recess \par and contributing to left foraminal stenosis \par \par  Moderately advanced degenerative disc changes at L3-L4 are associated with reactive Modic type \par changes. Disc osteophyte complex results in moderate to severe right foraminal stenosis and mild \par left foraminal stenosis further detailed above \par \par  Other findings outlined above \par \par \par Exam: MRI CERVICAL SPINE 11/2/22\par Order#: MRI [id number] \par \par \par \par HISTORY: 67 years Female CHRONIC CERVICAL RADICULOPATHY MRI \par \par  PROCEDURE: MRI cervical spine without contrast \par \par  COMPARISON: January 28, 2019 \par \par  TECHNIQUE: MRI cervical spine performed 1.5 Liza magnet \par \par  FINDINGS: \par  There is maintenance of the normal cervical lordosis. \par \par  The prevertebral soft tissues appear within range of normal. \par \par  The craniocervical junction and clivus and C1-C2 distance appear within range of normal \par \par  There is uniform marrow signal on all sequences \par \par  The cervical cord is uniform in signal intensity without evidence of syrinx or suggestion of \par myelomalacia. \par \par  At C2-C3 , there is no significant central or foraminal stenosis. \par \par  At C3-C4 there is broad-based disc osteophytic ridging which indents the thecal sac, decreases the \par AP dimension of the thecal sac slightly and contributes to moderate right foraminal stenosis and \par mild to moderate left foraminal stenosis. This may be slightly progressed as compared to the 2019 \par study. \par \par  At C4-[id number] is broad-based disc osteophytic ridging and a right para midline/right foraminal disc\par protrusion which indents the thecal sac and deforms the ventral surface of the cord. There is \par significantly increased mass effect as compared to the 2019 study which now results in moderate to \par severe right foraminal stenosis and moderate left foraminal stenosis with the AP dimension of the \par thecal sac is reduced to 7 mm. \par \par  At C5-C6 , broad-based disc osteophytic ridging has progressed slightly since prior exam. Mild to \par moderate right foraminal stenosis and mild left foraminal stenosis is present. Disc osteophyte comp\par sole asymmetrically prominent to the right. \par \par  At C6-C7 , there is no significant central or foraminal stenosis. \par \par  At C7-T1 , there is no significant central or foraminal stenosis. \par \par \par  IMPRESSION: \par  Multilevel degenerative changes as outlined above have progressed since the 2019 study most notably\par at C4-C5 where there is significantly increased mass effect associated with a right para \par midline/right foraminal disc osteophyte complex \par \par  Other findings discussed above \par \par \par  Thank you for allowing us to participate in the evaluation of this patient. \par \par \par \par MRI LS 11/22\par \par  Please note that there is transitional lumbosacral anatomy. Please note that there may be\par discrepancies in spinal level labeling on prior/subsequent imaging as well as clinical/surgical\par documentation and close attention on follow-up is strongly recommended especially in the setting of\par procedural planning. For the purposes of today's exam the transitional level is labeled a\par transitional L5 level.\par \par  The paraspinal musculature including the psoas muscle, multifidus muscles, and immediate para axial\par soft tissues appear within range of normal without evidence of mass or collection.\par \par  There is a maintenance of the normal lumbar lordosis. There is fairly uniform marrow signal on all\par sequences.\par \par  The conus terminates at the T12-L1 level.\par \par  At L5-S1\par  The disc appears intact. There is no significant central or foraminal stenosis.\par \par  At L4-L5\par  There is disc desiccation, a midline annular fissure and shallow central disc protrusion mildly\par effacing the ventral epidural space and flattening the ventral thecal sac. Bony overgrowth from\par adjacent facet joints and ligamentum flavum thickening contribute to mild left greater than right\par effacement of the lateral recesses. There is a prominent left-sided intraforaminal component of\par circumferential disc bulging contributing to moderately severe left foraminal stenosis.\par \par  At L3-L4\par  There is loss of intervertebral disc space height, disc desiccation, with effacement of the lateral\par recesses more so on the right than the left. Mild left foraminal stenosis and moderate to severe\par right foraminal stenosis is present. Bony overgrowth from adjacent facet arthropathy is present at\par this level.\par \par  At L2-L3\par  There is disc desiccation but no focal disc protrusion. No significant central or foraminal\par stenosis is present.\par \par  At L1-L2\par  There is mild disc desiccation but no focal disc protrusion\par \par  At T11-T12 there is evidence of degenerative disc disease with a shallow central disc bulge.\par \par \par  IMPRESSION: Please note that there is transitional lumbosacral anatomy. Please note that there may\par be discrepancies in spinal level labeling on prior/subsequent imaging as well as clinical/surgical\par documentation and close attention on follow-up is strongly recommended especially in the setting of\par procedural planning.\par \par  Degenerative changes lumbosacral spine at L4-L5 include midline annular fissure and a shallow\par central disc bulging asymmetrically prominent to the left mildly effacing the left lateral recess\par and contributing to left foraminal stenosis\par \par  Moderately advanced degenerative disc changes at L3-L4 are associated with reactive Modic type\par changes. Disc osteophyte complex results in moderate to severe right foraminal stenosis and mild\par left foraminal stenosis further detailed above\par \par MRI CS 11/2/22\par \par There is maintenance of the normal cervical lordosis.\par \par  The prevertebral soft tissues appear within range of normal.\par \par  The craniocervical junction and clivus and C1-C2 distance appear within range of normal\par \par  There is uniform marrow signal on all sequences\par \par  The cervical cord is uniform in signal intensity without evidence of syrinx or suggestion of\par myelomalacia.\par \par  At C2-C3 ,  there is no significant central or foraminal stenosis.\par \par  At C3-C4 there is broad-based disc osteophytic ridging which indents the thecal sac, decreases the\par AP dimension of the thecal sac slightly and contributes to moderate right foraminal stenosis and\par mild to moderate left foraminal stenosis. This may be slightly progressed as compared to the 2019\par study.\par \par  At [id number]-[id number] is broad-based disc osteophytic ridging and a right para midline/right foraminal disc\par protrusion which indents the thecal sac and deforms the ventral surface of the cord. There is\par significantly increased mass effect as compared to the 2019 study which now results in moderate to\par severe right foraminal stenosis and moderate left foraminal stenosis with the AP dimension of the\par thecal sac is reduced to 7 mm.\par \par  At C5-C6 , broad-based disc osteophytic ridging has progressed slightly since prior exam. Mild to\par moderate right foraminal stenosis and mild left foraminal stenosis is present. Disc osteophyte comp\par sole asymmetrically prominent to the right.\par \par  At C6-C7 , there is no significant central or foraminal stenosis.\par \par  At C7-T1 , there is no significant central or foraminal stenosis.\par \par \par  IMPRESSION:\par  Multilevel degenerative changes as outlined above have progressed since the 2019 study most notably\par at C4-C5 where there is significantly increased mass effect associated with a right para\par midline/right foraminal disc osteophyte complex\par \par 1/28/2019\par \par MRI CERVICAL SPINE\par Order#:   MRI [id number]\par \par \par \par MRI OF THE CERVICAL SPINE WITHOUT CONTRAST\par \par  INDICATION:  Neck pain\par \par  TECHNIQUE: Noncontrast sagittal T1, T2, STIR, axial T2 and gradient echo, and sagittal T2\par volumetric with axial and coronal reformats.\par \par  CONTRAST: None\par \par  COMPARISON:  No prior studies are available for comparison\par \par  FINDINGS:\par \par  There is reversal of the cervical lordosis. There is minimal degenerative retrolisthesis of C3 on\par C4 and C4 on C5. The marrow signal is overall within normal limits with no focal marrow replacing\par lesion identified. The vertebral body heights are maintained and there is no evidence of acute\par fracture or subluxation. There is no abnormal vertebral or paraspinal edema. The visualized\par paraspinal soft tissues appear grossly unremarkable.\par \par  No abnormal signal is identified in the cervical spinal cord. The visualized posterior fossa\par structures are unremarkable.\par \par  C2-3: No significant disc bulge or herniation. No significant central canal or foraminal stenosis.\par \par  C3-4: Central disc protrusion superimposed on mild disc bulge with marginal and uncovertebral\par osteophyte formation. No significant central canal stenosis, however disc herniation mildly impinges\par upon the ventral spinal cord. Moderate left foraminal stenosis and mild right foraminal stenosis.\par \par  C4-5: Right paracentral disc/osteophyte superimposed on a mild disc bulge with marginal osteophyte\par formation. No significant central canal stenosis, however disc/osteophyte mildly impinges upon the\par right ventral spinal cord. Moderate bilateral foraminal stenosis.\par \par  C5-6: Mild disc bulge with marginal and uncovertebral osteophyte formation. No significant central\par canal stenosis. Mild/moderate right foraminal stenosis and mild left foraminal stenosis.\par \par  C6-7: Mild disc bulge. No significant central canal or foraminal stenosis.\par \par  C7-T1: Minimal disc bulge. No significant central canal or foraminal stenosis.\par \par \par \par  IMPRESSION:\par \par  Cervical spondylosis with central disc herniation at C3-4 and right paracentral disc/osteophyte at\par C4-5. No significant central canal stenosis, however there is mild impingement of the ventral spinal\par cord at C3-4 and C4-5. Varying degrees of foraminal stenosis as above, most pronounced at C3-4 and\par the left and C4-5 bilaterally.\par \par \par \par \par

## 2022-12-22 NOTE — ASSESSMENT
[FreeTextEntry1] : >> Imaging and Other Studies\par \par I personally reviewed the relevant imaging.  Discussed and explained to patient the likely source of pathology and pain.  Questions answered. MRI \par \par XR L shoulder to evaluate pathology\par \par >> Therapy and Other Modalities\par \par start PT - referral provided\par \par >> Medications\par \par acetaminophen 650mg q8h prn pain (caution <3g daily)\par  \par >> Interventions\par \par Significant component of axial back pain secondary to lumbar spondylosis and facet arthropathy demonstrated on MRI LS.  Pain refractory to conservative treatments.  sp BILATERAL L4-sacral ala diagnostic medial branch block (2 joints, 3 nerves on each side) r/b/a discussed (STEROID SPARING) without immediate improvement, but improvement in 12 hours and now sustained\par \par \par >> Consults\par \par >> Discussion of Risks/Benefits/Alternatives\par \par 	>Regarding any scheduled procedures:\par \par I have discussed in detail with the patient that any interventional pain procedure is associated with potential risks.  The procedure may include an injection of steroids and potentially other medications (local anesthetic and normal saline) into the epidural space or surrounding tissue of the spine.  There are significant risks of this procedure which include and are not limited to infection, bleeding, worsening pain, dural puncture leading to postdural puncture headache, nerve damage, spinal cord injury, paralysis, stroke, and death.  \par \par There is a chance that the procedure does not improve their pain.  \par \par There are risks associated with the steroid being absorbed into the body systemically.  These include dysphoria, difficulty sleeping, mood swings and personality changes.  Premenopausal women may notice an irregularity in her menstrual cycle for 2-3 months following the injection.  Steroids can specifically affect patients with hypertension, diabetes, and peptic ulcers.  The procedure may cause a temporary increase in blood pressure and blood pressure, and may adversely affect a peptic ulcer.  Other, more rare complications, include avascular necrosis of joints, glaucoma and worsening of osteoporosis. \par \par I have discussed the risks of the procedure at length with the patient, and the potential benefits of pain relief.  I have offered alternatives to the procedure.  All questions were answered.  \par \par The patient expressed understanding and wishes to proceed with the procedure.\par \par 	>Regarding COVID19 Pandemic: \par \par Any planned interventional pain procedure are scheduled because further delay may cause harm or negative outcome to patient.  The goal in performing this procedure is to avoid deterioration of function, emergency room visits (which increases exposure) and reliance on opioids.  \par \par r/b/a discussed with patient, lack of evidence to conclusively determine whether pain management procedures have any positive or negative impact on the possibility of shima the virus and/or development of any sequelae. \par \par Patient counselled regarding timing steroid based intervention 2 weeks before or after COVID-19 vaccine administration to avoid any interaction or affect on efficacy of vaccination\par \par Patient demonstrates understanding\par \par Informed patient that risks associated with the COVID-19 infection.  Informed patient steps taken to limit the risks.  We are implementing safety precautions and following protocols consistent with the CDC and state recommendations. All patients and staff will be checked for fever or signs of illness upon entry to the facility. We will limit our steroid dose to the lowest effective therapeutic dose or in some cases steroids will not be injected at all. \par \par Patient agrees to proceed\par \par >> Conclusion\par \par The above diagnosis and treatment plan is medically reasonable and necessary based on the patient encounter \par There were no barriers to communication.\par Informed patient that I would be available for any additional questions.\par Patient was instructed to call with any worsening symptoms including severe pain, new numbness/weakness, or changes in the bowel/bladder function. \par Discussed role of nsaids in pain management and all relevant risks, if patient is continuing to require after 4 weeks the patient should f/u for alternative treatment. \par Instructed patient to maintain pain diary to monitor pain level, mobility, and function.\par \par I explained to patient benefits and limitation of TeleMedicine visits\par \par Patient understands that limitations include inability to perform comprehensive physical exam, which may lead to potential diagnostic inconsistencies.  \par \par Any scheduled procedures are based on history, imaging and limited physical exam performed on TeleHealth visit.  If necessary, additional focal physical exam will be performed on date of procedure\par \par Patient understands that diagnosis and treatment may be limited by these inconsistencies and patient agrees to proceed with care plan\par \par \par \par

## 2023-01-23 ENCOUNTER — APPOINTMENT (OUTPATIENT)
Dept: RHEUMATOLOGY | Facility: CLINIC | Age: 68
End: 2023-01-23

## 2023-04-10 ENCOUNTER — APPOINTMENT (OUTPATIENT)
Dept: FAMILY MEDICINE | Facility: CLINIC | Age: 68
End: 2023-04-10
Payer: MEDICARE

## 2023-04-10 ENCOUNTER — RX RENEWAL (OUTPATIENT)
Age: 68
End: 2023-04-10

## 2023-04-10 VITALS
DIASTOLIC BLOOD PRESSURE: 72 MMHG | OXYGEN SATURATION: 100 % | HEART RATE: 72 BPM | SYSTOLIC BLOOD PRESSURE: 114 MMHG | RESPIRATION RATE: 16 BRPM | BODY MASS INDEX: 23.39 KG/M2 | TEMPERATURE: 98 F | WEIGHT: 132 LBS | HEIGHT: 63 IN

## 2023-04-10 DIAGNOSIS — G89.29 PAIN IN THORACIC SPINE: ICD-10-CM

## 2023-04-10 DIAGNOSIS — E06.1 SUBACUTE THYROIDITIS: ICD-10-CM

## 2023-04-10 DIAGNOSIS — M54.6 PAIN IN THORACIC SPINE: ICD-10-CM

## 2023-04-10 DIAGNOSIS — M35.3 POLYMYALGIA RHEUMATICA: ICD-10-CM

## 2023-04-10 PROCEDURE — 99214 OFFICE O/P EST MOD 30 MIN: CPT | Mod: 25

## 2023-04-10 RX ORDER — ATORVASTATIN CALCIUM 40 MG/1
40 TABLET, FILM COATED ORAL
Qty: 90 | Refills: 3 | Status: ACTIVE | COMMUNITY
Start: 2020-07-26 | End: 1900-01-01

## 2023-04-10 RX ORDER — SOLIFENACIN SUCCINATE 5 MG/1
5 TABLET ORAL
Qty: 90 | Refills: 3 | Status: ACTIVE | COMMUNITY
Start: 1900-01-01 | End: 1900-01-01

## 2023-04-10 NOTE — HEALTH RISK ASSESSMENT
[No] : No [No falls in past year] : Patient reported no falls in the past year [0] : 2) Feeling down, depressed, or hopeless: Not at all (0) [Never] : Never [OBD1Qdnoy] : 0

## 2023-04-10 NOTE — PHYSICAL EXAM
[Normal] : affect was normal and insight and judgment were intact [de-identified] : Guarding against pain in lumbar area bilaterally

## 2023-04-10 NOTE — HISTORY OF PRESENT ILLNESS
[FreeTextEntry1] : Follow up on problems [de-identified] : Saw doctor for back pain.  Got injection in Dec.  Helped get rid of all pain for 2 months.  But the pain is back.  Hard to sleep.  Lower back.  Now has sciatic pain in both legs.  Has been using topicals.  Both legs the same.  Wants to see Dr. Abrams again for more treatment.\par Still sees Dr. Hellerman, endo, for thyroid.  Asks for me to draw labs in advance of her upcoming visit with him.\par Sees Dr. Renteria for headaches.  Feels much better on the medication, although, lately the back pain seems to flare up the headaches.\par Feels like the chlorthalidone might be causing symptoms.  Wants to stop it (but cont with atenolol) to see if there is an effect.  Will be able to monitor BP at home.\par Needs refill on meds.  Feels stable on BP meds and lipid meds.\par Aware of worse diabetes labs on last testing.  Has been trying to use diet/exercise.\par

## 2023-04-11 ENCOUNTER — APPOINTMENT (OUTPATIENT)
Dept: CARDIOLOGY | Facility: CLINIC | Age: 68
End: 2023-04-11
Payer: MEDICARE

## 2023-04-11 VITALS
DIASTOLIC BLOOD PRESSURE: 69 MMHG | BODY MASS INDEX: 24.1 KG/M2 | WEIGHT: 136 LBS | HEART RATE: 78 BPM | HEIGHT: 63 IN | SYSTOLIC BLOOD PRESSURE: 127 MMHG | OXYGEN SATURATION: 98 %

## 2023-04-11 DIAGNOSIS — R09.89 OTHER SPECIFIED SYMPTOMS AND SIGNS INVOLVING THE CIRCULATORY AND RESPIRATORY SYSTEMS: ICD-10-CM

## 2023-04-11 DIAGNOSIS — G45.9 TRANSIENT CEREBRAL ISCHEMIC ATTACK, UNSPECIFIED: ICD-10-CM

## 2023-04-11 DIAGNOSIS — R07.9 CHEST PAIN, UNSPECIFIED: ICD-10-CM

## 2023-04-11 PROCEDURE — 93000 ELECTROCARDIOGRAM COMPLETE: CPT

## 2023-04-11 PROCEDURE — 99214 OFFICE O/P EST MOD 30 MIN: CPT | Mod: 25

## 2023-04-11 NOTE — HISTORY OF PRESENT ILLNESS
[FreeTextEntry1] : Had an epidural in 12/22 0. lasted 2 months 0. now back again\par To see Dr Abrams in follow up\par \par A little bit of chest pain yesterday while in the kitchen -. lasted a few minutes after \par No exertional symptoms\par Doesn't exercise much

## 2023-04-11 NOTE — PHYSICAL EXAM
[Well Developed] : well developed [Well Nourished] : well nourished [No Acute Distress] : no acute distress [Normal Conjunctiva] : normal conjunctiva [Normal Venous Pressure] : normal venous pressure [Carotid Bruit] : carotid bruit [Normal S1, S2] : normal S1, S2 [No Murmur] : no murmur [No Rub] : no rub [No Gallop] : no gallop [Clear Lung Fields] : clear lung fields [Good Air Entry] : good air entry [No Respiratory Distress] : no respiratory distress  [Soft] : abdomen soft [Non Tender] : non-tender [No Masses/organomegaly] : no masses/organomegaly [Normal Bowel Sounds] : normal bowel sounds [Normal Gait] : normal gait [No Edema] : no edema [No Cyanosis] : no cyanosis [No Clubbing] : no clubbing [No Varicosities] : no varicosities [No Rash] : no rash [No Skin Lesions] : no skin lesions [Moves all extremities] : moves all extremities [No Focal Deficits] : no focal deficits [Normal Speech] : normal speech [Alert and Oriented] : alert and oriented [Normal memory] : normal memory [de-identified] : right

## 2023-04-13 ENCOUNTER — APPOINTMENT (OUTPATIENT)
Dept: PAIN MANAGEMENT | Facility: CLINIC | Age: 68
End: 2023-04-13
Payer: MEDICARE

## 2023-04-13 VITALS
SYSTOLIC BLOOD PRESSURE: 120 MMHG | HEIGHT: 63 IN | WEIGHT: 136 LBS | BODY MASS INDEX: 24.1 KG/M2 | DIASTOLIC BLOOD PRESSURE: 80 MMHG

## 2023-04-13 PROCEDURE — 99214 OFFICE O/P EST MOD 30 MIN: CPT

## 2023-04-13 NOTE — PHYSICAL EXAM
[Facet Tenderness] : facet tenderness [Spine: Flexion to ___ degrees, without pain] : spine: flexion to [unfilled] degrees, without pain [Normal] : Normal affect [de-identified] : .

## 2023-04-13 NOTE — ASSESSMENT
[FreeTextEntry1] : >> Imaging and Other Studies\par \par I personally reviewed the relevant imaging.  Discussed and explained to patient the likely source of pathology and pain.  Questions answered. MRI \par \par na\par >> Therapy and Other Modalities\par \par start PT - referral provided\par \par >> Medications\par \par acetaminophen 650mg q8h prn pain (caution <3g daily)\par  \par >> Interventions\par \par Significant component of axial back pain secondary to lumbar spondylosis and facet arthropathy demonstrated on MRI LS.  Pain refractory to conservative treatments.  sp BILATERAL L4-sacral ala diagnostic medial branch block (2 joints, 3 nerves on each side) r/b/a discussed (STEROID SPARING) without immediate improvement, but improvement in 12 hours and for 2 months\par \par given efficacy of previous intervention that is >80% improvement in pain and improved ability to perform adls, and return of pain despite conservative treatment, will schedule repeat BILATERAL L4-sacral ala diagnostic medial branch block (2 joints, 3 nerves on each side) r/b/a discussed (STEROID SPARING) \par \par \par \par >> Consults\par \par >> Discussion of Risks/Benefits/Alternatives\par \par 	>Regarding any scheduled procedures:\par \par I have discussed in detail with the patient that any interventional pain procedure is associated with potential risks.  The procedure may include an injection of steroids and potentially other medications (local anesthetic and normal saline) into the epidural space or surrounding tissue of the spine.  There are significant risks of this procedure which include and are not limited to infection, bleeding, worsening pain, dural puncture leading to postdural puncture headache, nerve damage, spinal cord injury, paralysis, stroke, and death.  \par \par There is a chance that the procedure does not improve their pain.  \par \par There are risks associated with the steroid being absorbed into the body systemically.  These include dysphoria, difficulty sleeping, mood swings and personality changes.  Premenopausal women may notice an irregularity in her menstrual cycle for 2-3 months following the injection.  Steroids can specifically affect patients with hypertension, diabetes, and peptic ulcers.  The procedure may cause a temporary increase in blood pressure and blood pressure, and may adversely affect a peptic ulcer.  Other, more rare complications, include avascular necrosis of joints, glaucoma and worsening of osteoporosis. \par \par I have discussed the risks of the procedure at length with the patient, and the potential benefits of pain relief.  I have offered alternatives to the procedure.  All questions were answered.  \par \par The patient expressed understanding and wishes to proceed with the procedure.\par \par 	>Regarding COVID19 Pandemic: \par \par Any planned interventional pain procedure are scheduled because further delay may cause harm or negative outcome to patient.  The goal in performing this procedure is to avoid deterioration of function, emergency room visits (which increases exposure) and reliance on opioids.  \par \par r/b/a discussed with patient, lack of evidence to conclusively determine whether pain management procedures have any positive or negative impact on the possibility of shima the virus and/or development of any sequelae. \par \par Patient counselled regarding timing steroid based intervention 2 weeks before or after COVID-19 vaccine administration to avoid any interaction or affect on efficacy of vaccination\par \par Patient demonstrates understanding\par \par Informed patient that risks associated with the COVID-19 infection.  Informed patient steps taken to limit the risks.  We are implementing safety precautions and following protocols consistent with the CDC and state recommendations. All patients and staff will be checked for fever or signs of illness upon entry to the facility. We will limit our steroid dose to the lowest effective therapeutic dose or in some cases steroids will not be injected at all. \par \par Patient agrees to proceed\par \par >> Conclusion\par \par The above diagnosis and treatment plan is medically reasonable and necessary based on the patient encounter \par There were no barriers to communication.\par Informed patient that I would be available for any additional questions.\par Patient was instructed to call with any worsening symptoms including severe pain, new numbness/weakness, or changes in the bowel/bladder function. \par Discussed role of nsaids in pain management and all relevant risks, if patient is continuing to require after 4 weeks the patient should f/u for alternative treatment. \par Instructed patient to maintain pain diary to monitor pain level, mobility, and function.\par \par I explained to patient benefits and limitation of TeleMedicine visits\par \par Patient understands that limitations include inability to perform comprehensive physical exam, which may lead to potential diagnostic inconsistencies.  \par \par Any scheduled procedures are based on history, imaging and limited physical exam performed on TeleHealth visit.  If necessary, additional focal physical exam will be performed on date of procedure\par \par Patient understands that diagnosis and treatment may be limited by these inconsistencies and patient agrees to proceed with care plan\par \par \par \par

## 2023-04-13 NOTE — HISTORY OF PRESENT ILLNESS
[8] : 3. What number best describes how, during the past week, pain has interfered with your general activity? 8/10 pain [___ yrs] : [unfilled] year(s) ago [FreeTextEntry1] : Interval Note:\par \par  sp  BILATERAL L4-sacral ala diagnostic medial branch block (2 joints, 3 nerves on each side) without immediate improvement in axial back pain but then significant 2 months of improvement of back pain. Reports bilateral back pain has returned.  Pain is so bad that patient finds it difficult to perform adls and ambulate. \par \par denies any worsening numbness, weakness, bowel/bladder dysfunction\par \par \par HPI\par \par  \par \par Ms. SMITH CUMMINGS is a 67 year F on baby ASA with pmhx of TIA, DM2 (Notes poor glucose control with episodes of hypo and hyperglycemia. (BG's 30's-300's- managed by Dr Gutierres and Dr Hellerman), HTN, RA, chronic lower back pain x 15 yrs. Lower back pain worsening over the past few months. Remains severe despite medication and physical therapy. Pain to lower back that radiates to anterolaterally, left greater than right. Worst at night when laying. In addition, continued scapular and shoulder pain that radiates down her arms. Pain makes it difficult to do ADL's. Denies any additional weakness, numbness, bowel/bladder dysfunction.\par \par \par \par Previous and current pain medications/doses/effects: Gabapentin 600mg tid, Cymbalta 60mg.\par \par  \par \par Previous Pain Treatments: \par \par  \par \par Previous Pain Injections: Cervical MINO (2004)- Dr Ramos\par \par  \par \par Previous Diagnostic Studies/Images:\par \par Exam: MRI LUMBAR SPINE 11/2/22\par Order#: MRI [id number] \par \par \par \par HISTORY: 67 years Female LUMBAR SPINAL STENOSIS MRI \par \par \par  PROCEDURE:MRI lumbar spine without contrast \par \par  COMPARISON: \par \par  TECHNIQUE:MRI lumbosacral spine 1.5 Liza magnet \par \par  FINDINGS: Please note that there is transitional lumbosacral anatomy. Please note that there may be\par discrepancies in spinal level labeling on prior/subsequent imaging as well as clinical/surgical \par documentation and close attention on follow-up is strongly recommended especially in the setting of \par procedural planning. For the purposes of today's exam the transitional level is labeled a \par transitional L5 level. \par \par  The paraspinal musculature including the psoas muscle, multifidus muscles, and immediate para axial\par soft tissues appear within range of normal without evidence of mass or collection. \par \par  There is a maintenance of the normal lumbar lordosis. There is fairly uniform marrow signal on all \par sequences. \par \par  The conus terminates at the T12-L1 level. \par \par  At L5-S1 \par  The disc appears intact. There is no significant central or foraminal stenosis. \par \par  At L4-L5 \par  There is disc desiccation, a midline annular fissure and shallow central disc protrusion mildly \par effacing the ventral epidural space and flattening the ventral thecal sac. Bony overgrowth from \par adjacent facet joints and ligamentum flavum thickening contribute to mild left greater than right \par effacement of the lateral recesses. There is a prominent left-sided intraforaminal component of \par circumferential disc bulging contributing to moderately severe left foraminal stenosis. \par \par  At L3-L4 \par  There is loss of intervertebral disc space height, disc desiccation, with effacement of the lateral\par recesses more so on the right than the left. Mild left foraminal stenosis and moderate to severe \par right foraminal stenosis is present. Bony overgrowth from adjacent facet arthropathy is present at \par this level. \par \par  At L2-L3 \par  There is disc desiccation but no focal disc protrusion. No significant central or foraminal \par stenosis is present. \par \par  At L1-L2 \par  There is mild disc desiccation but no focal disc protrusion \par \par  At T11-T12 there is evidence of degenerative disc disease with a shallow central disc bulge. \par \par \par  IMPRESSION: Please note that there is transitional lumbosacral anatomy. Please note that there may \par be discrepancies in spinal level labeling on prior/subsequent imaging as well as clinical/surgical \par documentation and close attention on follow-up is strongly recommended especially in the setting of \par procedural planning. \par \par  Degenerative changes lumbosacral spine at L4-L5 include midline annular fissure and a shallow \par central disc bulging asymmetrically prominent to the left mildly effacing the left lateral recess \par and contributing to left foraminal stenosis \par \par  Moderately advanced degenerative disc changes at L3-L4 are associated with reactive Modic type \par changes. Disc osteophyte complex results in moderate to severe right foraminal stenosis and mild \par left foraminal stenosis further detailed above \par \par  Other findings outlined above \par \par \par Exam: MRI CERVICAL SPINE 11/2/22\par Order#: MRI [id number] \par \par \par \par HISTORY: 67 years Female CHRONIC CERVICAL RADICULOPATHY MRI \par \par  PROCEDURE: MRI cervical spine without contrast \par \par  COMPARISON: January 28, 2019 \par \par  TECHNIQUE: MRI cervical spine performed 1.5 Liza magnet \par \par  FINDINGS: \par  There is maintenance of the normal cervical lordosis. \par \par  The prevertebral soft tissues appear within range of normal. \par \par  The craniocervical junction and clivus and C1-C2 distance appear within range of normal \par \par  There is uniform marrow signal on all sequences \par \par  The cervical cord is uniform in signal intensity without evidence of syrinx or suggestion of \par myelomalacia. \par \par  At C2-C3 , there is no significant central or foraminal stenosis. \par \par  At C3-C4 there is broad-based disc osteophytic ridging which indents the thecal sac, decreases the \par AP dimension of the thecal sac slightly and contributes to moderate right foraminal stenosis and \par mild to moderate left foraminal stenosis. This may be slightly progressed as compared to the 2019 \par study. \par \par  At C4-[id number] is broad-based disc osteophytic ridging and a right para midline/right foraminal disc\par protrusion which indents the thecal sac and deforms the ventral surface of the cord. There is \par significantly increased mass effect as compared to the 2019 study which now results in moderate to \par severe right foraminal stenosis and moderate left foraminal stenosis with the AP dimension of the \par thecal sac is reduced to 7 mm. \par \par  At C5-C6 , broad-based disc osteophytic ridging has progressed slightly since prior exam. Mild to \par moderate right foraminal stenosis and mild left foraminal stenosis is present. Disc osteophyte comp\par sole asymmetrically prominent to the right. \par \par  At C6-C7 , there is no significant central or foraminal stenosis. \par \par  At C7-T1 , there is no significant central or foraminal stenosis. \par \par \par  IMPRESSION: \par  Multilevel degenerative changes as outlined above have progressed since the 2019 study most notably\par at C4-C5 where there is significantly increased mass effect associated with a right para \par midline/right foraminal disc osteophyte complex \par \par  Other findings discussed above \par \par \par  Thank you for allowing us to participate in the evaluation of this patient. \par \par \par \par MRI LS 11/22\par \par  Please note that there is transitional lumbosacral anatomy. Please note that there may be\par discrepancies in spinal level labeling on prior/subsequent imaging as well as clinical/surgical\par documentation and close attention on follow-up is strongly recommended especially in the setting of\par procedural planning. For the purposes of today's exam the transitional level is labeled a\par transitional L5 level.\par \par  The paraspinal musculature including the psoas muscle, multifidus muscles, and immediate para axial\par soft tissues appear within range of normal without evidence of mass or collection.\par \par  There is a maintenance of the normal lumbar lordosis. There is fairly uniform marrow signal on all\par sequences.\par \par  The conus terminates at the T12-L1 level.\par \par  At L5-S1\par  The disc appears intact. There is no significant central or foraminal stenosis.\par \par  At L4-L5\par  There is disc desiccation, a midline annular fissure and shallow central disc protrusion mildly\par effacing the ventral epidural space and flattening the ventral thecal sac. Bony overgrowth from\par adjacent facet joints and ligamentum flavum thickening contribute to mild left greater than right\par effacement of the lateral recesses. There is a prominent left-sided intraforaminal component of\par circumferential disc bulging contributing to moderately severe left foraminal stenosis.\par \par  At L3-L4\par  There is loss of intervertebral disc space height, disc desiccation, with effacement of the lateral\par recesses more so on the right than the left. Mild left foraminal stenosis and moderate to severe\par right foraminal stenosis is present. Bony overgrowth from adjacent facet arthropathy is present at\par this level.\par \par  At L2-L3\par  There is disc desiccation but no focal disc protrusion. No significant central or foraminal\par stenosis is present.\par \par  At L1-L2\par  There is mild disc desiccation but no focal disc protrusion\par \par  At T11-T12 there is evidence of degenerative disc disease with a shallow central disc bulge.\par \par \par  IMPRESSION: Please note that there is transitional lumbosacral anatomy. Please note that there may\par be discrepancies in spinal level labeling on prior/subsequent imaging as well as clinical/surgical\par documentation and close attention on follow-up is strongly recommended especially in the setting of\par procedural planning.\par \par  Degenerative changes lumbosacral spine at L4-L5 include midline annular fissure and a shallow\par central disc bulging asymmetrically prominent to the left mildly effacing the left lateral recess\par and contributing to left foraminal stenosis\par \par  Moderately advanced degenerative disc changes at L3-L4 are associated with reactive Modic type\par changes. Disc osteophyte complex results in moderate to severe right foraminal stenosis and mild\par left foraminal stenosis further detailed above\par \par MRI CS 11/2/22\par \par There is maintenance of the normal cervical lordosis.\par \par  The prevertebral soft tissues appear within range of normal.\par \par  The craniocervical junction and clivus and C1-C2 distance appear within range of normal\par \par  There is uniform marrow signal on all sequences\par \par  The cervical cord is uniform in signal intensity without evidence of syrinx or suggestion of\par myelomalacia.\par \par  At C2-C3 ,  there is no significant central or foraminal stenosis.\par \par  At C3-C4 there is broad-based disc osteophytic ridging which indents the thecal sac, decreases the\par AP dimension of the thecal sac slightly and contributes to moderate right foraminal stenosis and\par mild to moderate left foraminal stenosis. This may be slightly progressed as compared to the 2019\par study.\par \par  At C4-[id number] is broad-based disc osteophytic ridging and a right para midline/right foraminal disc\par protrusion which indents the thecal sac and deforms the ventral surface of the cord. There is\par significantly increased mass effect as compared to the 2019 study which now results in moderate to\par severe right foraminal stenosis and moderate left foraminal stenosis with the AP dimension of the\par thecal sac is reduced to 7 mm.\par \par  At C5-C6 , broad-based disc osteophytic ridging has progressed slightly since prior exam. Mild to\par moderate right foraminal stenosis and mild left foraminal stenosis is present. Disc osteophyte comp\par sole asymmetrically prominent to the right.\par \par  At C6-C7 , there is no significant central or foraminal stenosis.\par \par  At C7-T1 , there is no significant central or foraminal stenosis.\par \par \par  IMPRESSION:\par  Multilevel degenerative changes as outlined above have progressed since the 2019 study most notably\par at C4-C5 where there is significantly increased mass effect associated with a right para\par midline/right foraminal disc osteophyte complex\par \par 1/28/2019\par \par MRI CERVICAL SPINE\par Order#:   MRI [id number]\par \par \par \par MRI OF THE CERVICAL SPINE WITHOUT CONTRAST\par \par  INDICATION:  Neck pain\par \par  TECHNIQUE: Noncontrast sagittal T1, T2, STIR, axial T2 and gradient echo, and sagittal T2\par volumetric with axial and coronal reformats.\par \par  CONTRAST: None\par \par  COMPARISON:  No prior studies are available for comparison\par \par  FINDINGS:\par \par  There is reversal of the cervical lordosis. There is minimal degenerative retrolisthesis of C3 on\par C4 and C4 on C5. The marrow signal is overall within normal limits with no focal marrow replacing\par lesion identified. The vertebral body heights are maintained and there is no evidence of acute\par fracture or subluxation. There is no abnormal vertebral or paraspinal edema. The visualized\par paraspinal soft tissues appear grossly unremarkable.\par \par  No abnormal signal is identified in the cervical spinal cord. The visualized posterior fossa\par structures are unremarkable.\par \par  C2-3: No significant disc bulge or herniation. No significant central canal or foraminal stenosis.\par \par  C3-4: Central disc protrusion superimposed on mild disc bulge with marginal and uncovertebral\par osteophyte formation. No significant central canal stenosis, however disc herniation mildly impinges\par upon the ventral spinal cord. Moderate left foraminal stenosis and mild right foraminal stenosis.\par \par  C4-5: Right paracentral disc/osteophyte superimposed on a mild disc bulge with marginal osteophyte\par formation. No significant central canal stenosis, however disc/osteophyte mildly impinges upon the\par right ventral spinal cord. Moderate bilateral foraminal stenosis.\par \par  C5-6: Mild disc bulge with marginal and uncovertebral osteophyte formation. No significant central\par canal stenosis. Mild/moderate right foraminal stenosis and mild left foraminal stenosis.\par \par  C6-7: Mild disc bulge. No significant central canal or foraminal stenosis.\par \par  C7-T1: Minimal disc bulge. No significant central canal or foraminal stenosis.\par \par \par \par  IMPRESSION:\par \par  Cervical spondylosis with central disc herniation at C3-4 and right paracentral disc/osteophyte at\par C4-5. No significant central canal stenosis, however there is mild impingement of the ventral spinal\par cord at C3-4 and C4-5. Varying degrees of foraminal stenosis as above, most pronounced at C3-4 and\par the left and C4-5 bilaterally.\par \par \par \par \par  [FreeTextEntry2] : 24

## 2023-04-14 LAB
ALBUMIN SERPL ELPH-MCNC: 4.4 G/DL
ALP BLD-CCNC: 64 U/L
ALT SERPL-CCNC: 15 U/L
ANION GAP SERPL CALC-SCNC: 17 MMOL/L
AST SERPL-CCNC: 20 U/L
BILIRUB SERPL-MCNC: 0.2 MG/DL
BUN SERPL-MCNC: 15 MG/DL
CALCIUM SERPL-MCNC: 9.8 MG/DL
CHLORIDE SERPL-SCNC: 102 MMOL/L
CO2 SERPL-SCNC: 22 MMOL/L
CREAT SERPL-MCNC: 0.67 MG/DL
EGFR: 95 ML/MIN/1.73M2
ESTIMATED AVERAGE GLUCOSE: 186 MG/DL
GLUCOSE SERPL-MCNC: 182 MG/DL
HBA1C MFR BLD HPLC: 8.1 %
HCT VFR BLD CALC: 31.4 %
HGB BLD-MCNC: 9 G/DL
MCHC RBC-ENTMCNC: 20.9 PG
MCHC RBC-ENTMCNC: 28.7 GM/DL
MCV RBC AUTO: 72.9 FL
PLATELET # BLD AUTO: 353 K/UL
POTASSIUM SERPL-SCNC: 3.7 MMOL/L
PROT SERPL-MCNC: 6.5 G/DL
RBC # BLD: 4.31 M/UL
RBC # FLD: 20.1 %
SODIUM SERPL-SCNC: 141 MMOL/L
T3 SERPL-MCNC: 103 NG/DL
T4 FREE SERPL-MCNC: 1.5 NG/DL
TSH SERPL-ACNC: 0.52 UIU/ML
WBC # FLD AUTO: 7.65 K/UL

## 2023-04-20 ENCOUNTER — APPOINTMENT (OUTPATIENT)
Dept: PAIN MANAGEMENT | Facility: CLINIC | Age: 68
End: 2023-04-20
Payer: MEDICARE

## 2023-04-20 VITALS — DIASTOLIC BLOOD PRESSURE: 75 MMHG | OXYGEN SATURATION: 100 % | SYSTOLIC BLOOD PRESSURE: 128 MMHG | HEART RATE: 80 BPM

## 2023-04-20 PROCEDURE — 64493 INJ PARAVERT F JNT L/S 1 LEV: CPT | Mod: 50

## 2023-04-20 PROCEDURE — 64494 INJ PARAVERT F JNT L/S 2 LEV: CPT | Mod: 50

## 2023-04-20 NOTE — PROCEDURE
[FreeTextEntry1] : LUMBAR MEDIAL BRANCH BLOCK BILATERAL \par \par The patient was placed in the prone position on the fluoroscopic table with a pillow under the abdomen.  Routine monitors were applied.  The back was draped in the usual sterile fashion and sterile technique was adhered to throughout the entire procedure.\par \par The [LEFT] L4-sacral ala  levels were identified under fluoroscopic guidance.  The vertebral body end plates were aligned and the junction of the superior articular process and the base of the transverse process was brought into view using an oblique angulation of the C-arm.  The skin and subcutaneous tissues were infiltrated with 1% Lidocaine.\par \par At all the levels except for the lumbosacral junction, a 25-gauge 3.5" spinal needle was inserted at the angle between the superior articular process and the base of the transverse process (after local anesthesia).  Oblique angulation was used to guide the needle into position.  The L5 median branch was identified at the lumbosacral junction and a 25-gauge 3.5" was guided fluoroscopically using AP view to the [LEFT ]lumbosacral junction.  1cc of 0.5% Bupivacaine with 1mg kenalog was injected at each level. \par \par \par The [RIGHT] L4-sacral ala  levels were identified under fluoroscopic guidance.  The vertebral body end plates were aligned and the junction of the superior articular process and the base of the transverse process was brought into view using an oblique angulation of the C-arm.  The skin and subcutaneous tissues were infiltrated with 1% Lidocaine.\par \par At all the levels except for the lumbosacral junction, a 25-gauge 3.5" spinal needle was inserted at the angle between the superior articular process and the base of the transverse process (after local anesthesia).  Oblique angulation was used to guide the needle into position.  The L5 median branch was identified at the lumbosacral junction and a 25-gauge 3.5" was guided fluoroscopically using AP view to the [RIGHT ]lumbosacral junction.  1cc of 0.5% Bupivacaine with 1mg kenalog was injected at each level. \par \par The patient tolerated the procedure well.  There was no neurological deficit post procedure.  The patient went to recovery room in stable condition.  Discharge instructions were given to the patient.\par

## 2023-04-21 ENCOUNTER — APPOINTMENT (OUTPATIENT)
Dept: PAIN MANAGEMENT | Facility: CLINIC | Age: 68
End: 2023-04-21
Payer: MEDICARE

## 2023-04-21 PROCEDURE — 99212 OFFICE O/P EST SF 10 MIN: CPT | Mod: 95

## 2023-04-21 NOTE — HISTORY OF PRESENT ILLNESS
[Home] : at home, [unfilled] , at the time of the visit. [Medical Office: ([facility])___] : at the medical office located in  [Verbal consent obtained from patient] : the patient, [unfilled] [___ yrs] : [unfilled] year(s) ago [FreeTextEntry1] : Interval Note:\par \par  sp  BILATERAL L4-sacral ala diagnostic medial branch block (2 joints, 3 nerves on each side) without immediate improvement in axial back pain but then significant improvement day after of back pain back pain\par \par denies any worsening numbness, weakness, bowel/bladder dysfunction\par \par \par HPI\par \par  \par \par Ms. SMITH CUMMINGS is a 67 year F on baby ASA with pmhx of TIA, DM2 (Notes poor glucose control with episodes of hypo and hyperglycemia. (BG's 30's-300's- managed by Dr Gutierres and Dr Hellerman), HTN, RA, chronic lower back pain x 15 yrs. Lower back pain worsening over the past few months. Remains severe despite medication and physical therapy. Pain to lower back that radiates to anterolaterally, left greater than right. Worst at night when laying. In addition, continued scapular and shoulder pain that radiates down her arms. Pain makes it difficult to do ADL's. Denies any additional weakness, numbness, bowel/bladder dysfunction.\par \par \par \par Previous and current pain medications/doses/effects: Gabapentin 600mg tid, Cymbalta 60mg.\par \par  \par \par Previous Pain Treatments: \par \par  \par \par Previous Pain Injections: Cervical MINO (2004)- Dr Ramos\par \par  \par \par Previous Diagnostic Studies/Images:\par \par Exam: MRI LUMBAR SPINE 11/2/22\par Order#: MRI [id number] \par \par \par \par HISTORY: 67 years Female LUMBAR SPINAL STENOSIS MRI \par \par \par  PROCEDURE:MRI lumbar spine without contrast \par \par  COMPARISON: \par \par  TECHNIQUE:MRI lumbosacral spine 1.5 Liza magnet \par \par  FINDINGS: Please note that there is transitional lumbosacral anatomy. Please note that there may be\par discrepancies in spinal level labeling on prior/subsequent imaging as well as clinical/surgical \par documentation and close attention on follow-up is strongly recommended especially in the setting of \par procedural planning. For the purposes of today's exam the transitional level is labeled a \par transitional L5 level. \par \par  The paraspinal musculature including the psoas muscle, multifidus muscles, and immediate para axial\par soft tissues appear within range of normal without evidence of mass or collection. \par \par  There is a maintenance of the normal lumbar lordosis. There is fairly uniform marrow signal on all \par sequences. \par \par  The conus terminates at the T12-L1 level. \par \par  At L5-S1 \par  The disc appears intact. There is no significant central or foraminal stenosis. \par \par  At L4-L5 \par  There is disc desiccation, a midline annular fissure and shallow central disc protrusion mildly \par effacing the ventral epidural space and flattening the ventral thecal sac. Bony overgrowth from \par adjacent facet joints and ligamentum flavum thickening contribute to mild left greater than right \par effacement of the lateral recesses. There is a prominent left-sided intraforaminal component of \par circumferential disc bulging contributing to moderately severe left foraminal stenosis. \par \par  At L3-L4 \par  There is loss of intervertebral disc space height, disc desiccation, with effacement of the lateral\par recesses more so on the right than the left. Mild left foraminal stenosis and moderate to severe \par right foraminal stenosis is present. Bony overgrowth from adjacent facet arthropathy is present at \par this level. \par \par  At L2-L3 \par  There is disc desiccation but no focal disc protrusion. No significant central or foraminal \par stenosis is present. \par \par  At L1-L2 \par  There is mild disc desiccation but no focal disc protrusion \par \par  At T11-T12 there is evidence of degenerative disc disease with a shallow central disc bulge. \par \par \par  IMPRESSION: Please note that there is transitional lumbosacral anatomy. Please note that there may \par be discrepancies in spinal level labeling on prior/subsequent imaging as well as clinical/surgical \par documentation and close attention on follow-up is strongly recommended especially in the setting of \par procedural planning. \par \par  Degenerative changes lumbosacral spine at L4-L5 include midline annular fissure and a shallow \par central disc bulging asymmetrically prominent to the left mildly effacing the left lateral recess \par and contributing to left foraminal stenosis \par \par  Moderately advanced degenerative disc changes at L3-L4 are associated with reactive Modic type \par changes. Disc osteophyte complex results in moderate to severe right foraminal stenosis and mild \par left foraminal stenosis further detailed above \par \par  Other findings outlined above \par \par \par Exam: MRI CERVICAL SPINE 11/2/22\par Order#: MRI [id number] \par \par \par \par HISTORY: 67 years Female CHRONIC CERVICAL RADICULOPATHY MRI \par \par  PROCEDURE: MRI cervical spine without contrast \par \par  COMPARISON: January 28, 2019 \par \par  TECHNIQUE: MRI cervical spine performed 1.5 Liza magnet \par \par  FINDINGS: \par  There is maintenance of the normal cervical lordosis. \par \par  The prevertebral soft tissues appear within range of normal. \par \par  The craniocervical junction and clivus and C1-C2 distance appear within range of normal \par \par  There is uniform marrow signal on all sequences \par \par  The cervical cord is uniform in signal intensity without evidence of syrinx or suggestion of \par myelomalacia. \par \par  At C2-C3 , there is no significant central or foraminal stenosis. \par \par  At C3-C4 there is broad-based disc osteophytic ridging which indents the thecal sac, decreases the \par AP dimension of the thecal sac slightly and contributes to moderate right foraminal stenosis and \par mild to moderate left foraminal stenosis. This may be slightly progressed as compared to the 2019 \par study. \par \par  At C4-M1shvfp is broad-based disc osteophytic ridging and a right para midline/right foraminal disc\par protrusion which indents the thecal sac and deforms the ventral surface of the cord. There is \par significantly increased mass effect as compared to the 2019 study which now results in moderate to \par severe right foraminal stenosis and moderate left foraminal stenosis with the AP dimension of the \par thecal sac is reduced to 7 mm. \par \par  At C5-C6 , broad-based disc osteophytic ridging has progressed slightly since prior exam. Mild to \par moderate right foraminal stenosis and mild left foraminal stenosis is present. Disc osteophyte comp\par sole asymmetrically prominent to the right. \par \par  At C6-C7 , there is no significant central or foraminal stenosis. \par \par  At C7-T1 , there is no significant central or foraminal stenosis. \par \par \par  IMPRESSION: \par  Multilevel degenerative changes as outlined above have progressed since the 2019 study most notably\par at C4-C5 where there is significantly increased mass effect associated with a right para \par midline/right foraminal disc osteophyte complex \par \par  Other findings discussed above \par \par \par  Thank you for allowing us to participate in the evaluation of this patient. \par \par \par \par MRI LS 11/22\par \par  Please note that there is transitional lumbosacral anatomy. Please note that there may be\par discrepancies in spinal level labeling on prior/subsequent imaging as well as clinical/surgical\par documentation and close attention on follow-up is strongly recommended especially in the setting of\par procedural planning. For the purposes of today's exam the transitional level is labeled a\par transitional L5 level.\par \par  The paraspinal musculature including the psoas muscle, multifidus muscles, and immediate para axial\par soft tissues appear within range of normal without evidence of mass or collection.\par \par  There is a maintenance of the normal lumbar lordosis. There is fairly uniform marrow signal on all\par sequences.\par \par  The conus terminates at the T12-L1 level.\par \par  At L5-S1\par  The disc appears intact. There is no significant central or foraminal stenosis.\par \par  At L4-L5\par  There is disc desiccation, a midline annular fissure and shallow central disc protrusion mildly\par effacing the ventral epidural space and flattening the ventral thecal sac. Bony overgrowth from\par adjacent facet joints and ligamentum flavum thickening contribute to mild left greater than right\par effacement of the lateral recesses. There is a prominent left-sided intraforaminal component of\par circumferential disc bulging contributing to moderately severe left foraminal stenosis.\par \par  At L3-L4\par  There is loss of intervertebral disc space height, disc desiccation, with effacement of the lateral\par recesses more so on the right than the left. Mild left foraminal stenosis and moderate to severe\par right foraminal stenosis is present. Bony overgrowth from adjacent facet arthropathy is present at\par this level.\par \par  At L2-L3\par  There is disc desiccation but no focal disc protrusion. No significant central or foraminal\par stenosis is present.\par \par  At L1-L2\par  There is mild disc desiccation but no focal disc protrusion\par \par  At T11-T12 there is evidence of degenerative disc disease with a shallow central disc bulge.\par \par \par  IMPRESSION: Please note that there is transitional lumbosacral anatomy. Please note that there may\par be discrepancies in spinal level labeling on prior/subsequent imaging as well as clinical/surgical\par documentation and close attention on follow-up is strongly recommended especially in the setting of\par procedural planning.\par \par  Degenerative changes lumbosacral spine at L4-L5 include midline annular fissure and a shallow\par central disc bulging asymmetrically prominent to the left mildly effacing the left lateral recess\par and contributing to left foraminal stenosis\par \par  Moderately advanced degenerative disc changes at L3-L4 are associated with reactive Modic type\par changes. Disc osteophyte complex results in moderate to severe right foraminal stenosis and mild\par left foraminal stenosis further detailed above\par \par MRI CS 11/2/22\par \par There is maintenance of the normal cervical lordosis.\par \par  The prevertebral soft tissues appear within range of normal.\par \par  The craniocervical junction and clivus and C1-C2 distance appear within range of normal\par \par  There is uniform marrow signal on all sequences\par \par  The cervical cord is uniform in signal intensity without evidence of syrinx or suggestion of\par myelomalacia.\par \par  At C2-C3 ,  there is no significant central or foraminal stenosis.\par \par  At C3-C4 there is broad-based disc osteophytic ridging which indents the thecal sac, decreases the\par AP dimension of the thecal sac slightly and contributes to moderate right foraminal stenosis and\par mild to moderate left foraminal stenosis. This may be slightly progressed as compared to the 2019\par study.\par \par  At C4-[id number] is broad-based disc osteophytic ridging and a right para midline/right foraminal disc\par protrusion which indents the thecal sac and deforms the ventral surface of the cord. There is\par significantly increased mass effect as compared to the 2019 study which now results in moderate to\par severe right foraminal stenosis and moderate left foraminal stenosis with the AP dimension of the\par thecal sac is reduced to 7 mm.\par \par  At C5-C6 , broad-based disc osteophytic ridging has progressed slightly since prior exam. Mild to\par moderate right foraminal stenosis and mild left foraminal stenosis is present. Disc osteophyte comp\par sole asymmetrically prominent to the right.\par \par  At C6-C7 , there is no significant central or foraminal stenosis.\par \par  At C7-T1 , there is no significant central or foraminal stenosis.\par \par \par  IMPRESSION:\par  Multilevel degenerative changes as outlined above have progressed since the 2019 study most notably\par at C4-C5 where there is significantly increased mass effect associated with a right para\par midline/right foraminal disc osteophyte complex\par \par 1/28/2019\par \par MRI CERVICAL SPINE\par Order#:   MRI [id number]\par \par \par \par MRI OF THE CERVICAL SPINE WITHOUT CONTRAST\par \par  INDICATION:  Neck pain\par \par  TECHNIQUE: Noncontrast sagittal T1, T2, STIR, axial T2 and gradient echo, and sagittal T2\par volumetric with axial and coronal reformats.\par \par  CONTRAST: None\par \par  COMPARISON:  No prior studies are available for comparison\par \par  FINDINGS:\par \par  There is reversal of the cervical lordosis. There is minimal degenerative retrolisthesis of C3 on\par C4 and C4 on C5. The marrow signal is overall within normal limits with no focal marrow replacing\par lesion identified. The vertebral body heights are maintained and there is no evidence of acute\par fracture or subluxation. There is no abnormal vertebral or paraspinal edema. The visualized\par paraspinal soft tissues appear grossly unremarkable.\par \par  No abnormal signal is identified in the cervical spinal cord. The visualized posterior fossa\par structures are unremarkable.\par \par  C2-3: No significant disc bulge or herniation. No significant central canal or foraminal stenosis.\par \par  C3-4: Central disc protrusion superimposed on mild disc bulge with marginal and uncovertebral\par osteophyte formation. No significant central canal stenosis, however disc herniation mildly impinges\par upon the ventral spinal cord. Moderate left foraminal stenosis and mild right foraminal stenosis.\par \par  C4-5: Right paracentral disc/osteophyte superimposed on a mild disc bulge with marginal osteophyte\par formation. No significant central canal stenosis, however disc/osteophyte mildly impinges upon the\par right ventral spinal cord. Moderate bilateral foraminal stenosis.\par \par  C5-6: Mild disc bulge with marginal and uncovertebral osteophyte formation. No significant central\par canal stenosis. Mild/moderate right foraminal stenosis and mild left foraminal stenosis.\par \par  C6-7: Mild disc bulge. No significant central canal or foraminal stenosis.\par \par  C7-T1: Minimal disc bulge. No significant central canal or foraminal stenosis.\par \par \par \par  IMPRESSION:\par \par  Cervical spondylosis with central disc herniation at C3-4 and right paracentral disc/osteophyte at\par C4-5. No significant central canal stenosis, however there is mild impingement of the ventral spinal\par cord at C3-4 and C4-5. Varying degrees of foraminal stenosis as above, most pronounced at C3-4 and\par the left and C4-5 bilaterally.\par \par \par \par \par

## 2023-04-21 NOTE — PHYSICAL EXAM
[de-identified] : \par Constitutional: Normal, well developed, no acute distress on audio/video examination\par Eyes: Symmetric, External structures on video examination\par ENT: Lips, mucosa and tongue normal on video examination\par Oropharynx: Lips normal, symmetric, no external lesions appreciated appreciated on video examination\par Respiratory: Non-labored breathing, no audible wheezes appreciated on audio/video examination\par Vascular: No cyanosis appreciated or edema appreciated on video examination\par GI:  no jaundice appreciated on video examination\par Neurovascular: CN grossly intact on video/audio examination, alert\par MSK: Normal muscle bulk on video examination\par

## 2023-04-21 NOTE — ASSESSMENT
[FreeTextEntry1] : >> Imaging and Other Studies\par \par I personally reviewed the relevant imaging.  Discussed and explained to patient the likely source of pathology and pain.  Questions answered. MRI \par \par na\par >> Therapy and Other Modalities\par \par start PT - referral provided\par \par >> Medications\par \par acetaminophen 650mg q8h prn pain (caution <3g daily)\par  \par >> Interventions\par \par Significant component of axial back pain secondary to lumbar spondylosis and facet arthropathy demonstrated on MRI LS.  Pain refractory to conservative treatments.  sp BILATERAL L4-sacral ala diagnostic medial branch block (2 joints, 3 nerves on each side) r/b/a discussed (STEROID SPARING) without immediate improvement, but improvement in 12 hours and for 2 months\par \par given efficacy of previous intervention that is >80% improvement in pain and improved ability to perform adls, and return of pain despite conservative treatment, sp repeat BILATERAL L4-sacral ala diagnostic medial branch block (2 joints, 3 nerves on each side) without immediate improvement but now improved\par \par \par \par >> Consults\par \par >> Discussion of Risks/Benefits/Alternatives\par \par 	>Regarding any scheduled procedures:\par \par I have discussed in detail with the patient that any interventional pain procedure is associated with potential risks.  The procedure may include an injection of steroids and potentially other medications (local anesthetic and normal saline) into the epidural space or surrounding tissue of the spine.  There are significant risks of this procedure which include and are not limited to infection, bleeding, worsening pain, dural puncture leading to postdural puncture headache, nerve damage, spinal cord injury, paralysis, stroke, and death.  \par \par There is a chance that the procedure does not improve their pain.  \par \par There are risks associated with the steroid being absorbed into the body systemically.  These include dysphoria, difficulty sleeping, mood swings and personality changes.  Premenopausal women may notice an irregularity in her menstrual cycle for 2-3 months following the injection.  Steroids can specifically affect patients with hypertension, diabetes, and peptic ulcers.  The procedure may cause a temporary increase in blood pressure and blood pressure, and may adversely affect a peptic ulcer.  Other, more rare complications, include avascular necrosis of joints, glaucoma and worsening of osteoporosis. \par \par I have discussed the risks of the procedure at length with the patient, and the potential benefits of pain relief.  I have offered alternatives to the procedure.  All questions were answered.  \par \par The patient expressed understanding and wishes to proceed with the procedure.\par \par 	>Regarding COVID19 Pandemic: \par \par Any planned interventional pain procedure are scheduled because further delay may cause harm or negative outcome to patient.  The goal in performing this procedure is to avoid deterioration of function, emergency room visits (which increases exposure) and reliance on opioids.  \par \par r/b/a discussed with patient, lack of evidence to conclusively determine whether pain management procedures have any positive or negative impact on the possibility of shima the virus and/or development of any sequelae. \par \par Patient counselled regarding timing steroid based intervention 2 weeks before or after COVID-19 vaccine administration to avoid any interaction or affect on efficacy of vaccination\par \par Patient demonstrates understanding\par \par Informed patient that risks associated with the COVID-19 infection.  Informed patient steps taken to limit the risks.  We are implementing safety precautions and following protocols consistent with the CDC and state recommendations. All patients and staff will be checked for fever or signs of illness upon entry to the facility. We will limit our steroid dose to the lowest effective therapeutic dose or in some cases steroids will not be injected at all. \par \par Patient agrees to proceed\par \par >> Conclusion\par \par The above diagnosis and treatment plan is medically reasonable and necessary based on the patient encounter \par There were no barriers to communication.\par Informed patient that I would be available for any additional questions.\par Patient was instructed to call with any worsening symptoms including severe pain, new numbness/weakness, or changes in the bowel/bladder function. \par Discussed role of nsaids in pain management and all relevant risks, if patient is continuing to require after 4 weeks the patient should f/u for alternative treatment. \par Instructed patient to maintain pain diary to monitor pain level, mobility, and function.\par \par I explained to patient benefits and limitation of TeleMedicine visits\par \par Patient understands that limitations include inability to perform comprehensive physical exam, which may lead to potential diagnostic inconsistencies.  \par \par Any scheduled procedures are based on history, imaging and limited physical exam performed on TeleHealth visit.  If necessary, additional focal physical exam will be performed on date of procedure\par \par Patient understands that diagnosis and treatment may be limited by these inconsistencies and patient agrees to proceed with care plan\par \par \par \par

## 2023-05-01 ENCOUNTER — APPOINTMENT (OUTPATIENT)
Dept: GASTROENTEROLOGY | Facility: CLINIC | Age: 68
End: 2023-05-01
Payer: MEDICARE

## 2023-05-01 VITALS
WEIGHT: 134 LBS | HEIGHT: 63 IN | DIASTOLIC BLOOD PRESSURE: 70 MMHG | BODY MASS INDEX: 23.74 KG/M2 | SYSTOLIC BLOOD PRESSURE: 120 MMHG

## 2023-05-01 DIAGNOSIS — K62.5 HEMORRHAGE OF ANUS AND RECTUM: ICD-10-CM

## 2023-05-01 DIAGNOSIS — K59.00 CONSTIPATION, UNSPECIFIED: ICD-10-CM

## 2023-05-01 PROCEDURE — 99215 OFFICE O/P EST HI 40 MIN: CPT

## 2023-05-01 NOTE — HISTORY OF PRESENT ILLNESS
[de-identified] : Presents  with persistent  constipation ( hard stool once  weekly).  Additional noted to have  a microcytic  anemia  ( 4/14/23) .\par \par Denies  nausea, vomiting, fever, chills, diarrhea, melena, hematemesis. Admits  to occasional blood on the toilet paper after a bowel movement. \par \par Last evaluated 6/4/2021 for  a  c/o 2-3  weeks of  LUQ and suprapubic  pain (  mild / non radiating / no clear  precipitating or  alleviating factors) . GYN evaluation ( 5/28/21) reviewed and was unremarkable  ( including pelvic  sonogram) \par \par CT scan 6/2021 : unremarkable   LABS 6/2021: UNREMARKABLE\par \par Denies nausea, vomiting, fever, chills, diarrhea, melena, hematemesis\par \par \par Evaluated 2/2020 for a microcytic  mild anemia and long standing constipation and epigastric discomfort ( mild) .  EGD / colonoscopy revealed gastritis / hemorrhoids / diverticulosis. PPI and digestive  advantage  continued\par \par - CT scan  ( 12/2017) notable for fatty liver (report reviewed by me) . \par -EGD / colonoscopy 4/2015 revealed H. pylori negative gastritis / esophagitis / hemorrhoids and diverticulosis. Indication for procedures was constipation / bloating / dysphagia. Digestive advantage recommended as well with Linzess to be considered if sxs persisted.\par \par  SONO/CAT scan ( 2011) was significant for fatty liver and a fatty growth on the kidney for which she was referred to Dr. Alston. \par \par An EGD / colonoscopy performed by me in 2004/2002 respectively revealed melanosis coli and gastritis / hiatal hernia. \par

## 2023-05-01 NOTE — PHYSICAL EXAM
[General Appearance - Alert] : alert [General Appearance - In No Acute Distress] : in no acute distress [Sclera] : the sclera and conjunctiva were normal [Outer Ear] : the ears and nose were normal in appearance [] : no respiratory distress [Abdomen Soft] : soft [FreeTextEntry1] : deferred [No CVA Tenderness] : no ~M costovertebral angle tenderness [Abnormal Walk] : normal gait [No Focal Deficits] : no focal deficits [Oriented To Time, Place, And Person] : oriented to person, place, and time

## 2023-05-01 NOTE — ASSESSMENT
[FreeTextEntry1] : 1. Anemia:  EGD  / colonoscopy ordered\par \par 2. BRBPR:  Likely hemorrhoidal / Colonoscopy to confirm\par \par 3. Anemia:  EGD with colonoscopy\par \par 4. Constipation: PRN  Miralax\par \par Pertinent available records reviewed\par Risks of the procedures including but not limited to bleeding / perforation / infection / anesthesia complication / missed  lesions explained to the  patient . The patient expressed understanding and a desire to proceed with the procedures.\par \par Risk of not doing procedures includes but is not limited to missed or delayed diagnosis of gastric pathology, colon cancer or other gastrointestinal pathology\par  \par

## 2023-05-03 ENCOUNTER — APPOINTMENT (OUTPATIENT)
Dept: PAIN MANAGEMENT | Facility: CLINIC | Age: 68
End: 2023-05-03
Payer: MEDICARE

## 2023-05-03 VITALS
DIASTOLIC BLOOD PRESSURE: 78 MMHG | SYSTOLIC BLOOD PRESSURE: 144 MMHG | WEIGHT: 134 LBS | BODY MASS INDEX: 23.74 KG/M2 | HEIGHT: 63 IN

## 2023-05-03 PROCEDURE — 99214 OFFICE O/P EST MOD 30 MIN: CPT

## 2023-05-03 NOTE — HISTORY OF PRESENT ILLNESS
[7] : 3. What number best describes how, during the past week, pain has interfered with your general activity? 7/10 pain [___ yrs] : [unfilled] year(s) ago [FreeTextEntry1] : Interval Note:\par \par  sp  BILATERAL L4-sacral ala diagnostic medial branch block (2 joints, 3 nerves on each side) without immediate improvement in axial back pain but then significant improvement day after of back pain back pain\par \par patient returns of continued back pain.  Pain is so bad that patient finds it difficult to perform adls and ambulate. denies any worsening numbness, weakness, bowel/bladder dysfunction. \par \par \par \par \par HPI\par \par  \par \par Ms. SMITH CUMMINGS is a 67 year F on baby ASA with pmhx of TIA, DM2 (Notes poor glucose control with episodes of hypo and hyperglycemia. (BG's 30's-300's- managed by Dr Gutierres and Dr Hellerman), HTN, RA, chronic lower back pain x 15 yrs. Lower back pain worsening over the past few months. Remains severe despite medication and physical therapy. Pain to lower back that radiates to anterolaterally, left greater than right. Worst at night when laying. In addition, continued scapular and shoulder pain that radiates down her arms. Pain makes it difficult to do ADL's. Denies any additional weakness, numbness, bowel/bladder dysfunction.\par \par \par \par Previous and current pain medications/doses/effects: Gabapentin 600mg tid, Cymbalta 60mg.\par \par  \par \par Previous Pain Treatments: \par \par  \par \par Previous Pain Injections: Cervical MINO (2004)- Dr Ramos\par \par  \par \par Previous Diagnostic Studies/Images:\par \par Exam: MRI LUMBAR SPINE 11/2/22\par Order#: MRI [id number] \par \par \par \par HISTORY: 67 years Female LUMBAR SPINAL STENOSIS MRI \par \par \par  PROCEDURE:MRI lumbar spine without contrast \par \par  COMPARISON: \par \par  TECHNIQUE:MRI lumbosacral spine 1.5 Liza magnet \par \par  FINDINGS: Please note that there is transitional lumbosacral anatomy. Please note that there may be\par discrepancies in spinal level labeling on prior/subsequent imaging as well as clinical/surgical \par documentation and close attention on follow-up is strongly recommended especially in the setting of \par procedural planning. For the purposes of today's exam the transitional level is labeled a \par transitional L5 level. \par \par  The paraspinal musculature including the psoas muscle, multifidus muscles, and immediate para axial\par soft tissues appear within range of normal without evidence of mass or collection. \par \par  There is a maintenance of the normal lumbar lordosis. There is fairly uniform marrow signal on all \par sequences. \par \par  The conus terminates at the T12-L1 level. \par \par  At L5-S1 \par  The disc appears intact. There is no significant central or foraminal stenosis. \par \par  At L4-L5 \par  There is disc desiccation, a midline annular fissure and shallow central disc protrusion mildly \par effacing the ventral epidural space and flattening the ventral thecal sac. Bony overgrowth from \par adjacent facet joints and ligamentum flavum thickening contribute to mild left greater than right \par effacement of the lateral recesses. There is a prominent left-sided intraforaminal component of \par circumferential disc bulging contributing to moderately severe left foraminal stenosis. \par \par  At L3-L4 \par  There is loss of intervertebral disc space height, disc desiccation, with effacement of the lateral\par recesses more so on the right than the left. Mild left foraminal stenosis and moderate to severe \par right foraminal stenosis is present. Bony overgrowth from adjacent facet arthropathy is present at \par this level. \par \par  At L2-L3 \par  There is disc desiccation but no focal disc protrusion. No significant central or foraminal \par stenosis is present. \par \par  At L1-L2 \par  There is mild disc desiccation but no focal disc protrusion \par \par  At T11-T12 there is evidence of degenerative disc disease with a shallow central disc bulge. \par \par \par  IMPRESSION: Please note that there is transitional lumbosacral anatomy. Please note that there may \par be discrepancies in spinal level labeling on prior/subsequent imaging as well as clinical/surgical \par documentation and close attention on follow-up is strongly recommended especially in the setting of \par procedural planning. \par \par  Degenerative changes lumbosacral spine at L4-L5 include midline annular fissure and a shallow \par central disc bulging asymmetrically prominent to the left mildly effacing the left lateral recess \par and contributing to left foraminal stenosis \par \par  Moderately advanced degenerative disc changes at L3-L4 are associated with reactive Modic type \par changes. Disc osteophyte complex results in moderate to severe right foraminal stenosis and mild \par left foraminal stenosis further detailed above \par \par  Other findings outlined above \par \par \par Exam: MRI CERVICAL SPINE 11/2/22\par Order#: MRI [id number] \par \par \par \par HISTORY: 67 years Female CHRONIC CERVICAL RADICULOPATHY MRI \par \par  PROCEDURE: MRI cervical spine without contrast \par \par  COMPARISON: January 28, 2019 \par \par  TECHNIQUE: MRI cervical spine performed 1.5 Liza magnet \par \par  FINDINGS: \par  There is maintenance of the normal cervical lordosis. \par \par  The prevertebral soft tissues appear within range of normal. \par \par  The craniocervical junction and clivus and C1-C2 distance appear within range of normal \par \par  There is uniform marrow signal on all sequences \par \par  The cervical cord is uniform in signal intensity without evidence of syrinx or suggestion of \par myelomalacia. \par \par  At C2-C3 , there is no significant central or foraminal stenosis. \par \par  At C3-C4 there is broad-based disc osteophytic ridging which indents the thecal sac, decreases the \par AP dimension of the thecal sac slightly and contributes to moderate right foraminal stenosis and \par mild to moderate left foraminal stenosis. This may be slightly progressed as compared to the 2019 \par study. \par \par  At C4-[id number] is broad-based disc osteophytic ridging and a right para midline/right foraminal disc\par protrusion which indents the thecal sac and deforms the ventral surface of the cord. There is \par significantly increased mass effect as compared to the 2019 study which now results in moderate to \par severe right foraminal stenosis and moderate left foraminal stenosis with the AP dimension of the \par thecal sac is reduced to 7 mm. \par \par  At C5-C6 , broad-based disc osteophytic ridging has progressed slightly since prior exam. Mild to \par moderate right foraminal stenosis and mild left foraminal stenosis is present. Disc osteophyte comp\par sole asymmetrically prominent to the right. \par \par  At C6-C7 , there is no significant central or foraminal stenosis. \par \par  At C7-T1 , there is no significant central or foraminal stenosis. \par \par \par  IMPRESSION: \par  Multilevel degenerative changes as outlined above have progressed since the 2019 study most notably\par at C4-C5 where there is significantly increased mass effect associated with a right para \par midline/right foraminal disc osteophyte complex \par \par  Other findings discussed above \par \par \par  Thank you for allowing us to participate in the evaluation of this patient. \par \par \par \par MRI LS 11/22\par \par  Please note that there is transitional lumbosacral anatomy. Please note that there may be\par discrepancies in spinal level labeling on prior/subsequent imaging as well as clinical/surgical\par documentation and close attention on follow-up is strongly recommended especially in the setting of\par procedural planning. For the purposes of today's exam the transitional level is labeled a\par transitional L5 level.\par \par  The paraspinal musculature including the psoas muscle, multifidus muscles, and immediate para axial\par soft tissues appear within range of normal without evidence of mass or collection.\par \par  There is a maintenance of the normal lumbar lordosis. There is fairly uniform marrow signal on all\par sequences.\par \par  The conus terminates at the T12-L1 level.\par \par  At L5-S1\par  The disc appears intact. There is no significant central or foraminal stenosis.\par \par  At L4-L5\par  There is disc desiccation, a midline annular fissure and shallow central disc protrusion mildly\par effacing the ventral epidural space and flattening the ventral thecal sac. Bony overgrowth from\par adjacent facet joints and ligamentum flavum thickening contribute to mild left greater than right\par effacement of the lateral recesses. There is a prominent left-sided intraforaminal component of\par circumferential disc bulging contributing to moderately severe left foraminal stenosis.\par \par  At L3-L4\par  There is loss of intervertebral disc space height, disc desiccation, with effacement of the lateral\par recesses more so on the right than the left. Mild left foraminal stenosis and moderate to severe\par right foraminal stenosis is present. Bony overgrowth from adjacent facet arthropathy is present at\par this level.\par \par  At L2-L3\par  There is disc desiccation but no focal disc protrusion. No significant central or foraminal\par stenosis is present.\par \par  At L1-L2\par  There is mild disc desiccation but no focal disc protrusion\par \par  At T11-T12 there is evidence of degenerative disc disease with a shallow central disc bulge.\par \par \par  IMPRESSION: Please note that there is transitional lumbosacral anatomy. Please note that there may\par be discrepancies in spinal level labeling on prior/subsequent imaging as well as clinical/surgical\par documentation and close attention on follow-up is strongly recommended especially in the setting of\par procedural planning.\par \par  Degenerative changes lumbosacral spine at L4-L5 include midline annular fissure and a shallow\par central disc bulging asymmetrically prominent to the left mildly effacing the left lateral recess\par and contributing to left foraminal stenosis\par \par  Moderately advanced degenerative disc changes at L3-L4 are associated with reactive Modic type\par changes. Disc osteophyte complex results in moderate to severe right foraminal stenosis and mild\par left foraminal stenosis further detailed above\par \par MRI CS 11/2/22\par \par There is maintenance of the normal cervical lordosis.\par \par  The prevertebral soft tissues appear within range of normal.\par \par  The craniocervical junction and clivus and C1-C2 distance appear within range of normal\par \par  There is uniform marrow signal on all sequences\par \par  The cervical cord is uniform in signal intensity without evidence of syrinx or suggestion of\par myelomalacia.\par \par  At C2-C3 ,  there is no significant central or foraminal stenosis.\par \par  At C3-C4 there is broad-based disc osteophytic ridging which indents the thecal sac, decreases the\par AP dimension of the thecal sac slightly and contributes to moderate right foraminal stenosis and\par mild to moderate left foraminal stenosis. This may be slightly progressed as compared to the 2019\par study.\par \par  At C4-E7zezpf is broad-based disc osteophytic ridging and a right para midline/right foraminal disc\par protrusion which indents the thecal sac and deforms the ventral surface of the cord. There is\par significantly increased mass effect as compared to the 2019 study which now results in moderate to\par severe right foraminal stenosis and moderate left foraminal stenosis with the AP dimension of the\par thecal sac is reduced to 7 mm.\par \par  At C5-C6 , broad-based disc osteophytic ridging has progressed slightly since prior exam. Mild to\par moderate right foraminal stenosis and mild left foraminal stenosis is present. Disc osteophyte comp\par sole asymmetrically prominent to the right.\par \par  At C6-C7 , there is no significant central or foraminal stenosis.\par \par  At C7-T1 , there is no significant central or foraminal stenosis.\par \par \par  IMPRESSION:\par  Multilevel degenerative changes as outlined above have progressed since the 2019 study most notably\par at C4-C5 where there is significantly increased mass effect associated with a right para\par midline/right foraminal disc osteophyte complex\par \par 1/28/2019\par \par MRI CERVICAL SPINE\par Order#:   MRI [id number]\par \par \par \par MRI OF THE CERVICAL SPINE WITHOUT CONTRAST\par \par  INDICATION:  Neck pain\par \par  TECHNIQUE: Noncontrast sagittal T1, T2, STIR, axial T2 and gradient echo, and sagittal T2\par volumetric with axial and coronal reformats.\par \par  CONTRAST: None\par \par  COMPARISON:  No prior studies are available for comparison\par \par  FINDINGS:\par \par  There is reversal of the cervical lordosis. There is minimal degenerative retrolisthesis of C3 on\par C4 and C4 on C5. The marrow signal is overall within normal limits with no focal marrow replacing\par lesion identified. The vertebral body heights are maintained and there is no evidence of acute\par fracture or subluxation. There is no abnormal vertebral or paraspinal edema. The visualized\par paraspinal soft tissues appear grossly unremarkable.\par \par  No abnormal signal is identified in the cervical spinal cord. The visualized posterior fossa\par structures are unremarkable.\par \par  C2-3: No significant disc bulge or herniation. No significant central canal or foraminal stenosis.\par \par  C3-4: Central disc protrusion superimposed on mild disc bulge with marginal and uncovertebral\par osteophyte formation. No significant central canal stenosis, however disc herniation mildly impinges\par upon the ventral spinal cord. Moderate left foraminal stenosis and mild right foraminal stenosis.\par \par  C4-5: Right paracentral disc/osteophyte superimposed on a mild disc bulge with marginal osteophyte\par formation. No significant central canal stenosis, however disc/osteophyte mildly impinges upon the\par right ventral spinal cord. Moderate bilateral foraminal stenosis.\par \par  C5-6: Mild disc bulge with marginal and uncovertebral osteophyte formation. No significant central\par canal stenosis. Mild/moderate right foraminal stenosis and mild left foraminal stenosis.\par \par  C6-7: Mild disc bulge. No significant central canal or foraminal stenosis.\par \par  C7-T1: Minimal disc bulge. No significant central canal or foraminal stenosis.\par \par \par \par  IMPRESSION:\par \par  Cervical spondylosis with central disc herniation at C3-4 and right paracentral disc/osteophyte at\par C4-5. No significant central canal stenosis, however there is mild impingement of the ventral spinal\par cord at C3-4 and C4-5. Varying degrees of foraminal stenosis as above, most pronounced at C3-4 and\par the left and C4-5 bilaterally.\par \par \par \par \par  [FreeTextEntry2] : 21

## 2023-05-03 NOTE — ASSESSMENT
[FreeTextEntry1] : >> Imaging and Other Studies\par \par I personally reviewed the relevant imaging.  Discussed and explained to patient the likely source of pathology and pain.  Questions answered. MRI \par \par na\par >> Therapy and Other Modalities\par \par start PT - referral provided\par \par >> Medications\par \par acetaminophen 650mg q8h prn pain (caution <3g daily)\par  \par >> Interventions\par \par Significant component of axial back pain secondary to lumbar spondylosis and facet arthropathy demonstrated on MRI LS.  Pain refractory to conservative treatments.  sp BILATERAL L4-sacral ala diagnostic medial branch block (2 joints, 3 nerves on each side) r/b/a discussed (STEROID SPARING) without immediate improvement, but improvement in 12 hours and for 2 months\par \par given efficacy of previous intervention that is >80% improvement in pain and improved ability to perform adls, and return of pain despite conservative treatment, sp repeat BILATERAL L4-sacral ala diagnostic medial branch block (2 joints, 3 nerves on each side) without immediate improvement but now improved\par \par Significant component of back and leg pain likely secondary to lumbar spinal stenosis demonstrated on MRI LS.  Will schedule L4-5 interlaminar epidural steroid injection r/b/a discussed (caution transitional L5)\par \par >> Consults\par \par >> Discussion of Risks/Benefits/Alternatives\par \par 	>Regarding any scheduled procedures:\par \par I have discussed in detail with the patient that any interventional pain procedure is associated with potential risks.  The procedure may include an injection of steroids and potentially other medications (local anesthetic and normal saline) into the epidural space or surrounding tissue of the spine.  There are significant risks of this procedure which include and are not limited to infection, bleeding, worsening pain, dural puncture leading to postdural puncture headache, nerve damage, spinal cord injury, paralysis, stroke, and death.  \par \par There is a chance that the procedure does not improve their pain.  \par \par There are risks associated with the steroid being absorbed into the body systemically.  These include dysphoria, difficulty sleeping, mood swings and personality changes.  Premenopausal women may notice an irregularity in her menstrual cycle for 2-3 months following the injection.  Steroids can specifically affect patients with hypertension, diabetes, and peptic ulcers.  The procedure may cause a temporary increase in blood pressure and blood pressure, and may adversely affect a peptic ulcer.  Other, more rare complications, include avascular necrosis of joints, glaucoma and worsening of osteoporosis. \par \par I have discussed the risks of the procedure at length with the patient, and the potential benefits of pain relief.  I have offered alternatives to the procedure.  All questions were answered.  \par \par The patient expressed understanding and wishes to proceed with the procedure.\par \par 	>Regarding COVID19 Pandemic: \par \par Any planned interventional pain procedure are scheduled because further delay may cause harm or negative outcome to patient.  The goal in performing this procedure is to avoid deterioration of function, emergency room visits (which increases exposure) and reliance on opioids.  \par \par r/b/a discussed with patient, lack of evidence to conclusively determine whether pain management procedures have any positive or negative impact on the possibility of shima the virus and/or development of any sequelae. \par \par Patient counselled regarding timing steroid based intervention 2 weeks before or after COVID-19 vaccine administration to avoid any interaction or affect on efficacy of vaccination\par \par Patient demonstrates understanding\par \par Informed patient that risks associated with the COVID-19 infection.  Informed patient steps taken to limit the risks.  We are implementing safety precautions and following protocols consistent with the CDC and state recommendations. All patients and staff will be checked for fever or signs of illness upon entry to the facility. We will limit our steroid dose to the lowest effective therapeutic dose or in some cases steroids will not be injected at all. \par \par Patient agrees to proceed\par \par >> Conclusion\par \par The above diagnosis and treatment plan is medically reasonable and necessary based on the patient encounter \par There were no barriers to communication.\par Informed patient that I would be available for any additional questions.\par Patient was instructed to call with any worsening symptoms including severe pain, new numbness/weakness, or changes in the bowel/bladder function. \par Discussed role of nsaids in pain management and all relevant risks, if patient is continuing to require after 4 weeks the patient should f/u for alternative treatment. \par Instructed patient to maintain pain diary to monitor pain level, mobility, and function.\par

## 2023-05-05 ENCOUNTER — RESULT REVIEW (OUTPATIENT)
Age: 68
End: 2023-05-05

## 2023-05-08 ENCOUNTER — RESULT REVIEW (OUTPATIENT)
Age: 68
End: 2023-05-08

## 2023-05-08 ENCOUNTER — APPOINTMENT (OUTPATIENT)
Dept: HEMATOLOGY ONCOLOGY | Facility: CLINIC | Age: 68
End: 2023-05-08
Payer: MEDICARE

## 2023-05-08 VITALS
WEIGHT: 138.38 LBS | BODY MASS INDEX: 24.52 KG/M2 | SYSTOLIC BLOOD PRESSURE: 126 MMHG | HEART RATE: 84 BPM | DIASTOLIC BLOOD PRESSURE: 61 MMHG | RESPIRATION RATE: 16 BRPM | OXYGEN SATURATION: 99 % | HEIGHT: 63 IN | TEMPERATURE: 97.6 F

## 2023-05-08 DIAGNOSIS — Z80.42 FAMILY HISTORY OF MALIGNANT NEOPLASM OF PROSTATE: ICD-10-CM

## 2023-05-08 DIAGNOSIS — D50.9 IRON DEFICIENCY ANEMIA, UNSPECIFIED: ICD-10-CM

## 2023-05-08 PROCEDURE — 99204 OFFICE O/P NEW MOD 45 MIN: CPT

## 2023-05-08 RX ORDER — GABAPENTIN 300 MG/1
300 CAPSULE ORAL 3 TIMES DAILY
Qty: 540 | Refills: 1 | Status: DISCONTINUED | COMMUNITY
Start: 2021-04-27 | End: 2023-05-08

## 2023-05-08 NOTE — HISTORY OF PRESENT ILLNESS
[de-identified] : Ms. Nurys Rooney is 67 year old postmenopausal female with microcytic anemia here for consultation, referred by Dr. Meenu Weaver\par  [id number]\par \par Patient with hx of HTN, diverticulitis, chronic cervical radiculopathy, fibromyalgia, GERD, TIA, type 2 diabetes, and anemia in the last 10 years, never needed iron or blood transfusion.  Tried PO iron supplement multiple times but d/c due to severe constipation. \par She has hx of uterine fibroids 20 yrs ago with heavy menses  s/p hystectomy \par \par 4/14/2023 H/H 9.0/31.4 MCV 72.9 - been microcytic since 2020 as per records\par \par FHx:\par Brother with prostate at age 69\par M. grandmother with intestine cancer \par \par Screenings;\par Colonoscopy - 6/15/2023, last was 5 years\par Mammograms - 5/11/2023\par \par Social History\par Smoking - never\par Alcohol - denies\par Illicit drugs - denies\par retired \par \par She has been having headaches and more fatigue than usual.  NO PICA \par She has some bright red bleeding with wiping at times with hard BMs. \par \par

## 2023-05-08 NOTE — CONSULT LETTER
[Dear  ___] : Dear  [unfilled], [Consult Letter:] : I had the pleasure of evaluating your patient, [unfilled]. [Please see my note below.] : Please see my note below. [Consult Closing:] : Thank you very much for allowing me to participate in the care of this patient.  If you have any questions, please do not hesitate to contact me. [Sincerely,] : Sincerely, [FreeTextEntry3] : Dilshad Salas MD, MPH\par  of Medicine [facility]\par Attending Physician \par Hematology and Medical Oncology\par [facility]\par

## 2023-05-09 ENCOUNTER — APPOINTMENT (OUTPATIENT)
Dept: ENDOCRINOLOGY | Facility: CLINIC | Age: 68
End: 2023-05-09
Payer: MEDICARE

## 2023-05-09 VITALS
HEART RATE: 89 BPM | OXYGEN SATURATION: 99 % | WEIGHT: 138 LBS | DIASTOLIC BLOOD PRESSURE: 80 MMHG | BODY MASS INDEX: 24.45 KG/M2 | SYSTOLIC BLOOD PRESSURE: 140 MMHG | HEIGHT: 63 IN

## 2023-05-09 PROCEDURE — 99214 OFFICE O/P EST MOD 30 MIN: CPT

## 2023-05-09 NOTE — HISTORY OF PRESENT ILLNESS
[FreeTextEntry1] : May 09, 2023     in person\par \par PCP:  Dr. Meenu Weaver\par           Neuro:  Dr. Jarad Renteria (for HA)\par           Card:  Dr. Adrienne Pugh (palpitations, wake her from sleep)\par           GI:  Dr. Cisco Bull (anemia)\par           Rheum:  Dr. Sophia Moncada (arthritis - ?Heberden's)          \par           Gyn:  Dr. Gladys Moran\par           Pod:  Dr. Lázaro Bee - TRUMAN achilles tendon swelling\par           Eyes:  Dr. Montoya  \par \par CC:  Tender swollen thyroid  [started ~ May 2020] (her daughter is treated for Graves disease)\par         (Type 2 diabetes, 1/2/2017 A1c 8.3 %)\par       \par 68 yo mother of six.  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  TSH never suppressed but ESR went up and\par TgAb went up.    She also has diabetes and is undergoing investigation for anemia.\par \par For the diabetes, she takes metformin 1000 mg BID and more recently started Januvia 100 mg as well.\par She reports FBS improved.    April 10 A1c 8.1, up from 7.6 Oct 2022 and 7.3 in June 2022\par \par : :\par Constitutional:  Alert, well nourished, healthy appearance, no acute distress \par Eyes:  No proptosis, no stare\par Thyroid:\par Pulmonary:  No respiratory distress, no accessory muscle use; normal rate and effort\par Cardiac:  Normal rate\par Vascular: \par Endocrine:  No stigmata of Cushing’s Syndrome\par Musculoskeletal:  Normal gait, no involuntary movements\par Neurology:  No tremors\par Affect/Mood/Psych:  Oriented x 3; normal affect, normal insight/judgement, normal mood \par .\par \par Impression:  On 5/6/23 TSH  normal at 0.78 (was never out of range)  \par Ultrasound 5/8/23 of thyroid compared to 6/10/22:  Multiple blilateral nodular and cystic changes have progressed sligthly - recomment ongoing follow up in 6 - 12 months.  \par \par Plan:  Test BS AC and PC \par She does not have a smart phone for CGM and she is not on insulin for a .\par \par \par \par \par \par \par Oct 18, 2022     in person\par \par PCP:  Dr. Meenu Weaver\par           Neuro:  Dr. Jarad Renteria (for HA)\par           Card:  Dr. Adrienne Pugh (palpitations, wake her from sleep)\par           GI:  Dr. Cisco Bull (anemia)\par           Rheum:  Dr. Sophia Moncada (arthritis - ?Heberden's)          \par           Gyn:  Dr. Gladys Moran\par           Pod:  Dr. Lázaro LAUGHLIN achilles tendon swelling\par           Eyes:  Dr. Montoya  \par \par CC:  Tender swollen thyroid  [started ~ May 2020] (her daughter is treated for Graves disease)\par         (Type 2 diabetes, 1/2/2017 A1c 8.3 %)\par       \par 65 yo mother of six.  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  TSH never suppressed but ESR went up and\par TgAb went up.    \par \par : :\par Constitutional:  Alert, well nourished, healthy appearance, no acute distress \par Eyes:  No proptosis, no stare\par Thyroid: Normal to palpation, non-tender\par Pulmonary:  No respiratory distress, no accessory muscle use; normal rate and effort\par Cardiac:  Normal rate\par Vascular: \par Endocrine:  No stigmata of Cushing’s Syndrome\par Musculoskeletal:  Normal gait, no involuntary movements\par Neurology:  No tremors\par Affect/Mood/Psych:  Oriented x 3; normal affect, normal insight/judgement, normal mood \par .\par \par Impression:  Rarely notes thyroid discomfort.\par TFTs have remained WnL.\par Surveillance appropriate.\par Next year, f/u US thyroid ~ May 2023 and ROV after that.\par Continue to follow her new, lower carb healthy diet.   \par \par \par Jun 14, 2022      in person\par \par PCP:  Dr. Meenu Weaver\par           Neuro:  Dr. Jarad Renteria (for HA)\par           Card:  Dr. Adrienne Pugh (palpitations, wake her from sleep)\par           GI:  Dr. Cisco Bull (anemia)\par           Rheum:  Dr. Sophia Moncada (arthritis - ?Heberden's)          \par           Gyn:  Dr. Gladys Moran\par           Pod:  Dr. Lázaro LAUGHLIN achilles tendon swelling\par           Eyes:  Dr. Montoya  \par \par CC:  Tender swollen thyroid  [started ~ May 2020] (her daughter is treated for Graves disease)\par         (Type 2 diabetes, 1/2/2017 A1c 8.3 %)\par       \par 65 yo mother of six.  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  TSH never suppressed but ESR went up and\par TgAb went up.    Most recent US thyroid at [facility] on 6/10/2022:\par \par "COMPARISON: 11/29/2021 \par \par FINDINGS:\par Right Lobe: 3.9 x  4.3 x 1.6 cm. In the upper pole, a hypoechoic nodule is identified, measuring 1.0 x 0.4 x 0.7 cm.\par \par Left Lobe: 4.0 x 1.0 x 1.7 cm. Lower pole spongiform nodule measures 0.6 x 0.3 x 0.6 cm.\par \par Isthmus: 2.2 mm. Hypoechoic nodule in the left isthmus measures 0.5 x 0.3 x 0.4 cm.\par \par Cervical Lymph Nodes: In the region of palpable abnormality on the left, there is a tiny hypoechoic, nonpathologic lymph node measuring 1.0 x 0.3 x 1.0 cm. On the right side, an area of palpable abnormality shows small hypoechoic lymph node, measuring 1.0 x 0.4 x 0.7 cm.\par \par IMPRESSION: \par \par Subcentimeter bilateral nodules.\par \par In the area of bilateral concern, small, nonpathologic lymph nodes.\par \par -----------------------------------------------\par Gary Solis MD              06/13/22 "\par \par \par \par For diabetes, she\par remains on glipizide ER 10  (usually early afternoon\par metformin 1000 mg in AM and again in the afternoon - her preference)\par \par Monitors sugars TID with OneTouch Ultra 2 (not available today)\par \par \par : :\par Constitutional:  Alert, well nourished, healthy appearance, no acute distress \par Eyes:  No proptosis, no stare\par Thyroid:  Normal to palpation  \par Pulmonary:  No respiratory distress, no accessory muscle use; normal rate and effort\par Cardiac:  Normal rate\par Vascular: \par Endocrine:  No stigmata of Cushing’s Syndrome\par Musculoskeletal:  Normal gait, no involuntary movements  Swelling R Achilles tendon\par Neurology:  No tremors\par Affect/Mood/Psych:  Oriented x 3; normal affect, normal insight/judgement, normal mood \par \par \par Impression/Plan:  By her history, diabetes is under excellent control with only rare, mild hypoglycemia.\par Will confirm with A1c. and lower glipizide ER from 10 mg to 5 mg.\par \par Ultrasound of thyroid reassuring and will repeat US ~ one year.\par \par ROV in November. \par .\par \par  \par \par Apr 13, 2022     in person     no NFC\par \par PCP:  Dr. Meenu Weaver\par           Neuro:  Dr. Jarad Renteria (for HA)\par           Card:  Dr. Adrienne Pugh (palpitations, wake her from sleep)\par           GI:  Dr. Cisco Bull (anemia)\par           Rheum:  Dr. Sophia Moncada (arthritis - ?Heberden's)          \par           Gyn:  Dr. Gladys Moran\par \par CC:  Tender swollen thyroid  [started ~ May 2020] (her daughter is treated for Graves disease)\par         (Type 2 diabetes, 1/2/2017 A1c 8.3 %)\par       \par 65 yo mother of six.  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  TSH never suppressed but ESR went up and\par TgAb went up.   Finally thyroid feels fine to her.    Continued surveillance by means of history, exam, US, TFTs remain prudent with\par next US thyroid about one year after last:  about Dec [id number]     TFTs today.  \par \par : :\par Constitutional:  Alert, well nourished, healthy appearance, no acute distress \par Eyes:  No proptosis, no stare\par Thyroid:  Normal to palpation, non-tender \par Pulmonary:  No respiratory distress, no accessory muscle use; normal rate and effort\par Cardiac:  Normal rate\par Vascular: \par Endocrine:  No stigmata of Cushing’s Syndrome\par Musculoskeletal:  Normal gait, no involuntary movements\par Neurology:  No tremors\par Affect/Mood/Psych:  Oriented x 3; normal affect, normal insight/judgement, normal mood \par .\par \par Imp:  Thyroiditis appears to be winding down\par FBS remain a bit elevated.    \par \par Plan:  I suggested she test FBS only twicee a week and the rest of the week do some after meal BS monitoring.\par Also, as FBS and A1c have drifted up a bit, to try moving the glipizide ER 10 mg to take in evening along with the 2nd metformin.  \par \par Mar 15, 2022     in person     no NFC \par \par PCP:  Dr. Meenu Weaver\par           Neuro:  Dr. Jarad Renteria (for HA)\par           Card:  Dr. Adrienne Pugh (palpitations, wake her from sleep)\par           GI:  Dr. Cisco Bull (anemia)\par           Rheum:  Dr. Sophia Moncada (arthritis - ?Heberden's)          \par           Gyn:  Dr. Gladys Moran\par \par CC:  Tender swollen thyroid  [started ~ May 2020] (her daughter is treated for Graves disease)\par         (Type 2 diabetes, 1/2/2017 A1c 8.3 %)\par       \par 65 yo mother of six. \par Originally seen for pain in region of thyroid - clinically thyroiditis, although euthyroid, now much improved.\par \par On exam, still has slight tenderness region of R thyroid lobe.  \par Most recent US thyroid 11/29/21 reassuring.  Will aim for f/u US thyroid about six months after that. \par \par : :\par Constitutional:  Alert, well nourished, healthy appearance, no acute distress \par Eyes:  No proptosis, no stare\par Thyroid:  Slight tenderness R thyroid lobe.  No palpable adenopathy. 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   Continue surveillance will be the strategy.\par A1c of 7.3% noted.     Will try to assist patient in doing some monitoring with CGM.   Her [employer] smartphone is\par not equipped with the necessary NFC but she believes her son may be able to help in that regard.  \par \par .\par \par Feb 15, 2022     in person       she has an android w/o nfc\par \par PCP:  Dr. Meenu Weaver\par           Neuro:  Dr. Jarad Renteria (for HA)\par           Card:  Dr. Adrienne Pugh (palpitations, wake her from sleep)\par           GI:  Dr. Cisco Bull (anemia)\par           Rheum:  Dr. Sophia Moncada (arthritis - ?Heberden's)          \par           Gyn:  Dr. Gladys Moran\par \par CC:  Tender swollen thyroid  [started ~ May 2020] (her daughter is treated for Graves disease)\par         (Type 2 diabetes, 1/2/2017 A1c 8.3 %)\par       \par 65 yo mother of six. \par Originally seen for pain in region of thyroid - clinically thyroiditis, although euthyroid, now much improved.\par \par   Diagnosed with diabetes about 10 years ago on routine exam.\par Takes metformin 1000 mg in BID and glipizide  ER 10 mg at lunch.\par \par Reports FBS around 180 and after dinner around 180\par \par She tests with a Freestyle Lite meter.\par \par Still has some L thyroid bed discomfort.\par Last US thyroid very reassuring.\par \par Plan:  TFTs, ESR, A1c today.\par Bring meter to next visit \par Consider brief trial of adding CGM (no NFC) \par ROV in about one month.  \par \par \par Nov 18, 2021    in person\par \par PCP:  Dr. Meenu Weaver\par           Neuro:  Dr. Jarad Renteria (for HA)\par           Card:  Dr. Adrienne Pugh (palpitations, wake her from sleep)\par           GI:  Dr. Cisco Bull (anemia)\par           Rheum:  Dr. Sophia Moncada (arthritis - ?Heberden's)          \par           Gyn:  Dr. Gladys Moran\par \par CC:  Tender swollen thyroid  [started ~ May 2020] (her daughter is treated for Graves disease)\par         (Type 2 diabetes, 1/2/2017 A1c 8.3 %)\par       \par 65 yo mother of six.   Diagnosed with diabetes about 10 years ago on routine exam.\par Takes metformin 1000 mg in AM and glipizide  ER 10 mg at lunch.\par Monitors her sugars with her 's meter; however, he is out of town.  \par Today after broccoli, cauliflorer, beans, corn and rice  the fingerstick BS is 274 mg/dl\par So she will start using the Verio Flextouch to check her sugars 3X a day, before meals.\par Also reviewed with her a lower carb diet.\par \par But the primary reason she stopped in today is pain R thyroid bed which started a few weeks ago.   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  \par \par   \par \par \par \par \par \par May 04, 2021    in prson\par \par PCP:  Dr. Meenu Weaver\par           Neuro:  Dr. Jarad Renteria (for HA)\par           Card:  Dr. Adrienne Pugh (palpitations, wake her from sleep)\par           GI:  Dr. Cisco Bull (anemia)\par           Rheum:  Dr. Sophia Moncada (arthritis)\par \par           Rheum:  Dr. Sophia Moncada (joint pain)\par           Gyn:  Dr. Gladys Moran\par \par CC:  Tender swollen thyroid  [started ~ May 2020] (her daughter is treated for Graves disease)\par         (Type 2 diabetes, 1/2/2017 A1c 8.3 %)\par       \par Followed for tender thyroid.  Pain has marched across the gland similar to subacute thyroiditis.  \par \par Most recent lab tests at [facility] on\par 4/26/201 included:\par \par TSH 1.10   (lowest was 0.31 in Jan 2019)\par glucose 170 mg/dl\par insulin 38 (not fasting)\par \par On exam, thyroid slightly tender.\par No palpable nodules or adenopathy.\par \par Impression:  "Smouldering thyroiditis" slowly stuttering into baseline.\par Most recent US thyroid from 10/19/2020: diffuse heterogeneity of echogenicity, mild hypervascularity....no significant change from 9/15/2020"\par \par Doing well.\par Euthyroid.\par Reassuring US thyroid.\par \par Plan:  f/u US thyroid by 9 2021 and ROV October.\par She borrows her 's OneTouch Ultra, but will try to get her own and test sugars at different times of the day.\par If results not in range and not amenable to dietary change, additional medications which appear to be covedred include\par Farxiga, Trulicity.  \par \par She reports  mg/dl while on metformin 1000 mg BID.  \par \par \par \par \par \par \par \par \par Mar 02, 2021    in person\par \par PCP:  Dr. Meenu Weaver\par           Neuro:  Dr. Jarad Renteria (for HA)\par           Card:  Dr. Adrienne Pugh (palpitations, wake her from sleep)\par           GI:  Dr. Cisco Bull (anemia)\par \par           Rheum:  Dr. Sophia Moncada (joint pain)\par           Gyn:  Dr. Gladys Moran\par \par CC:  Tender swollen thyroid  [started ~ May 2020] (her daughter is treated for Graves disease)\par         (Type 2 diabetes, 1/2/2017 A1c 8.3 %)\par       \par Followed for tender thyroid.  Pain has marched across the gland similar to subacute thyroiditis.  Associated with development of elevated ESR (9/19: 11,  7/2/20:  39,  8/29/20:  52) that then returned to normal.   Also developed anti-thyroglobulin antibodies (7/2/20 < 20 -> 12/4/20: 83).  However, apparently never hyperthyroid enough to generate a suppressed TSH.\par \par In the initial phase US thyroid 6/16/20 showed a large poorly defined heterogeneous hypoelchoic area in the left mid lower pole felt possibly related to thyroiditis but possibility of mass "not excluded" and FNAB revealed no suspicious cytology.\par \par On exam, thyroid normal size, non-tender, without palpable nodule or lymphadenopathy.\par \par Impression:  An atypical presentation of subacute thyroiditis and although she still notes occasional discomfort in the region of the thyroid, this is infrequent and much milder than when she first presented.   \par \par Plan:  TFTs today and the long term strategy will be surveillance with follow up here in about 4 - 6 months.  \par \par \par \par Dec 03, 2020   in person\par \par PCP:  Dr. Meenu Weaver\par           Neuro:  Dr. Jarad Renteria (for HA)\par           Card:  Dr. Adrienne Pugh (palpitations, wake her from sleep)\par           GI:  Dr. Cisco Bull (anemia)\par           Rheum:  Dr. Sophia Moncada (joint pain)\par           Gyn:  Dr. Gladys Moran\par \par CC:  Tender swollen thyroid   (her daughter is treated for Graves disease)\par         (Type 2 diabetes, 1/2/2017 A1c 8.3 %)\par       \par Followed for tender thyroid.  Pain has marched across the gland (from L to R) similar to subacute thyroiditis.\par The TSH has never been suppressed.\par The ESR went up and came down followed by positive TPO abs.\par \par : :\par Constitutional:  Alert, well nourished, healthy appearance, no acute distress \par Eyes:  No proptosis, no stare\par Thyroid:\par Pulmonary:  No respiratory distress, no accessory muscle use; normal rate and effort\par Cardiac:  Normal rate\par Vascular: \par Endocrine:  No stigmata of Cushing’s Syndrome\par Musculoskeletal:  Normal gait, no involuntary movements\par Neurology:  No tremors\par Affect/Mood/Psych:  Oriented x 3; normal affect, normal insight/judgement, normal mood \par .\par  Impression:  She feels thyroid discomfort has continued to slowly improve.\par \par Plan:  TFTs today and ROV early March 2021.  \par \par \par The right thyroid lobe measures 4.9 x 1.6 x 1.7 cm, the isthmus up to 3 mm AP in the midline, and the left thyroid lobe measures 4 x 1.2 x 1.6 cm. There has been no change in lobulated thyroid contour and diffuse heterogeneity of thyroid parenchymal echogenicity. Color-flow Doppler evaluation demonstrates mild hypervascularity of the gland. . There are several very small colloid cyst in the left lobe.\par \par \par IMPRESSION: No significant change from 9/15/2020.\par \par \par ***Electronically Signed ***\par -----------------------------------------------\par Demond Valdez MD              10/19/20 \par \par \par Nov 17, 2020     Te;ephone     \par \par PCP:  Dr. Meenu Weaver\par           Neuro:  Dr. Jarad Renteria (for HA)\par           Card:  Dr. Adrienne Pugh (palpitations, wake her from sleep)\par           GI:  Dr. Cisco Bull (anemia)\par           Rheum:  Dr. Sophia Moncada (joint pain)\par           Gyn:  Dr. Gladys Moran\par \par CC:  Tender swollen thyroid   (her daughter is treated for Graves disease)\par         (Type 2 diabetes, A1c 7.3%)\par       \par 66 yo who presented with a tender swollen thyroid with symptoms "marching" across the gland from L to R.\par Today has no symptoms, but last night she had some symptoms.\par \par Trajectory of ESR was\par 9/19  11;  7/2/20  39;  7/29/20  52;  9/15/20  37   10/19/20  19 ***      \par \par \par ESR Trajectory:\par 7/2/20  < 20;  7/29/20:  < 20;  9/15/20:  52;  10/19/20:  50.4   ****\par \par 9/15/20   PC ;      10/19/10    (post prandial) ***\par \par TSH:   0.31 - 2.1     (never suppressed)  \par \par 10/19/2020 Ultrasound thyroid:  heterogeneous gland.  R lobe 4.9 cm;   L lobe 4 cm\par       Has had 5 US thyroid and FNAB of heterogeneous area L lobe when there was tenderness in that region.  \par \par Impression:  Although she has had the thyroid condition for several months, and it behaves like an unusual variant of subacute (painful) thyroiditis, the tSH has never been low.     The ESR went up and came down, the TPO ab went from negative to positive, all common with subacute thyroiditis.   "Marching" across the gland is also not that unusual.   In sum, she reports, that even though she still gets some pain, it is much better than when this all started.     So continued active surveiillance will be the strategy.\par \par Incidentally noted is the elevation of post prandial glucose, so I have taken the liberty of asking her to return for instruction in slef blood glucose monitoring as well as some dietary overview.  \par \par \par \par \par \par Sep 22, 2020   in person accompanied by her daughter, Emelina.   \par \par PCP:  Dr. Meenu Weaver\par           Neuro:  Dr. Jarad Renteria (for HA)\par           Card:  Dr. Adrienne Pugh (palpitations, wake her from sleep)\par           GI:  Dr. Cisco Bull (anemia)\par           Rheum:  Dr. Sophia Moncada (joint pain)\par           Gyn:  Dr. Gladys Moran\par \par CC:  Tender swollen thyroid   (her daughter is treated for Graves disease)\par         (Type 2 diabetes, A1c 7.3%)\par       \par 66 yo Type 2 diabetes, visits in follow up for swollen, tender thyroid.  Symptoms began on Left side of thyroid and then marched to the Right.  \par \par Most recent lab tests on\par September 15, 2020 include\par ESR 37 (had been 52 July 29, 39 July 2 and 11 Sep 20, 2019\par T4 8.9\par calcium 10.6 ***\par TSH 2.1 (up from 1.2 ion July 29, 0.70 July 2)\par TPO remains neg\par TgAb 52.3 (<10.0);  had been < 20 previously\par Tg 20 (<1.8)   [of course abs may impact\par \par \par Impression:  In general, thyroid enlargement appears to be slowly resolving.  US thyroid no longer demonstrates mass R thyroid lobe.    Her findings are most consistent with subacute thyroiditis; however, there are many atypical features, including the lack of TSH suppression and the unusually long course.    Continued close monitoring will be the strategy, including f/u US thyroid and lab tests.  \par \par Aug 03, 2020   in person\par \par PCP:  Dr. Meenu Weaver\par           Neuro:  Dr. Jarad Renteria (for HA)\par           Card:  Dr. Adrienne Pugh (palpitations, wake her from sleep)\par           GI:  Dr. Cisco Bull (anemia)\par           Rheum:  Dr. Sophia Moncada (joint pain)\par           Gyn:  Dr. Gladys Moran\par \par CC:  Tender swollen thyroid\par         (Type 2 diabetes, A1c 7.3%)\par       \par \par Recent labs at [facility] from\par 7/29/2020 include:\par \par ESR 52 (up from 39 on July 2 and 11 on Sep 20)\par TSH  1.2\par Thyroid antibodies (TPO, TgAb) remain negative\par \par Biopsy of left thyroid "nodule":  "benign follicular nodule with cystic change"\par \par Follow up US:\par \par CLINICAL HISTORY: Painful enlarged thyroid with nodules, recent benign biopsy of a left lower pole masslike asymmetry\par \par COMPARISON: 6/26/2020\par \par FINDINGS:\par \par Thyroid isthmus measures 2.8 mm.\par \par Right lobe of the thyroid measures 4.9 x 1.7 x 1.7 cm and is diffusely heterogeneous with interval development of a masslike area of asymmetry in the midpole measuring 2.4 x 1.4 x 1.3 cm.\par \par Left lobe of the thyroid measures 3.7 x 1.5 x 1.5 cm diffusely heterogeneous with previously biopsied area of masslike asymmetrical density now measuring 2.7 x 1.3 x 1.3 cm. This is not significantly changed.\par \par There is mild increased vascularity of the thyroid.\par \par Small lymph nodes are seen in the cervical chain bilaterally largest on the left measuring 7.7 x 5.6 x 5.1 mm and the largest on the right measuring 7.9 x 6.8 x 4.6 mm.\par \par \par IMPRESSION: Heterogeneous thyroid gland is again seen with interval development of a masslike area of asymmetry in the right mid pole thyroid gland. No change in the left lobe of the thyroid.\par \par Recommend short interval follow-up perhaps in 3-6 months time.\par \par ***Electronically Signed ***\par -----------------------------------------------\par Eric Pablo MD              07/29/20 \par \par \par Jul 01, 2020      in person     \par \par PCP:  Dr. Meenu Weaver\par           Neuro:  Dr. Jarad Renteria (for HA)\par           Card:  Dr. Adrienne Pugh (palpitations, wake her from sleep)\par           GI:  Dr. Cisco Bull (anemia)\par           Rheum:  Dr. Sophia Moncada (joint pain)\par           Gyn:  Dr. Gladys Moran\par \par CC:  Tender swollen thyroid\par         (Type 2 diabetes, A1c 7.3%)\par       \par 63 yo accompanied by her daughter.   Patient reports that she started to notice discomfort and swelling in the area of the thyroid about a month ago.  While the tenderness and discomfort was bilateral, it was more noticeable on the left.   The swelling and pain are still present, she reports that both are improving.   She is currently taking Tylenol with some benefit.    There is no previous history of thyroid problem.   Her daughter has been treated for a thyroid condition.  There is no history of face or neck irradiation.  \par \par Lab tests on\par 6/11/2020 included\par TSH 0.83\par \par 6/16/2020 Ultrasound Thyroid at [facility]:\par .\par "The right thyroid lobe measures 4.6 x 1.2 x 2 cm, the isthmus up to 2 mm AP in the midline, and the left thyroid lobe measures 3.8 x 1.4 x 2.1 cm. A large poorly-defined heterogeneous hypoechoic area in the left mid-lower pole measures 3 x 1.3 x 1.3 cm. A couple of small hypoechoic right mid pole nodules measure 4 x 2 x 3 mm and 4 x 2 x 3 mm. Several small left cervical lymph nodes measure 1.0 x 0.6 x 0.9 cm, 1.3 x 0.6 x 0.9 cm, and 1.1 x 0.5 x 0.5 cm, and are diffusely hypoechoic, without clearly defined echogenic indira.\par \par \par IMPRESSION: In view of the history of left-sided neck pain, the large poorly-defined heterogeneous hypoechoic area in the left mid-lower pole may be secondary to thyroiditis, with the possibility of mass not excluded. Several small left-sided lymph nodes could be inflammatory or neoplastic.\par \par \par ***Electronically Signed ***\par -----------------------------------------------\par Demond Valdez MD              06/16/20  "\par \par .\par 6/29/2020 underwent FNAB of the ~ 3 cm L thyroid lesion:\par \par "FINAL DIAGNOSIS\par  \par Left thyroid mid to lower pole area:\par BENIGN FOLLICULAR NODULE WITH CYSTIC CHANGE ([place] Category II)\par A few Hurthle cells and crushed lymphocytes, suggestive of chronic lymphocytic thyroiditis.\par  \par \par MICROSCOPIC DESCRIPTION  \par  \par The Diff-Quik and Papanicolaou stained direct smears show sheets and groups of benign follicular cells with small nuclei, inconspicuous nucleoli and moderate cytoplasm in a hemorrhagic background with bare nuclei, macrophages and thin colloid. Mild metaplastic changes and rare crushed lymphoid cells are noted. "\par \par On exam, the thyroid is slightly enlarged, L more than R and is slightly tender to palpation.  \par \par \par Impression:  Her careful evaluation is most consistent with subacute thyroiditis.  The inflammation of subacute thyroiditis usually causes enough release of thyroid hormone to cause suppression of TSH, which has not yet been seen here, so close follow up by means of history, exam, labs and ultrasound will be the current strategy.    The trajectory of her symptoms appears to be that of improvement, so that is reassuring.   Symptoms from subacute thyroiditis may take several months to completely resolve.  \par \par Plan:  Repeat TSH today, repeat US thyroid in about 4-5 weeks and ROV here after that.

## 2023-05-11 ENCOUNTER — RESULT REVIEW (OUTPATIENT)
Age: 68
End: 2023-05-11

## 2023-05-11 DIAGNOSIS — R93.89 ABNORMAL FINDINGS ON DIAGNOSTIC IMAGING OF OTHER SPECIFIED BODY STRUCTURES: ICD-10-CM

## 2023-05-17 ENCOUNTER — APPOINTMENT (OUTPATIENT)
Dept: PAIN MANAGEMENT | Facility: CLINIC | Age: 68
End: 2023-05-17
Payer: MEDICARE

## 2023-05-17 ENCOUNTER — APPOINTMENT (OUTPATIENT)
Dept: PAIN MANAGEMENT | Facility: HOSPITAL | Age: 68
End: 2023-05-17

## 2023-05-17 VITALS
SYSTOLIC BLOOD PRESSURE: 137 MMHG | BODY MASS INDEX: 23.92 KG/M2 | HEART RATE: 101 BPM | OXYGEN SATURATION: 99 % | HEIGHT: 63 IN | DIASTOLIC BLOOD PRESSURE: 68 MMHG | WEIGHT: 135 LBS | RESPIRATION RATE: 16 BRPM

## 2023-05-17 PROCEDURE — 62323 NJX INTERLAMINAR LMBR/SAC: CPT

## 2023-05-23 ENCOUNTER — NON-APPOINTMENT (OUTPATIENT)
Age: 68
End: 2023-05-23

## 2023-05-30 ENCOUNTER — APPOINTMENT (OUTPATIENT)
Dept: INTERNAL MEDICINE | Facility: CLINIC | Age: 68
End: 2023-05-30
Payer: MEDICARE

## 2023-05-30 VITALS
SYSTOLIC BLOOD PRESSURE: 130 MMHG | RESPIRATION RATE: 16 BRPM | BODY MASS INDEX: 23.74 KG/M2 | OXYGEN SATURATION: 100 % | WEIGHT: 134 LBS | HEIGHT: 63 IN | HEART RATE: 96 BPM | DIASTOLIC BLOOD PRESSURE: 76 MMHG

## 2023-05-30 DIAGNOSIS — Z86.73 PERSONAL HISTORY OF TRANSIENT ISCHEMIC ATTACK (TIA), AND CEREBRAL INFARCTION W/OUT RESIDUAL DEFICITS: ICD-10-CM

## 2023-05-30 PROCEDURE — 99213 OFFICE O/P EST LOW 20 MIN: CPT

## 2023-05-31 ENCOUNTER — APPOINTMENT (OUTPATIENT)
Dept: PAIN MANAGEMENT | Facility: CLINIC | Age: 68
End: 2023-05-31
Payer: MEDICARE

## 2023-05-31 VITALS
SYSTOLIC BLOOD PRESSURE: 137 MMHG | HEIGHT: 63 IN | WEIGHT: 134 LBS | BODY MASS INDEX: 23.74 KG/M2 | DIASTOLIC BLOOD PRESSURE: 79 MMHG

## 2023-05-31 PROCEDURE — 99214 OFFICE O/P EST MOD 30 MIN: CPT

## 2023-05-31 NOTE — HISTORY OF PRESENT ILLNESS
[0 (No Pain)] : 1. What number best describes your pain on average in the past week? 0/10 pain [5] : 3. What number best describes how, during the past week, pain has interfered with your general activity? 5/10 pain [___ yrs] : [unfilled] year(s) ago [FreeTextEntry1] : Interval Note:\par \par sp  L4-5 interlaminar epidural steroid injection 5/17/23 with 100% improvement in back pain.  Patient is doing very well.  continues gabapentin 400mg TID. \par denies any worsening numbness, weakness, bowel/bladder dysfunction. \par \par \par \par \par HPI\par \par  \par \par Ms. SMITH CUMMINGS is a 67 year F on baby ASA with pmhx of TIA, DM2 (Notes poor glucose control with episodes of hypo and hyperglycemia. (BG's 30's-300's- managed by Dr Gutierres and Dr Hellerman), HTN, RA, chronic lower back pain x 15 yrs. Lower back pain worsening over the past few months. Remains severe despite medication and physical therapy. Pain to lower back that radiates to anterolaterally, left greater than right. Worst at night when laying. In addition, continued scapular and shoulder pain that radiates down her arms. Pain makes it difficult to do ADL's. Denies any additional weakness, numbness, bowel/bladder dysfunction.\par \par \par \par Previous and current pain medications/doses/effects: Gabapentin 600mg tid, Cymbalta 60mg.\par \par  \par \par Previous Pain Treatments: \par \par  \par \par Previous Pain Injections:\par \par  L4-5 interlaminar epidural steroid injection 5/17/23\par  Cervical MINO (2004)- Dr Ramos\par \par  \par \par Previous Diagnostic Studies/Images:\par \par Exam: MRI LUMBAR SPINE 11/2/22\par Order#: MRI [id number] \par \par \par \par HISTORY: 67 years Female LUMBAR SPINAL STENOSIS MRI \par \par \par  PROCEDURE:MRI lumbar spine without contrast \par \par  COMPARISON: \par \par  TECHNIQUE:MRI lumbosacral spine 1.5 Liza magnet \par \par  FINDINGS: Please note that there is transitional lumbosacral anatomy. Please note that there may be\par discrepancies in spinal level labeling on prior/subsequent imaging as well as clinical/surgical \par documentation and close attention on follow-up is strongly recommended especially in the setting of \par procedural planning. For the purposes of today's exam the transitional level is labeled a \par transitional L5 level. \par \par  The paraspinal musculature including the psoas muscle, multifidus muscles, and immediate para axial\par soft tissues appear within range of normal without evidence of mass or collection. \par \par  There is a maintenance of the normal lumbar lordosis. There is fairly uniform marrow signal on all \par sequences. \par \par  The conus terminates at the T12-L1 level. \par \par  At L5-S1 \par  The disc appears intact. There is no significant central or foraminal stenosis. \par \par  At L4-L5 \par  There is disc desiccation, a midline annular fissure and shallow central disc protrusion mildly \par effacing the ventral epidural space and flattening the ventral thecal sac. Bony overgrowth from \par adjacent facet joints and ligamentum flavum thickening contribute to mild left greater than right \par effacement of the lateral recesses. There is a prominent left-sided intraforaminal component of \par circumferential disc bulging contributing to moderately severe left foraminal stenosis. \par \par  At L3-L4 \par  There is loss of intervertebral disc space height, disc desiccation, with effacement of the lateral\par recesses more so on the right than the left. Mild left foraminal stenosis and moderate to severe \par right foraminal stenosis is present. Bony overgrowth from adjacent facet arthropathy is present at \par this level. \par \par  At L2-L3 \par  There is disc desiccation but no focal disc protrusion. No significant central or foraminal \par stenosis is present. \par \par  At L1-L2 \par  There is mild disc desiccation but no focal disc protrusion \par \par  At T11-T12 there is evidence of degenerative disc disease with a shallow central disc bulge. \par \par \par  IMPRESSION: Please note that there is transitional lumbosacral anatomy. Please note that there may \par be discrepancies in spinal level labeling on prior/subsequent imaging as well as clinical/surgical \par documentation and close attention on follow-up is strongly recommended especially in the setting of \par procedural planning. \par \par  Degenerative changes lumbosacral spine at L4-L5 include midline annular fissure and a shallow \par central disc bulging asymmetrically prominent to the left mildly effacing the left lateral recess \par and contributing to left foraminal stenosis \par \par  Moderately advanced degenerative disc changes at L3-L4 are associated with reactive Modic type \par changes. Disc osteophyte complex results in moderate to severe right foraminal stenosis and mild \par left foraminal stenosis further detailed above \par \par  Other findings outlined above \par \par \par Exam: MRI CERVICAL SPINE 11/2/22\par Order#: MRI [id number] \par \par \par \par HISTORY: 67 years Female CHRONIC CERVICAL RADICULOPATHY MRI \par \par  PROCEDURE: MRI cervical spine without contrast \par \par  COMPARISON: January 28, 2019 \par \par  TECHNIQUE: MRI cervical spine performed 1.5 Liza magnet \par \par  FINDINGS: \par  There is maintenance of the normal cervical lordosis. \par \par  The prevertebral soft tissues appear within range of normal. \par \par  The craniocervical junction and clivus and C1-C2 distance appear within range of normal \par \par  There is uniform marrow signal on all sequences \par \par  The cervical cord is uniform in signal intensity without evidence of syrinx or suggestion of \par myelomalacia. \par \par  At C2-C3 , there is no significant central or foraminal stenosis. \par \par  At C3-C4 there is broad-based disc osteophytic ridging which indents the thecal sac, decreases the \par AP dimension of the thecal sac slightly and contributes to moderate right foraminal stenosis and \par mild to moderate left foraminal stenosis. This may be slightly progressed as compared to the 2019 \par study. \par \par  At C4-Q1ovgbs is broad-based disc osteophytic ridging and a right para midline/right foraminal disc\par protrusion which indents the thecal sac and deforms the ventral surface of the cord. There is \par significantly increased mass effect as compared to the 2019 study which now results in moderate to \par severe right foraminal stenosis and moderate left foraminal stenosis with the AP dimension of the \par thecal sac is reduced to 7 mm. \par \par  At C5-C6 , broad-based disc osteophytic ridging has progressed slightly since prior exam. Mild to \par moderate right foraminal stenosis and mild left foraminal stenosis is present. Disc osteophyte comp\par sole asymmetrically prominent to the right. \par \par  At C6-C7 , there is no significant central or foraminal stenosis. \par \par  At C7-T1 , there is no significant central or foraminal stenosis. \par \par \par  IMPRESSION: \par  Multilevel degenerative changes as outlined above have progressed since the 2019 study most notably\par at C4-C5 where there is significantly increased mass effect associated with a right para \par midline/right foraminal disc osteophyte complex \par \par  Other findings discussed above \par \par \par  Thank you for allowing us to participate in the evaluation of this patient. \par \par \par \par MRI LS 11/22\par \par  Please note that there is transitional lumbosacral anatomy. Please note that there may be\par discrepancies in spinal level labeling on prior/subsequent imaging as well as clinical/surgical\par documentation and close attention on follow-up is strongly recommended especially in the setting of\par procedural planning. For the purposes of today's exam the transitional level is labeled a\par transitional L5 level.\par \par  The paraspinal musculature including the psoas muscle, multifidus muscles, and immediate para axial\par soft tissues appear within range of normal without evidence of mass or collection.\par \par  There is a maintenance of the normal lumbar lordosis. There is fairly uniform marrow signal on all\par sequences.\par \par  The conus terminates at the T12-L1 level.\par \par  At L5-S1\par  The disc appears intact. There is no significant central or foraminal stenosis.\par \par  At L4-L5\par  There is disc desiccation, a midline annular fissure and shallow central disc protrusion mildly\par effacing the ventral epidural space and flattening the ventral thecal sac. Bony overgrowth from\par adjacent facet joints and ligamentum flavum thickening contribute to mild left greater than right\par effacement of the lateral recesses. There is a prominent left-sided intraforaminal component of\par circumferential disc bulging contributing to moderately severe left foraminal stenosis.\par \par  At L3-L4\par  There is loss of intervertebral disc space height, disc desiccation, with effacement of the lateral\par recesses more so on the right than the left. Mild left foraminal stenosis and moderate to severe\par right foraminal stenosis is present. Bony overgrowth from adjacent facet arthropathy is present at\par this level.\par \par  At L2-L3\par  There is disc desiccation but no focal disc protrusion. No significant central or foraminal\par stenosis is present.\par \par  At L1-L2\par  There is mild disc desiccation but no focal disc protrusion\par \par  At T11-T12 there is evidence of degenerative disc disease with a shallow central disc bulge.\par \par \par  IMPRESSION: Please note that there is transitional lumbosacral anatomy. Please note that there may\par be discrepancies in spinal level labeling on prior/subsequent imaging as well as clinical/surgical\par documentation and close attention on follow-up is strongly recommended especially in the setting of\par procedural planning.\par \par  Degenerative changes lumbosacral spine at L4-L5 include midline annular fissure and a shallow\par central disc bulging asymmetrically prominent to the left mildly effacing the left lateral recess\par and contributing to left foraminal stenosis\par \par  Moderately advanced degenerative disc changes at L3-L4 are associated with reactive Modic type\par changes. Disc osteophyte complex results in moderate to severe right foraminal stenosis and mild\par left foraminal stenosis further detailed above\par \par MRI CS 11/2/22\par \par There is maintenance of the normal cervical lordosis.\par \par  The prevertebral soft tissues appear within range of normal.\par \par  The craniocervical junction and clivus and C1-C2 distance appear within range of normal\par \par  There is uniform marrow signal on all sequences\par \par  The cervical cord is uniform in signal intensity without evidence of syrinx or suggestion of\par myelomalacia.\par \par  At C2-C3 ,  there is no significant central or foraminal stenosis.\par \par  At C3-C4 there is broad-based disc osteophytic ridging which indents the thecal sac, decreases the\par AP dimension of the thecal sac slightly and contributes to moderate right foraminal stenosis and\par mild to moderate left foraminal stenosis. This may be slightly progressed as compared to the 2019\par study.\par \par  At C4-[id number] is broad-based disc osteophytic ridging and a right para midline/right foraminal disc\par protrusion which indents the thecal sac and deforms the ventral surface of the cord. There is\par significantly increased mass effect as compared to the 2019 study which now results in moderate to\par severe right foraminal stenosis and moderate left foraminal stenosis with the AP dimension of the\par thecal sac is reduced to 7 mm.\par \par  At C5-C6 , broad-based disc osteophytic ridging has progressed slightly since prior exam. Mild to\par moderate right foraminal stenosis and mild left foraminal stenosis is present. Disc osteophyte comp\par sole asymmetrically prominent to the right.\par \par  At C6-C7 , there is no significant central or foraminal stenosis.\par \par  At C7-T1 , there is no significant central or foraminal stenosis.\par \par \par  IMPRESSION:\par  Multilevel degenerative changes as outlined above have progressed since the 2019 study most notably\par at C4-C5 where there is significantly increased mass effect associated with a right para\par midline/right foraminal disc osteophyte complex\par \par 1/28/2019\par \par MRI CERVICAL SPINE\par Order#:   MRI [id number]\par \par \par \par MRI OF THE CERVICAL SPINE WITHOUT CONTRAST\par \par  INDICATION:  Neck pain\par \par  TECHNIQUE: Noncontrast sagittal T1, T2, STIR, axial T2 and gradient echo, and sagittal T2\par volumetric with axial and coronal reformats.\par \par  CONTRAST: None\par \par  COMPARISON:  No prior studies are available for comparison\par \par  FINDINGS:\par \par  There is reversal of the cervical lordosis. There is minimal degenerative retrolisthesis of C3 on\par C4 and C4 on C5. The marrow signal is overall within normal limits with no focal marrow replacing\par lesion identified. The vertebral body heights are maintained and there is no evidence of acute\par fracture or subluxation. There is no abnormal vertebral or paraspinal edema. The visualized\par paraspinal soft tissues appear grossly unremarkable.\par \par  No abnormal signal is identified in the cervical spinal cord. The visualized posterior fossa\par structures are unremarkable.\par \par  C2-3: No significant disc bulge or herniation. No significant central canal or foraminal stenosis.\par \par  C3-4: Central disc protrusion superimposed on mild disc bulge with marginal and uncovertebral\par osteophyte formation. No significant central canal stenosis, however disc herniation mildly impinges\par upon the ventral spinal cord. Moderate left foraminal stenosis and mild right foraminal stenosis.\par \par  C4-5: Right paracentral disc/osteophyte superimposed on a mild disc bulge with marginal osteophyte\par formation. No significant central canal stenosis, however disc/osteophyte mildly impinges upon the\par right ventral spinal cord. Moderate bilateral foraminal stenosis.\par \par  C5-6: Mild disc bulge with marginal and uncovertebral osteophyte formation. No significant central\par canal stenosis. Mild/moderate right foraminal stenosis and mild left foraminal stenosis.\par \par  C6-7: Mild disc bulge. No significant central canal or foraminal stenosis.\par \par  C7-T1: Minimal disc bulge. No significant central canal or foraminal stenosis.\par \par \par \par  IMPRESSION:\par \par  Cervical spondylosis with central disc herniation at C3-4 and right paracentral disc/osteophyte at\par C4-5. No significant central canal stenosis, however there is mild impingement of the ventral spinal\par cord at C3-4 and C4-5. Varying degrees of foraminal stenosis as above, most pronounced at C3-4 and\par the left and C4-5 bilaterally.\par \par \par \par \par  [FreeTextEntry2] : 10

## 2023-05-31 NOTE — ASSESSMENT
[FreeTextEntry1] : >> Imaging and Other Studies\par \par I personally reviewed the relevant imaging.  Discussed and explained to patient the likely source of pathology and pain.  Questions answered. MRI \par \par na\par >> Therapy and Other Modalities\par \par start PT - referral provided\par \par >> Medications\par \par trial gabapentin uptitrate 600mg TID\par cautioned change in mood.  Encouraged to call with any worsening mood or depression/suicidal ideations\par \par acetaminophen 650mg q8h prn pain (caution <3g daily)\par  \par >> Interventions\par \par Significant component of axial back pain secondary to lumbar spondylosis and facet arthropathy demonstrated on MRI LS.  Pain refractory to conservative treatments.  sp BILATERAL L4-sacral ala diagnostic medial branch block (2 joints, 3 nerves on each side) r/b/a discussed (STEROID SPARING) without immediate improvement, but improvement in 12 hours and for 2 months\par \par given efficacy of previous intervention that is >80% improvement in pain and improved ability to perform adls, and return of pain despite conservative treatment, sp repeat BILATERAL L4-sacral ala diagnostic medial branch block (2 joints, 3 nerves on each side) without immediate improvement but now improved\par \par Significant component of back and leg pain likely secondary to lumbar spinal stenosis demonstrated on MRI LS.  sp L4-5 interlaminar epidural steroid injection with significant improvement\par \par >> Consults\par \par >> Discussion of Risks/Benefits/Alternatives\par \par 	>Regarding any scheduled procedures:\par \par I have discussed in detail with the patient that any interventional pain procedure is associated with potential risks.  The procedure may include an injection of steroids and potentially other medications (local anesthetic and normal saline) into the epidural space or surrounding tissue of the spine.  There are significant risks of this procedure which include and are not limited to infection, bleeding, worsening pain, dural puncture leading to postdural puncture headache, nerve damage, spinal cord injury, paralysis, stroke, and death.  \par \par There is a chance that the procedure does not improve their pain.  \par \par There are risks associated with the steroid being absorbed into the body systemically.  These include dysphoria, difficulty sleeping, mood swings and personality changes.  Premenopausal women may notice an irregularity in her menstrual cycle for 2-3 months following the injection.  Steroids can specifically affect patients with hypertension, diabetes, and peptic ulcers.  The procedure may cause a temporary increase in blood pressure and blood pressure, and may adversely affect a peptic ulcer.  Other, more rare complications, include avascular necrosis of joints, glaucoma and worsening of osteoporosis. \par \par I have discussed the risks of the procedure at length with the patient, and the potential benefits of pain relief.  I have offered alternatives to the procedure.  All questions were answered.  \par \par The patient expressed understanding and wishes to proceed with the procedure.\par \par 	>Regarding COVID19 Pandemic: \par \par Any planned interventional pain procedure are scheduled because further delay may cause harm or negative outcome to patient.  The goal in performing this procedure is to avoid deterioration of function, emergency room visits (which increases exposure) and reliance on opioids.  \par \par r/b/a discussed with patient, lack of evidence to conclusively determine whether pain management procedures have any positive or negative impact on the possibility of shima the virus and/or development of any sequelae. \par \par Patient counselled regarding timing steroid based intervention 2 weeks before or after COVID-19 vaccine administration to avoid any interaction or affect on efficacy of vaccination\par \par Patient demonstrates understanding\par \par Informed patient that risks associated with the COVID-19 infection.  Informed patient steps taken to limit the risks.  We are implementing safety precautions and following protocols consistent with the CDC and state recommendations. All patients and staff will be checked for fever or signs of illness upon entry to the facility. We will limit our steroid dose to the lowest effective therapeutic dose or in some cases steroids will not be injected at all. \par \par Patient agrees to proceed\par \par >> Conclusion\par \par The above diagnosis and treatment plan is medically reasonable and necessary based on the patient encounter \par There were no barriers to communication.\par Informed patient that I would be available for any additional questions.\par Patient was instructed to call with any worsening symptoms including severe pain, new numbness/weakness, or changes in the bowel/bladder function. \par Discussed role of nsaids in pain management and all relevant risks, if patient is continuing to require after 4 weeks the patient should f/u for alternative treatment. \par Instructed patient to maintain pain diary to monitor pain level, mobility, and function.\par

## 2023-06-01 ENCOUNTER — APPOINTMENT (OUTPATIENT)
Dept: HEMATOLOGY ONCOLOGY | Facility: CLINIC | Age: 68
End: 2023-06-01
Payer: MEDICARE

## 2023-06-01 VITALS
BODY MASS INDEX: 23.77 KG/M2 | SYSTOLIC BLOOD PRESSURE: 120 MMHG | TEMPERATURE: 96.5 F | DIASTOLIC BLOOD PRESSURE: 62 MMHG | WEIGHT: 134.13 LBS | HEIGHT: 63 IN | HEART RATE: 96 BPM | OXYGEN SATURATION: 97 % | RESPIRATION RATE: 17 BRPM

## 2023-06-01 PROCEDURE — 99214 OFFICE O/P EST MOD 30 MIN: CPT | Mod: 25

## 2023-06-01 NOTE — ASSESSMENT
[FreeTextEntry1] : Microcytic anemia\par Denies any brbpr, melena, hematuria\par Except once in a while she has blood on the toilet paper (usually after she has prolonged constipation x 10 days)\par LMP ~20 years ago.\par Denies any weight loss\par Brother- prostate cancer GM- intestine cancer but not sure\par Denies smoker\par \par Blood work reviewed and discussed\par C/w SHREYA ferritin 5\par Discussed at length about iron deficiency- etiology, signs and symptoms, complications, management options\par DIscussed about oral vs IV Iron\par I have reviewed the risks, benefits and side effects of IV iron with the patient. All questions were answered to satisfaction. Patient agrees to pursue IV iron.\par Schedule IV venofer 400 mg  x 3 or IV injectafer 750 mg x 2 \par Repeat blood work including CBC, ferritin, iron studies in 5-6 weeks. \par Strongly encouraged to pursue GI work up. Has appt scheduled\par Further IV iron PRN for ferritin < \par Information given in writing\par \par Patient had multiple questions which were answered to satisfaction\par \par Labs in 6 weeks. CBC, ferritin, Epo\par OV few days later

## 2023-06-01 NOTE — HISTORY OF PRESENT ILLNESS
[de-identified] : Ms. Nurys Rooney is 67 year old postmenopausal female with microcytic anemia here for consultation, referred by Dr. Meenu Weaver\par  [id number]\par \par Patient with hx of HTN, diverticulitis, chronic cervical radiculopathy, fibromyalgia, GERD, TIA, type 2 diabetes, and anemia in the last 10 years, never needed iron or blood transfusion.  Tried PO iron supplement multiple times but d/c due to severe constipation. \par She has hx of uterine fibroids 20 yrs ago with heavy menses  s/p hystectomy \par \par 4/14/2023 H/H 9.0/31.4 MCV 72.9 - been microcytic since 2020 as per records\par \par FHx:\par Brother with prostate at age 69\par M. grandmother with intestine cancer \par \par Screenings;\par Colonoscopy - 6/15/2023, last was 5 years\par Mammograms - 5/11/2023\par \par Social History\par Smoking - never\par Alcohol - denies\par Illicit drugs - denies\par retired \par \par She has been having headaches and more fatigue than usual.  NO PICA \par She has some bright red bleeding with wiping at times with hard BMs. \par \par  [de-identified] : Patient is seen today for follow up\par Accompanied by her daughter\par \par Feels tired\par

## 2023-06-01 NOTE — CONSULT LETTER
[Dear  ___] : Dear  [unfilled], [Consult Letter:] : I had the pleasure of evaluating your patient, [unfilled]. [Please see my note below.] : Please see my note below. [Consult Closing:] : Thank you very much for allowing me to participate in the care of this patient.  If you have any questions, please do not hesitate to contact me. [Sincerely,] : Sincerely, [FreeTextEntry3] : Dilshad Salas MD, MPH\par  of Medicine [facility] [employer] [facility]\par Attending Physician \par Hematology and Medical Oncology\par [facility]\par

## 2023-06-02 LAB — BACTERIA THROAT CULT: NORMAL

## 2023-06-02 RX ORDER — CLOTRIMAZOLE 10 MG/1
10 LOZENGE ORAL DAILY
Qty: 35 | Refills: 0 | Status: ACTIVE | COMMUNITY
Start: 2023-06-02 | End: 1900-01-01

## 2023-06-05 ENCOUNTER — APPOINTMENT (OUTPATIENT)
Dept: INTERNAL MEDICINE | Facility: CLINIC | Age: 68
End: 2023-06-05
Payer: MEDICARE

## 2023-06-05 DIAGNOSIS — J02.9 ACUTE PHARYNGITIS, UNSPECIFIED: ICD-10-CM

## 2023-06-05 DIAGNOSIS — Z87.09 PERSONAL HISTORY OF OTHER DISEASES OF THE RESPIRATORY SYSTEM: ICD-10-CM

## 2023-06-05 PROCEDURE — 36415 COLL VENOUS BLD VENIPUNCTURE: CPT

## 2023-06-07 LAB
25(OH)D3 SERPL-MCNC: 39.9 NG/ML
ALBUMIN SERPL ELPH-MCNC: 4.2 G/DL
ALP BLD-CCNC: 63 U/L
ALT SERPL-CCNC: 12 U/L
ANA SER IF-ACNC: NEGATIVE
ANION GAP SERPL CALC-SCNC: 11 MMOL/L
AST SERPL-CCNC: 15 U/L
BASOPHILS # BLD AUTO: 0.04 K/UL
BASOPHILS NFR BLD AUTO: 0.5 %
BILIRUB SERPL-MCNC: 0.2 MG/DL
BUN SERPL-MCNC: 17 MG/DL
CALCIUM SERPL-MCNC: 10.1 MG/DL
CCP AB SER IA-ACNC: <8 UNITS
CHLORIDE SERPL-SCNC: 104 MMOL/L
CO2 SERPL-SCNC: 26 MMOL/L
CREAT SERPL-MCNC: 0.75 MG/DL
CRP SERPL-MCNC: <3 MG/L
EBV EA AB SER IA-ACNC: 21.9 U/ML
EBV EA AB TITR SER IF: POSITIVE
EBV EA IGG SER QL IA: 192 U/ML
EBV EA IGG SER-ACNC: POSITIVE
EBV EA IGM SER IA-ACNC: NEGATIVE
EBV PATRN SPEC IB-IMP: NORMAL
EBV VCA IGG SER IA-ACNC: 63.5 U/ML
EBV VCA IGM SER QL IA: <10 U/ML
ENA SS-A AB SER IA-ACNC: <0.2 AL
ENA SS-B AB SER IA-ACNC: <0.2 AL
EOSINOPHIL # BLD AUTO: 0.25 K/UL
EOSINOPHIL NFR BLD AUTO: 3.2 %
EPSTEIN-BARR VIRUS CAPSID ANTIGEN IGG: POSITIVE
ERYTHROCYTE [SEDIMENTATION RATE] IN BLOOD BY WESTERGREN METHOD: 31 MM/HR
GLUCOSE SERPL-MCNC: 134 MG/DL
HCT VFR BLD CALC: 32.4 %
HETEROPH AB SER QL: NEGATIVE
HGB BLD-MCNC: 9.6 G/DL
HLA-B27 RELATED AG QL: NEGATIVE
IMM GRANULOCYTES NFR BLD AUTO: 0.3 %
LYMPHOCYTES # BLD AUTO: 1.87 K/UL
LYMPHOCYTES NFR BLD AUTO: 24.1 %
MAN DIFF?: NORMAL
MCHC RBC-ENTMCNC: 21.6 PG
MCHC RBC-ENTMCNC: 29.6 GM/DL
MCV RBC AUTO: 73 FL
MONOCYTES # BLD AUTO: 0.7 K/UL
MONOCYTES NFR BLD AUTO: 9 %
NEUTROPHILS # BLD AUTO: 4.87 K/UL
NEUTROPHILS NFR BLD AUTO: 62.9 %
PLATELET # BLD AUTO: 361 K/UL
POTASSIUM SERPL-SCNC: 4.3 MMOL/L
PROT SERPL-MCNC: 6.7 G/DL
RBC # BLD: 4.44 M/UL
RBC # FLD: 18.6 %
RF+CCP IGG SER-IMP: NEGATIVE
RHEUMATOID FACT SER QL: <10 IU/ML
SODIUM SERPL-SCNC: 141 MMOL/L
VIT B12 SERPL-MCNC: 1966 PG/ML
WBC # FLD AUTO: 7.75 K/UL

## 2023-06-08 LAB — HETEROPH AB SER QL: NEGATIVE

## 2023-06-12 ENCOUNTER — NON-APPOINTMENT (OUTPATIENT)
Age: 68
End: 2023-06-12

## 2023-06-12 ENCOUNTER — APPOINTMENT (OUTPATIENT)
Dept: FAMILY MEDICINE | Facility: CLINIC | Age: 68
End: 2023-06-12
Payer: MEDICARE

## 2023-06-12 VITALS
OXYGEN SATURATION: 97 % | SYSTOLIC BLOOD PRESSURE: 112 MMHG | WEIGHT: 136 LBS | BODY MASS INDEX: 24.1 KG/M2 | HEIGHT: 63 IN | DIASTOLIC BLOOD PRESSURE: 68 MMHG | HEART RATE: 87 BPM

## 2023-06-12 DIAGNOSIS — M65.341 TRIGGER FINGER, RIGHT RING FINGER: ICD-10-CM

## 2023-06-12 DIAGNOSIS — K21.9 GASTRO-ESOPHAGEAL REFLUX DISEASE W/OUT ESOPHAGITIS: ICD-10-CM

## 2023-06-12 PROCEDURE — 99215 OFFICE O/P EST HI 40 MIN: CPT

## 2023-06-12 NOTE — HEALTH RISK ASSESSMENT
[No] : In the past 12 months have you used drugs other than those required for medical reasons? No [No falls in past year] : Patient reported no falls in the past year [0] : 2) Feeling down, depressed, or hopeless: Not at all (0) [Never] : Never [de-identified] : Walks  [de-identified] : Normal [[id number]] : 0 [AdvancecareDate] : 06/12/2023

## 2023-06-12 NOTE — PHYSICAL EXAM
[Normal] : normal rate, regular rhythm, normal S1 and S2 and no murmur heard [de-identified] : Flexed position of 2-4th digits of right hand

## 2023-06-12 NOTE — HISTORY OF PRESENT ILLNESS
[Family Member] : family member [FreeTextEntry1] : States she has sore throat for about a month.  She noticed some white spots in the back of the throat.\par She symptoms has pain with swallowing and occasional sensation of solids only getting stuck in the throat.  She has no problem drinking liquids.\par No fever chills or URI symptoms.\par She had watery stool a few days ago that lasted about 3 to 4 days and has resolved.

## 2023-06-12 NOTE — PHYSICAL EXAM
[No Acute Distress] : no acute distress [Normal Voice/Communication] : normal voice/communication [No Lymphadenopathy] : no lymphadenopathy [Normal] : normal rate, regular rhythm, normal S1 and S2 and no murmur heard [de-identified] : Oropharynx normal.  Showed photo of 2 white spots posterior right pharynx

## 2023-06-12 NOTE — ASSESSMENT
[FreeTextEntry1] : Per patient's request, I wrote a note with her diagnoses and noted that she would benefit from air conditioning.

## 2023-06-12 NOTE — REVIEW OF SYSTEMS
[Sore Throat] : sore throat [Fever] : no fever [Nasal Discharge] : no nasal discharge [Chest Pain] : no chest pain [Shortness Of Breath] : no shortness of breath [Cough] : no cough [Abdominal Pain] : no abdominal pain [Vomiting] : no vomiting

## 2023-06-12 NOTE — HISTORY OF PRESENT ILLNESS
[FreeTextEntry1] : Follow up on medical problems [de-identified] : Pt here for f/u.\par Daughter (and great grandson--five year old) present at visit.\mallory Had seen Dr. Gross for the throat.  Testing negative for bacterial infection.  But did have appearance of thrush..  Says she still feels like there is swelling in the throat.  When she eats,she feels like food gets stuck in throat.  Had white film across mouth . Got clotrimazole and got better.  Has GI.\mallory Still has some back pain.  In the hips . Uses patches and pills . Has been feeling a lot better the last few days, maybe because of injection a couple weeks ago.  Continues to see pain management.\par Still getting dizziness and headache.  \par Has been checking BP.  Labile and sometimes low.  Requests BP machine.\par c/o trigger fingers of the right hand (3 middle digits).  They get stuck in a flexed position and she has to manually extend them.  About 8 months.\par Saw heme.  Got iron transfusion just now.  Has f/u in a week then labs and visit in about a month.  Feels OK.\mallory Claims that she's never been tested for osteoporosis.  Would like a DEXA scan.\par Taking meds as prescribed including for BP, diabetes, and cholesterol.  Wants to reduce her cholesterol. Trying to eat well.

## 2023-06-14 ENCOUNTER — RESULT REVIEW (OUTPATIENT)
Age: 68
End: 2023-06-14

## 2023-06-15 ENCOUNTER — APPOINTMENT (OUTPATIENT)
Dept: GASTROENTEROLOGY | Facility: HOSPITAL | Age: 68
End: 2023-06-15

## 2023-06-26 ENCOUNTER — NON-APPOINTMENT (OUTPATIENT)
Age: 68
End: 2023-06-26

## 2023-06-27 ENCOUNTER — APPOINTMENT (OUTPATIENT)
Dept: PAIN MANAGEMENT | Facility: CLINIC | Age: 68
End: 2023-06-27
Payer: MEDICARE

## 2023-06-27 VITALS
SYSTOLIC BLOOD PRESSURE: 110 MMHG | WEIGHT: 136 LBS | BODY MASS INDEX: 24.1 KG/M2 | DIASTOLIC BLOOD PRESSURE: 70 MMHG | HEIGHT: 63 IN

## 2023-06-27 PROCEDURE — 99214 OFFICE O/P EST MOD 30 MIN: CPT

## 2023-06-27 NOTE — ASSESSMENT
[FreeTextEntry1] : >> Imaging and Other Studies\par \par I personally reviewed the relevant imaging.  Discussed and explained to patient the likely source of pathology and pain.  Questions answered. MRI \par \par na\par >> Therapy and Other Modalities\par \par start PT - referral provided\par \par >> Medications\par \par gabapentin uptitrate 600mg TID\par cautioned change in mood.  Encouraged to call with any worsening mood or depression/suicidal ideations\par \par acetaminophen 650mg q8h prn pain (caution <3g daily)\par  \par >> Interventions\par \par Significant component of axial back pain secondary to lumbar spondylosis and facet arthropathy demonstrated on MRI LS.  Pain refractory to conservative treatments.  sp BILATERAL L4-sacral ala diagnostic medial branch block (2 joints, 3 nerves on each side) r/b/a discussed (STEROID SPARING) without immediate improvement, but improvement in 12 hours and for 2 months\par \par given efficacy of previous intervention that is >80% improvement in pain and improved ability to perform adls, and return of pain despite conservative treatment, sp repeat BILATERAL L4-sacral ala diagnostic medial branch block (2 joints, 3 nerves on each side) without immediate improvement but now improved\par \par Significant component of back and leg pain likely secondary to lumbar spinal stenosis demonstrated on MRI LS.  sp L4-5 interlaminar epidural steroid injection with significant improvement\par \par given efficacy of previous intervention that is >80% improvement in pain and improved ability to perform adls, and return of pain despite conservative treatment, will schedule repeat L4-5 interlaminar epidural steroid injection r/b/a discussed\par \par patient to keep close eye on glucose control post procedure\par \par >> Consults\par \par >> Discussion of Risks/Benefits/Alternatives\par \par 	>Regarding any scheduled procedures:\par \par I have discussed in detail with the patient that any interventional pain procedure is associated with potential risks.  The procedure may include an injection of steroids and potentially other medications (local anesthetic and normal saline) into the epidural space or surrounding tissue of the spine.  There are significant risks of this procedure which include and are not limited to infection, bleeding, worsening pain, dural puncture leading to postdural puncture headache, nerve damage, spinal cord injury, paralysis, stroke, and death.  \par \par There is a chance that the procedure does not improve their pain.  \par \par There are risks associated with the steroid being absorbed into the body systemically.  These include dysphoria, difficulty sleeping, mood swings and personality changes.  Premenopausal women may notice an irregularity in her menstrual cycle for 2-3 months following the injection.  Steroids can specifically affect patients with hypertension, diabetes, and peptic ulcers.  The procedure may cause a temporary increase in blood pressure and blood pressure, and may adversely affect a peptic ulcer.  Other, more rare complications, include avascular necrosis of joints, glaucoma and worsening of osteoporosis. \par \par I have discussed the risks of the procedure at length with the patient, and the potential benefits of pain relief.  I have offered alternatives to the procedure.  All questions were answered.  \par \par The patient expressed understanding and wishes to proceed with the procedure.\par \par 	>Regarding COVID19 Pandemic: \par \par Any planned interventional pain procedure are scheduled because further delay may cause harm or negative outcome to patient.  The goal in performing this procedure is to avoid deterioration of function, emergency room visits (which increases exposure) and reliance on opioids.  \par \par r/b/a discussed with patient, lack of evidence to conclusively determine whether pain management procedures have any positive or negative impact on the possibility of shima the virus and/or development of any sequelae. \par \par Patient counselled regarding timing steroid based intervention 2 weeks before or after COVID-19 vaccine administration to avoid any interaction or affect on efficacy of vaccination\par \par Patient demonstrates understanding\par \par Informed patient that risks associated with the COVID-19 infection.  Informed patient steps taken to limit the risks.  We are implementing safety precautions and following protocols consistent with the CDC and state recommendations. All patients and staff will be checked for fever or signs of illness upon entry to the facility. We will limit our steroid dose to the lowest effective therapeutic dose or in some cases steroids will not be injected at all. \par \par Patient agrees to proceed\par \par >> Conclusion\par \par The above diagnosis and treatment plan is medically reasonable and necessary based on the patient encounter \par There were no barriers to communication.\par Informed patient that I would be available for any additional questions.\par Patient was instructed to call with any worsening symptoms including severe pain, new numbness/weakness, or changes in the bowel/bladder function. \par Discussed role of nsaids in pain management and all relevant risks, if patient is continuing to require after 4 weeks the patient should f/u for alternative treatment. \par Instructed patient to maintain pain diary to monitor pain level, mobility, and function.\par

## 2023-06-27 NOTE — HISTORY OF PRESENT ILLNESS
[7] : 3. What number best describes how, during the past week, pain has interfered with your general activity? 7/10 pain [___ yrs] : [unfilled] year(s) ago [FreeTextEntry1] : Interval Note:\par \par returns today with exacerbation of bilateral back pain, worse ambulation and affecting ability to complete PT.  Pain is so bad that patient finds it difficult to perform adls and ambulate. \par denies any worsening numbness, weakness, bowel/bladder dysfunction. \par \par \par \par \par HPI\par \par  \par \par Ms. SMITH CUMMINGS is a 67 year F on baby ASA with pmhx of TIA, DM2 (Notes poor glucose control with episodes of hypo and hyperglycemia. (BG's 30's-300's- managed by Dr Gutierres and Dr Hellerman), HTN, RA, chronic lower back pain x 15 yrs. Lower back pain worsening over the past few months. Remains severe despite medication and physical therapy. Pain to lower back that radiates to anterolaterally, left greater than right. Worst at night when laying. In addition, continued scapular and shoulder pain that radiates down her arms. Pain makes it difficult to do ADL's. Denies any additional weakness, numbness, bowel/bladder dysfunction.\par \par \par \par Previous and current pain medications/doses/effects: Gabapentin 600mg tid, Cymbalta 60mg.\par \par  \par \par Previous Pain Treatments: \par \par  \par \par Previous Pain Injections:\par \par  L4-5 interlaminar epidural steroid injection 5/17/23\par  Cervical MINO (2004)- Dr Ramos\par \par  \par \par Previous Diagnostic Studies/Images:\par \par Exam: MRI LUMBAR SPINE 11/2/22\par Order#: MRI [id number] \par \par \par \par HISTORY: 67 years Female LUMBAR SPINAL STENOSIS MRI \par \par \par  PROCEDURE:MRI lumbar spine without contrast \par \par  COMPARISON: \par \par  TECHNIQUE:MRI lumbosacral spine 1.5 Liza magnet \par \par  FINDINGS: Please note that there is transitional lumbosacral anatomy. Please note that there may be\par discrepancies in spinal level labeling on prior/subsequent imaging as well as clinical/surgical \par documentation and close attention on follow-up is strongly recommended especially in the setting of \par procedural planning. For the purposes of today's exam the transitional level is labeled a \par transitional L5 level. \par \par  The paraspinal musculature including the psoas muscle, multifidus muscles, and immediate para axial\par soft tissues appear within range of normal without evidence of mass or collection. \par \par  There is a maintenance of the normal lumbar lordosis. There is fairly uniform marrow signal on all \par sequences. \par \par  The conus terminates at the T12-L1 level. \par \par  At L5-S1 \par  The disc appears intact. There is no significant central or foraminal stenosis. \par \par  At L4-L5 \par  There is disc desiccation, a midline annular fissure and shallow central disc protrusion mildly \par effacing the ventral epidural space and flattening the ventral thecal sac. Bony overgrowth from \par adjacent facet joints and ligamentum flavum thickening contribute to mild left greater than right \par effacement of the lateral recesses. There is a prominent left-sided intraforaminal component of \par circumferential disc bulging contributing to moderately severe left foraminal stenosis. \par \par  At L3-L4 \par  There is loss of intervertebral disc space height, disc desiccation, with effacement of the lateral\par recesses more so on the right than the left. Mild left foraminal stenosis and moderate to severe \par right foraminal stenosis is present. Bony overgrowth from adjacent facet arthropathy is present at \par this level. \par \par  At L2-L3 \par  There is disc desiccation but no focal disc protrusion. No significant central or foraminal \par stenosis is present. \par \par  At L1-L2 \par  There is mild disc desiccation but no focal disc protrusion \par \par  At T11-T12 there is evidence of degenerative disc disease with a shallow central disc bulge. \par \par \par  IMPRESSION: Please note that there is transitional lumbosacral anatomy. Please note that there may \par be discrepancies in spinal level labeling on prior/subsequent imaging as well as clinical/surgical \par documentation and close attention on follow-up is strongly recommended especially in the setting of \par procedural planning. \par \par  Degenerative changes lumbosacral spine at L4-L5 include midline annular fissure and a shallow \par central disc bulging asymmetrically prominent to the left mildly effacing the left lateral recess \par and contributing to left foraminal stenosis \par \par  Moderately advanced degenerative disc changes at L3-L4 are associated with reactive Modic type \par changes. Disc osteophyte complex results in moderate to severe right foraminal stenosis and mild \par left foraminal stenosis further detailed above \par \par  Other findings outlined above \par \par \par Exam: MRI CERVICAL SPINE 11/2/22\par Order#: MRI [id number] \par \par \par \par HISTORY: 67 years Female CHRONIC CERVICAL RADICULOPATHY MRI \par \par  PROCEDURE: MRI cervical spine without contrast \par \par  COMPARISON: January 28, 2019 \par \par  TECHNIQUE: MRI cervical spine performed 1.5 Liza magnet \par \par  FINDINGS: \par  There is maintenance of the normal cervical lordosis. \par \par  The prevertebral soft tissues appear within range of normal. \par \par  The craniocervical junction and clivus and C1-C2 distance appear within range of normal \par \par  There is uniform marrow signal on all sequences \par \par  The cervical cord is uniform in signal intensity without evidence of syrinx or suggestion of \par myelomalacia. \par \par  At C2-C3 , there is no significant central or foraminal stenosis. \par \par  At C3-C4 there is broad-based disc osteophytic ridging which indents the thecal sac, decreases the \par AP dimension of the thecal sac slightly and contributes to moderate right foraminal stenosis and \par mild to moderate left foraminal stenosis. This may be slightly progressed as compared to the 2019 \par study. \par \par  At C4-C8uceei is broad-based disc osteophytic ridging and a right para midline/right foraminal disc\par protrusion which indents the thecal sac and deforms the ventral surface of the cord. There is \par significantly increased mass effect as compared to the 2019 study which now results in moderate to \par severe right foraminal stenosis and moderate left foraminal stenosis with the AP dimension of the \par thecal sac is reduced to 7 mm. \par \par  At C5-C6 , broad-based disc osteophytic ridging has progressed slightly since prior exam. Mild to \par moderate right foraminal stenosis and mild left foraminal stenosis is present. Disc osteophyte comp\par sole asymmetrically prominent to the right. \par \par  At C6-C7 , there is no significant central or foraminal stenosis. \par \par  At C7-T1 , there is no significant central or foraminal stenosis. \par \par \par  IMPRESSION: \par  Multilevel degenerative changes as outlined above have progressed since the 2019 study most notably\par at C4-C5 where there is significantly increased mass effect associated with a right para \par midline/right foraminal disc osteophyte complex \par \par  Other findings discussed above \par \par \par  Thank you for allowing us to participate in the evaluation of this patient. \par \par \par \par MRI LS 11/22\par \par  Please note that there is transitional lumbosacral anatomy. Please note that there may be\par discrepancies in spinal level labeling on prior/subsequent imaging as well as clinical/surgical\par documentation and close attention on follow-up is strongly recommended especially in the setting of\par procedural planning. For the purposes of today's exam the transitional level is labeled a\par transitional L5 level.\par \par  The paraspinal musculature including the psoas muscle, multifidus muscles, and immediate para axial\par soft tissues appear within range of normal without evidence of mass or collection.\par \par  There is a maintenance of the normal lumbar lordosis. There is fairly uniform marrow signal on all\par sequences.\par \par  The conus terminates at the T12-L1 level.\par \par  At L5-S1\par  The disc appears intact. There is no significant central or foraminal stenosis.\par \par  At L4-L5\par  There is disc desiccation, a midline annular fissure and shallow central disc protrusion mildly\par effacing the ventral epidural space and flattening the ventral thecal sac. Bony overgrowth from\par adjacent facet joints and ligamentum flavum thickening contribute to mild left greater than right\par effacement of the lateral recesses. There is a prominent left-sided intraforaminal component of\par circumferential disc bulging contributing to moderately severe left foraminal stenosis.\par \par  At L3-L4\par  There is loss of intervertebral disc space height, disc desiccation, with effacement of the lateral\par recesses more so on the right than the left. Mild left foraminal stenosis and moderate to severe\par right foraminal stenosis is present. Bony overgrowth from adjacent facet arthropathy is present at\par this level.\par \par  At L2-L3\par  There is disc desiccation but no focal disc protrusion. No significant central or foraminal\par stenosis is present.\par \par  At L1-L2\par  There is mild disc desiccation but no focal disc protrusion\par \par  At T11-T12 there is evidence of degenerative disc disease with a shallow central disc bulge.\par \par \par  IMPRESSION: Please note that there is transitional lumbosacral anatomy. Please note that there may\par be discrepancies in spinal level labeling on prior/subsequent imaging as well as clinical/surgical\par documentation and close attention on follow-up is strongly recommended especially in the setting of\par procedural planning.\par \par  Degenerative changes lumbosacral spine at L4-L5 include midline annular fissure and a shallow\par central disc bulging asymmetrically prominent to the left mildly effacing the left lateral recess\par and contributing to left foraminal stenosis\par \par  Moderately advanced degenerative disc changes at L3-L4 are associated with reactive Modic type\par changes. Disc osteophyte complex results in moderate to severe right foraminal stenosis and mild\par left foraminal stenosis further detailed above\par \par MRI CS 11/2/22\par \par There is maintenance of the normal cervical lordosis.\par \par  The prevertebral soft tissues appear within range of normal.\par \par  The craniocervical junction and clivus and C1-C2 distance appear within range of normal\par \par  There is uniform marrow signal on all sequences\par \par  The cervical cord is uniform in signal intensity without evidence of syrinx or suggestion of\par myelomalacia.\par \par  At C2-C3 ,  there is no significant central or foraminal stenosis.\par \par  At C3-C4 there is broad-based disc osteophytic ridging which indents the thecal sac, decreases the\par AP dimension of the thecal sac slightly and contributes to moderate right foraminal stenosis and\par mild to moderate left foraminal stenosis. This may be slightly progressed as compared to the 2019\par study.\par \par  At C4-D6ihczs is broad-based disc osteophytic ridging and a right para midline/right foraminal disc\par protrusion which indents the thecal sac and deforms the ventral surface of the cord. There is\par significantly increased mass effect as compared to the 2019 study which now results in moderate to\par severe right foraminal stenosis and moderate left foraminal stenosis with the AP dimension of the\par thecal sac is reduced to 7 mm.\par \par  At C5-C6 , broad-based disc osteophytic ridging has progressed slightly since prior exam. Mild to\par moderate right foraminal stenosis and mild left foraminal stenosis is present. Disc osteophyte comp\par sole asymmetrically prominent to the right.\par \par  At C6-C7 , there is no significant central or foraminal stenosis.\par \par  At C7-T1 , there is no significant central or foraminal stenosis.\par \par \par  IMPRESSION:\par  Multilevel degenerative changes as outlined above have progressed since the 2019 study most notably\par at C4-C5 where there is significantly increased mass effect associated with a right para\par midline/right foraminal disc osteophyte complex\par \par 1/28/2019\par \par MRI CERVICAL SPINE\par Order#:   MRI [id number]\par \par \par \par MRI OF THE CERVICAL SPINE WITHOUT CONTRAST\par \par  INDICATION:  Neck pain\par \par  TECHNIQUE: Noncontrast sagittal T1, T2, STIR, axial T2 and gradient echo, and sagittal T2\par volumetric with axial and coronal reformats.\par \par  CONTRAST: None\par \par  COMPARISON:  No prior studies are available for comparison\par \par  FINDINGS:\par \par  There is reversal of the cervical lordosis. There is minimal degenerative retrolisthesis of C3 on\par C4 and C4 on C5. The marrow signal is overall within normal limits with no focal marrow replacing\par lesion identified. The vertebral body heights are maintained and there is no evidence of acute\par fracture or subluxation. There is no abnormal vertebral or paraspinal edema. The visualized\par paraspinal soft tissues appear grossly unremarkable.\par \par  No abnormal signal is identified in the cervical spinal cord. The visualized posterior fossa\par structures are unremarkable.\par \par  C2-3: No significant disc bulge or herniation. No significant central canal or foraminal stenosis.\par \par  C3-4: Central disc protrusion superimposed on mild disc bulge with marginal and uncovertebral\par osteophyte formation. No significant central canal stenosis, however disc herniation mildly impinges\par upon the ventral spinal cord. Moderate left foraminal stenosis and mild right foraminal stenosis.\par \par  C4-5: Right paracentral disc/osteophyte superimposed on a mild disc bulge with marginal osteophyte\par formation. No significant central canal stenosis, however disc/osteophyte mildly impinges upon the\par right ventral spinal cord. Moderate bilateral foraminal stenosis.\par \par  C5-6: Mild disc bulge with marginal and uncovertebral osteophyte formation. No significant central\par canal stenosis. Mild/moderate right foraminal stenosis and mild left foraminal stenosis.\par \par  C6-7: Mild disc bulge. No significant central canal or foraminal stenosis.\par \par  C7-T1: Minimal disc bulge. No significant central canal or foraminal stenosis.\par \par \par \par  IMPRESSION:\par \par  Cervical spondylosis with central disc herniation at C3-4 and right paracentral disc/osteophyte at\par C4-5. No significant central canal stenosis, however there is mild impingement of the ventral spinal\par cord at C3-4 and C4-5. Varying degrees of foraminal stenosis as above, most pronounced at C3-4 and\par the left and C4-5 bilaterally.\par \par \par \par \par  [FreeTextEntry2] : 21

## 2023-07-12 ENCOUNTER — RESULT REVIEW (OUTPATIENT)
Age: 68
End: 2023-07-12

## 2023-07-12 ENCOUNTER — APPOINTMENT (OUTPATIENT)
Dept: HEMATOLOGY ONCOLOGY | Facility: CLINIC | Age: 68
End: 2023-07-12

## 2023-07-12 VITALS
BODY MASS INDEX: 24.61 KG/M2 | DIASTOLIC BLOOD PRESSURE: 64 MMHG | TEMPERATURE: 96.8 F | SYSTOLIC BLOOD PRESSURE: 119 MMHG | HEIGHT: 63 IN | HEART RATE: 84 BPM | RESPIRATION RATE: 16 BRPM | OXYGEN SATURATION: 100 % | WEIGHT: 138.9 LBS

## 2023-07-13 ENCOUNTER — RESULT REVIEW (OUTPATIENT)
Age: 68
End: 2023-07-13

## 2023-07-13 DIAGNOSIS — Z13.820 ENCOUNTER FOR SCREENING FOR OSTEOPOROSIS: ICD-10-CM

## 2023-07-14 ENCOUNTER — APPOINTMENT (OUTPATIENT)
Dept: PAIN MANAGEMENT | Facility: CLINIC | Age: 68
End: 2023-07-14
Payer: MEDICARE

## 2023-07-14 VITALS
BODY MASS INDEX: 24.45 KG/M2 | SYSTOLIC BLOOD PRESSURE: 114 MMHG | WEIGHT: 138 LBS | DIASTOLIC BLOOD PRESSURE: 72 MMHG | HEIGHT: 63 IN

## 2023-07-14 PROCEDURE — 62323 NJX INTERLAMINAR LMBR/SAC: CPT

## 2023-07-17 ENCOUNTER — APPOINTMENT (OUTPATIENT)
Dept: OBGYN | Facility: CLINIC | Age: 68
End: 2023-07-17
Payer: MEDICARE

## 2023-07-17 ENCOUNTER — ASOB RESULT (OUTPATIENT)
Age: 68
End: 2023-07-17

## 2023-07-17 DIAGNOSIS — R31.9 HEMATURIA, UNSPECIFIED: ICD-10-CM

## 2023-07-17 DIAGNOSIS — Z87.448 PERSONAL HISTORY OF OTHER DISEASES OF URINARY SYSTEM: ICD-10-CM

## 2023-07-17 DIAGNOSIS — R10.2 PELVIC AND PERINEAL PAIN: ICD-10-CM

## 2023-07-17 PROCEDURE — 76830 TRANSVAGINAL US NON-OB: CPT

## 2023-07-17 PROCEDURE — 81003 URINALYSIS AUTO W/O SCOPE: CPT | Mod: QW

## 2023-07-17 PROCEDURE — 99213 OFFICE O/P EST LOW 20 MIN: CPT

## 2023-07-17 NOTE — CHIEF COMPLAINT
[Urgent Visit] : Urgent Visit [FreeTextEntry1] : 67-year-old postmenopausal status post hysterectomy for fibroids approximately 20 years ago presents this morning with complaint of suprapubic pain and hematuria no other complaints

## 2023-07-17 NOTE — PHYSICAL EXAM
[Chaperone Present] : A chaperone was present in the examining room during all aspects of the physical examination [FreeTextEntry1] : Bobbi Ordoñez [No Lesions] : no genitalia lesions [Vulvar Atrophy] : vulvar atrophy [Normal] : vagina [Labia Majora] : labia major [Atrophy] : atrophy [Absent] : absent [FreeTextEntry4] : No vaginal lacerations or abraisions seen

## 2023-07-18 LAB
BILIRUB UR QL STRIP: NEGATIVE
CLARITY UR: NORMAL
COLLECTION METHOD: NORMAL
GLUCOSE UR-MCNC: NEGATIVE
HCG UR QL: 0.2 EU/DL
HGB UR QL STRIP.AUTO: NORMAL
KETONES UR-MCNC: NORMAL
LEUKOCYTE ESTERASE UR QL STRIP: NEGATIVE
NITRITE UR QL STRIP: NEGATIVE
PH UR STRIP: 6
PROT UR STRIP-MCNC: NORMAL
SP GR UR STRIP: 1.02

## 2023-07-19 ENCOUNTER — APPOINTMENT (OUTPATIENT)
Dept: HEMATOLOGY ONCOLOGY | Facility: CLINIC | Age: 68
End: 2023-07-19
Payer: MEDICARE

## 2023-07-19 VITALS
WEIGHT: 135.44 LBS | SYSTOLIC BLOOD PRESSURE: 126 MMHG | DIASTOLIC BLOOD PRESSURE: 62 MMHG | HEART RATE: 77 BPM | BODY MASS INDEX: 24 KG/M2 | OXYGEN SATURATION: 100 % | TEMPERATURE: 97.1 F | RESPIRATION RATE: 16 BRPM | HEIGHT: 63 IN

## 2023-07-19 PROCEDURE — 99213 OFFICE O/P EST LOW 20 MIN: CPT | Mod: 25

## 2023-07-19 NOTE — HISTORY OF PRESENT ILLNESS
[de-identified] : Ms. Nurys Rooney is 67 year old postmenopausal female with microcytic anemia here for consultation, referred by Dr. Meenu Weaver\par  [id number]\par \par Patient with hx of HTN, diverticulitis, chronic cervical radiculopathy, fibromyalgia, GERD, TIA, type 2 diabetes, and anemia in the last 10 years, never needed iron or blood transfusion.  Tried PO iron supplement multiple times but d/c due to severe constipation. \par She has hx of uterine fibroids 20 yrs ago with heavy menses  s/p hystectomy \par \par 4/14/2023 H/H 9.0/31.4 MCV 72.9 - been microcytic since 2020 as per records\par \par FHx:\par Brother with prostate at age 69\par M. grandmother with intestine cancer \par \par Screenings;\par Colonoscopy - 6/15/2023, last was 5 years\par Mammograms - 5/11/2023\par \par Social History\par Smoking - never\par Alcohol - denies\par Illicit drugs - denies\par retired \par \par She has been having headaches and more fatigue than usual.  NO PICA \par She has some bright red bleeding with wiping at times with hard BMs. \par \par  [de-identified] : Patient is seen today for follow up\par Accompanied by her daughter\par s/p 2 injectafer in June 2023. \par \par Her fatigue and headaches resolved since iron infusions\par s/p EGD/Colonoscopy in June 2023 \par

## 2023-07-19 NOTE — ASSESSMENT
[FreeTextEntry1] : Microcytic anemia\par Denies any brbpr, melena, hematuria\par Except once in a while she has blood on the toilet paper (usually after she has prolonged constipation x 10 days)\par LMP ~20 years ago.\par Denies any weight loss\par Brother- prostate cancer GM- intestine cancer but not sure\par Denies smoker\par Work up suggestive of SHREYA - Ferritin 5\par s/p Injectafer x 2 in June 2023\par Labs reviewed, analyzed, and discussed\par s/p EGD/colonoscopy in 6/2023 = normal findings. \par clinically feeling better with resolved headaches and fatigue. \par Ferritin improved to 292 with normal Hgb \par no further IV iron needed. \par \par Patient had multiple questions which were answered to satisfaction\par \par Labs in 6 months. CBC, ferritin, CMP, iron panel\par OV few days later

## 2023-07-19 NOTE — CONSULT LETTER
[Dear  ___] : Dear  [unfilled], [Consult Letter:] : I had the pleasure of evaluating your patient, [unfilled]. [Please see my note below.] : Please see my note below. [Consult Closing:] : Thank you very much for allowing me to participate in the care of this patient.  If you have any questions, please do not hesitate to contact me. [Sincerely,] : Sincerely, [FreeTextEntry3] : Dilshad Salas MD, MPH\par  of Medicine [facility] [employer] [facility]\par Attending Physician \par Hematology and Medical Oncology\par [facility]\par

## 2023-07-24 ENCOUNTER — APPOINTMENT (OUTPATIENT)
Dept: ENDOCRINOLOGY | Facility: CLINIC | Age: 68
End: 2023-07-24
Payer: MEDICARE

## 2023-07-24 VITALS
WEIGHT: 135 LBS | OXYGEN SATURATION: 99 % | HEART RATE: 71 BPM | SYSTOLIC BLOOD PRESSURE: 122 MMHG | HEIGHT: 63 IN | BODY MASS INDEX: 23.92 KG/M2 | DIASTOLIC BLOOD PRESSURE: 76 MMHG

## 2023-07-24 PROCEDURE — 99214 OFFICE O/P EST MOD 30 MIN: CPT

## 2023-07-25 RX ORDER — BLOOD SUGAR DIAGNOSTIC
STRIP MISCELLANEOUS
Qty: 100 | Refills: 5 | Status: ACTIVE | COMMUNITY
Start: 2022-12-08 | End: 1900-01-01

## 2023-07-28 ENCOUNTER — APPOINTMENT (OUTPATIENT)
Dept: PAIN MANAGEMENT | Facility: CLINIC | Age: 68
End: 2023-07-28

## 2023-07-28 NOTE — HISTORY OF PRESENT ILLNESS
[FreeTextEntry1] : Jul 24, 2023     in person\par \par PCP:  Dr. Meenu Weaver\par           Neuro:  Dr. Jarad Renteria (for HA)\par           Card:  Dr. Adrienne Pugh (palpitations, wake her from sleep)\par           GI:  Dr. Cisco Bull (anemia)\par           Rheum:  Dr. Sophia Moncada (arthritis - ?Heberden's)          \par           Gyn:  Dr. Gladys Moran\par           Pod:  Dr. Lázaro Bee - TRUMAN achilles tendon swelling\par           Eyes:  Dr. Montoya  \par \par CC:  Tender swollen thyroid  [started ~ May 2020] (her daughter is treated for Graves disease)\par         (Type 2 diabetes, 1/2/2017 A1c 8.3 %)\par       \par 66 yo mother of six.  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  TSH never suppressed but ESR went up and\par TgAb went up.    She also has diabetes and is undergoing investigation for anemia.\par \par For the diabetes, she takes metformin 1000 mg BID and more recently started Januvia 100 mg as well.\par She reports FBS improved.    April 10 A1c 8.1, up from 7.6 Oct 2022 and 7.3 in June 2022\par \par May 8, 2023 labs included TSH 0.78\par July 13, 2023 bone density excellent\par June 5, 2023 US thyroid:  R lobe 4 cm, L lobe 3.9 cm.\par Imp: Multiple bilateral nodular and cystic changes as describe...progressed slightly in the interval.\par \par : :\par Constitutional:  Alert, well nourished, healthy appearance, no acute distress \par Eyes:  No proptosis, no stare\par Thyroid:\par Pulmonary:  No respiratory distress, no accessory muscle use; normal rate and effort\par Cardiac:  Normal rate\par Vascular: \par Endocrine:  No stigmata of Cushing’s Syndrome\par Musculoskeletal:  Normal gait, no involuntary movements\par Neurology:  No tremors\par Affect/Mood/Psych:  Oriented x 3; normal affect, normal insight/judgement, normal mood \par .\par \par Impression:  Episode of atypical subacute thyroiditis, now with heterogenity of thyroid tissue, subcentimeter nodules, but still euthyroid.\par \par PlaN:  Repeat US thyroid prior to next visit in April 2024.    \par \par \par \par \par May 09, 2023     in person\par \par PCP:  Dr. Meenu Weaver\par           Neuro:  Dr. Jarad Renteria (for HA)\par           Card:  Dr. Adrienne Pugh (palpitations, wake her from sleep)\par           GI:  Dr. Cisco Bull (anemia)\par           Rheum:  Dr. Sophia Moncada (arthritis - ?Heberden's)          \par           Gyn:  Dr. Gladys Moran\par           Pod:  Dr. Lázaro Bee - TRUMAN achilles tendon swelling\par           Eyes:  Dr. Montoya  \par \par CC:  Tender swollen thyroid  [started ~ May 2020] (her daughter is treated for Graves disease)\par         (Type 2 diabetes, 1/2/2017 A1c 8.3 %)\par       \par 66 yo mother of six.  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  TSH never suppressed but ESR went up and\par TgAb went up.    She also has diabetes and is undergoing investigation for anemia.\par \par For the diabetes, she takes metformin 1000 mg BID and more recently started Januvia 100 mg as well.\par She reports FBS improved.    April 10 A1c 8.1, up from 7.6 Oct 2022 and 7.3 in June 2022\par \par : :\par Constitutional:  Alert, well nourished, healthy appearance, no acute distress \par Eyes:  No proptosis, no stare\par Thyroid:\par Pulmonary:  No respiratory distress, no accessory muscle use; normal rate and effort\par Cardiac:  Normal rate\par Vascular: \par Endocrine:  No stigmata of Cushing’s Syndrome\par Musculoskeletal:  Normal gait, no involuntary movements\par Neurology:  No tremors\par Affect/Mood/Psych:  Oriented x 3; normal affect, normal insight/judgement, normal mood \par .\par \par Impression:  On 5/6/23 TSH  normal at 0.78 (was never out of range)  \par Ultrasound 5/8/23 of thyroid compared to 6/10/22:  Multiple blilateral nodular and cystic changes have progressed sligthly - recomment ongoing follow up in 6 - 12 months.  \par \par Plan:  Test BS AC and PC \par She does not have a smart phone for CGM and she is not on insulin for a .\par \par \par \par \par \par \par Oct 18, 2022     in person\par \par PCP:  Dr. Meenu Weaver\par           Neuro:  Dr. Jarad Renteria (for HA)\par           Card:  Dr. Adrienne Pugh (palpitations, wake her from sleep)\par           GI:  Dr. Cisco Bull (anemia)\par           Rheum:  Dr. Sophia Moncada (arthritis - ?Heberden's)          \par           Gyn:  Dr. Gladys Moran\par           Pod:  Dr. Lázaro LAUGHLIN achilles tendon swelling\par           Eyes:  Dr. Montoya  \par \par CC:  Tender swollen thyroid  [started ~ May 2020] (her daughter is treated for Graves disease)\par         (Type 2 diabetes, 1/2/2017 A1c 8.3 %)\par       \par 67 yo mother of six.  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  TSH never suppressed but ESR went up and\par TgAb went up.    \par \par : :\par Constitutional:  Alert, well nourished, healthy appearance, no acute distress \par Eyes:  No proptosis, no stare\par Thyroid: Normal to palpation, non-tender\par Pulmonary:  No respiratory distress, no accessory muscle use; normal rate and effort\par Cardiac:  Normal rate\par Vascular: \par Endocrine:  No stigmata of Cushing’s Syndrome\par Musculoskeletal:  Normal gait, no involuntary movements\par Neurology:  No tremors\par Affect/Mood/Psych:  Oriented x 3; normal affect, normal insight/judgement, normal mood \par .\par \par Impression:  Rarely notes thyroid discomfort.\par TFTs have remained WnL.\par Surveillance appropriate.\par Next year, f/u US thyroid ~ May 2023 and ROV after that.\par Continue to follow her new, lower carb healthy diet.   \par \par \par Jun 14, 2022      in person\par \par PCP:  Dr. Meenu Weaver\par           Neuro:  Dr. Jarad Renteria (for HA)\par           Card:  Dr. Adrienne Pugh (palpitations, wake her from sleep)\par           GI:  Dr. Cisco Bull (anemia)\par           Rheum:  Dr. Sophia Moncada (arthritis - ?Heberden's)          \par           Gyn:  Dr. Gladys Moran\par           Pod:  Dr. Lázaro LAUGHLIN achilles tendon swelling\par           Eyes:  Dr. Montoya  \par \par CC:  Tender swollen thyroid  [started ~ May 2020] (her daughter is treated for Graves disease)\par         (Type 2 diabetes, 1/2/2017 A1c 8.3 %)\par       \par 67 yo mother of six.  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  TSH never suppressed but ESR went up and\par TgAb went up.    Most recent US thyroid at [facility] on 6/10/2022:\par \par "COMPARISON: 11/29/2021 \par \par FINDINGS:\par Right Lobe: 3.9 x  4.3 x 1.6 cm. In the upper pole, a hypoechoic nodule is identified, measuring 1.0 x 0.4 x 0.7 cm.\par \par Left Lobe: 4.0 x 1.0 x 1.7 cm. Lower pole spongiform nodule measures 0.6 x 0.3 x 0.6 cm.\par \par Isthmus: 2.2 mm. Hypoechoic nodule in the left isthmus measures 0.5 x 0.3 x 0.4 cm.\par \par Cervical Lymph Nodes: In the region of palpable abnormality on the left, there is a tiny hypoechoic, nonpathologic lymph node measuring 1.0 x 0.3 x 1.0 cm. On the right side, an area of palpable abnormality shows small hypoechoic lymph node, measuring 1.0 x 0.4 x 0.7 cm.\par \par IMPRESSION: \par \par Subcentimeter bilateral nodules.\par \par In the area of bilateral concern, small, nonpathologic lymph nodes.\par \par -----------------------------------------------\par Gary Solis MD              06/13/22 "\par \par \par \par For diabetes, she\par remains on glipizide ER 10  (usually early afternoon\par metformin 1000 mg in AM and again in the afternoon - her preference)\par \par Monitors sugars TID with OneTouch Ultra 2 (not available today)\par \par \par : :\par Constitutional:  Alert, well nourished, healthy appearance, no acute distress \par Eyes:  No proptosis, no stare\par Thyroid:  Normal to palpation  \par Pulmonary:  No respiratory distress, no accessory muscle use; normal rate and effort\par Cardiac:  Normal rate\par Vascular: \par Endocrine:  No stigmata of Cushing’s Syndrome\par Musculoskeletal:  Normal gait, no involuntary movements  Swelling R Achilles tendon\par Neurology:  No tremors\par Affect/Mood/Psych:  Oriented x 3; normal affect, normal insight/judgement, normal mood \par \par \par Impression/Plan:  By her history, diabetes is under excellent control with only rare, mild hypoglycemia.\par Will confirm with A1c. and lower glipizide ER from 10 mg to 5 mg.\par \par Ultrasound of thyroid reassuring and will repeat US ~ one year.\par \par ROV in November. \par .\par \par  \par \par Apr 13, 2022     in person     no NFC\par \par PCP:  Dr. Meenu Weaver\par           Neuro:  Dr. Jarad Renteria (for HA)\par           Card:  Dr. Adrienne Pugh (palpitations, wake her from sleep)\par           GI:  Dr. Cisco Bull (anemia)\par           Rheum:  Dr. Sophia Moncada (arthritis - ?Heberden's)          \par           Gyn:  Dr. Gladys Moran\par \par CC:  Tender swollen thyroid  [started ~ May 2020] (her daughter is treated for Graves disease)\par         (Type 2 diabetes, 1/2/2017 A1c 8.3 %)\par       \par 67 yo mother of six.  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  TSH never suppressed but ESR went up and\par TgAb went up.   Finally thyroid feels fine to her.    Continued surveillance by means of history, exam, US, TFTs remain prudent with\par next US thyroid about one year after last:  about Dec [id number]     TFTs today.  \par \par : :\par Constitutional:  Alert, well nourished, healthy appearance, no acute distress \par Eyes:  No proptosis, no stare\par Thyroid:  Normal to palpation, non-tender \par Pulmonary:  No respiratory distress, no accessory muscle use; normal rate and effort\par Cardiac:  Normal rate\par Vascular: \par Endocrine:  No stigmata of Cushing’s Syndrome\par Musculoskeletal:  Normal gait, no involuntary movements\par Neurology:  No tremors\par Affect/Mood/Psych:  Oriented x 3; normal affect, normal insight/judgement, normal mood \par .\par \par Imp:  Thyroiditis appears to be winding down\par FBS remain a bit elevated.    \par \par Plan:  I suggested she test FBS only twicee a week and the rest of the week do some after meal BS monitoring.\par Also, as FBS and A1c have drifted up a bit, to try moving the glipizide ER 10 mg to take in evening along with the 2nd metformin.  \par \par Mar 15, 2022     in person     no NFC \par \par PCP:  Dr. Meenu Weaver\par           Neuro:  Dr. Jarad Renteria (for HA)\par           Card:  Dr. Adrienne Pugh (palpitations, wake her from sleep)\par           GI:  Dr. Cisco Bull (anemia)\par           Rheum:  Dr. Sophia Moncada (arthritis - ?Heberden's)          \par           Gyn:  Dr. Gladys Moran\par \par CC:  Tender swollen thyroid  [started ~ May 2020] (her daughter is treated for Graves disease)\par         (Type 2 diabetes, 1/2/2017 A1c 8.3 %)\par       \par 67 yo mother of six. \par Originally seen for pain in region of thyroid - clinically thyroiditis, although euthyroid, now much improved.\par \par On exam, still has slight tenderness region of R thyroid lobe.  \par Most recent US thyroid 11/29/21 reassuring.  Will aim for f/u US thyroid about six months after that. \par \par : :\par Constitutional:  Alert, well nourished, healthy appearance, no acute distress \par Eyes:  No proptosis, no stare\par Thyroid:  Slight tenderness R thyroid lobe.  No palpable adenopathy. 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   Continue surveillance will be the strategy.\par A1c of 7.3% noted.     Will try to assist patient in doing some monitoring with CGM.   Her cucrrent smartphone is\par not equipped with the necessary NFC but she believes her son may be able to help in that regard.  \par \par .\par \par Feb 15, 2022     in person       she has an android w/o nfc\par \par PCP:  Dr. Meenu Weaver\par           Neuro:  Dr. Jarad Renteria (for HA)\par           Card:  Dr. Adrienne Pugh (palpitations, wake her from sleep)\par           GI:  Dr. Cisco Bull (anemia)\par           Rheum:  Dr. Sophia Moncada (arthritis - ?Heberden's)          \par           Gyn:  Dr. Gladys Moran\par \par CC:  Tender swollen thyroid  [started ~ May 2020] (her daughter is treated for Graves disease)\par         (Type 2 diabetes, 1/2/2017 A1c 8.3 %)\par       \par 67 yo mother of six. \par Originally seen for pain in region of thyroid - clinically thyroiditis, although euthyroid, now much improved.\par \par   Diagnosed with diabetes about 10 years ago on routine exam.\par Takes metformin 1000 mg in BID and glipizide  ER 10 mg at lunch.\par \par Reports FBS around 180 and after dinner around 180\par \par She tests with a Freestyle Lite meter.\par \par Still has some L thyroid bed discomfort.\par Last US thyroid very reassuring.\par \par Plan:  TFTs, ESR, A1c today.\par Bring meter to next visit \par Consider brief trial of adding CGM (no NFC) \par ROV in about one month.  \par \par \par Nov 18, 2021    in person\par \par PCP:  Dr. Meenu Weaver\par           Neuro:  Dr. Jarad Renteria (for HA)\par           Card:  Dr. Adrienne Pugh (palpitations, wake her from sleep)\par           GI:  Dr. Cisco Bull (anemia)\par           Rheum:  Dr. Sophia Moncada (arthritis - ?Heberden's)          \par           Gyn:  Dr. Gladys Moran\par \par CC:  Tender swollen thyroid  [started ~ May 2020] (her daughter is treated for Graves disease)\par         (Type 2 diabetes, 1/2/2017 A1c 8.3 %)\par       \par 67 yo mother of six.   Diagnosed with diabetes about 10 years ago on routine exam.\par Takes metformin 1000 mg in AM and glipizide  ER 10 mg at lunch.\par Monitors her sugars with her 's meter; however, he is out of town.  \par Today after broccoli, cauliflorer, beans, corn and rice  the fingerstick BS is 274 mg/dl\par So she will start using the Verio Flextouch to check her sugars 3X a day, before meals.\par Also reviewed with her a lower carb diet.\par \par But the primary reason she stopped in today is pain R thyroid bed which started a few weeks ago.   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  \par \par   \par \par \par \par \par \par May 04, 2021    in prson\par \par PCP:  Dr. Meenu Weaver\par           Neuro:  Dr. Jarad Renteria (for HA)\par           Card:  Dr. Adrienne Pugh (palpitations, wake her from sleep)\par           GI:  Dr. Cisco Bull (anemia)\par           Rheum:  Dr. Sophia Moncada (arthritis)\par \par           Rheum:  Dr. Sophia Moncada (joint pain)\par           Gyn:  Dr. Gladys Moran\par \par CC:  Tender swollen thyroid  [started ~ May 2020] (her daughter is treated for Graves disease)\par         (Type 2 diabetes, 1/2/2017 A1c 8.3 %)\par       \par Followed for tender thyroid.  Pain has marched across the gland similar to subacute thyroiditis.  \par \par Most recent lab tests at [facility] on\par 4/26/201 included:\par \par TSH 1.10   (lowest was 0.31 in Jan 2019)\par glucose 170 mg/dl\par insulin 38 (not fasting)\par \par On exam, thyroid slightly tender.\par No palpable nodules or adenopathy.\par \par Impression:  "Smouldering thyroiditis" slowly stuttering into baseline.\par Most recent US thyroid from 10/19/2020: diffuse heterogeneity of echogenicity, mild hypervascularity....no significant change from 9/15/2020"\par \par Doing well.\par Euthyroid.\par Reassuring US thyroid.\par \par Plan:  f/u US thyroid by 9 2021 and ROV October.\par She borrows her 's OneTouch Ultra, but will try to get her own and test sugars at different times of the day.\par If results not in range and not amenable to dietary change, additional medications which appear to be covedred include\par Farxiga, Trulicity.  \par \par She reports  mg/dl while on metformin 1000 mg BID.  \par \par \par \par \par \par \par \par \par Mar 02, 2021    in person\par \par PCP:  Dr. Meenu Weaver\par           Neuro:  Dr. Jarad Renteria (for HA)\par           Card:  Dr. Adrienne Pugh (palpitations, wake her from sleep)\par           GI:  Dr. Cisco Bull (anemia)\par \par           Rheum:  Dr. Sophia Moncada (joint pain)\par           Gyn:  Dr. Gladys Moran\par \par CC:  Tender swollen thyroid  [started ~ May 2020] (her daughter is treated for Graves disease)\par         (Type 2 diabetes, 1/2/2017 A1c 8.3 %)\par       \par Followed for tender thyroid.  Pain has marched across the gland similar to subacute thyroiditis.  Associated with development of elevated ESR (9/19: 11,  7/2/20:  39,  8/29/20:  52) that then returned to normal.   Also developed anti-thyroglobulin antibodies (7/2/20 < 20 -> 12/4/20: 83).  However, apparently never hyperthyroid enough to generate a suppressed TSH.\par \par In the initial phase US thyroid 6/16/20 showed a large poorly defined heterogeneous hypoelchoic area in the left mid lower pole felt possibly related to thyroiditis but possibility of mass "not excluded" and FNAB revealed no suspicious cytology.\par \par On exam, thyroid normal size, non-tender, without palpable nodule or lymphadenopathy.\par \par Impression:  An atypical presentation of subacute thyroiditis and although she still notes occasional discomfort in the region of the thyroid, this is infrequent and much milder than when she first presented.   \par \par Plan:  TFTs today and the long term strategy will be surveillance with follow up here in about 4 - 6 months.  \par \par \par \par Dec 03, 2020   in person\par \par PCP:  Dr. Meenu Weaver\par           Neuro:  Dr. Jarad Renteria (for HA)\par           Card:  Dr. Adrienne Pugh (palpitations, wake her from sleep)\par           GI:  Dr. Cisco Bull (anemia)\par           Rheum:  Dr. Sophia Moncada (joint pain)\par           Gyn:  Dr. Gladys Moran\par \par CC:  Tender swollen thyroid   (her daughter is treated for Graves disease)\par         (Type 2 diabetes, 1/2/2017 A1c 8.3 %)\par       \par Followed for tender thyroid.  Pain has marched across the gland (from L to R) similar to subacute thyroiditis.\par The TSH has never been suppressed.\par The ESR went up and came down followed by positive TPO abs.\par \par : :\par Constitutional:  Alert, well nourished, healthy appearance, no acute distress \par Eyes:  No proptosis, no stare\par Thyroid:\par Pulmonary:  No respiratory distress, no accessory muscle use; normal rate and effort\par Cardiac:  Normal rate\par Vascular: \par Endocrine:  No stigmata of Cushing’s Syndrome\par Musculoskeletal:  Normal gait, no involuntary movements\par Neurology:  No tremors\par Affect/Mood/Psych:  Oriented x 3; normal affect, normal insight/judgement, normal mood \par .\par  Impression:  She feels thyroid discomfort has continued to slowly improve.\par \par Plan:  TFTs today and ROV early March 2021.  \par \par \par The right thyroid lobe measures 4.9 x 1.6 x 1.7 cm, the isthmus up to 3 mm AP in the midline, and the left thyroid lobe measures 4 x 1.2 x 1.6 cm. There has been no change in lobulated thyroid contour and diffuse heterogeneity of thyroid parenchymal echogenicity. Color-flow Doppler evaluation demonstrates mild hypervascularity of the gland. . There are several very small colloid cyst in the left lobe.\par \par \par IMPRESSION: No significant change from 9/15/2020.\par \par \par ***Electronically Signed ***\par -----------------------------------------------\par Demond Valdez MD              10/19/20 \par \par \par Nov 17, 2020     Te;ephone     \par \par PCP:  Dr. Meenu Weaver\par           Neuro:  Dr. Jarad Renteria (for HA)\par           Card:  Dr. Adrienne Pugh (palpitations, wake her from sleep)\par           GI:  Dr. Cisco Bull (anemia)\par           Rheum:  Dr. Sophia Moncada (joint pain)\par           Gyn:  Dr. Gladys Moran\par \par CC:  Tender swollen thyroid   (her daughter is treated for Graves disease)\par         (Type 2 diabetes, A1c 7.3%)\par       \par 66 yo who presented with a tender swollen thyroid with symptoms "marching" across the gland from L to R.\par Today has no symptoms, but last night she had some symptoms.\par \par Trajectory of ESR was\par 9/19  11;  7/2/20  39;  7/29/20  52;  9/15/20  37   10/19/20  19 ***      \par \par \par ESR Trajectory:\par 7/2/20  < 20;  7/29/20:  < 20;  9/15/20:  52;  10/19/20:  50.4   ****\par \par 9/15/20   PC ;      10/19/10    (post prandial) ***\par \par TSH:   0.31 - 2.1     (never suppressed)  \par \par 10/19/2020 Ultrasound thyroid:  heterogeneous gland.  R lobe 4.9 cm;   L lobe 4 cm\par       Has had 5 US thyroid and FNAB of heterogeneous area L lobe when there was tenderness in that region.  \par \par Impression:  Although she has had the thyroid condition for several months, and it behaves like an unusual variant of subacute (painful) thyroiditis, the tSH has never been low.     The ESR went up and came down, the TPO ab went from negative to positive, all common with subacute thyroiditis.   "Marching" across the gland is also not that unusual.   In sum, she reports, that even though she still gets some pain, it is much better than when this all started.     So continued active surveiillance will be the strategy.\par \par Incidentally noted is the elevation of post prandial glucose, so I have taken the liberty of asking her to return for instruction in slef blood glucose monitoring as well as some dietary overview.  \par \par \par \par \par \par Sep 22, 2020   in person accompanied by her daughter, Emelina.   \par \par PCP:  Dr. Meenu Weaver\par           Neuro:  Dr. Jarad Renteria (for HA)\par           Card:  Dr. Adrienne Pugh (palpitations, wake her from sleep)\par           GI:  Dr. Cisco Bull (anemia)\par           Rheum:  Dr. Sophia Moncada (joint pain)\par           Gyn:  Dr. Gladys Moran\par \par CC:  Tender swollen thyroid   (her daughter is treated for Graves disease)\par         (Type 2 diabetes, A1c 7.3%)\par       \par 66 yo Type 2 diabetes, visits in follow up for swollen, tender thyroid.  Symptoms began on Left side of thyroid and then marched to the Right.  \par \par Most recent lab tests on\par September 15, 2020 include\par ESR 37 (had been 52 July 29, 39 July 2 and 11 Sep 20, 2019\par T4 8.9\par calcium 10.6 ***\par TSH 2.1 (up from 1.2 ion July 29, 0.70 July 2)\par TPO remains neg\par TgAb 52.3 (<10.0);  had been < 20 previously\par Tg 20 (<1.8)   [of course abs may impact\par \par \par Impression:  In general, thyroid enlargement appears to be slowly resolving.  US thyroid no longer demonstrates mass R thyroid lobe.    Her findings are most consistent with subacute thyroiditis; however, there are many atypical features, including the lack of TSH suppression and the unusually long course.    Continued close monitoring will be the strategy, including f/u US thyroid and lab tests.  \par \par Aug 03, 2020   in person\par \par PCP:  Dr. Meenu Weaver\par           Neuro:  Dr. Jarad Renteria (for HA)\par           Card:  Dr. Adrienne Pugh (palpitations, wake her from sleep)\par           GI:  Dr. Cisco Bull (anemia)\par           Rheum:  Dr. Sophia Moncada (joint pain)\par           Gyn:  Dr. Gladys Moran\par \par CC:  Tender swollen thyroid\par         (Type 2 diabetes, A1c 7.3%)\par       \par \par Recent labs at [facility] from\par 7/29/2020 include:\par \par ESR 52 (up from 39 on July 2 and 11 on Sep 20)\par TSH  1.2\par Thyroid antibodies (TPO, TgAb) remain negative\par \par Biopsy of left thyroid "nodule":  "benign follicular nodule with cystic change"\par \par Follow up US:\par \par CLINICAL HISTORY: Painful enlarged thyroid with nodules, recent benign biopsy of a left lower pole masslike asymmetry\par \par COMPARISON: 6/26/2020\par \par FINDINGS:\par \par Thyroid isthmus measures 2.8 mm.\par \par Right lobe of the thyroid measures 4.9 x 1.7 x 1.7 cm and is diffusely heterogeneous with interval development of a masslike area of asymmetry in the midpole measuring 2.4 x 1.4 x 1.3 cm.\par \par Left lobe of the thyroid measures 3.7 x 1.5 x 1.5 cm diffusely heterogeneous with previously biopsied area of masslike asymmetrical density now measuring 2.7 x 1.3 x 1.3 cm. This is not significantly changed.\par \par There is mild increased vascularity of the thyroid.\par \par Small lymph nodes are seen in the cervical chain bilaterally largest on the left measuring 7.7 x 5.6 x 5.1 mm and the largest on the right measuring 7.9 x 6.8 x 4.6 mm.\par \par \par IMPRESSION: Heterogeneous thyroid gland is again seen with interval development of a masslike area of asymmetry in the right mid pole thyroid gland. No change in the left lobe of the thyroid.\par \par Recommend short interval follow-up perhaps in 3-6 months time.\par \par ***Electronically Signed ***\par -----------------------------------------------\par Eric Pablo MD              07/29/20 \par \par \par Jul 01, 2020      in person     \par \par PCP:  Dr. Meenu Weaver\par           Neuro:  Dr. Jarad Renteria (for HA)\par           Card:  Dr. Adrienne Pugh (palpitations, wake her from sleep)\par           GI:  Dr. Cisco Bull (anemia)\par           Rheum:  Dr. Sophia Moncada (joint pain)\par           Gyn:  Dr. Gladys Moran\par \par CC:  Tender swollen thyroid\par         (Type 2 diabetes, A1c 7.3%)\par       \par 65 yo accompanied by her daughter.   Patient reports that she started to notice discomfort and swelling in the area of the thyroid about a month ago.  While the tenderness and discomfort was bilateral, it was more noticeable on the left.   The swelling and pain are still present, she reports that both are improving.   She is currently taking Tylenol with some benefit.    There is no previous history of thyroid problem.   Her daughter has been treated for a thyroid condition.  There is no history of face or neck irradiation.  \par \par Lab tests on\par 6/11/2020 included\par TSH 0.83\par \par 6/16/2020 Ultrasound Thyroid at [place]:\par .\par "The right thyroid lobe measures 4.6 x 1.2 x 2 cm, the isthmus up to 2 mm AP in the midline, and the left thyroid lobe measures 3.8 x 1.4 x 2.1 cm. A large poorly-defined heterogeneous hypoechoic area in the left mid-lower pole measures 3 x 1.3 x 1.3 cm. A couple of small hypoechoic right mid pole nodules measure 4 x 2 x 3 mm and 4 x 2 x 3 mm. Several small left cervical lymph nodes measure 1.0 x 0.6 x 0.9 cm, 1.3 x 0.6 x 0.9 cm, and 1.1 x 0.5 x 0.5 cm, and are diffusely hypoechoic, without clearly defined echogenic indira.\par \par \par IMPRESSION: In view of the history of left-sided neck pain, the large poorly-defined heterogeneous hypoechoic area in the left mid-lower pole may be secondary to thyroiditis, with the possibility of mass not excluded. Several small left-sided lymph nodes could be inflammatory or neoplastic.\par \par \par ***Electronically Signed ***\par -----------------------------------------------\par Demond Valdez MD              06/16/20  "\par \par .\par 6/29/2020 underwent FNAB of the ~ 3 cm L thyroid lesion:\par \par "FINAL DIAGNOSIS\par  \par Left thyroid mid to lower pole area:\par BENIGN FOLLICULAR NODULE WITH CYSTIC CHANGE ([place] Category II)\par A few Hurthle cells and crushed lymphocytes, suggestive of chronic lymphocytic thyroiditis.\par  \par \par MICROSCOPIC DESCRIPTION  \par  \par The Diff-Quik and Papanicolaou stained direct smears show sheets and groups of benign follicular cells with small nuclei, inconspicuous nucleoli and moderate cytoplasm in a hemorrhagic background with bare nuclei, macrophages and thin colloid. Mild metaplastic changes and rare crushed lymphoid cells are noted. "\par \par On exam, the thyroid is slightly enlarged, L more than R and is slightly tender to palpation.  \par \par \par Impression:  Her careful evaluation is most consistent with subacute thyroiditis.  The inflammation of subacute thyroiditis usually causes enough release of thyroid hormone to cause suppression of TSH, which has not yet been seen here, so close follow up by means of history, exam, labs and ultrasound will be the current strategy.    The trajectory of her symptoms appears to be that of improvement, so that is reassuring.   Symptoms from subacute thyroiditis may take several months to completely resolve.  \par \par Plan:  Repeat TSH today, repeat US thyroid in about 4-5 weeks and ROV here after that.

## 2023-07-31 LAB — BACTERIA UR CULT: NORMAL

## 2023-08-10 ENCOUNTER — RESULT REVIEW (OUTPATIENT)
Age: 68
End: 2023-08-10

## 2023-08-10 ENCOUNTER — APPOINTMENT (OUTPATIENT)
Dept: PAIN MANAGEMENT | Facility: CLINIC | Age: 68
End: 2023-08-10
Payer: MEDICARE

## 2023-08-10 VITALS
HEIGHT: 63 IN | WEIGHT: 135 LBS | HEART RATE: 87 BPM | OXYGEN SATURATION: 97 % | DIASTOLIC BLOOD PRESSURE: 78 MMHG | SYSTOLIC BLOOD PRESSURE: 129 MMHG | BODY MASS INDEX: 23.92 KG/M2

## 2023-08-10 PROCEDURE — 99214 OFFICE O/P EST MOD 30 MIN: CPT

## 2023-08-10 NOTE — REASON FOR VISIT
Noted.   Forwarded to [employer] Cuevas .     [Follow-Up Visit] : a follow-up pain management visit [FreeTextEntry2] : back pain

## 2023-08-10 NOTE — HISTORY OF PRESENT ILLNESS
[___ yrs] : [unfilled] year(s) ago [FreeTextEntry1] : Interval Note: sp  L4-5 interlaminar epidural steroid injection 7/14/23 with significant improvement  Unfortunately patient returns complaining of left shoulder pain that was exacerbated with PT, reports that she has pain over posterior shoulder and edema, and limitation in ROM.   Pain is so bad that patient finds it difficult to perform adls denies fever/chills denies any worsening numbness, weakness, bowel/bladder dysfunction.      HPI     Ms. SMITH CUMMINGS is a 67 year F on baby ASA with pmhx of TIA, DM2 (Notes poor glucose control with episodes of hypo and hyperglycemia. (BG's 30's-300's- managed by Dr Gutierres and Dr Hellerman), HTN, RA, chronic lower back pain x 15 yrs. Lower back pain worsening over the past few months. Remains severe despite medication and physical therapy. Pain to lower back that radiates to anterolaterally, left greater than right. Worst at night when laying. In addition, continued scapular and shoulder pain that radiates down her arms. Pain makes it difficult to do ADL's. Denies any additional weakness, numbness, bowel/bladder dysfunction.    Previous and current pain medications/doses/effects: Gabapentin 600mg tid, Cymbalta 60mg.     Previous Pain Treatments:      Previous Pain Injections:  L4-5 interlaminar epidural steroid injection 7/14/23  L4-5 interlaminar epidural steroid injection 5/17/23  Cervical MINO (2004)- Dr Ramos     Previous Diagnostic Studies/Images:  Exam: MRI LUMBAR SPINE 11/2/22 Order#: [id number]     HISTORY: 67 years Female LUMBAR SPINAL STENOSIS MRI     PROCEDURE:MRI lumbar spine without contrast    COMPARISON:    TECHNIQUE:MRI lumbosacral spine 1.5 Liza magnet    FINDINGS: Please note that there is transitional lumbosacral anatomy. Please note that there may be discrepancies in spinal level labeling on prior/subsequent imaging as well as clinical/surgical  documentation and close attention on follow-up is strongly recommended especially in the setting of  procedural planning. For the purposes of today's exam the transitional level is labeled a  transitional L5 level.    The paraspinal musculature including the psoas muscle, multifidus muscles, and immediate para axial soft tissues appear within range of normal without evidence of mass or collection.    There is a maintenance of the normal lumbar lordosis. There is fairly uniform marrow signal on all  sequences.    The conus terminates at the T12-L1 level.    At L5-S1   The disc appears intact. There is no significant central or foraminal stenosis.    At L4-L5   There is disc desiccation, a midline annular fissure and shallow central disc protrusion mildly  effacing the ventral epidural space and flattening the ventral thecal sac. Bony overgrowth from  adjacent facet joints and ligamentum flavum thickening contribute to mild left greater than right  effacement of the lateral recesses. There is a prominent left-sided intraforaminal component of  circumferential disc bulging contributing to moderately severe left foraminal stenosis.    At L3-L4   There is loss of intervertebral disc space height, disc desiccation, with effacement of the lateral recesses more so on the right than the left. Mild left foraminal stenosis and moderate to severe  right foraminal stenosis is present. Bony overgrowth from adjacent facet arthropathy is present at  this level.    At L2-L3   There is disc desiccation but no focal disc protrusion. No significant central or foraminal  stenosis is present.    At L1-L2   There is mild disc desiccation but no focal disc protrusion    At T11-T12 there is evidence of degenerative disc disease with a shallow central disc bulge.     IMPRESSION: Please note that there is transitional lumbosacral anatomy. Please note that there may  be discrepancies in spinal level labeling on prior/subsequent imaging as well as clinical/surgical  documentation and close attention on follow-up is strongly recommended especially in the setting of  procedural planning.    Degenerative changes lumbosacral spine at L4-L5 include midline annular fissure and a shallow  central disc bulging asymmetrically prominent to the left mildly effacing the left lateral recess  and contributing to left foraminal stenosis    Moderately advanced degenerative disc changes at L3-L4 are associated with reactive Modic type  changes. Disc osteophyte complex results in moderate to severe right foraminal stenosis and mild  left foraminal stenosis further detailed above    Other findings outlined above    Exam: MRI CERVICAL SPINE 11/2/22 Order#: [id number]     HISTORY: 67 years Female CHRONIC CERVICAL RADICULOPATHY MRI    PROCEDURE: MRI cervical spine without contrast    COMPARISON: January 28, 2019    TECHNIQUE: MRI cervical spine performed 1.5 Liza magnet    FINDINGS:   There is maintenance of the normal cervical lordosis.    The prevertebral soft tissues appear within range of normal.    The craniocervical junction and clivus and C1-C2 distance appear within range of normal    There is uniform marrow signal on all sequences    The cervical cord is uniform in signal intensity without evidence of syrinx or suggestion of  myelomalacia.    At C2-C3 , there is no significant central or foraminal stenosis.    At C3-C4 there is broad-based disc osteophytic ridging which indents the thecal sac, decreases the  AP dimension of the thecal sac slightly and contributes to moderate right foraminal stenosis and  mild to moderate left foraminal stenosis. This may be slightly progressed as compared to the 2019  study.    At C4-V8fdmce is broad-based disc osteophytic ridging and a right para midline/right foraminal disc protrusion which indents the thecal sac and deforms the ventral surface of the cord. There is  significantly increased mass effect as compared to the 2019 study which now results in moderate to  severe right foraminal stenosis and moderate left foraminal stenosis with the AP dimension of the  thecal sac is reduced to 7 mm.    At C5-C6 , broad-based disc osteophytic ridging has progressed slightly since prior exam. Mild to  moderate right foraminal stenosis and mild left foraminal stenosis is present. Disc osteophyte comp sole asymmetrically prominent to the right.    At C6-C7 , there is no significant central or foraminal stenosis.    At C7-T1 , there is no significant central or foraminal stenosis.     IMPRESSION:   Multilevel degenerative changes as outlined above have progressed since the 2019 study most notably at C4-C5 where there is significantly increased mass effect associated with a right para  midline/right foraminal disc osteophyte complex    Other findings discussed above     Thank you for allowing us to participate in the evaluation of this patient.     MRI LS 11/22   Please note that there is transitional lumbosacral anatomy. Please note that there may be discrepancies in spinal level labeling on prior/subsequent imaging as well as clinical/surgical documentation and close attention on follow-up is strongly recommended especially in the setting of procedural planning. For the purposes of today's exam the transitional level is labeled a transitional L5 level.   The paraspinal musculature including the psoas muscle, multifidus muscles, and immediate para axial soft tissues appear within range of normal without evidence of mass or collection.   There is a maintenance of the normal lumbar lordosis. There is fairly uniform marrow signal on all sequences.   The conus terminates at the T12-L1 level.   At L5-S1  The disc appears intact. There is no significant central or foraminal stenosis.   At L4-L5  There is disc desiccation, a midline annular fissure and shallow central disc protrusion mildly effacing the ventral epidural space and flattening the ventral thecal sac. Bony overgrowth from adjacent facet joints and ligamentum flavum thickening contribute to mild left greater than right effacement of the lateral recesses. There is a prominent left-sided intraforaminal component of circumferential disc bulging contributing to moderately severe left foraminal stenosis.   At L3-L4  There is loss of intervertebral disc space height, disc desiccation, with effacement of the lateral recesses more so on the right than the left. Mild left foraminal stenosis and moderate to severe right foraminal stenosis is present. Bony overgrowth from adjacent facet arthropathy is present at this level.   At L2-L3  There is disc desiccation but no focal disc protrusion. No significant central or foraminal stenosis is present.   At L1-L2  There is mild disc desiccation but no focal disc protrusion   At T11-T12 there is evidence of degenerative disc disease with a shallow central disc bulge.    IMPRESSION: Please note that there is transitional lumbosacral anatomy. Please note that there may be discrepancies in spinal level labeling on prior/subsequent imaging as well as clinical/surgical documentation and close attention on follow-up is strongly recommended especially in the setting of procedural planning.   Degenerative changes lumbosacral spine at L4-L5 include midline annular fissure and a shallow central disc bulging asymmetrically prominent to the left mildly effacing the left lateral recess and contributing to left foraminal stenosis   Moderately advanced degenerative disc changes at L3-L4 are associated with reactive Modic type changes. Disc osteophyte complex results in moderate to severe right foraminal stenosis and mild left foraminal stenosis further detailed above  MRI CS 11/2/22  There is maintenance of the normal cervical lordosis.   The prevertebral soft tissues appear within range of normal.   The craniocervical junction and clivus and C1-C2 distance appear within range of normal   There is uniform marrow signal on all sequences   The cervical cord is uniform in signal intensity without evidence of syrinx or suggestion of myelomalacia.   At C2-C3 ,  there is no significant central or foraminal stenosis.   At C3-C4 there is broad-based disc osteophytic ridging which indents the thecal sac, decreases the AP dimension of the thecal sac slightly and contributes to moderate right foraminal stenosis and mild to moderate left foraminal stenosis. This may be slightly progressed as compared to the 2019 study.   At C4-K4kmyne is broad-based disc osteophytic ridging and a right para midline/right foraminal disc protrusion which indents the thecal sac and deforms the ventral surface of the cord. There is significantly increased mass effect as compared to the 2019 study which now results in moderate to severe right foraminal stenosis and moderate left foraminal stenosis with the AP dimension of the thecal sac is reduced to 7 mm.   At C5-C6 , broad-based disc osteophytic ridging has progressed slightly since prior exam. Mild to moderate right foraminal stenosis and mild left foraminal stenosis is present. Disc osteophyte comp sole asymmetrically prominent to the right.   At C6-C7 , there is no significant central or foraminal stenosis.   At C7-T1 , there is no significant central or foraminal stenosis.    IMPRESSION:  Multilevel degenerative changes as outlined above have progressed since the 2019 study most notably at C4-C5 where there is significantly increased mass effect associated with a right para midline/right foraminal disc osteophyte complex  1/28/2019  MRI CERVICAL SPINE Order#:   MRI [id number]    MRI OF THE CERVICAL SPINE WITHOUT CONTRAST   INDICATION:  Neck pain   TECHNIQUE: Noncontrast sagittal T1, T2, STIR, axial T2 and gradient echo, and sagittal T2 volumetric with axial and coronal reformats.   CONTRAST: None   COMPARISON:  No prior studies are available for comparison   FINDINGS:   There is reversal of the cervical lordosis. There is minimal degenerative retrolisthesis of C3 on C4 and C4 on C5. The marrow signal is overall within normal limits with no focal marrow replacing lesion identified. The vertebral body heights are maintained and there is no evidence of acute fracture or subluxation. There is no abnormal vertebral or paraspinal edema. The visualized paraspinal soft tissues appear grossly unremarkable.   No abnormal signal is identified in the cervical spinal cord. The visualized posterior fossa structures are unremarkable.   C2-3: No significant disc bulge or herniation. No significant central canal or foraminal stenosis.   C3-4: Central disc protrusion superimposed on mild disc bulge with marginal and uncovertebral osteophyte formation. No significant central canal stenosis, however disc herniation mildly impinges upon the ventral spinal cord. Moderate left foraminal stenosis and mild right foraminal stenosis.   C4-5: Right paracentral disc/osteophyte superimposed on a mild disc bulge with marginal osteophyte formation. No significant central canal stenosis, however disc/osteophyte mildly impinges upon the right ventral spinal cord. Moderate bilateral foraminal stenosis.   C5-6: Mild disc bulge with marginal and uncovertebral osteophyte formation. No significant central canal stenosis. Mild/moderate right foraminal stenosis and mild left foraminal stenosis.   C6-7: Mild disc bulge. No significant central canal or foraminal stenosis.   C7-T1: Minimal disc bulge. No significant central canal or foraminal stenosis.     IMPRESSION:   Cervical spondylosis with central disc herniation at C3-4 and right paracentral disc/osteophyte at C4-5. No significant central canal stenosis, however there is mild impingement of the ventral spinal cord at C3-4 and C4-5. Varying degrees of foraminal stenosis as above, most pronounced at C3-4 and the left and C4-5 bilaterally.

## 2023-08-10 NOTE — ASSESSMENT
[FreeTextEntry1] : >> Imaging and Other Studies  I personally reviewed the relevant imaging.  Discussed and explained to patient the likely source of pathology and pain.  Questions answered. MRI   XR Shoulder to evaluate na >> Therapy and Other Modalities  continue PT  >> Medications  gabapentin 600mg TID cautioned change in mood.  Encouraged to call with any worsening mood or depression/suicidal ideations  acetaminophen 650mg q8h prn pain (caution <3g daily)   >> Interventions  Significant component of axial back pain secondary to lumbar spondylosis and facet arthropathy demonstrated on MRI LS.  Pain refractory to conservative treatments.  sp BILATERAL L4-sacral ala diagnostic medial branch block (2 joints, 3 nerves on each side) r/b/a discussed (STEROID SPARING) without immediate improvement, but improvement in 12 hours and for 2 months  given efficacy of previous intervention that is >80% improvement in pain and improved ability to perform adls, and return of pain despite conservative treatment, sp repeat BILATERAL L4-sacral ala diagnostic medial branch block (2 joints, 3 nerves on each side) without immediate improvement but now improved  Significant component of back and leg pain likely secondary to lumbar spinal stenosis demonstrated on MRI LS.  sp L4-5 interlaminar epidural steroid injection with significant improvement  shoulder pain likely secondary to significant joint arthropathy demonstrated on imaging refractory to conservative treatments. Will schedule LEFT glenohumeral joint steroid injection r/b/a discussed  patient to keep close eye on glucose control post procedure  >> Consults  >> Discussion of Risks/Benefits/Alternatives  	>Regarding any scheduled procedures:  I have discussed in detail with the patient that any interventional pain procedure is associated with potential risks.  The procedure may include an injection of steroids and potentially other medications (local anesthetic and normal saline) into the epidural space or surrounding tissue of the spine.  There are significant risks of this procedure which include and are not limited to infection, bleeding, worsening pain, dural puncture leading to postdural puncture headache, nerve damage, spinal cord injury, paralysis, stroke, and death.    There is a chance that the procedure does not improve their pain.    There are risks associated with the steroid being absorbed into the body systemically.  These include dysphoria, difficulty sleeping, mood swings and personality changes.  Premenopausal women may notice an irregularity in her menstrual cycle for 2-3 months following the injection.  Steroids can specifically affect patients with hypertension, diabetes, and peptic ulcers.  The procedure may cause a temporary increase in blood pressure and blood pressure, and may adversely affect a peptic ulcer.  Other, more rare complications, include avascular necrosis of joints, glaucoma and worsening of osteoporosis.   I have discussed the risks of the procedure at length with the patient, and the potential benefits of pain relief.  I have offered alternatives to the procedure.  All questions were answered.    The patient expressed understanding and wishes to proceed with the procedure.  	>Regarding COVID19 Pandemic:   Any planned interventional pain procedure are scheduled because further delay may cause harm or negative outcome to patient.  The goal in performing this procedure is to avoid deterioration of function, emergency room visits (which increases exposure) and reliance on opioids.    r/b/a discussed with patient, lack of evidence to conclusively determine whether pain management procedures have any positive or negative impact on the possibility of shima the virus and/or development of any sequelae.   Patient counselled regarding timing steroid based intervention 2 weeks before or after COVID-19 vaccine administration to avoid any interaction or affect on efficacy of vaccination  Patient demonstrates understanding  Informed patient that risks associated with the COVID-19 infection.  Informed patient steps taken to limit the risks.  We are implementing safety precautions and following protocols consistent with the CDC and state recommendations. All patients and staff will be checked for fever or signs of illness upon entry to the facility. We will limit our steroid dose to the lowest effective therapeutic dose or in some cases steroids will not be injected at all.   Patient agrees to proceed  >> Conclusion  The above diagnosis and treatment plan is medically reasonable and necessary based on the patient encounter  There were no barriers to communication. Informed patient that I would be available for any additional questions. Patient was instructed to call with any worsening symptoms including severe pain, new numbness/weakness, or changes in the bowel/bladder function.  Discussed role of nsaids in pain management and all relevant risks, if patient is continuing to require after 4 weeks the patient should f/u for alternative treatment.  Instructed patient to maintain pain diary to monitor pain level, mobility, and function.

## 2023-08-17 ENCOUNTER — APPOINTMENT (OUTPATIENT)
Dept: PAIN MANAGEMENT | Facility: CLINIC | Age: 68
End: 2023-08-17
Payer: MEDICARE

## 2023-08-17 VITALS
HEIGHT: 63 IN | WEIGHT: 135 LBS | SYSTOLIC BLOOD PRESSURE: 132 MMHG | OXYGEN SATURATION: 99 % | BODY MASS INDEX: 23.92 KG/M2 | DIASTOLIC BLOOD PRESSURE: 72 MMHG | HEART RATE: 81 BPM

## 2023-08-17 PROCEDURE — 20611 DRAIN/INJ JOINT/BURSA W/US: CPT | Mod: LT

## 2023-08-17 NOTE — PROCEDURE
[FreeTextEntry1] : SHOULDER INJECTION ULTRASOUND  The patient was given opportunity to ask questions regarding the procedure, its indications and the associated risks.  The risks of the procedure discussed include but are not limited to infection, bleeding, allergic reactions, nerve injuries, and side effects.  The patient showed understanding and wished to proceed.  After obtaining informed consent, the patient's chart was reviewed, the site of operation was marked, and the patient's identification was confirmed.  The patient was brought to the Exam Room and placed in a seated position on the exam room table with the] [LEFT hand resting on the contralateral shoulder. Strict sterile technique was used.  Skin was prepped with Chlorhexadine solution and draped in sterile manner.  Using sterile technique, a high-frequency, linear-array ultrasound probe was placed in a position just caudad to the acromion and parallel with the spine of the scapula. The area between the glenoid and humeral head was identified.  An entry point just lateral to the ultrasound probe was anesthetized using a [30]g needle and [1]cc 1% lidocaine. Following this, a [21]g needle was inserted using an in-plane technique with the ultrasound such that the needle shaft and tip were visualized. When the needle tip reached the junction between the glenoid and humeral head, avoiding suprascapular mass and after negative aspiration for heme, a mixture of 20mg kenalog with 4cc 0.75% bupivacaine was injected. The needle was then flushed with lidocaine and removed.  A band-aid dressing was applied.  The patient tolerated the procedure well and there were no immediate adverse event. The patient was asked to follow up in 2 weeks and was instructed to call with fever, chills, increased pain, redness or swelling at the injection site, numbness or weakness.

## 2023-08-22 NOTE — ASSESSMENT
[FreeTextEntry1] : SHOULDER INJECTION ULTRASOUND LEFT  The patient was given opportunity to ask questions regarding the procedure, its indications and the associated risks.  The risks of the procedure discussed include but are not limited to infection, bleeding, allergic reactions, nerve injuries, and side effects.  The patient showed understanding and wished to proceed.  After obtaining informed consent, the patient's chart was reviewed, the site of operation was marked, and the patient's identification was confirmed.  The patient was brought to the Exam Room and placed in a seated position on the exam room table with the] [LEFT hand resting on the contralateral shoulder. Strict sterile technique was used.  Skin was prepped with Chlorhexadine solution and draped in sterile manner.  Using sterile technique, a high-frequency, linear-array ultrasound probe was placed in a position just caudad to the acromion and parallel with the spine of the scapula. The area between the glenoid and humeral head was identified.  An entry point just lateral to the ultrasound probe was anesthetized using a [30]g needle and [1]cc 1% lidocaine. Following this, a [21]g needle was inserted using an in-plane technique with the ultrasound such that the needle shaft and tip were visualized. When the needle tip reached the junction between the glenoid and humeral head, and after negative aspiration for heme, a mixture of 20mg kenalog with 4cc 0.5% bupivacaine was injected. The needle was then flushed with lidocaine and removed.  A band-aid dressing was applied.  The patient tolerated the procedure well and there were no immediate adverse event. The patient was asked to follow up in 2 weeks and was instructed to call with fever, chills, increased pain, redness or swelling at the injection site, numbness or weakness.

## 2023-08-31 ENCOUNTER — APPOINTMENT (OUTPATIENT)
Dept: FAMILY MEDICINE | Facility: CLINIC | Age: 68
End: 2023-08-31
Payer: MEDICARE

## 2023-08-31 ENCOUNTER — APPOINTMENT (OUTPATIENT)
Dept: PAIN MANAGEMENT | Facility: CLINIC | Age: 68
End: 2023-08-31
Payer: MEDICARE

## 2023-08-31 VITALS
SYSTOLIC BLOOD PRESSURE: 113 MMHG | HEIGHT: 63 IN | DIASTOLIC BLOOD PRESSURE: 72 MMHG | WEIGHT: 135 LBS | BODY MASS INDEX: 23.92 KG/M2

## 2023-08-31 VITALS
BODY MASS INDEX: 24.1 KG/M2 | SYSTOLIC BLOOD PRESSURE: 110 MMHG | HEIGHT: 63 IN | HEART RATE: 83 BPM | DIASTOLIC BLOOD PRESSURE: 74 MMHG | OXYGEN SATURATION: 98 % | WEIGHT: 136 LBS

## 2023-08-31 DIAGNOSIS — E78.49 OTHER HYPERLIPIDEMIA: ICD-10-CM

## 2023-08-31 DIAGNOSIS — Z00.00 ENCOUNTER FOR GENERAL ADULT MEDICAL EXAMINATION W/OUT ABNORMAL FINDINGS: ICD-10-CM

## 2023-08-31 PROCEDURE — 99212 OFFICE O/P EST SF 10 MIN: CPT | Mod: 25

## 2023-08-31 PROCEDURE — 99214 OFFICE O/P EST MOD 30 MIN: CPT

## 2023-08-31 PROCEDURE — G0439: CPT

## 2023-08-31 NOTE — ASSESSMENT
[FreeTextEntry1] : >> Imaging and Other Studies  I personally reviewed the relevant imaging.  Discussed and explained to patient the likely source of pathology and pain.  Questions answered. MRI  XR  >> Therapy and Other Modalities  continue PT  >> Medications  gabapentin 600mg TID cautioned change in mood.  Encouraged to call with any worsening mood or depression/suicidal ideations  acetaminophen 650mg q8h prn pain (caution <3g daily)   >> Interventions  Significant component of axial back pain secondary to lumbar spondylosis and facet arthropathy demonstrated on MRI LS.  Pain refractory to conservative treatments.  sp BILATERAL L4-sacral ala diagnostic medial branch block (2 joints, 3 nerves on each side) r/b/a discussed (STEROID SPARING) without immediate improvement, but improvement in 12 hours and for 2 months  given efficacy of previous intervention that is >80% improvement in pain and improved ability to perform adls, and return of pain despite conservative treatment, sp repeat BILATERAL L4-sacral ala diagnostic medial branch block (2 joints, 3 nerves on each side) without immediate improvement but now improved  Significant component of back and leg pain likely secondary to lumbar spinal stenosis demonstrated on MRI LS.  sp L4-5 interlaminar epidural steroid injection with significant improvement   shoulder pain likely secondary to significant joint arthropathy demonstrated on imaging refractory to conservative treatments.sp  LEFT glenohumeral joint steroid injection    >> Consults  >> Discussion of Risks/Benefits/Alternatives  	>Regarding any scheduled procedures:  I have discussed in detail with the patient that any interventional pain procedure is associated with potential risks.  The procedure may include an injection of steroids and potentially other medications (local anesthetic and normal saline) into the epidural space or surrounding tissue of the spine.  There are significant risks of this procedure which include and are not limited to infection, bleeding, worsening pain, dural puncture leading to postdural puncture headache, nerve damage, spinal cord injury, paralysis, stroke, and death.    There is a chance that the procedure does not improve their pain.    There are risks associated with the steroid being absorbed into the body systemically.  These include dysphoria, difficulty sleeping, mood swings and personality changes.  Premenopausal women may notice an irregularity in her menstrual cycle for 2-3 months following the injection.  Steroids can specifically affect patients with hypertension, diabetes, and peptic ulcers.  The procedure may cause a temporary increase in blood pressure and blood pressure, and may adversely affect a peptic ulcer.  Other, more rare complications, include avascular necrosis of joints, glaucoma and worsening of osteoporosis.   I have discussed the risks of the procedure at length with the patient, and the potential benefits of pain relief.  I have offered alternatives to the procedure.  All questions were answered.    The patient expressed understanding and wishes to proceed with the procedure.  	>Regarding COVID19 Pandemic:   Any planned interventional pain procedure are scheduled because further delay may cause harm or negative outcome to patient.  The goal in performing this procedure is to avoid deterioration of function, emergency room visits (which increases exposure) and reliance on opioids.    r/b/a discussed with patient, lack of evidence to conclusively determine whether pain management procedures have any positive or negative impact on the possibility of shima the virus and/or development of any sequelae.   Patient counselled regarding timing steroid based intervention 2 weeks before or after COVID-19 vaccine administration to avoid any interaction or affect on efficacy of vaccination  Patient demonstrates understanding  Informed patient that risks associated with the COVID-19 infection.  Informed patient steps taken to limit the risks.  We are implementing safety precautions and following protocols consistent with the CDC and state recommendations. All patients and staff will be checked for fever or signs of illness upon entry to the facility. We will limit our steroid dose to the lowest effective therapeutic dose or in some cases steroids will not be injected at all.   Patient agrees to proceed  >> Conclusion  The above diagnosis and treatment plan is medically reasonable and necessary based on the patient encounter  There were no barriers to communication. Informed patient that I would be available for any additional questions. Patient was instructed to call with any worsening symptoms including severe pain, new numbness/weakness, or changes in the bowel/bladder function.  Discussed role of nsaids in pain management and all relevant risks, if patient is continuing to require after 4 weeks the patient should f/u for alternative treatment.  Instructed patient to maintain pain diary to monitor pain level, mobility, and function.

## 2023-08-31 NOTE — HISTORY OF PRESENT ILLNESS
[FreeTextEntry1] : PE/shoulder pain  [de-identified] : 67 year F presents for PE. Pt is feeling well today. c/o shoulder pain; saw Pain management this morning; pt want referral to Ortho CRC: UTD 6/2023 repeat in 5 year Mammo: UTD PAP: pt follows with GYN  ENDO: follows with Dr Hellerman  Agreeable for lab work and HIV/HepC screen.

## 2023-08-31 NOTE — PHYSICAL EXAM
[Normal Sclera/Conjunctiva] : normal sclera/conjunctiva [PERRL] : pupils equal round and reactive to light [EOMI] : extraocular movements intact [Normal Oropharynx] : the oropharynx was normal [Normal Outer Ear/Nose] : the outer ears and nose were normal in appearance [No Lymphadenopathy] : no lymphadenopathy [Supple] : supple [Thyroid Normal, No Nodules] : the thyroid was normal and there were no nodules present [No Respiratory Distress] : no respiratory distress  [No Accessory Muscle Use] : no accessory muscle use [Clear to Auscultation] : lungs were clear to auscultation bilaterally [Normal Rate] : normal rate  [Regular Rhythm] : with a regular rhythm [Normal S1, S2] : normal S1 and S2 [No Edema] : there was no peripheral edema [Soft] : abdomen soft [Non Tender] : non-tender [Non-distended] : non-distended [Normal Bowel Sounds] : normal bowel sounds [No Rash] : no rash [Coordination Grossly Intact] : coordination grossly intact [No Focal Deficits] : no focal deficits [Normal Gait] : normal gait [Normal] : affect was normal and insight and judgment were intact

## 2023-08-31 NOTE — REVIEW OF SYSTEMS
[Fever] : no fever [Chills] : no chills [Fatigue] : no fatigue [Night Sweats] : no night sweats [Vision Problems] : no vision problems [Chest Pain] : no chest pain [Palpitations] : no palpitations [Leg Claudication] : no leg claudication [Shortness Of Breath] : no shortness of breath [Abdominal Pain] : no abdominal pain [Vomiting] : no vomiting [FreeTextEntry9] : no bony deformities, normal ROM, tender to palpation L trapezius, Painful arc neg, empty can neg

## 2023-08-31 NOTE — HEALTH RISK ASSESSMENT
[Good] : ~his/her~  mood as  good [No] : No [No falls in past year] : Patient reported no falls in the past year [0] : 2) Feeling down, depressed, or hopeless: Not at all (0) [PHQ-2 Negative - No further assessment needed] : PHQ-2 Negative - No further assessment needed [Patient reported PAP Smear was normal] : Patient reported PAP Smear was normal [Patient reported colonoscopy was normal] : Patient reported colonoscopy was normal [Retired] : retired [High School] : high school [] :  [# Of Children ___] : has [unfilled] children [Smoke Detector] : smoke detector [Carbon Monoxide Detector] : carbon monoxide detector [Safety elements used in home] : safety elements used in home [Never] : Never [de-identified] : walking [de-identified] : none [[id number]] : o [Guns at Home] : no guns at home [MammogramDate] : 05/11/2023 [PapSmearDate] : 2020 [PapSmearComments] : Scheduled for 11/2023 [BoneDensityDate] : 07/13/2023 [ColonoscopyDate] : 06/15/2023 [ColonoscopyComments] : Dr. Bull.  Repeat 5 years. [de-identified] : disability  [AdvancecareDate] : 08/31/2023

## 2023-08-31 NOTE — HISTORY OF PRESENT ILLNESS
[FreeTextEntry1] : PE/shoulder pain  [de-identified] : 67 year F presents for PE. Pt is feeling well today. c/o shoulder pain; saw Pain management this morning; pt want referral to Ortho CRC: UTD 6/2023 repeat in 5 year Mammo: UTD PAP: pt follows with GYN  ENDO: follows with Dr Hellerman  Agreeable for lab work and HIV/HepC screen.

## 2023-08-31 NOTE — HEALTH RISK ASSESSMENT
[Good] : ~his/her~  mood as  good [No] : No [No falls in past year] : Patient reported no falls in the past year [0] : 2) Feeling down, depressed, or hopeless: Not at all (0) [PHQ-2 Negative - No further assessment needed] : PHQ-2 Negative - No further assessment needed [Patient reported PAP Smear was normal] : Patient reported PAP Smear was normal [Patient reported colonoscopy was normal] : Patient reported colonoscopy was normal [Retired] : retired [High School] : high school [] :  [# Of Children ___] : has [unfilled] children [Smoke Detector] : smoke detector [Carbon Monoxide Detector] : carbon monoxide detector [Safety elements used in home] : safety elements used in home [Never] : Never [de-identified] : walking [de-identified] : none [[id number]] : o [Guns at Home] : no guns at home [MammogramDate] : 05/11/2023 [PapSmearDate] : 2020 [PapSmearComments] : Scheduled for 11/2023 [BoneDensityDate] : 07/13/2023 [ColonoscopyDate] : 06/15/2023 [ColonoscopyComments] : Dr. Bull.  Repeat 5 years. [de-identified] : disability  [AdvancecareDate] : 08/31/2023

## 2023-08-31 NOTE — HISTORY OF PRESENT ILLNESS
[___ yrs] : [unfilled] year(s) ago [FreeTextEntry1] : Interval Note:  sp LEFT glenohumeral joint steroid injection 8/17/23 with significant improvement in left shoulder pain.  Patient reports that she is doing well in PT.   denies any worsening numbness, weakness, bowel/bladder dysfunction.      HPI     Ms. SMITH CUMMINGS is a 67 year F on baby ASA with pmhx of TIA, DM2 (Notes poor glucose control with episodes of hypo and hyperglycemia. (BG's 30's-300's- managed by Dr Guiterres and Dr Hellerman), HTN, RA, chronic lower back pain x 15 yrs. Lower back pain worsening over the past few months. Remains severe despite medication and physical therapy. Pain to lower back that radiates to anterolaterally, left greater than right. Worst at night when laying. In addition, continued scapular and shoulder pain that radiates down her arms. Pain makes it difficult to do ADL's. Denies any additional weakness, numbness, bowel/bladder dysfunction.    Previous and current pain medications/doses/effects: Gabapentin 600mg tid, Cymbalta 60mg.     Previous Pain Treatments:      Previous Pain Injections:  L4-5 interlaminar epidural steroid injection 7/14/23  L4-5 interlaminar epidural steroid injection 5/17/23  Cervical MINO (2004)- Dr Ramos     Previous Diagnostic Studies/Images:  Exam: MRI LUMBAR SPINE 11/2/22 Order#: [id number]     HISTORY: 67 years Female LUMBAR SPINAL STENOSIS MRI     PROCEDURE:MRI lumbar spine without contrast    COMPARISON:    TECHNIQUE:MRI lumbosacral spine 1.5 Liza magnet    FINDINGS: Please note that there is transitional lumbosacral anatomy. Please note that there may be discrepancies in spinal level labeling on prior/subsequent imaging as well as clinical/surgical  documentation and close attention on follow-up is strongly recommended especially in the setting of  procedural planning. For the purposes of today's exam the transitional level is labeled a  transitional L5 level.    The paraspinal musculature including the psoas muscle, multifidus muscles, and immediate para axial soft tissues appear within range of normal without evidence of mass or collection.    There is a maintenance of the normal lumbar lordosis. There is fairly uniform marrow signal on all  sequences.    The conus terminates at the T12-L1 level.    At L5-S1   The disc appears intact. There is no significant central or foraminal stenosis.    At L4-L5   There is disc desiccation, a midline annular fissure and shallow central disc protrusion mildly  effacing the ventral epidural space and flattening the ventral thecal sac. Bony overgrowth from  adjacent facet joints and ligamentum flavum thickening contribute to mild left greater than right  effacement of the lateral recesses. There is a prominent left-sided intraforaminal component of  circumferential disc bulging contributing to moderately severe left foraminal stenosis.    At L3-L4   There is loss of intervertebral disc space height, disc desiccation, with effacement of the lateral recesses more so on the right than the left. Mild left foraminal stenosis and moderate to severe  right foraminal stenosis is present. Bony overgrowth from adjacent facet arthropathy is present at  this level.    At L2-L3   There is disc desiccation but no focal disc protrusion. No significant central or foraminal  stenosis is present.    At L1-L2   There is mild disc desiccation but no focal disc protrusion    At T11-T12 there is evidence of degenerative disc disease with a shallow central disc bulge.     IMPRESSION: Please note that there is transitional lumbosacral anatomy. Please note that there may  be discrepancies in spinal level labeling on prior/subsequent imaging as well as clinical/surgical  documentation and close attention on follow-up is strongly recommended especially in the setting of  procedural planning.    Degenerative changes lumbosacral spine at L4-L5 include midline annular fissure and a shallow  central disc bulging asymmetrically prominent to the left mildly effacing the left lateral recess  and contributing to left foraminal stenosis    Moderately advanced degenerative disc changes at L3-L4 are associated with reactive Modic type  changes. Disc osteophyte complex results in moderate to severe right foraminal stenosis and mild  left foraminal stenosis further detailed above    Other findings outlined above    Exam: MRI CERVICAL SPINE 11/2/22 Order#: [id number]     HISTORY: 67 years Female CHRONIC CERVICAL RADICULOPATHY MRI    PROCEDURE: MRI cervical spine without contrast    COMPARISON: January 28, 2019    TECHNIQUE: MRI cervical spine performed 1.5 Liza magnet    FINDINGS:   There is maintenance of the normal cervical lordosis.    The prevertebral soft tissues appear within range of normal.    The craniocervical junction and clivus and C1-C2 distance appear within range of normal    There is uniform marrow signal on all sequences    The cervical cord is uniform in signal intensity without evidence of syrinx or suggestion of  myelomalacia.    At C2-C3 , there is no significant central or foraminal stenosis.    At C3-C4 there is broad-based disc osteophytic ridging which indents the thecal sac, decreases the  AP dimension of the thecal sac slightly and contributes to moderate right foraminal stenosis and  mild to moderate left foraminal stenosis. This may be slightly progressed as compared to the 2019  study.    At C4-C6ugdvf is broad-based disc osteophytic ridging and a right para midline/right foraminal disc protrusion which indents the thecal sac and deforms the ventral surface of the cord. There is  significantly increased mass effect as compared to the 2019 study which now results in moderate to  severe right foraminal stenosis and moderate left foraminal stenosis with the AP dimension of the  thecal sac is reduced to 7 mm.    At C5-C6 , broad-based disc osteophytic ridging has progressed slightly since prior exam. Mild to  moderate right foraminal stenosis and mild left foraminal stenosis is present. Disc osteophyte comp sole asymmetrically prominent to the right.    At C6-C7 , there is no significant central or foraminal stenosis.    At C7-T1 , there is no significant central or foraminal stenosis.     IMPRESSION:   Multilevel degenerative changes as outlined above have progressed since the 2019 study most notably at C4-C5 where there is significantly increased mass effect associated with a right para  midline/right foraminal disc osteophyte complex    Other findings discussed above     Thank you for allowing us to participate in the evaluation of this patient.     MRI LS 11/22   Please note that there is transitional lumbosacral anatomy. Please note that there may be discrepancies in spinal level labeling on prior/subsequent imaging as well as clinical/surgical documentation and close attention on follow-up is strongly recommended especially in the setting of procedural planning. For the purposes of today's exam the transitional level is labeled a transitional L5 level.   The paraspinal musculature including the psoas muscle, multifidus muscles, and immediate para axial soft tissues appear within range of normal without evidence of mass or collection.   There is a maintenance of the normal lumbar lordosis. There is fairly uniform marrow signal on all sequences.   The conus terminates at the T12-L1 level.   At L5-S1  The disc appears intact. There is no significant central or foraminal stenosis.   At L4-L5  There is disc desiccation, a midline annular fissure and shallow central disc protrusion mildly effacing the ventral epidural space and flattening the ventral thecal sac. Bony overgrowth from adjacent facet joints and ligamentum flavum thickening contribute to mild left greater than right effacement of the lateral recesses. There is a prominent left-sided intraforaminal component of circumferential disc bulging contributing to moderately severe left foraminal stenosis.   At L3-L4  There is loss of intervertebral disc space height, disc desiccation, with effacement of the lateral recesses more so on the right than the left. Mild left foraminal stenosis and moderate to severe right foraminal stenosis is present. Bony overgrowth from adjacent facet arthropathy is present at this level.   At L2-L3  There is disc desiccation but no focal disc protrusion. No significant central or foraminal stenosis is present.   At L1-L2  There is mild disc desiccation but no focal disc protrusion   At T11-T12 there is evidence of degenerative disc disease with a shallow central disc bulge.    IMPRESSION: Please note that there is transitional lumbosacral anatomy. Please note that there may be discrepancies in spinal level labeling on prior/subsequent imaging as well as clinical/surgical documentation and close attention on follow-up is strongly recommended especially in the setting of procedural planning.   Degenerative changes lumbosacral spine at L4-L5 include midline annular fissure and a shallow central disc bulging asymmetrically prominent to the left mildly effacing the left lateral recess and contributing to left foraminal stenosis   Moderately advanced degenerative disc changes at L3-L4 are associated with reactive Modic type changes. Disc osteophyte complex results in moderate to severe right foraminal stenosis and mild left foraminal stenosis further detailed above  MRI CS 11/2/22  There is maintenance of the normal cervical lordosis.   The prevertebral soft tissues appear within range of normal.   The craniocervical junction and clivus and C1-C2 distance appear within range of normal   There is uniform marrow signal on all sequences   The cervical cord is uniform in signal intensity without evidence of syrinx or suggestion of myelomalacia.   At C2-C3 ,  there is no significant central or foraminal stenosis.   At C3-C4 there is broad-based disc osteophytic ridging which indents the thecal sac, decreases the AP dimension of the thecal sac slightly and contributes to moderate right foraminal stenosis and mild to moderate left foraminal stenosis. This may be slightly progressed as compared to the 2019 study.   At C4-L9hqiab is broad-based disc osteophytic ridging and a right para midline/right foraminal disc protrusion which indents the thecal sac and deforms the ventral surface of the cord. There is significantly increased mass effect as compared to the 2019 study which now results in moderate to severe right foraminal stenosis and moderate left foraminal stenosis with the AP dimension of the thecal sac is reduced to 7 mm.   At C5-C6 , broad-based disc osteophytic ridging has progressed slightly since prior exam. Mild to moderate right foraminal stenosis and mild left foraminal stenosis is present. Disc osteophyte comp sole asymmetrically prominent to the right.   At C6-C7 , there is no significant central or foraminal stenosis.   At C7-T1 , there is no significant central or foraminal stenosis.    IMPRESSION:  Multilevel degenerative changes as outlined above have progressed since the 2019 study most notably at C4-C5 where there is significantly increased mass effect associated with a right para midline/right foraminal disc osteophyte complex  1/28/2019  MRI CERVICAL SPINE Order#:   MRI [id number]    MRI OF THE CERVICAL SPINE WITHOUT CONTRAST   INDICATION:  Neck pain   TECHNIQUE: Noncontrast sagittal T1, T2, STIR, axial T2 and gradient echo, and sagittal T2 volumetric with axial and coronal reformats.   CONTRAST: None   COMPARISON:  No prior studies are available for comparison   FINDINGS:   There is reversal of the cervical lordosis. There is minimal degenerative retrolisthesis of C3 on C4 and C4 on C5. The marrow signal is overall within normal limits with no focal marrow replacing lesion identified. The vertebral body heights are maintained and there is no evidence of acute fracture or subluxation. There is no abnormal vertebral or paraspinal edema. The visualized paraspinal soft tissues appear grossly unremarkable.   No abnormal signal is identified in the cervical spinal cord. The visualized posterior fossa structures are unremarkable.   C2-3: No significant disc bulge or herniation. No significant central canal or foraminal stenosis.   C3-4: Central disc protrusion superimposed on mild disc bulge with marginal and uncovertebral osteophyte formation. No significant central canal stenosis, however disc herniation mildly impinges upon the ventral spinal cord. Moderate left foraminal stenosis and mild right foraminal stenosis.   C4-5: Right paracentral disc/osteophyte superimposed on a mild disc bulge with marginal osteophyte formation. No significant central canal stenosis, however disc/osteophyte mildly impinges upon the right ventral spinal cord. Moderate bilateral foraminal stenosis.   C5-6: Mild disc bulge with marginal and uncovertebral osteophyte formation. No significant central canal stenosis. Mild/moderate right foraminal stenosis and mild left foraminal stenosis.   C6-7: Mild disc bulge. No significant central canal or foraminal stenosis.   C7-T1: Minimal disc bulge. No significant central canal or foraminal stenosis.     IMPRESSION:   Cervical spondylosis with central disc herniation at C3-4 and right paracentral disc/osteophyte at C4-5. No significant central canal stenosis, however there is mild impingement of the ventral spinal cord at C3-4 and C4-5. Varying degrees of foraminal stenosis as above, most pronounced at C3-4 and the left and C4-5 bilaterally.

## 2023-08-31 NOTE — PHYSICAL EXAM
[Normal Sclera/Conjunctiva] : normal sclera/conjunctiva [PERRL] : pupils equal round and reactive to light [EOMI] : extraocular movements intact [Normal Outer Ear/Nose] : the outer ears and nose were normal in appearance [Normal Oropharynx] : the oropharynx was normal [No Lymphadenopathy] : no lymphadenopathy [Supple] : supple [Thyroid Normal, No Nodules] : the thyroid was normal and there were no nodules present [No Respiratory Distress] : no respiratory distress  [No Accessory Muscle Use] : no accessory muscle use [Clear to Auscultation] : lungs were clear to auscultation bilaterally [Normal Rate] : normal rate  [Regular Rhythm] : with a regular rhythm [Normal S1, S2] : normal S1 and S2 [No Edema] : there was no peripheral edema [Soft] : abdomen soft [Non Tender] : non-tender [Non-distended] : non-distended [Normal Bowel Sounds] : normal bowel sounds [No Rash] : no rash [Coordination Grossly Intact] : coordination grossly intact [No Focal Deficits] : no focal deficits [Normal Gait] : normal gait [Normal] : affect was normal and insight and judgment were intact

## 2023-09-01 LAB
ALBUMIN SERPL ELPH-MCNC: 4.7 G/DL
ALP BLD-CCNC: 59 U/L
ALT SERPL-CCNC: 24 U/L
ANION GAP SERPL CALC-SCNC: 14 MMOL/L
AST SERPL-CCNC: 23 U/L
BILIRUB SERPL-MCNC: 0.4 MG/DL
BUN SERPL-MCNC: 15 MG/DL
CALCIUM SERPL-MCNC: 10.3 MG/DL
CHLORIDE SERPL-SCNC: 96 MMOL/L
CHOLEST SERPL-MCNC: 171 MG/DL
CO2 SERPL-SCNC: 28 MMOL/L
CREAT SERPL-MCNC: 1.03 MG/DL
CREAT SPEC-SCNC: 151 MG/DL
EGFR: 59 ML/MIN/1.73M2
ESTIMATED AVERAGE GLUCOSE: 183 MG/DL
GLUCOSE SERPL-MCNC: 247 MG/DL
HBA1C MFR BLD HPLC: 8 %
HCV AB SER QL: NONREACTIVE
HCV S/CO RATIO: 0.51 S/CO
HDLC SERPL-MCNC: 52 MG/DL
HIV1+2 AB SPEC QL IA.RAPID: NONREACTIVE
LDLC SERPL CALC-MCNC: 94 MG/DL
MICROALBUMIN 24H UR DL<=1MG/L-MCNC: <1.2 MG/DL
MICROALBUMIN/CREAT 24H UR-RTO: NORMAL MG/G
NONHDLC SERPL-MCNC: 119 MG/DL
POTASSIUM SERPL-SCNC: 4.3 MMOL/L
PROT SERPL-MCNC: 6.6 G/DL
SODIUM SERPL-SCNC: 138 MMOL/L
TRIGL SERPL-MCNC: 140 MG/DL

## 2023-09-21 ENCOUNTER — APPOINTMENT (OUTPATIENT)
Dept: FAMILY MEDICINE | Facility: CLINIC | Age: 68
End: 2023-09-21
Payer: MEDICARE

## 2023-09-21 VITALS
WEIGHT: 133 LBS | OXYGEN SATURATION: 97 % | HEIGHT: 63 IN | SYSTOLIC BLOOD PRESSURE: 128 MMHG | BODY MASS INDEX: 23.57 KG/M2 | HEART RATE: 85 BPM | DIASTOLIC BLOOD PRESSURE: 70 MMHG

## 2023-09-21 DIAGNOSIS — R22.2 LOCALIZED SWELLING, MASS AND LUMP, TRUNK: ICD-10-CM

## 2023-09-21 PROCEDURE — 99214 OFFICE O/P EST MOD 30 MIN: CPT

## 2023-09-25 ENCOUNTER — RX RENEWAL (OUTPATIENT)
Age: 68
End: 2023-09-25

## 2023-09-29 ENCOUNTER — APPOINTMENT (OUTPATIENT)
Dept: PAIN MANAGEMENT | Facility: CLINIC | Age: 68
End: 2023-09-29

## 2023-10-03 ENCOUNTER — APPOINTMENT (OUTPATIENT)
Dept: NEUROLOGY | Facility: CLINIC | Age: 68
End: 2023-10-03

## 2023-10-10 ENCOUNTER — APPOINTMENT (OUTPATIENT)
Dept: ENDOCRINOLOGY | Facility: CLINIC | Age: 68
End: 2023-10-10

## 2023-10-12 ENCOUNTER — APPOINTMENT (OUTPATIENT)
Dept: CARDIOLOGY | Facility: CLINIC | Age: 68
End: 2023-10-12

## 2023-11-10 ENCOUNTER — NON-APPOINTMENT (OUTPATIENT)
Age: 68
End: 2023-11-10

## 2023-11-16 ENCOUNTER — APPOINTMENT (OUTPATIENT)
Dept: CARDIOLOGY | Facility: CLINIC | Age: 68
End: 2023-11-16
Payer: MEDICARE

## 2023-11-16 ENCOUNTER — NON-APPOINTMENT (OUTPATIENT)
Age: 68
End: 2023-11-16

## 2023-11-16 VITALS
HEART RATE: 82 BPM | OXYGEN SATURATION: 98 % | SYSTOLIC BLOOD PRESSURE: 134 MMHG | WEIGHT: 137 LBS | BODY MASS INDEX: 24.27 KG/M2 | DIASTOLIC BLOOD PRESSURE: 69 MMHG

## 2023-11-16 PROCEDURE — 93000 ELECTROCARDIOGRAM COMPLETE: CPT

## 2023-11-16 PROCEDURE — 99214 OFFICE O/P EST MOD 30 MIN: CPT | Mod: 25

## 2023-11-17 ENCOUNTER — APPOINTMENT (OUTPATIENT)
Dept: PAIN MANAGEMENT | Facility: CLINIC | Age: 68
End: 2023-11-17
Payer: MEDICARE

## 2023-11-17 VITALS
SYSTOLIC BLOOD PRESSURE: 125 MMHG | DIASTOLIC BLOOD PRESSURE: 68 MMHG | HEIGHT: 63 IN | WEIGHT: 137 LBS | BODY MASS INDEX: 24.27 KG/M2

## 2023-11-17 PROCEDURE — 99214 OFFICE O/P EST MOD 30 MIN: CPT

## 2023-11-29 ENCOUNTER — APPOINTMENT (OUTPATIENT)
Dept: OBGYN | Facility: CLINIC | Age: 68
End: 2023-11-29
Payer: MEDICARE

## 2023-11-29 DIAGNOSIS — Z12.31 ENCOUNTER FOR SCREENING MAMMOGRAM FOR MALIGNANT NEOPLASM OF BREAST: ICD-10-CM

## 2023-11-29 DIAGNOSIS — Z01.419 ENCOUNTER FOR GYNECOLOGICAL EXAMINATION (GENERAL) (ROUTINE) W/OUT ABNORMAL FINDINGS: ICD-10-CM

## 2023-11-29 PROCEDURE — 99397 PER PM REEVAL EST PAT 65+ YR: CPT

## 2023-12-07 ENCOUNTER — TRANSCRIPTION ENCOUNTER (OUTPATIENT)
Age: 68
End: 2023-12-07

## 2023-12-07 ENCOUNTER — APPOINTMENT (OUTPATIENT)
Dept: PAIN MANAGEMENT | Facility: HOSPITAL | Age: 68
End: 2023-12-07

## 2023-12-08 ENCOUNTER — APPOINTMENT (OUTPATIENT)
Dept: RHEUMATOLOGY | Facility: CLINIC | Age: 68
End: 2023-12-08

## 2023-12-14 ENCOUNTER — APPOINTMENT (OUTPATIENT)
Dept: VASCULAR SURGERY | Facility: CLINIC | Age: 68
End: 2023-12-14
Payer: MEDICARE

## 2023-12-14 ENCOUNTER — NON-APPOINTMENT (OUTPATIENT)
Age: 68
End: 2023-12-14

## 2023-12-14 VITALS
HEIGHT: 63 IN | HEART RATE: 78 BPM | BODY MASS INDEX: 24.1 KG/M2 | DIASTOLIC BLOOD PRESSURE: 69 MMHG | WEIGHT: 136 LBS | SYSTOLIC BLOOD PRESSURE: 120 MMHG

## 2023-12-14 DIAGNOSIS — M54.10 RADICULOPATHY, SITE UNSPECIFIED: ICD-10-CM

## 2023-12-14 PROCEDURE — 99203 OFFICE O/P NEW LOW 30 MIN: CPT

## 2023-12-14 PROCEDURE — 93922 UPR/L XTREMITY ART 2 LEVELS: CPT

## 2023-12-15 ENCOUNTER — RESULT REVIEW (OUTPATIENT)
Age: 68
End: 2023-12-15

## 2023-12-15 ENCOUNTER — APPOINTMENT (OUTPATIENT)
Dept: HEMATOLOGY ONCOLOGY | Facility: CLINIC | Age: 68
End: 2023-12-15
Payer: MEDICARE

## 2023-12-15 VITALS
BODY MASS INDEX: 24.65 KG/M2 | DIASTOLIC BLOOD PRESSURE: 62 MMHG | RESPIRATION RATE: 16 BRPM | HEART RATE: 90 BPM | TEMPERATURE: 96.9 F | SYSTOLIC BLOOD PRESSURE: 116 MMHG | WEIGHT: 139.13 LBS | HEIGHT: 63 IN | OXYGEN SATURATION: 99 %

## 2023-12-15 DIAGNOSIS — D50.0 IRON DEFICIENCY ANEMIA SECONDARY TO BLOOD LOSS (CHRONIC): ICD-10-CM

## 2023-12-15 PROCEDURE — 99213 OFFICE O/P EST LOW 20 MIN: CPT | Mod: 25

## 2023-12-15 NOTE — CONSULT LETTER
[FreeTextEntry3] : Dilshad Salas MD, MPH\par   of Medicine [facility] [employer] [facility] at [facility]\par  Attending Physician \par  Hematology and Medical Oncology\par  [facility]\par

## 2023-12-15 NOTE — ASSESSMENT
[FreeTextEntry1] : ## Microcytic anemia Denies any brbpr, melena, hematuria Except once in a while she has blood on the toilet paper (usually after she has prolonged constipation x 10 days) LMP ~20 years ago. Denies any weight loss Brother- prostate cancer GM- intestine cancer but not sure Denies smoker Work up suggestive of SHREYA - Ferritin 5 s/p Injectafer x 2 in June 2023 s/p EGD/colonoscopy in 6/2023 = normal findings. Other than her left hip pain, she's doing well  She'll follow up with pain management next week for chronic left hip pain -Labs are drawn in the office, reviewed, analyzed, and discussed Hgb and iron sat are acceptable.  Ferritin - pending. Will f/u with result. If normal, she'll now follow up with her  PCP Dr. Weaver and to follow up with us prn.   Patient had multiple questions which were answered to satisfaction  follow up prn CBC, ferritin, CMP, iron panel, Ferritin   OV few days later.

## 2023-12-15 NOTE — HISTORY OF PRESENT ILLNESS
[de-identified] : Ms. Nurys Rooney is 67 year old postmenopausal female with microcytic anemia here for consultation, referred by Dr. Meenu Weaver\par   [id number]\par  \par  Patient with hx of HTN, diverticulitis, chronic cervical radiculopathy, fibromyalgia, GERD, TIA, type 2 diabetes, and anemia in the last 10 years, never needed iron or blood transfusion.  Tried PO iron supplement multiple times but d/c due to severe constipation. \par  She has hx of uterine fibroids 20 yrs ago with heavy menses  s/p hystectomy \par  \par  4/14/2023 H/H 9.0/31.4 MCV 72.9 - been microcytic since 2020 as per records\par  \par  FHx:\par  Brother with prostate at age 69\par  M. grandmother with intestine cancer \par  \par  Screenings;\par  Colonoscopy - 6/15/2023, last was 5 years\par  Mammograms - 5/11/2023\par  \par  Social History\par  Smoking - never\par  Alcohol - denies\par  Illicit drugs - denies\par  retired \par  \par  She has been having headaches and more fatigue than usual.  NO PICA \par  She has some bright red bleeding with wiping at times with hard BMs. \par  \par   [de-identified] : Patient is seen today for follow up Accompanied by her daughter s/p 2 injectafer in June 2023.   She has chronic left hip pain, improved for 2 weeks after steroid injection with Dr. Abrams two months ago. Pain now returns and taking Tylenol prn.  She feels well, no dizziness, SOB/MCWILLIAMS, chest pain/palpitation, or bleeding.

## 2023-12-21 RX ORDER — SITAGLIPTIN 100 MG/1
100 TABLET, FILM COATED ORAL
Qty: 90 | Refills: 3 | Status: ACTIVE | COMMUNITY
Start: 2023-04-14 | End: 1900-01-01

## 2023-12-21 RX ORDER — FLUTICASONE PROPIONATE 50 UG/1
50 SPRAY, METERED NASAL
Qty: 48 | Refills: 3 | Status: ACTIVE | COMMUNITY
Start: 2019-03-13 | End: 1900-01-01

## 2024-01-05 ENCOUNTER — APPOINTMENT (OUTPATIENT)
Dept: PAIN MANAGEMENT | Facility: CLINIC | Age: 69
End: 2024-01-05
Payer: MEDICARE

## 2024-01-05 VITALS
DIASTOLIC BLOOD PRESSURE: 86 MMHG | HEIGHT: 63 IN | SYSTOLIC BLOOD PRESSURE: 154 MMHG | BODY MASS INDEX: 24.63 KG/M2 | WEIGHT: 139 LBS

## 2024-01-05 PROCEDURE — 99214 OFFICE O/P EST MOD 30 MIN: CPT

## 2024-01-05 PROCEDURE — G2211 COMPLEX E/M VISIT ADD ON: CPT

## 2024-01-05 NOTE — HISTORY OF PRESENT ILLNESS
[___ yrs] : [unfilled] year(s) ago [FreeTextEntry1] : Interval Note: sp  C7-T1 interlaminar epidural steroid injection with significant improvement in neck and arm pain.  Patient now complaining of left back, buttock posterolateral thigh and shin pain.  Pain is so bad that patient finds it difficult to perform adls and ambulate.   denies any worsening numbness, weakness, bowel/bladder dysfunction.      HPI     Ms. SMITH CUMMINGS is a 68 year F on baby ASA with pmhx of TIA, DM2 (Notes poor glucose control with episodes of hypo and hyperglycemia. (BG's 30's-300's- managed by Dr Gutierres and Dr Hellerman), HTN, RA, chronic lower back pain x 15 yrs. Lower back pain worsening over the past few months. Remains severe despite medication and physical therapy. Pain to lower back that radiates to anterolaterally, left greater than right. Worst at night when laying. In addition, continued scapular and shoulder pain that radiates down her arms. Pain makes it difficult to do ADL's. Denies any additional weakness, numbness, bowel/bladder dysfunction.    Previous and current pain medications/doses/effects: Gabapentin 600mg tid, Cymbalta 60mg.     Previous Pain Treatments:      Previous Pain Injections:   C7-T1 interlaminar epidural steroid injection 12/7/23  L4-5 interlaminar epidural steroid injection 7/14/23  L4-5 interlaminar epidural steroid injection 5/17/23  Cervical MINO (2004)- Dr Ramos     Previous Diagnostic Studies/Images:  Exam: MRI LUMBAR SPINE 11/2/22 Order#: [id number]     HISTORY: 67 years Female LUMBAR SPINAL STENOSIS MRI     PROCEDURE:MRI lumbar spine without contrast    COMPARISON:    TECHNIQUE:MRI lumbosacral spine 1.5 Liza magnet    FINDINGS: Please note that there is transitional lumbosacral anatomy. Please note that there may be discrepancies in spinal level labeling on prior/subsequent imaging as well as clinical/surgical  documentation and close attention on follow-up is strongly recommended especially in the setting of  procedural planning. For the purposes of today's exam the transitional level is labeled a  transitional L5 level.    The paraspinal musculature including the psoas muscle, multifidus muscles, and immediate para axial soft tissues appear within range of normal without evidence of mass or collection.    There is a maintenance of the normal lumbar lordosis. There is fairly uniform marrow signal on all  sequences.    The conus terminates at the T12-L1 level.    At L5-S1   The disc appears intact. There is no significant central or foraminal stenosis.    At L4-L5   There is disc desiccation, a midline annular fissure and shallow central disc protrusion mildly  effacing the ventral epidural space and flattening the ventral thecal sac. Bony overgrowth from  adjacent facet joints and ligamentum flavum thickening contribute to mild left greater than right  effacement of the lateral recesses. There is a prominent left-sided intraforaminal component of  circumferential disc bulging contributing to moderately severe left foraminal stenosis.    At L3-L4   There is loss of intervertebral disc space height, disc desiccation, with effacement of the lateral recesses more so on the right than the left. Mild left foraminal stenosis and moderate to severe  right foraminal stenosis is present. Bony overgrowth from adjacent facet arthropathy is present at  this level.    At L2-L3   There is disc desiccation but no focal disc protrusion. No significant central or foraminal  stenosis is present.    At L1-L2   There is mild disc desiccation but no focal disc protrusion    At T11-T12 there is evidence of degenerative disc disease with a shallow central disc bulge.     IMPRESSION: Please note that there is transitional lumbosacral anatomy. Please note that there may  be discrepancies in spinal level labeling on prior/subsequent imaging as well as clinical/surgical  documentation and close attention on follow-up is strongly recommended especially in the setting of  procedural planning.    Degenerative changes lumbosacral spine at L4-L5 include midline annular fissure and a shallow  central disc bulging asymmetrically prominent to the left mildly effacing the left lateral recess  and contributing to left foraminal stenosis    Moderately advanced degenerative disc changes at L3-L4 are associated with reactive Modic type  changes. Disc osteophyte complex results in moderate to severe right foraminal stenosis and mild  left foraminal stenosis further detailed above    Other findings outlined above    Exam: MRI CERVICAL SPINE 11/2/22 Order#: [id number]     HISTORY: 67 years Female CHRONIC CERVICAL RADICULOPATHY MRI    PROCEDURE: MRI cervical spine without contrast    COMPARISON: January 28, 2019    TECHNIQUE: MRI cervical spine performed 1.5 Liza magnet    FINDINGS:   There is maintenance of the normal cervical lordosis.    The prevertebral soft tissues appear within range of normal.    The craniocervical junction and clivus and C1-C2 distance appear within range of normal    There is uniform marrow signal on all sequences    The cervical cord is uniform in signal intensity without evidence of syrinx or suggestion of  myelomalacia.    At C2-C3 , there is no significant central or foraminal stenosis.    At C3-C4 there is broad-based disc osteophytic ridging which indents the thecal sac, decreases the  AP dimension of the thecal sac slightly and contributes to moderate right foraminal stenosis and  mild to moderate left foraminal stenosis. This may be slightly progressed as compared to the 2019  study.    At C4-H6mtuyv is broad-based disc osteophytic ridging and a right para midline/right foraminal disc protrusion which indents the thecal sac and deforms the ventral surface of the cord. There is  significantly increased mass effect as compared to the 2019 study which now results in moderate to  severe right foraminal stenosis and moderate left foraminal stenosis with the AP dimension of the  thecal sac is reduced to 7 mm.    At C5-C6 , broad-based disc osteophytic ridging has progressed slightly since prior exam. Mild to  moderate right foraminal stenosis and mild left foraminal stenosis is present. Disc osteophyte comp sole asymmetrically prominent to the right.    At C6-C7 , there is no significant central or foraminal stenosis.    At C7-T1 , there is no significant central or foraminal stenosis.     IMPRESSION:   Multilevel degenerative changes as outlined above have progressed since the 2019 study most notably at C4-C5 where there is significantly increased mass effect associated with a right para  midline/right foraminal disc osteophyte complex    Other findings discussed above     Thank you for allowing us to participate in the evaluation of this patient.     MRI LS 11/22   Please note that there is transitional lumbosacral anatomy. Please note that there may be discrepancies in spinal level labeling on prior/subsequent imaging as well as clinical/surgical documentation and close attention on follow-up is strongly recommended especially in the setting of procedural planning. For the purposes of today's exam the transitional level is labeled a transitional L5 level.   The paraspinal musculature including the psoas muscle, multifidus muscles, and immediate para axial soft tissues appear within range of normal without evidence of mass or collection.   There is a maintenance of the normal lumbar lordosis. There is fairly uniform marrow signal on all sequences.   The conus terminates at the T12-L1 level.   At L5-S1  The disc appears intact. There is no significant central or foraminal stenosis.   At L4-L5  There is disc desiccation, a midline annular fissure and shallow central disc protrusion mildly effacing the ventral epidural space and flattening the ventral thecal sac. Bony overgrowth from adjacent facet joints and ligamentum flavum thickening contribute to mild left greater than right effacement of the lateral recesses. There is a prominent left-sided intraforaminal component of circumferential disc bulging contributing to moderately severe left foraminal stenosis.   At L3-L4  There is loss of intervertebral disc space height, disc desiccation, with effacement of the lateral recesses more so on the right than the left. Mild left foraminal stenosis and moderate to severe right foraminal stenosis is present. Bony overgrowth from adjacent facet arthropathy is present at this level.   At L2-L3  There is disc desiccation but no focal disc protrusion. No significant central or foraminal stenosis is present.   At L1-L2  There is mild disc desiccation but no focal disc protrusion   At T11-T12 there is evidence of degenerative disc disease with a shallow central disc bulge.    IMPRESSION: Please note that there is transitional lumbosacral anatomy. Please note that there may be discrepancies in spinal level labeling on prior/subsequent imaging as well as clinical/surgical documentation and close attention on follow-up is strongly recommended especially in the setting of procedural planning.   Degenerative changes lumbosacral spine at L4-L5 include midline annular fissure and a shallow central disc bulging asymmetrically prominent to the left mildly effacing the left lateral recess and contributing to left foraminal stenosis   Moderately advanced degenerative disc changes at L3-L4 are associated with reactive Modic type changes. Disc osteophyte complex results in moderate to severe right foraminal stenosis and mild left foraminal stenosis further detailed above  MRI CS 11/2/22  There is maintenance of the normal cervical lordosis.   The prevertebral soft tissues appear within range of normal.   The craniocervical junction and clivus and C1-C2 distance appear within range of normal   There is uniform marrow signal on all sequences   The cervical cord is uniform in signal intensity without evidence of syrinx or suggestion of myelomalacia.   At C2-C3 ,  there is no significant central or foraminal stenosis.   At C3-C4 there is broad-based disc osteophytic ridging which indents the thecal sac, decreases the AP dimension of the thecal sac slightly and contributes to moderate right foraminal stenosis and mild to moderate left foraminal stenosis. This may be slightly progressed as compared to the 2019 study.   At C4-P2pwili is broad-based disc osteophytic ridging and a right para midline/right foraminal disc protrusion which indents the thecal sac and deforms the ventral surface of the cord. There is significantly increased mass effect as compared to the 2019 study which now results in moderate to severe right foraminal stenosis and moderate left foraminal stenosis with the AP dimension of the thecal sac is reduced to 7 mm.   At C5-C6 , broad-based disc osteophytic ridging has progressed slightly since prior exam. Mild to moderate right foraminal stenosis and mild left foraminal stenosis is present. Disc osteophyte comp sole asymmetrically prominent to the right.   At C6-C7 , there is no significant central or foraminal stenosis.   At C7-T1 , there is no significant central or foraminal stenosis.    IMPRESSION:  Multilevel degenerative changes as outlined above have progressed since the 2019 study most notably at C4-C5 where there is significantly increased mass effect associated with a right para midline/right foraminal disc osteophyte complex  1/28/2019  MRI CERVICAL SPINE Order#:   MRI [id number]    MRI OF THE CERVICAL SPINE WITHOUT CONTRAST   INDICATION:  Neck pain   TECHNIQUE: Noncontrast sagittal T1, T2, STIR, axial T2 and gradient echo, and sagittal T2 volumetric with axial and coronal reformats.   CONTRAST: None   COMPARISON:  No prior studies are available for comparison   FINDINGS:   There is reversal of the cervical lordosis. There is minimal degenerative retrolisthesis of C3 on C4 and C4 on C5. The marrow signal is overall within normal limits with no focal marrow replacing lesion identified. The vertebral body heights are maintained and there is no evidence of acute fracture or subluxation. There is no abnormal vertebral or paraspinal edema. The visualized paraspinal soft tissues appear grossly unremarkable.   No abnormal signal is identified in the cervical spinal cord. The visualized posterior fossa structures are unremarkable.   C2-3: No significant disc bulge or herniation. No significant central canal or foraminal stenosis.   C3-4: Central disc protrusion superimposed on mild disc bulge with marginal and uncovertebral osteophyte formation. No significant central canal stenosis, however disc herniation mildly impinges upon the ventral spinal cord. Moderate left foraminal stenosis and mild right foraminal stenosis.   C4-5: Right paracentral disc/osteophyte superimposed on a mild disc bulge with marginal osteophyte formation. No significant central canal stenosis, however disc/osteophyte mildly impinges upon the right ventral spinal cord. Moderate bilateral foraminal stenosis.   C5-6: Mild disc bulge with marginal and uncovertebral osteophyte formation. No significant central canal stenosis. Mild/moderate right foraminal stenosis and mild left foraminal stenosis.   C6-7: Mild disc bulge. No significant central canal or foraminal stenosis.   C7-T1: Minimal disc bulge. No significant central canal or foraminal stenosis.     IMPRESSION:   Cervical spondylosis with central disc herniation at C3-4 and right paracentral disc/osteophyte at C4-5. No significant central canal stenosis, however there is mild impingement of the ventral spinal cord at C3-4 and C4-5. Varying degrees of foraminal stenosis as above, most pronounced at C3-4 and the left and C4-5 bilaterally.

## 2024-01-05 NOTE — ASSESSMENT
[FreeTextEntry1] : >> Imaging and Other Studies  I personally reviewed the relevant imaging. Discussed and explained to patient the likely source of pathology and pain. Questions answered. MRI XR  >> Therapy and Other Modalities  continue PT  >> Medications  gabapentin 600mg TID cautioned change in mood. Encouraged to call with any worsening mood or depression/suicidal ideations  acetaminophen 650mg q8h prn pain (caution <3g daily)  >> Interventions  Significant component of axial back pain secondary to lumbar spondylosis and facet arthropathy demonstrated on MRI LS. Pain refractory to conservative treatments. sp BILATERAL L4-sacral ala diagnostic medial branch block (2 joints, 3 nerves on each side) r/b/a discussed (STEROID SPARING) without immediate improvement, but improvement in 12 hours and for 2 months  given efficacy of previous intervention that is >80% improvement in pain and improved ability to perform adls, and return of pain despite conservative treatment, sp repeat BILATERAL L4-sacral ala diagnostic medial branch block (2 joints, 3 nerves on each side) without immediate improvement but now improved  Significant component of back and leg pain likely secondary to lumbar spinal stenosis demonstrated on MRI LS. sp L4-5 interlaminar epidural steroid injection with significant improvement  given efficacy of previous intervention that is >80% improvement in pain and improved ability to perform adls, and return of pain despite conservative treatment, will schedule repeat L4-5 interlaminar epidural steroid injection r/b/a discussed  continue asa 81 for secondary prophylaxis   shoulder pain likely secondary to significant joint arthropathy demonstrated on imaging refractory to conservative treatments.sp LEFT glenohumeral joint steroid injection   MRI demonstrating disc herniation causing radiculopathy, significantly impactful to ability to perform ADL and refractory to conservative treatments, including physician directed exercises/PT ().  sp C7-T1 interlaminar epidural steroid injection    >> Consults  >> Discussion of Risks/Benefits/Alternatives   >Regarding any scheduled procedures:  I have discussed in detail with the patient that any interventional pain procedure is associated with potential risks. The procedure may include an injection of steroids and potentially other medications (local anesthetic and normal saline) into the epidural space or surrounding tissue of the spine. There are significant risks of this procedure which include and are not limited to infection, bleeding, worsening pain, dural puncture leading to postdural puncture headache, nerve damage, spinal cord injury, paralysis, stroke, and death.  There is a chance that the procedure does not improve their pain.  There are risks associated with the steroid being absorbed into the body systemically. These include dysphoria, difficulty sleeping, mood swings and personality changes. Premenopausal women may notice an irregularity in her menstrual cycle for 2-3 months following the injection. Steroids can specifically affect patients with hypertension, diabetes, and peptic ulcers. The procedure may cause a temporary increase in blood pressure and blood pressure, and may adversely affect a peptic ulcer. Other, more rare complications, include avascular necrosis of joints, glaucoma and worsening of osteoporosis.  I have discussed the risks of the procedure at length with the patient, and the potential benefits of pain relief. I have offered alternatives to the procedure. All questions were answered.  The patient expressed understanding and wishes to proceed with the procedure.   >Regarding COVID19 Pandemic:  Any planned interventional pain procedure are scheduled because further delay may cause harm or negative outcome to patient. The goal in performing this procedure is to avoid deterioration of function, emergency room visits (which increases exposure) and reliance on opioids.  r/b/a discussed with patient, lack of evidence to conclusively determine whether pain management procedures have any positive or negative impact on the possibility of shima the virus and/or development of any sequelae.  Patient counselled regarding timing steroid based intervention 2 weeks before or after COVID-19 vaccine administration to avoid any interaction or affect on efficacy of vaccination  Patient demonstrates understanding  Informed patient that risks associated with the COVID-19 infection. Informed patient steps taken to limit the risks. We are implementing safety precautions and following protocols consistent with the CDC and state recommendations. All patients and staff will be checked for fever or signs of illness upon entry to the facility. We will limit our steroid dose to the lowest effective therapeutic dose or in some cases steroids will not be injected at all.  Patient agrees to proceed  >> Conclusion  The above diagnosis and treatment plan is medically reasonable and necessary based on the patient encounter There were no barriers to communication. Informed patient that I would be available for any additional questions. Patient was instructed to call with any worsening symptoms including severe pain, new numbness/weakness, or changes in the bowel/bladder function. Discussed role of nsaids in pain management and all relevant risks, if patient is continuing to require after 4 weeks the patient should f/u for alternative treatment. Instructed patient to maintain pain diary to monitor pain level, mobility, and function.

## 2024-01-05 NOTE — PHYSICAL EXAM
[Normal muscle bulk without asymmetry] : normal muscle bulk without asymmetry [Facet Tenderness] : facet tenderness [] : Motor: [Normal] : Normal affect

## 2024-01-16 ENCOUNTER — APPOINTMENT (OUTPATIENT)
Dept: PAIN MANAGEMENT | Facility: CLINIC | Age: 69
End: 2024-01-16
Payer: MEDICARE

## 2024-01-16 VITALS
BODY MASS INDEX: 24.63 KG/M2 | RESPIRATION RATE: 16 BRPM | HEIGHT: 63 IN | OXYGEN SATURATION: 98 % | HEART RATE: 76 BPM | DIASTOLIC BLOOD PRESSURE: 83 MMHG | SYSTOLIC BLOOD PRESSURE: 144 MMHG | WEIGHT: 139 LBS

## 2024-01-16 PROCEDURE — 62323 NJX INTERLAMINAR LMBR/SAC: CPT

## 2024-01-16 RX ADMIN — IOHEXOL 0 MG/ML: 180 INJECTION INTRAVENOUS at 00:00

## 2024-01-16 RX ADMIN — TRIAMCINOLONE ACETONIDE 0 MG/ML: 80 INJECTION, SUSPENSION INTRA-ARTICULAR; INTRAMUSCULAR at 00:00

## 2024-01-16 RX ADMIN — LIDOCAINE HYDROCHLORIDE %: 10 INJECTION, SOLUTION INFILTRATION; PERINEURAL at 00:00

## 2024-01-19 PROBLEM — M54.10 RADICULAR PAIN OF LOWER EXTREMITY: Status: ACTIVE | Noted: 2022-10-03

## 2024-01-19 NOTE — ADDENDUM
[FreeTextEntry1] : Documented by Leigha Hernandez acting as a scribe for ASHLEY MENDEZ on 12/14/2023.

## 2024-01-19 NOTE — HISTORY OF PRESENT ILLNESS
[FreeTextEntry1] : 68 year-old female, referred by , presents for an initial evaluation of bilateral lower extremity pain L>R. The patient reports pain in her lower back that radiates down her legs L>R. The patient reports that her symptoms began 6-8 months ago. She has been taking Tylenol or Motrin to manage her pain in the past but it is no longer alleviated her symptoms. She has been seeing pain management without significant relief. Her pain persists throughout the day.

## 2024-01-19 NOTE — PHYSICAL EXAM
[0] : right 0 [2+] : left 2+ [Alert] : alert [Oriented to Person] : oriented to person [Oriented to Place] : oriented to place [Oriented to Time] : oriented to time [Calm] : calm [Ankle Swelling (On Exam)] : not present [Abdomen Masses] : No abdominal masses [de-identified] : Awake and Alert [FreeTextEntry1] : biphasic right pt signal [de-identified] : Soft, NT, ND.

## 2024-01-19 NOTE — PROCEDURE
[FreeTextEntry1] : CELIA/PVR testing performed at our [facility] vascular lab - Normal ABIs bilaterally. 
No

## 2024-01-19 NOTE — ASSESSMENT
[FreeTextEntry1] : 68 year-old female with bilateral lower extremity pain L > R. The patient does not have evidence of arterial disease on physical exam. She underwent arterial testing that demonstrated normal ABIs bilaterally. I do not believe her symptoms are of vascular etiology and may be of spinal pathology. I recommended she follow up with a her PCP or neurologist for further evaluation, but ultimately may require spinal surgical consultation.  She understands this.

## 2024-01-19 NOTE — END OF VISIT
[FreeTextEntry3] : All medical record entries made by the Saulibe were at my, ASHLEY MENDEZ, direction and personally dictated by me on 12/14/2023. I have reviewed the chart and agree that the record accurately reflects my personal performance of the history, physical exam, assessment and plan. I have also personally directed, reviewed, and agreed with the chart.

## 2024-01-29 ENCOUNTER — RX RENEWAL (OUTPATIENT)
Age: 69
End: 2024-01-29

## 2024-02-02 ENCOUNTER — APPOINTMENT (OUTPATIENT)
Dept: PAIN MANAGEMENT | Facility: CLINIC | Age: 69
End: 2024-02-02
Payer: MEDICARE

## 2024-02-02 VITALS
SYSTOLIC BLOOD PRESSURE: 129 MMHG | BODY MASS INDEX: 24.63 KG/M2 | WEIGHT: 139 LBS | HEIGHT: 63 IN | DIASTOLIC BLOOD PRESSURE: 80 MMHG

## 2024-02-02 PROCEDURE — G2211 COMPLEX E/M VISIT ADD ON: CPT

## 2024-02-02 PROCEDURE — 99214 OFFICE O/P EST MOD 30 MIN: CPT

## 2024-02-02 NOTE — ASSESSMENT
[FreeTextEntry1] : >> Imaging and Other Studies  I personally reviewed the relevant imaging. Discussed and explained to patient the likely source of pathology and pain. Questions answered. MRI XR  >> Therapy and Other Modalities  restart PT  >> Medications  gabapentin 600mg TID cautioned change in mood. Encouraged to call with any worsening mood or depression/suicidal ideations  acetaminophen 650mg q8h prn pain (caution <3g daily)  >> Interventions  Significant component of axial back pain secondary to lumbar spondylosis and facet arthropathy demonstrated on MRI LS. Pain refractory to conservative treatments. sp BILATERAL L4-sacral ala diagnostic medial branch block (2 joints, 3 nerves on each side) r/b/a discussed (STEROID SPARING) without immediate improvement, but improvement in 12 hours and for 2 months  given efficacy of previous intervention that is >80% improvement in pain and improved ability to perform adls, and return of pain despite conservative treatment, sp repeat BILATERAL L4-sacral ala diagnostic medial branch block (2 joints, 3 nerves on each side) without immediate improvement but now improved  Significant component of back and leg pain likely secondary to lumbar spinal stenosis demonstrated on MRI LS. sp L4-5 interlaminar epidural steroid injection with significant improvement  continue asa 81 for secondary prophylaxis   shoulder pain likely secondary to significant joint arthropathy demonstrated on imaging refractory to conservative treatments.sp LEFT glenohumeral joint steroid injection   MRI demonstrating disc herniation causing radiculopathy, significantly impactful to ability to perform ADL and refractory to conservative treatments, including physician directed exercises/PT ().  sp C7-T1 interlaminar epidural steroid injection    >> Consults  >> Discussion of Risks/Benefits/Alternatives   >Regarding any scheduled procedures:  I have discussed in detail with the patient that any interventional pain procedure is associated with potential risks. The procedure may include an injection of steroids and potentially other medications (local anesthetic and normal saline) into the epidural space or surrounding tissue of the spine. There are significant risks of this procedure which include and are not limited to infection, bleeding, worsening pain, dural puncture leading to postdural puncture headache, nerve damage, spinal cord injury, paralysis, stroke, and death.  There is a chance that the procedure does not improve their pain.  There are risks associated with the steroid being absorbed into the body systemically. These include dysphoria, difficulty sleeping, mood swings and personality changes. Premenopausal women may notice an irregularity in her menstrual cycle for 2-3 months following the injection. Steroids can specifically affect patients with hypertension, diabetes, and peptic ulcers. The procedure may cause a temporary increase in blood pressure and blood pressure, and may adversely affect a peptic ulcer. Other, more rare complications, include avascular necrosis of joints, glaucoma and worsening of osteoporosis.  I have discussed the risks of the procedure at length with the patient, and the potential benefits of pain relief. I have offered alternatives to the procedure. All questions were answered.  The patient expressed understanding and wishes to proceed with the procedure.   > Longitudinal management of Complex Painful condition   The patient is being managed for a complex condition that requires ongoing management.  The nature of this condition demands nuanced approach to treatment.  The seriousness of the condition necessitates an in-depth and focused approach to management and coordination with other healthcare professionals.    This visit involves intricate evaluation and management of the patient's condition.  The complexity of the visit was due to the need for detailed assessment of the current state, consideration of potential complications and a careful balancing of treatment options to management the chronic condition effectively.   As detailed above, the patient has a chronic significant painful condition that requires regular and detailed management.  The condition's impact on the patient's quality of life and health is substantial and necessitates a comprehensive and tailored approach   >> Conclusion   There were no barriers to communication. Informed patient that I would be available for any additional questions. Patient was instructed to call with any worsening symptoms including severe pain, new numbness/weakness, or changes in the bowel/bladder function. Discussed role of nsaids in pain management and all relevant risks, if patient is continuing to require after 4 weeks the patient should f/u for alternative treatment. Instructed patient to maintain pain diary to monitor pain level, mobility, and function.

## 2024-02-02 NOTE — HISTORY OF PRESENT ILLNESS
[___ yrs] : [unfilled] year(s) ago [FreeTextEntry1] : Interval Note:  sp  L4-5 interlaminar epidural steroid injection 1/16/24 with significant improvement in left back and leg pain. sp  C7-T1 interlaminar epidural steroid injection with significant improvement in neck and arm pain. Patient has minimal pain, doing very well.  Continues gabapentin without medication adverse effects.   denies any worsening numbness, weakness, bowel/bladder dysfunction.      HPI     Ms. SMITH CUMMINGS is a 68 year F on baby ASA with pmhx of TIA, DM2 (Notes poor glucose control with episodes of hypo and hyperglycemia. (BG's 30's-300's- managed by Dr Gutierres and Dr Hellerman), HTN, RA, chronic lower back pain x 15 yrs. Lower back pain worsening over the past few months. Remains severe despite medication and physical therapy. Pain to lower back that radiates to anterolaterally, left greater than right. Worst at night when laying. In addition, continued scapular and shoulder pain that radiates down her arms. Pain makes it difficult to do ADL's. Denies any additional weakness, numbness, bowel/bladder dysfunction.    Previous and current pain medications/doses/effects: Gabapentin 600mg tid, Cymbalta 60mg.     Previous Pain Treatments:      Previous Pain Injections:  L4-5 interlaminar epidural steroid injection 1/16/24  C7-T1 interlaminar epidural steroid injection 12/7/23  L4-5 interlaminar epidural steroid injection 7/14/23  L4-5 interlaminar epidural steroid injection 5/17/23  Cervical MINO (2004)- Dr Ramos     Previous Diagnostic Studies/Images:  Exam: MRI LUMBAR SPINE 11/2/22 Order#: [id number]     HISTORY: 67 years Female LUMBAR SPINAL STENOSIS MRI     PROCEDURE:MRI lumbar spine without contrast    COMPARISON:    TECHNIQUE:MRI lumbosacral spine 1.5 Liza magnet    FINDINGS: Please note that there is transitional lumbosacral anatomy. Please note that there may be discrepancies in spinal level labeling on prior/subsequent imaging as well as clinical/surgical  documentation and close attention on follow-up is strongly recommended especially in the setting of  procedural planning. For the purposes of today's exam the transitional level is labeled a  transitional L5 level.    The paraspinal musculature including the psoas muscle, multifidus muscles, and immediate para axial soft tissues appear within range of normal without evidence of mass or collection.    There is a maintenance of the normal lumbar lordosis. There is fairly uniform marrow signal on all  sequences.    The conus terminates at the T12-L1 level.    At L5-S1   The disc appears intact. There is no significant central or foraminal stenosis.    At L4-L5   There is disc desiccation, a midline annular fissure and shallow central disc protrusion mildly  effacing the ventral epidural space and flattening the ventral thecal sac. Bony overgrowth from  adjacent facet joints and ligamentum flavum thickening contribute to mild left greater than right  effacement of the lateral recesses. There is a prominent left-sided intraforaminal component of  circumferential disc bulging contributing to moderately severe left foraminal stenosis.    At L3-L4   There is loss of intervertebral disc space height, disc desiccation, with effacement of the lateral recesses more so on the right than the left. Mild left foraminal stenosis and moderate to severe  right foraminal stenosis is present. Bony overgrowth from adjacent facet arthropathy is present at  this level.    At L2-L3   There is disc desiccation but no focal disc protrusion. No significant central or foraminal  stenosis is present.    At L1-L2   There is mild disc desiccation but no focal disc protrusion    At T11-T12 there is evidence of degenerative disc disease with a shallow central disc bulge.     IMPRESSION: Please note that there is transitional lumbosacral anatomy. Please note that there may  be discrepancies in spinal level labeling on prior/subsequent imaging as well as clinical/surgical  documentation and close attention on follow-up is strongly recommended especially in the setting of  procedural planning.    Degenerative changes lumbosacral spine at L4-L5 include midline annular fissure and a shallow  central disc bulging asymmetrically prominent to the left mildly effacing the left lateral recess  and contributing to left foraminal stenosis    Moderately advanced degenerative disc changes at L3-L4 are associated with reactive Modic type  changes. Disc osteophyte complex results in moderate to severe right foraminal stenosis and mild  left foraminal stenosis further detailed above    Other findings outlined above    Exam: MRI CERVICAL SPINE 11/2/22 Order#: [id number]     HISTORY: 67 years Female CHRONIC CERVICAL RADICULOPATHY MRI    PROCEDURE: MRI cervical spine without contrast    COMPARISON: January 28, 2019    TECHNIQUE: MRI cervical spine performed 1.5 Liza magnet    FINDINGS:   There is maintenance of the normal cervical lordosis.    The prevertebral soft tissues appear within range of normal.    The craniocervical junction and clivus and C1-C2 distance appear within range of normal    There is uniform marrow signal on all sequences    The cervical cord is uniform in signal intensity without evidence of syrinx or suggestion of  myelomalacia.    At C2-C3 , there is no significant central or foraminal stenosis.    At C3-C4 there is broad-based disc osteophytic ridging which indents the thecal sac, decreases the  AP dimension of the thecal sac slightly and contributes to moderate right foraminal stenosis and  mild to moderate left foraminal stenosis. This may be slightly progressed as compared to the 2019  study.    At C4-S8huize is broad-based disc osteophytic ridging and a right para midline/right foraminal disc protrusion which indents the thecal sac and deforms the ventral surface of the cord. There is  significantly increased mass effect as compared to the 2019 study which now results in moderate to  severe right foraminal stenosis and moderate left foraminal stenosis with the AP dimension of the  thecal sac is reduced to 7 mm.    At C5-C6 , broad-based disc osteophytic ridging has progressed slightly since prior exam. Mild to  moderate right foraminal stenosis and mild left foraminal stenosis is present. Disc osteophyte comp sole asymmetrically prominent to the right.    At C6-C7 , there is no significant central or foraminal stenosis.    At C7-T1 , there is no significant central or foraminal stenosis.     IMPRESSION:   Multilevel degenerative changes as outlined above have progressed since the 2019 study most notably at C4-C5 where there is significantly increased mass effect associated with a right para  midline/right foraminal disc osteophyte complex    Other findings discussed above     Thank you for allowing us to participate in the evaluation of this patient.     MRI LS 11/22   Please note that there is transitional lumbosacral anatomy. Please note that there may be discrepancies in spinal level labeling on prior/subsequent imaging as well as clinical/surgical documentation and close attention on follow-up is strongly recommended especially in the setting of procedural planning. For the purposes of today's exam the transitional level is labeled a transitional L5 level.   The paraspinal musculature including the psoas muscle, multifidus muscles, and immediate para axial soft tissues appear within range of normal without evidence of mass or collection.   There is a maintenance of the normal lumbar lordosis. There is fairly uniform marrow signal on all sequences.   The conus terminates at the T12-L1 level.   At L5-S1  The disc appears intact. There is no significant central or foraminal stenosis.   At L4-L5  There is disc desiccation, a midline annular fissure and shallow central disc protrusion mildly effacing the ventral epidural space and flattening the ventral thecal sac. Bony overgrowth from adjacent facet joints and ligamentum flavum thickening contribute to mild left greater than right effacement of the lateral recesses. There is a prominent left-sided intraforaminal component of circumferential disc bulging contributing to moderately severe left foraminal stenosis.   At L3-L4  There is loss of intervertebral disc space height, disc desiccation, with effacement of the lateral recesses more so on the right than the left. Mild left foraminal stenosis and moderate to severe right foraminal stenosis is present. Bony overgrowth from adjacent facet arthropathy is present at this level.   At L2-L3  There is disc desiccation but no focal disc protrusion. No significant central or foraminal stenosis is present.   At L1-L2  There is mild disc desiccation but no focal disc protrusion   At T11-T12 there is evidence of degenerative disc disease with a shallow central disc bulge.    IMPRESSION: Please note that there is transitional lumbosacral anatomy. Please note that there may be discrepancies in spinal level labeling on prior/subsequent imaging as well as clinical/surgical documentation and close attention on follow-up is strongly recommended especially in the setting of procedural planning.   Degenerative changes lumbosacral spine at L4-L5 include midline annular fissure and a shallow central disc bulging asymmetrically prominent to the left mildly effacing the left lateral recess and contributing to left foraminal stenosis   Moderately advanced degenerative disc changes at L3-L4 are associated with reactive Modic type changes. Disc osteophyte complex results in moderate to severe right foraminal stenosis and mild left foraminal stenosis further detailed above  MRI CS 11/2/22  There is maintenance of the normal cervical lordosis.   The prevertebral soft tissues appear within range of normal.   The craniocervical junction and clivus and C1-C2 distance appear within range of normal   There is uniform marrow signal on all sequences   The cervical cord is uniform in signal intensity without evidence of syrinx or suggestion of myelomalacia.   At C2-C3 ,  there is no significant central or foraminal stenosis.   At C3-C4 there is broad-based disc osteophytic ridging which indents the thecal sac, decreases the AP dimension of the thecal sac slightly and contributes to moderate right foraminal stenosis and mild to moderate left foraminal stenosis. This may be slightly progressed as compared to the 2019 study.   At C4-O5whziu is broad-based disc osteophytic ridging and a right para midline/right foraminal disc protrusion which indents the thecal sac and deforms the ventral surface of the cord. There is significantly increased mass effect as compared to the 2019 study which now results in moderate to severe right foraminal stenosis and moderate left foraminal stenosis with the AP dimension of the thecal sac is reduced to 7 mm.   At C5-C6 , broad-based disc osteophytic ridging has progressed slightly since prior exam. Mild to moderate right foraminal stenosis and mild left foraminal stenosis is present. Disc osteophyte comp sole asymmetrically prominent to the right.   At C6-C7 , there is no significant central or foraminal stenosis.   At C7-T1 , there is no significant central or foraminal stenosis.    IMPRESSION:  Multilevel degenerative changes as outlined above have progressed since the 2019 study most notably at C4-C5 where there is significantly increased mass effect associated with a right para midline/right foraminal disc osteophyte complex  1/28/2019  MRI CERVICAL SPINE Order#:   MRI [id number]    MRI OF THE CERVICAL SPINE WITHOUT CONTRAST   INDICATION:  Neck pain   TECHNIQUE: Noncontrast sagittal T1, T2, STIR, axial T2 and gradient echo, and sagittal T2 volumetric with axial and coronal reformats.   CONTRAST: None   COMPARISON:  No prior studies are available for comparison   FINDINGS:   There is reversal of the cervical lordosis. There is minimal degenerative retrolisthesis of C3 on C4 and C4 on C5. The marrow signal is overall within normal limits with no focal marrow replacing lesion identified. The vertebral body heights are maintained and there is no evidence of acute fracture or subluxation. There is no abnormal vertebral or paraspinal edema. The visualized paraspinal soft tissues appear grossly unremarkable.   No abnormal signal is identified in the cervical spinal cord. The visualized posterior fossa structures are unremarkable.   C2-3: No significant disc bulge or herniation. No significant central canal or foraminal stenosis.   C3-4: Central disc protrusion superimposed on mild disc bulge with marginal and uncovertebral osteophyte formation. No significant central canal stenosis, however disc herniation mildly impinges upon the ventral spinal cord. Moderate left foraminal stenosis and mild right foraminal stenosis.   C4-5: Right paracentral disc/osteophyte superimposed on a mild disc bulge with marginal osteophyte formation. No significant central canal stenosis, however disc/osteophyte mildly impinges upon the right ventral spinal cord. Moderate bilateral foraminal stenosis.   C5-6: Mild disc bulge with marginal and uncovertebral osteophyte formation. No significant central canal stenosis. Mild/moderate right foraminal stenosis and mild left foraminal stenosis.   C6-7: Mild disc bulge. No significant central canal or foraminal stenosis.   C7-T1: Minimal disc bulge. No significant central canal or foraminal stenosis.     IMPRESSION:   Cervical spondylosis with central disc herniation at C3-4 and right paracentral disc/osteophyte at C4-5. No significant central canal stenosis, however there is mild impingement of the ventral spinal cord at C3-4 and C4-5. Varying degrees of foraminal stenosis as above, most pronounced at C3-4 and the left and C4-5 bilaterally.

## 2024-02-06 RX ORDER — GABAPENTIN 300 MG/1
300 CAPSULE ORAL
Qty: 180 | Refills: 1 | Status: ACTIVE | COMMUNITY
Start: 2023-05-31 | End: 1900-01-01

## 2024-02-06 RX ORDER — LIDOCAINE 5% 700 MG/1
5 PATCH TOPICAL
Qty: 30 | Refills: 3 | Status: ACTIVE | COMMUNITY
Start: 2022-10-07 | End: 1900-01-01

## 2024-02-07 RX ORDER — RAMIPRIL 2.5 MG/1
2.5 CAPSULE ORAL
Qty: 90 | Refills: 3 | Status: ACTIVE | COMMUNITY
Start: 2019-12-03 | End: 1900-01-01

## 2024-02-07 RX ORDER — ATENOLOL AND CHLORTHALIDONE 100; 25 MG/1; MG/1
100-25 TABLET ORAL
Qty: 90 | Refills: 3 | Status: ACTIVE | COMMUNITY
Start: 2020-08-16 | End: 1900-01-01

## 2024-02-12 ENCOUNTER — APPOINTMENT (OUTPATIENT)
Dept: RHEUMATOLOGY | Facility: CLINIC | Age: 69
End: 2024-02-12
Payer: MEDICARE

## 2024-02-12 VITALS
BODY MASS INDEX: 24.1 KG/M2 | OXYGEN SATURATION: 97 % | HEIGHT: 63 IN | HEART RATE: 81 BPM | DIASTOLIC BLOOD PRESSURE: 74 MMHG | WEIGHT: 136 LBS | SYSTOLIC BLOOD PRESSURE: 120 MMHG

## 2024-02-12 DIAGNOSIS — M54.12 RADICULOPATHY, CERVICAL REGION: ICD-10-CM

## 2024-02-12 PROCEDURE — 99215 OFFICE O/P EST HI 40 MIN: CPT

## 2024-02-12 NOTE — PHYSICAL EXAM
[General Appearance - Alert] : alert [Neck Appearance] : the appearance of the neck was normal [Auscultation Breath Sounds / Voice Sounds] : lungs were clear to auscultation bilaterally [Heart Rate And Rhythm] : heart rate was normal and rhythm regular [Heart Sounds] : normal S1 and S2 [FreeTextEntry1] : diffuse tenderness to palpation upper and lower body bilaterlly over articular and non articular structures [] : no rash [No Focal Deficits] : no focal deficits [Oriented To Time, Place, And Person] : oriented to person, place, and time

## 2024-02-12 NOTE — HISTORY OF PRESENT ILLNESS
[FreeTextEntry1] : 69yo F in the office for evaluation of chronic pain  History: patient with chronic pain presented initially 5-6 years ago with diffuse body aches at the time evaluation consistent with non-inflammatory etiology diagnosed with fibromyalgia. at the time other processes presented   degenerative arthritis lumbar and cervical spine on pain management follow up multiple spine/epidural injection last procedure 2 weeks ago with positive effects on chronic neck and back pain gabapentin 600 mg tid duloxetine 60 mg qd anemia treated by hem, iron def  now normal h/h hand pain, recurrent, episodic reports of fingers contractures

## 2024-02-14 ENCOUNTER — APPOINTMENT (OUTPATIENT)
Dept: ENDOCRINOLOGY | Facility: CLINIC | Age: 69
End: 2024-02-14
Payer: MEDICARE

## 2024-02-14 VITALS
OXYGEN SATURATION: 97 % | HEIGHT: 63 IN | HEART RATE: 95 BPM | BODY MASS INDEX: 24.1 KG/M2 | SYSTOLIC BLOOD PRESSURE: 116 MMHG | DIASTOLIC BLOOD PRESSURE: 66 MMHG | WEIGHT: 136 LBS

## 2024-02-14 DIAGNOSIS — E06.1 SUBACUTE THYROIDITIS: ICD-10-CM

## 2024-02-14 PROCEDURE — 99214 OFFICE O/P EST MOD 30 MIN: CPT

## 2024-02-14 PROCEDURE — G2211 COMPLEX E/M VISIT ADD ON: CPT

## 2024-02-14 RX ORDER — GLIPIZIDE 5 MG/1
5 TABLET, FILM COATED, EXTENDED RELEASE ORAL
Qty: 30 | Refills: 11 | Status: ACTIVE | COMMUNITY
Start: 2019-06-17 | End: 1900-01-01

## 2024-02-15 PROBLEM — E06.1 SUBACUTE THYROIDITIS: Status: ACTIVE | Noted: 2020-09-23

## 2024-02-15 NOTE — HISTORY OF PRESENT ILLNESS
[FreeTextEntry1] : Feb 14, 2024    in person  PCP:  Dr. Meenu Weaver           Neuro:  Dr. Jarad Renteria (for HA)           Card:  Dr. Adrienne Pugh (palpitations, wake her from sleep)           GI:  Dr. Cisco Bull (anemia)           Rheum:  Dr. Sophia Moncada (arthritis - ?Heberden's)                     Gyn:  Dr. Gladys Moran           Pod:  Dr. Lázaro LAUGHLIN achilles tendon swelling           Eyes:  Dr. Montoya    CC:  Tender swollen thyroid  [started ~ May 2020] (her daughter is treated for Graves disease)         (Type 2 diabetes, 1/2/2017 A1c 8.3 %)        67yo mother of six.  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  TSH never suppressed but ESR went up and TgAb went up.    She also has diabetes and reports BS last night 153 and this  mg/dl    Two weeks ago at 3:00 am BS down to 37 mg/dl (took OJ)    : : Constitutional:  Alert, well nourished, healthy appearance, no acute distress  Eyes:  No proptosis, no stare Thyroid: unremarkable but tender over thyroid cartiledge Pulmonary:  No respiratory distress, no accessory muscle use; normal rate and effort Cardiac:  Normal rate Vascular:  Endocrine:  No stigmata of Cushings Syndrome Musculoskeletal:  Normal gait, no involuntary movements Neurology:  No tremors Affect/Mood/Psych:  Oriented x 3; normal affect, normal insight/judgement, normal mood  .  Impression;  History of atypical subacute thyroiditis. Diabetes.   Plan  Updated US thryoid  TFTs Because of some low BS, try lowering glipizide ER to 5 mg (from 10 mg) and try in PM    Jul 24, 2023     in person  PCP:  Dr. Meenu Weaver           Neuro:  Dr. Jarad Renteria (for HA)           Card:  Dr. Adrienne Pugh (palpitations, wake her from sleep)           GI:  Dr. Cisco Bull (anemia)           Rheum:  Dr. Sophia Moncada (arthritis - ?Heberden's)                     Gyn:  Dr. Gladys Moran           Pod:  Dr. Lázaro LAUGHLIN achilles tendon swelling           Eyes:  Dr. Montoya    CC:  Tender swollen thyroid  [started ~ May 2020] (her daughter is treated for Graves disease)         (Type 2 diabetes, 1/2/2017 A1c 8.3 %)        66 yo mother of six.  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  TSH never suppressed but ESR went up and TgAb went up.    She also has diabetes and is undergoing investigation for anemia.  For the diabetes, she takes metformin 1000 mg BID and more recently started Januvia 100 mg as well. She reports FBS improved.    April 10 A1c 8.1, up from 7.6 Oct 2022 and 7.3 in June 2022  May 8, 2023 labs included TSH 0.78 July 13, 2023 bone density excellent June 5, 2023 US thyroid:  R lobe 4 cm, L lobe 3.9 cm. Imp: Multiple bilateral nodular and cystic changes as describe...progressed slightly in the interval.  : : Constitutional:  Alert, well nourished, healthy appearance, no acute distress  Eyes:  No proptosis, no stare Thyroid: Pulmonary:  No respiratory distress, no accessory muscle use; normal rate and effort Cardiac:  Normal rate Vascular:  Endocrine:  No stigmata of Cushing's Syndrome Musculoskeletal:  Normal gait, no involuntary movements Neurology:  No tremors Affect/Mood/Psych:  Oriented x 3; normal affect, normal insight/judgement, normal mood  .  Impression:  Episode of atypical subacute thyroiditis, now with heterogenity of thyroid tissue, subcentimeter nodules, but still euthyroid.  PlaN:  Repeat US thyroid prior to next visit in April 2024.         May 09, 2023     in person  PCP:  Dr. Meenu Weaver           Neuro:  Dr. Jarad Renteria (for HA)           Card:  Dr. Adrienne Pugh (palpitations, wake her from sleep)           GI:  Dr. Cisco Bull (anemia)           Rheum:  Dr. Sophia Moncada (arthritis - ?Heberden's)                     Gyn:  Dr. Gladys Moran           Pod:  Dr. Lázaro LAUGHLIN achilles tendon swelling           Eyes:  Dr. Montoya    CC:  Tender swollen thyroid  [started ~ May 2020] (her daughter is treated for Graves disease)         (Type 2 diabetes, 1/2/2017 A1c 8.3 %)        66 yo mother of six.  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  TSH never suppressed but ESR went up and TgAb went up.    She also has diabetes and is undergoing investigation for anemia.  For the diabetes, she takes metformin 1000 mg BID and more recently started Januvia 100 mg as well. She reports FBS improved.    April 10 A1c 8.1, up from 7.6 Oct 2022 and 7.3 in June 2022  : : Constitutional:  Alert, well nourished, healthy appearance, no acute distress  Eyes:  No proptosis, no stare Thyroid: Pulmonary:  No respiratory distress, no accessory muscle use; normal rate and effort Cardiac:  Normal rate Vascular:  Endocrine:  No stigmata of Cushing's Syndrome Musculoskeletal:  Normal gait, no involuntary movements Neurology:  No tremors Affect/Mood/Psych:  Oriented x 3; normal affect, normal insight/judgement, normal mood  .  Impression:  On 5/6/23 TSH  normal at 0.78 (was never out of range)   Ultrasound 5/8/23 of thyroid compared to 6/10/22:  Multiple blilateral nodular and cystic changes have progressed sligthly - recomment ongoing follow up in 6 - 12 months.    Plan:  Test BS AC and PC  She does not have a smart phone for CGM and she is not on insulin for a .       Oct 18, 2022     in person  PCP:  Dr. Meenu Weaver           Neuro:  Dr. Jarad Renteria (for HA)           Card:  Dr. Adrienne Pugh (palpitations, wake her from sleep)           GI:  Dr. Cisco Bull (anemia)           Rheum:  Dr. Sophia Moncada (arthritis - ?Heberden's)                     Gyn:  Dr. Gladys Moran           Pod:  Dr. Lázaro LAUGHLIN achilles tendon swelling           Eyes:  Dr. Montoya    CC:  Tender swollen thyroid  [started ~ May 2020] (her daughter is treated for Graves disease)         (Type 2 diabetes, 1/2/2017 A1c 8.3 %)        67 yo mother of six.  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  TSH never suppressed but ESR went up and TgAb went up.      : : Constitutional:  Alert, well nourished, healthy appearance, no acute distress  Eyes:  No proptosis, no stare Thyroid: Normal to palpation, non-tender Pulmonary:  No respiratory distress, no accessory muscle use; normal rate and effort Cardiac:  Normal rate Vascular:  Endocrine:  No stigmata of Cushing's Syndrome Musculoskeletal:  Normal gait, no involuntary movements Neurology:  No tremors Affect/Mood/Psych:  Oriented x 3; normal affect, normal insight/judgement, normal mood  .  Impression:  Rarely notes thyroid discomfort. TFTs have remained WnL. Surveillance appropriate. Next year, f/u US thyroid ~ May 2023 and ROV after that. Continue to follow her new, lower carb healthy diet.      Jun 14, 2022      in person  PCP:  Dr. Meenu Weaver           Neuro:  Dr. Jarad Renteria (for HA)           Card:  Dr. Adrienne Puhg (palpitations, wake her from sleep)           GI:  Dr. Cisco Bull (anemia)           Rheum:  Dr. Sophia oMncada (arthritis - ?Heberden's)                     Gyn:  Dr. Gldays Moran           Pod:  Dr. Lázaro LAUGHLIN achilles tendon swelling           Eyes:  Dr. Montoya    CC:  Tender swollen thyroid  [started ~ May 2020] (her daughter is treated for Graves disease)         (Type 2 diabetes, 1/2/2017 A1c 8.3 %)        67 yo mother of six.  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  TSH never suppressed but ESR went up and TgAb went up.    Most recent US thyroid at [facility] on 6/10/2022:  "COMPARISON: 11/29/2021   FINDINGS: Right Lobe: 3.9 x  4.3 x 1.6 cm. In the upper pole, a hypoechoic nodule is identified, measuring 1.0 x 0.4 x 0.7 cm.  Left Lobe: 4.0 x 1.0 x 1.7 cm. Lower pole spongiform nodule measures 0.6 x 0.3 x 0.6 cm.  Isthmus: 2.2 mm. Hypoechoic nodule in the left isthmus measures 0.5 x 0.3 x 0.4 cm.  Cervical Lymph Nodes: In the region of palpable abnormality on the left, there is a tiny hypoechoic, nonpathologic lymph node measuring 1.0 x 0.3 x 1.0 cm. On the right side, an area of palpable abnormality shows small hypoechoic lymph node, measuring 1.0 x 0.4 x 0.7 cm.  IMPRESSION:   Subcentimeter bilateral nodules.  In the area of bilateral concern, small, nonpathologic lymph nodes.  ----------------------------------------------- Gary Solis MD              06/13/22 "    For diabetes, she remains on glipizide ER 10  (usually early afternoon metformin 1000 mg in AM and again in the afternoon - her preference)  Monitors sugars TID with OneTouch Ultra 2 (not available today)   : : Constitutional:  Alert, well nourished, healthy appearance, no acute distress  Eyes:  No proptosis, no stare Thyroid:  Normal to palpation   Pulmonary:  No respiratory distress, no accessory muscle use; normal rate and effort Cardiac:  Normal rate Vascular:  Endocrine:  No stigmata of Cushing's Syndrome Musculoskeletal:  Normal gait, no involuntary movements  Swelling R Achilles tendon Neurology:  No tremors Affect/Mood/Psych:  Oriented x 3; normal affect, normal insight/judgement, normal mood    Impression/Plan:  By her history, diabetes is under excellent control with only rare, mild hypoglycemia. Will confirm with A1c. and lower glipizide ER from 10 mg to 5 mg.  Ultrasound of thyroid reassuring and will repeat US ~ one year.  ROV in November.  .     Apr 13, 2022     in person     no NFC  PCP:  Dr. Meenu Weaver           Neuro:  Dr. Jarad Renteria (for HA)           Card:  Dr. Adrienne Pugh (palpitations, wake her from sleep)           GI:  Dr. Cisco Bull (anemia)           Rheum:  Dr. Sophia Moncada (arthritis - ?Heberden's)                     Gyn:  Dr. Gladys Moran  CC:  Tender swollen thyroid  [started ~ May 2020] (her daughter is treated for Graves disease)         (Type 2 diabetes, 1/2/2017 A1c 8.3 %)        67 yo mother of six.  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  TSH never suppressed but ESR went up and TgAb went up.   Finally thyroid feels fine to her.    Continued surveillance by means of history, exam, US, TFTs remain prudent with next US thyroid about one year after last:  about Dec 28802     TFTs today.    : : Constitutional:  Alert, well nourished, healthy appearance, no acute distress  Eyes:  No proptosis, no stare Thyroid:  Normal to palpation, non-tender  Pulmonary:  No respiratory distress, no accessory muscle use; normal rate and effort Cardiac:  Normal rate Vascular:  Endocrine:  No stigmata of Cushing's Syndrome Musculoskeletal:  Normal gait, no involuntary movements Neurology:  No tremors Affect/Mood/Psych:  Oriented x 3; normal affect, normal insight/judgement, normal mood  .  Imp:  Thyroiditis appears to be winding down FBS remain a bit elevated.      Plan:  I suggested she test FBS only twicee a week and the rest of the week do some after meal BS monitoring. Also, as FBS and A1c have drifted up a bit, to try moving the glipizide ER 10 mg to take in evening along with the 2nd metformin.    Mar 15, 2022     in person     no NFC   PCP:  Dr. Meenu Weaver           Neuro:  Dr. Jarad Renteria (for HA)           Card:  Dr. Adrienne Pugh (palpitations, wake her from sleep)           GI:  Dr. Cisco Bull (anemia)           Rheum:  Dr. Sophia Moncada (arthritis - ?Heberden's)                     Gyn:  Dr. Gladys Moran  CC:  Tender swollen thyroid  [started ~ May 2020] (her daughter is treated for Graves disease)         (Type 2 diabetes, 1/2/2017 A1c 8.3 %)        67 yo mother of six.  Originally seen for pain in region of thyroid - clinically thyroiditis, although euthyroid, now much improved.  On exam, still has slight tenderness region of R thyroid lobe.   Most recent US thyroid 11/29/21 reassuring.  Will aim for f/u US thyroid about six months after that.   : : Constitutional:  Alert, well nourished, healthy appearance, no acute distress  Eyes:  No proptosis, no stare Thyroid:  Slight tenderness R thyroid lobe.  No palpable adenopathy.  Pulmonary:  No respiratory distress, no accessory muscle use; normal rate and effort Cardiac:  Normal rate Vascular:  Endocrine:  No stigmata of Cushing's Syndrome Musculoskeletal:  Normal gait, no involuntary movements Neurology:  No tremors Affect/Mood/Psych:  Oriented x 3; normal affect, normal insight/judgement, normal mood  .    Impression/Plan:  Smouldering, atypical, subacute thyroiditis that "marched" across the thyroid. Not unheard of such events to reoccur.   Continue surveillance will be the strategy. A1c of 7.3% noted.     Will try to assist patient in doing some monitoring with CGM.   Her Att[employer] smartphone is not equipped with the necessary NFC but she believes her son may be able to help in that regard.    .  Feb 15, 2022     in person       she has an android w/o nfc  PCP:  Dr. Meenu Weaver           Neuro:  Dr. Jarad Renteria (for HA)           Card:  Dr. Adrienne Pugh (palpitations, wake her from sleep)           GI:  Dr. Cisco Bull (anemia)           Rheum:  Dr. Sophia Moncada (arthritis - ?Heberden's)                     Gyn:  Dr. Gladys Moran  CC:  Tender swollen thyroid  [started ~ May 2020] (her daughter is treated for Graves disease)         (Type 2 diabetes, 1/2/2017 A1c 8.3 %)        67 yo mother of six.  Originally seen for pain in region of thyroid - clinically thyroiditis, although euthyroid, now much improved.    Diagnosed with diabetes about 10 years ago on routine exam. Takes metformin 1000 mg in BID and glipizide  ER 10 mg at lunch.  Reports FBS around 180 and after dinner around 180  She tests with a Freestyle Lite meter.  Still has some L thyroid bed discomfort. Last US thyroid very reassuring.  Plan:  TFTs, ESR, A1c today. Bring meter to next visit  Consider brief trial of adding CGM (no NFC)  ROV in about one month.     Nov 18, 2021    in person  PCP:  Dr. Meenu Weaver           Neuro:  Dr. Jarad Renteria (for HA)           Card:  Dr. Adrienne Pugh (palpitations, wake her from sleep)           GI:  Dr. Cisco Bull (anemia)           Rheum:  Dr. Sophia Moncada (arthritis - ?Heberden's)                     Gyn:  Dr. Gladys Moran  CC:  Tender swollen thyroid  [started ~ May 2020] (her daughter is treated for Graves disease)         (Type 2 diabetes, 1/2/2017 A1c 8.3 %)        67 yo mother of six.   Diagnosed with diabetes about 10 years ago on routine exam. Takes metformin 1000 mg in AM and glipizide  ER 10 mg at lunch. Monitors her sugars with her 's meter; however, he is out of town.   Today after broccoli, cauliflorer, beans, corn and rice  the fingerstick BS is 274 mg/dl So she will start using the Verio Flextouch to check her sugars 3X a day, before meals. Also reviewed with her a lower carb diet.  But the primary reason she stopped in today is pain R thyroid bed which started a few weeks ago.    On exam, the area is tender but not swollen.  Likely return of the atypical thyroiditis. She will have updated labs today, including TFTs, ESR and call me in two days for results and schedule US thyroid (after PA) f/u visit.            May 04, 2021    in prson  PCP:  Dr. Meenu Weaver           Neuro:  Dr. Jarad Renteria (for HA)           Card:  Dr. Adrienne Pugh (palpitations, wake her from sleep)           GI:  Dr. Cisco Bull (anemia)           Rheum:  Dr. Sophia Moncada (arthritis)            Rheum:  Dr. Sophia Moncada (joint pain)           Gyn:  Dr. Gladys Moran  CC:  Tender swollen thyroid  [started ~ May 2020] (her daughter is treated for Graves disease)         (Type 2 diabetes, 1/2/2017 A1c 8.3 %)        Followed for tender thyroid.  Pain has marched across the gland similar to subacute thyroiditis.    Most recent lab tests at [facility] on 4/26/201 included:  TSH 1.10   (lowest was 0.31 in Jan 2019) glucose 170 mg/dl insulin 38 (not fasting)  On exam, thyroid slightly tender. No palpable nodules or adenopathy.  Impression:  "Smouldering thyroiditis" slowly stuttering into baseline. Most recent US thyroid from 10/19/2020: diffuse heterogeneity of echogenicity, mild hypervascularity....no significant change from 9/15/2020"  Doing well. Euthyroid. Reassuring US thyroid.  Plan:  f/u US thyroid by 9 2021 and ROV October. She borrows her 's OneTouch Ultra, but will try to get her own and test sugars at different times of the day. If results not in range and not amenable to dietary change, additional medications which appear to be covedred include Farxiga, Trulicity.    She reports  mg/dl while on metformin 1000 mg BID.           Mar 02, 2021    in person  PCP:  Dr. Meenu Weaver           Neuro:  Dr. Jarad Renteria (for HA)           Card:  Dr. Adrienne Pugh (palpitations, wake her from sleep)           GI:  Dr. Cisco Bull (anemia)            Rheum:  Dr. Sophia Moncada (joint pain)           Gyn:  Dr. Gladys Moran  CC:  Tender swollen thyroid  [started ~ May 2020] (her daughter is treated for Graves disease)         (Type 2 diabetes, 1/2/2017 A1c 8.3 %)        Followed for tender thyroid.  Pain has marched across the gland similar to subacute thyroiditis.  Associated with development of elevated ESR (9/19: 11,  7/2/20:  39,  8/29/20:  52) that then returned to normal.   Also developed anti-thyroglobulin antibodies (7/2/20 < 20 -> 12/4/20: 83).  However, apparently never hyperthyroid enough to generate a suppressed TSH.  In the initial phase US thyroid 6/16/20 showed a large poorly defined heterogeneous hypoelchoic area in the left mid lower pole felt possibly related to thyroiditis but possibility of mass "not excluded" and FNAB revealed no suspicious cytology.  On exam, thyroid normal size, non-tender, without palpable nodule or lymphadenopathy.  Impression:  An atypical presentation of subacute thyroiditis and although she still notes occasional discomfort in the region of the thyroid, this is infrequent and much milder than when she first presented.     Plan:  TFTs today and the long term strategy will be surveillance with follow up here in about 4 - 6 months.      Dec 03, 2020   in person  PCP:  Dr. Meenu Weaver           Neuro:  Dr. Jarad Renteria (for HA)           Card:  Dr. Adrienne Pugh (palpitations, wake her from sleep)           GI:  Dr. Cisco Bull (anemia)           Rheum:  Dr. Sophia Moncada (joint pain)           Gyn:  Dr. Gladys Moran  CC:  Tender swollen thyroid   (her daughter is treated for Graves disease)         (Type 2 diabetes, 1/2/2017 A1c 8.3 %)        Followed for tender thyroid.  Pain has marched across the gland (from L to R) similar to subacute thyroiditis. The TSH has never been suppressed. The ESR went up and came down followed by positive TPO abs.  : : Constitutional:  Alert, well nourished, healthy appearance, no acute distress  Eyes:  No proptosis, no stare Thyroid: Pulmonary:  No respiratory distress, no accessory muscle use; normal rate and effort Cardiac:  Normal rate Vascular:  Endocrine:  No stigmata of Cushing's Syndrome Musculoskeletal:  Normal gait, no involuntary movements Neurology:  No tremors Affect/Mood/Psych:  Oriented x 3; normal affect, normal insight/judgement, normal mood  .  Impression:  She feels thyroid discomfort has continued to slowly improve.  Plan:  TFTs today and ROV early March 2021.     The right thyroid lobe measures 4.9 x 1.6 x 1.7 cm, the isthmus up to 3 mm AP in the midline, and the left thyroid lobe measures 4 x 1.2 x 1.6 cm. There has been no change in lobulated thyroid contour and diffuse heterogeneity of thyroid parenchymal echogenicity. Color-flow Doppler evaluation demonstrates mild hypervascularity of the gland. . There are several very small colloid cyst in the left lobe.   IMPRESSION: No significant change from 9/15/2020.   ***Electronically Signed *** ----------------------------------------------- Demond Valdez MD              10/19/20    Nov 17, 2020     Te;ephone       PCP:  Dr. Meenu Weaver           Neuro:  Dr. Jarad Renteria (for HA)           Card:  Dr. Adrienne Pugh (palpitations, wake her from sleep)           GI:  Dr. Cisco Bull (anemia)           Rheum:  Dr. Sophia Moncada (joint pain)           Gyn:  Dr. Gladys Moran  CC:  Tender swollen thyroid   (her daughter is treated for Graves disease)         (Type 2 diabetes, A1c 7.3%)        66 yo who presented with a tender swollen thyroid with symptoms "marching" across the gland from L to R. Today has no symptoms, but last night she had some symptoms.  Trajectory of ESR was 9/19  11;  7/2/20  39;  7/29/20  52;  9/15/20  37   10/19/20  19 ***         ESR Trajectory: 7/2/20  < 20;  7/29/20:  < 20;  9/15/20:  52;  10/19/20:  50.4   ****  9/15/20   PC ;      10/19/10    (post prandial) ***  TSH:   0.31 - 2.1     (never suppressed)    10/19/2020 Ultrasound thyroid:  heterogeneous gland.  R lobe 4.9 cm;   L lobe 4 cm       Has had 5 US thyroid and FNAB of heterogeneous area L lobe when there was tenderness in that region.    Impression:  Although she has had the thyroid condition for several months, and it behaves like an unusual variant of subacute (painful) thyroiditis, the tSH has never been low.     The ESR went up and came down, the TPO ab went from negative to positive, all common with subacute thyroiditis.   "Marching" across the gland is also not that unusual.   In sum, she reports, that even though she still gets some pain, it is much better than when this all started.     So continued active surveiillance will be the strategy.  Incidentally noted is the elevation of post prandial glucose, so I have taken the liberty of asking her to return for instruction in slef blood glucose monitoring as well as some dietary overview.        Sep 22, 2020   in person accompanied by her daughter, Emelina.     PCP:  Dr. Meenu Weaver           Neuro:  Dr. Jarad Renteria (for HA)           Card:  Dr. Adrienne Pugh (palpitations, wake her from sleep)           GI:  Dr. Cisco Bull (anemia)           Rheum:  Dr. Sophia Moncada (joint pain)           Gyn:  Dr. Gladys Moran  CC:  Tender swollen thyroid   (her daughter is treated for Graves disease)         (Type 2 diabetes, A1c 7.3%)        66 yo Type 2 diabetes, visits in follow up for swollen, tender thyroid.  Symptoms began on Left side of thyroid and then marched to the Right.    Most recent lab tests on September 15, 2020 include ESR 37 (had been 52 July 29, 39 July 2 and 11 Sep 20, 2019 T4 8.9 calcium 10.6 *** TSH 2.1 (up from 1.2 ion July 29, 0.70 July 2) TPO remains neg TgAb 52.3 (<10.0);  had been < 20 previously Tg 20 (<1.8)   [of course abs may impact   Impression:  In general, thyroid enlargement appears to be slowly resolving.  US thyroid no longer demonstrates mass R thyroid lobe.    Her findings are most consistent with subacute thyroiditis; however, there are many atypical features, including the lack of TSH suppression and the unusually long course.    Continued close monitoring will be the strategy, including f/u US thyroid and lab tests.    Aug 03, 2020   in person  PCP:  Dr. Meenu Weaver           Neuro:  Dr. Jarad Renteria (for HA)           Card:  Dr. Adrienne Pugh (palpitations, wake her from sleep)           GI:  Dr. Cisco Bull (anemia)           Rheum:  Dr. Sophia Moncada (joint pain)           Gyn:  Dr. Gladys Moran  CC:  Tender swollen thyroid         (Type 2 diabetes, A1c 7.3%)         Recent labs at [facility] from 7/29/2020 include:  ESR 52 (up from 39 on July 2 and 11 on Sep 20) TSH  1.2 Thyroid antibodies (TPO, TgAb) remain negative  Biopsy of left thyroid "nodule":  "benign follicular nodule with cystic change"  Follow up US:  CLINICAL HISTORY: Painful enlarged thyroid with nodules, recent benign biopsy of a left lower pole masslike asymmetry  COMPARISON: 6/26/2020  FINDINGS:  Thyroid isthmus measures 2.8 mm.  Right lobe of the thyroid measures 4.9 x 1.7 x 1.7 cm and is diffusely heterogeneous with interval development of a masslike area of asymmetry in the midpole measuring 2.4 x 1.4 x 1.3 cm.  Left lobe of the thyroid measures 3.7 x 1.5 x 1.5 cm diffusely heterogeneous with previously biopsied area of masslike asymmetrical density now measuring 2.7 x 1.3 x 1.3 cm. This is not significantly changed.  There is mild increased vascularity of the thyroid.  Small lymph nodes are seen in the cervical chain bilaterally largest on the left measuring 7.7 x 5.6 x 5.1 mm and the largest on the right measuring 7.9 x 6.8 x 4.6 mm.   IMPRESSION: Heterogeneous thyroid gland is again seen with interval development of a masslike area of asymmetry in the right mid pole thyroid gland. No change in the left lobe of the thyroid.  Recommend short interval follow-up perhaps in 3-6 months time.  ***Electronically Signed *** ----------------------------------------------- Eric Pablo MD              07/29/20 Jul 01, 2020      in person       PCP:  Dr. Meenu Weaver           Neuro:  Dr. Jarad Renteria (for HA)           Card:  Dr. Adrienne Pugh (palpitations, wake her from sleep)           GI:  Dr. Cisco Bull (anemia)           Rheum:  Dr. Sophia Moncada (joint pain)           Gyn:  Dr. Gladys Moran  CC:  Tender swollen thyroid         (Type 2 diabetes, A1c 7.3%)        65 yo accompanied by her daughter.   Patient reports that she started to notice discomfort and swelling in the area of the thyroid about a month ago.  While the tenderness and discomfort was bilateral, it was more noticeable on the left.   The swelling and pain are still present, she reports that both are improving.   She is currently taking Tylenol with some benefit.    There is no previous history of thyroid problem.   Her daughter has been treated for a thyroid condition.  There is no history of face or neck irradiation.    Lab tests on 6/11/2020 included TSH 0.83  6/16/2020 Ultrasound Thyroid at [facility]: . "The right thyroid lobe measures 4.6 x 1.2 x 2 cm, the isthmus up to 2 mm AP in the midline, and the left thyroid lobe measures 3.8 x 1.4 x 2.1 cm. A large poorly-defined heterogeneous hypoechoic area in the left mid-lower pole measures 3 x 1.3 x 1.3 cm. A couple of small hypoechoic right mid pole nodules measure 4 x 2 x 3 mm and 4 x 2 x 3 mm. Several small left cervical lymph nodes measure 1.0 x 0.6 x 0.9 cm, 1.3 x 0.6 x 0.9 cm, and 1.1 x 0.5 x 0.5 cm, and are diffusely hypoechoic, without clearly defined echogenic indira.   IMPRESSION: In view of the history of left-sided neck pain, the large poorly-defined heterogeneous hypoechoic area in the left mid-lower pole may be secondary to thyroiditis, with the possibility of mass not excluded. Several small left-sided lymph nodes could be inflammatory or neoplastic.   ***Electronically Signed *** ----------------------------------------------- Demond Valdez MD              06/16/20  "  . 6/29/2020 underwent FNAB of the ~ 3 cm L thyroid lesion:  "FINAL DIAGNOSIS   Left thyroid mid to lower pole area: BENIGN FOLLICULAR NODULE WITH CYSTIC CHANGE (Barre Category II) A few Hurthle cells and crushed lymphocytes, suggestive of chronic lymphocytic thyroiditis.    MICROSCOPIC DESCRIPTION     The Diff-Quik and Papanicolaou stained direct smears show sheets and groups of benign follicular cells with small nuclei, inconspicuous nucleoli and moderate cytoplasm in a hemorrhagic background with bare nuclei, macrophages and thin colloid. Mild metaplastic changes and rare crushed lymphoid cells are noted. "  On exam, the thyroid is slightly enlarged, L more than R and is slightly tender to palpation.     Impression:  Her careful evaluation is most consistent with subacute thyroiditis.  The inflammation of subacute thyroiditis usually causes enough release of thyroid hormone to cause suppression of TSH, which has not yet been seen here, so close follow up by means of history, exam, labs and ultrasound will be the current strategy.    The trajectory of her symptoms appears to be that of improvement, so that is reassuring.   Symptoms from subacute thyroiditis may take several months to completely resolve.    Plan:  Repeat TSH today, repeat US thyroid in about 4-5 weeks and ROV here after that.

## 2024-02-23 ENCOUNTER — RESULT REVIEW (OUTPATIENT)
Age: 69
End: 2024-02-23

## 2024-02-29 ENCOUNTER — APPOINTMENT (OUTPATIENT)
Dept: FAMILY MEDICINE | Facility: CLINIC | Age: 69
End: 2024-02-29
Payer: MEDICARE

## 2024-02-29 VITALS
OXYGEN SATURATION: 98 % | BODY MASS INDEX: 23.92 KG/M2 | SYSTOLIC BLOOD PRESSURE: 110 MMHG | WEIGHT: 135 LBS | HEIGHT: 63 IN | DIASTOLIC BLOOD PRESSURE: 60 MMHG | HEART RATE: 90 BPM

## 2024-02-29 DIAGNOSIS — E07.9 DISORDER OF THYROID, UNSPECIFIED: ICD-10-CM

## 2024-02-29 DIAGNOSIS — E78.5 HYPERLIPIDEMIA, UNSPECIFIED: ICD-10-CM

## 2024-02-29 DIAGNOSIS — E11.9 TYPE 2 DIABETES MELLITUS W/OUT COMPLICATIONS: ICD-10-CM

## 2024-02-29 PROCEDURE — 99214 OFFICE O/P EST MOD 30 MIN: CPT

## 2024-02-29 PROCEDURE — G2211 COMPLEX E/M VISIT ADD ON: CPT

## 2024-02-29 NOTE — HEALTH RISK ASSESSMENT
[No] : In the past 12 months have you used drugs other than those required for medical reasons? No [No falls in past year] : Patient reported no falls in the past year [0] : 1) Little interest or pleasure doing things: Not at all (0) [PHQ-2 Negative - No further assessment needed] : PHQ-2 Negative - No further assessment needed [Never] : Never [de-identified] : walking [de-identified] : none [[id number]] : 0

## 2024-02-29 NOTE — HISTORY OF PRESENT ILLNESS
[FreeTextEntry1] : Follow up on medical problems [de-identified] : Pt here for f/u. Saw Dr. Benoit for her ongoing pain.  Steven in legs. Shoulders.  Neck.  He's continuing with current meds for fibromyalgia. Saw Dr. Abrams who put injection in neck which has helped a lot.  Very happy about the injection.  Just got some xrays of the hands. Seeing Dr. Hellerman for diabetes and thyroid.  Believes things are stable. He had to decrease her glipizide because she was waking with some low blood sugars. Saw ENT/allergy doctor due to ear pain.  Just got medrol dose pack and started yesterday. Had appt with Dr. Triplett, neuro, for headaches that persist.  Seem to be worse.  Yesterday had to take ibu and tylenol.  Patient reports some mild tremor of some fingers of the right hand intermittently. No pain.  Full function.

## 2024-03-05 ENCOUNTER — APPOINTMENT (OUTPATIENT)
Dept: NEUROLOGY | Facility: CLINIC | Age: 69
End: 2024-03-05
Payer: MEDICARE

## 2024-03-05 VITALS
WEIGHT: 135 LBS | SYSTOLIC BLOOD PRESSURE: 121 MMHG | OXYGEN SATURATION: 98 % | DIASTOLIC BLOOD PRESSURE: 74 MMHG | BODY MASS INDEX: 23.92 KG/M2 | HEIGHT: 63 IN | HEART RATE: 81 BPM

## 2024-03-05 DIAGNOSIS — M62.838 OTHER MUSCLE SPASM: ICD-10-CM

## 2024-03-05 DIAGNOSIS — G43.E09 CHRONIC MIGRAINE WITH AURA, NOT INTRACTABLE, WITHOUT STATUS MIGRAINOSUS: ICD-10-CM

## 2024-03-05 PROCEDURE — 99214 OFFICE O/P EST MOD 30 MIN: CPT

## 2024-03-05 RX ORDER — LANCETS 33 GAUGE
EACH MISCELLANEOUS
Qty: 100 | Refills: 11 | Status: DISCONTINUED | COMMUNITY
Start: 2021-11-18 | End: 2024-03-05

## 2024-03-05 RX ORDER — BLOOD SUGAR DIAGNOSTIC
STRIP MISCELLANEOUS
Qty: 100 | Refills: 11 | Status: DISCONTINUED | COMMUNITY
Start: 2021-11-18 | End: 2024-03-05

## 2024-03-05 RX ORDER — BLOOD-GLUCOSE METER
W/DEVICE EACH MISCELLANEOUS
Qty: 1 | Refills: 0 | Status: DISCONTINUED | COMMUNITY
Start: 2021-05-04 | End: 2024-03-05

## 2024-03-05 RX ORDER — TIZANIDINE 2 MG/1
2 TABLET ORAL
Qty: 30 | Refills: 3 | Status: ACTIVE | COMMUNITY
Start: 2024-03-05 | End: 1900-01-01

## 2024-03-05 RX ORDER — GLIPIZIDE 10 MG/1
10 TABLET, FILM COATED, EXTENDED RELEASE ORAL
Qty: 90 | Refills: 0 | Status: ACTIVE | COMMUNITY
Start: 2023-04-10

## 2024-03-05 RX ORDER — MELOXICAM 15 MG/1
15 TABLET ORAL
Qty: 21 | Refills: 0 | Status: DISCONTINUED | COMMUNITY
Start: 2021-05-20 | End: 2024-03-05

## 2024-03-05 RX ORDER — LANCETS 33 GAUGE
EACH MISCELLANEOUS
Qty: 100 | Refills: 6 | Status: DISCONTINUED | COMMUNITY
Start: 2021-05-04 | End: 2024-03-05

## 2024-03-05 NOTE — ASSESSMENT
[FreeTextEntry1] : Nurys Rooney is a 66 year old woman with migraines.  Continue Duloxetine 60 mg daily.   Continue gabapentin 600 mg bid (given by Dr. Moncada)  Neck pain and muscle spasm PT appt pending Begin Tizanidine 2 mg at bedtime.   Ha diary   if headaches persist, consider BOTOX.   She will see me in f/u in 4 months.

## 2024-03-05 NOTE — REVIEW OF SYSTEMS
[Tension Headache] : tension-type headaches [Palpitations] : palpitations [Joint Pain] : joint pain [Joint Stiffness] : joint stiffness [Negative] : Heme/Lymph [Chills] : no chills [Feeling Tired] : not feeling tired [Numbness] : no numbness [Tingling] : no tingling [Dizziness] : no dizziness [Seizures] : no convulsions [Vertigo] : no vertigo [Anxiety] : no anxiety [Depression] : no depression [Eye Pain] : no eye pain [Red Eyes] : eyes not red [Loss Of Hearing] : no hearing loss [Chest Pain] : no chest pain [Wheezing] : no wheezing [Shortness Of Breath] : no shortness of breath [Constipation] : no constipation [Diarrhea] : no diarrhea [Incontinence] : no incontinence [Joint Swelling] : no joint swelling [Muscle Weakness] : no muscle weakness [Itching] : no itching [Easy Bleeding] : no tendency for easy bleeding [Easy Bruising] : no tendency for easy bruising [Swollen Glands] : no swollen glands

## 2024-03-05 NOTE — REASON FOR VISIT
[Follow-Up: _____] : a [unfilled] follow-up visit [[employer] ] : provided by [employer]   [Interpreters_IDNumber] : [id number] [Interpreters_FullName] : Sunita [FreeTextEntry3] : Croatian

## 2024-03-05 NOTE — HISTORY OF PRESENT ILLNESS
[FreeTextEntry1] : 3/5/24 Here in f/u Increased number of headaches over the last 3 weeks- almost daily  preceded by neck pain with radiation into arms- Dr. Abrams gave MINO which helped some  PT ordered but she has not started it yet  Notes tightness in neck   9/28/22 Duloxetine stopped 18 days ago he fell. Takes Duloxetine in the AM with food  With Duloxetine her headaches resolved - has headache today bc no medication (Advil helps a little )  She has chronic neck and low back pain.  Notes a lots of muscle pain.  Tolerating gabapentin.  No lateralizing deficits.  4/27/22 dry mouth mo - mouth very dry even on 10 mg qhs. headaches were improved on this   Gabapentin 600 twice daily (4 total)  3/31/22 Seen by Frances Padilla   6/24/2020 Nurys Rooney is a 66 year old woman with a history of migraines, TIA, DM, presenting for a follow up appointment for migraines.   She is able to tolerate Amitriptyline 10mg without side effects. She took one dose last night and reports her  headaches have improved.  Tongue heaviness with Amitriptyline 20mg. stopped medication two weeks ago. Three and a half weeks prior noted heavy tongue. Heaviness and dry mouth resolved.  She also has dry eyes 2-3 years and uses lubrication drops.   Headache  location- bilateral frontal and temple description- sharp dizziness with it. described as room spinning. no falls.   unilateral or bilateral-bilateral Nausea or vomiting- none Photophobia or phonophobia- none warning/aura-none nocturnal awakening- association with exertion or Valsalva-  Duration-varies.   Average #/week-daily since stopping the medication.    history of trauma-none   Triggers-none sleep- 4 hours.  water intake- 64 ounces     past medications tried-  Tylenol Advil Motrin Gabapentin 300mg TID with rheumatology.   Neck pain. Does PT two times per week.   The remaining neurological review of systems is negative    6/2020 In February, patient reported that her headaches were in good control. Over the last 3 weeks she has been having a sore throat and difficulty swallowing. She underwent ultrasound of the thyroid which shows an abnormality. She is scheduled for a biopsy this Friday. Her headaches have increased since then. She thinks it may be due to the stress she is experiencing from her current diagnosis and workup. She does not have any significant neck pain. Intermittently she has pain behind the right ear. The headaches continue to be bifrontal. They are helped with Tylenol or Advil. She has been compliant with amitriptyline. She would like a refill on this.

## 2024-03-08 ENCOUNTER — APPOINTMENT (OUTPATIENT)
Dept: PAIN MANAGEMENT | Facility: CLINIC | Age: 69
End: 2024-03-08
Payer: MEDICARE

## 2024-03-08 VITALS
SYSTOLIC BLOOD PRESSURE: 144 MMHG | HEIGHT: 63 IN | DIASTOLIC BLOOD PRESSURE: 81 MMHG | WEIGHT: 135 LBS | BODY MASS INDEX: 23.92 KG/M2

## 2024-03-08 PROCEDURE — 99214 OFFICE O/P EST MOD 30 MIN: CPT

## 2024-03-08 PROCEDURE — G2211 COMPLEX E/M VISIT ADD ON: CPT

## 2024-03-08 NOTE — HISTORY OF PRESENT ILLNESS
[___ yrs] : [unfilled] year(s) ago [FreeTextEntry1] : Interval Note:  Patient returns with significant pain complaining of left back, buttock posterolateral thigh and shin pain. Pain is so bad that patient finds it difficult to perform adls and ambulate. She is finding it difficult to perform adls. denies any worsening numbness, weakness, bowel/bladder dysfunction.      HPI     Ms. SMITH CUMMINGS is a 68 year F on baby ASA with pmhx of TIA, DM2 (Notes poor glucose control with episodes of hypo and hyperglycemia. (BG's 30's-300's- managed by Dr Gutierres and Dr Hellerman), HTN, RA, chronic lower back pain x 15 yrs. Lower back pain worsening over the past few months. Remains severe despite medication and physical therapy. Pain to lower back that radiates to anterolaterally, left greater than right. Worst at night when laying. In addition, continued scapular and shoulder pain that radiates down her arms. Pain makes it difficult to do ADL's. Denies any additional weakness, numbness, bowel/bladder dysfunction.    Previous and current pain medications/doses/effects: Gabapentin 600mg tid, Cymbalta 60mg.     Previous Pain Treatments:      Previous Pain Injections:  L4-5 interlaminar epidural steroid injection 1/16/24  C7-T1 interlaminar epidural steroid injection 12/7/23  L4-5 interlaminar epidural steroid injection 7/14/23  L4-5 interlaminar epidural steroid injection 5/17/23  Cervical MINO (2004)- Dr Ramos     Previous Diagnostic Studies/Images:  Exam: MRI LUMBAR SPINE 11/2/22 Order#: [id number]     HISTORY: 67 years Female LUMBAR SPINAL STENOSIS MRI     PROCEDURE:MRI lumbar spine without contrast    COMPARISON:    TECHNIQUE:MRI lumbosacral spine 1.5 Liza magnet    FINDINGS: Please note that there is transitional lumbosacral anatomy. Please note that there may be discrepancies in spinal level labeling on prior/subsequent imaging as well as clinical/surgical  documentation and close attention on follow-up is strongly recommended especially in the setting of  procedural planning. For the purposes of today's exam the transitional level is labeled a  transitional L5 level.    The paraspinal musculature including the psoas muscle, multifidus muscles, and immediate para axial soft tissues appear within range of normal without evidence of mass or collection.    There is a maintenance of the normal lumbar lordosis. There is fairly uniform marrow signal on all  sequences.    The conus terminates at the T12-L1 level.    At L5-S1   The disc appears intact. There is no significant central or foraminal stenosis.    At L4-L5   There is disc desiccation, a midline annular fissure and shallow central disc protrusion mildly  effacing the ventral epidural space and flattening the ventral thecal sac. Bony overgrowth from  adjacent facet joints and ligamentum flavum thickening contribute to mild left greater than right  effacement of the lateral recesses. There is a prominent left-sided intraforaminal component of  circumferential disc bulging contributing to moderately severe left foraminal stenosis.    At L3-L4   There is loss of intervertebral disc space height, disc desiccation, with effacement of the lateral recesses more so on the right than the left. Mild left foraminal stenosis and moderate to severe  right foraminal stenosis is present. Bony overgrowth from adjacent facet arthropathy is present at  this level.    At L2-L3   There is disc desiccation but no focal disc protrusion. No significant central or foraminal  stenosis is present.    At L1-L2   There is mild disc desiccation but no focal disc protrusion    At T11-T12 there is evidence of degenerative disc disease with a shallow central disc bulge.     IMPRESSION: Please note that there is transitional lumbosacral anatomy. Please note that there may  be discrepancies in spinal level labeling on prior/subsequent imaging as well as clinical/surgical  documentation and close attention on follow-up is strongly recommended especially in the setting of  procedural planning.    Degenerative changes lumbosacral spine at L4-L5 include midline annular fissure and a shallow  central disc bulging asymmetrically prominent to the left mildly effacing the left lateral recess  and contributing to left foraminal stenosis    Moderately advanced degenerative disc changes at L3-L4 are associated with reactive Modic type  changes. Disc osteophyte complex results in moderate to severe right foraminal stenosis and mild  left foraminal stenosis further detailed above    Other findings outlined above    Exam: MRI CERVICAL SPINE 11/2/22 Order#: [id number]     HISTORY: 67 years Female CHRONIC CERVICAL RADICULOPATHY MRI    PROCEDURE: MRI cervical spine without contrast    COMPARISON: January 28, 2019    TECHNIQUE: MRI cervical spine performed 1.5 Liza magnet    FINDINGS:   There is maintenance of the normal cervical lordosis.    The prevertebral soft tissues appear within range of normal.    The craniocervical junction and clivus and C1-C2 distance appear within range of normal    There is uniform marrow signal on all sequences    The cervical cord is uniform in signal intensity without evidence of syrinx or suggestion of  myelomalacia.    At C2-C3 , there is no significant central or foraminal stenosis.    At C3-C4 there is broad-based disc osteophytic ridging which indents the thecal sac, decreases the  AP dimension of the thecal sac slightly and contributes to moderate right foraminal stenosis and  mild to moderate left foraminal stenosis. This may be slightly progressed as compared to the 2019  study.    At C4-G1gmrjd is broad-based disc osteophytic ridging and a right para midline/right foraminal disc protrusion which indents the thecal sac and deforms the ventral surface of the cord. There is  significantly increased mass effect as compared to the 2019 study which now results in moderate to  severe right foraminal stenosis and moderate left foraminal stenosis with the AP dimension of the  thecal sac is reduced to 7 mm.    At C5-C6 , broad-based disc osteophytic ridging has progressed slightly since prior exam. Mild to  moderate right foraminal stenosis and mild left foraminal stenosis is present. Disc osteophyte comp sole asymmetrically prominent to the right.    At C6-C7 , there is no significant central or foraminal stenosis.    At C7-T1 , there is no significant central or foraminal stenosis.     IMPRESSION:   Multilevel degenerative changes as outlined above have progressed since the 2019 study most notably at C4-C5 where there is significantly increased mass effect associated with a right para  midline/right foraminal disc osteophyte complex    Other findings discussed above     Thank you for allowing us to participate in the evaluation of this patient.     MRI LS 11/22   Please note that there is transitional lumbosacral anatomy. Please note that there may be discrepancies in spinal level labeling on prior/subsequent imaging as well as clinical/surgical documentation and close attention on follow-up is strongly recommended especially in the setting of procedural planning. For the purposes of today's exam the transitional level is labeled a transitional L5 level.   The paraspinal musculature including the psoas muscle, multifidus muscles, and immediate para axial soft tissues appear within range of normal without evidence of mass or collection.   There is a maintenance of the normal lumbar lordosis. There is fairly uniform marrow signal on all sequences.   The conus terminates at the T12-L1 level.   At L5-S1  The disc appears intact. There is no significant central or foraminal stenosis.   At L4-L5  There is disc desiccation, a midline annular fissure and shallow central disc protrusion mildly effacing the ventral epidural space and flattening the ventral thecal sac. Bony overgrowth from adjacent facet joints and ligamentum flavum thickening contribute to mild left greater than right effacement of the lateral recesses. There is a prominent left-sided intraforaminal component of circumferential disc bulging contributing to moderately severe left foraminal stenosis.   At L3-L4  There is loss of intervertebral disc space height, disc desiccation, with effacement of the lateral recesses more so on the right than the left. Mild left foraminal stenosis and moderate to severe right foraminal stenosis is present. Bony overgrowth from adjacent facet arthropathy is present at this level.   At L2-L3  There is disc desiccation but no focal disc protrusion. No significant central or foraminal stenosis is present.   At L1-L2  There is mild disc desiccation but no focal disc protrusion   At T11-T12 there is evidence of degenerative disc disease with a shallow central disc bulge.    IMPRESSION: Please note that there is transitional lumbosacral anatomy. Please note that there may be discrepancies in spinal level labeling on prior/subsequent imaging as well as clinical/surgical documentation and close attention on follow-up is strongly recommended especially in the setting of procedural planning.   Degenerative changes lumbosacral spine at L4-L5 include midline annular fissure and a shallow central disc bulging asymmetrically prominent to the left mildly effacing the left lateral recess and contributing to left foraminal stenosis   Moderately advanced degenerative disc changes at L3-L4 are associated with reactive Modic type changes. Disc osteophyte complex results in moderate to severe right foraminal stenosis and mild left foraminal stenosis further detailed above  MRI CS 11/2/22  There is maintenance of the normal cervical lordosis.   The prevertebral soft tissues appear within range of normal.   The craniocervical junction and clivus and C1-C2 distance appear within range of normal   There is uniform marrow signal on all sequences   The cervical cord is uniform in signal intensity without evidence of syrinx or suggestion of myelomalacia.   At C2-C3 ,  there is no significant central or foraminal stenosis.   At C3-C4 there is broad-based disc osteophytic ridging which indents the thecal sac, decreases the AP dimension of the thecal sac slightly and contributes to moderate right foraminal stenosis and mild to moderate left foraminal stenosis. This may be slightly progressed as compared to the 2019 study.   At C4-F0qgzmm is broad-based disc osteophytic ridging and a right para midline/right foraminal disc protrusion which indents the thecal sac and deforms the ventral surface of the cord. There is significantly increased mass effect as compared to the 2019 study which now results in moderate to severe right foraminal stenosis and moderate left foraminal stenosis with the AP dimension of the thecal sac is reduced to 7 mm.   At C5-C6 , broad-based disc osteophytic ridging has progressed slightly since prior exam. Mild to moderate right foraminal stenosis and mild left foraminal stenosis is present. Disc osteophyte comp sole asymmetrically prominent to the right.   At C6-C7 , there is no significant central or foraminal stenosis.   At C7-T1 , there is no significant central or foraminal stenosis.    IMPRESSION:  Multilevel degenerative changes as outlined above have progressed since the 2019 study most notably at C4-C5 where there is significantly increased mass effect associated with a right para midline/right foraminal disc osteophyte complex  1/28/2019  MRI CERVICAL SPINE Order#:   MRI [id number]    MRI OF THE CERVICAL SPINE WITHOUT CONTRAST   INDICATION:  Neck pain   TECHNIQUE: Noncontrast sagittal T1, T2, STIR, axial T2 and gradient echo, and sagittal T2 volumetric with axial and coronal reformats.   CONTRAST: None   COMPARISON:  No prior studies are available for comparison   FINDINGS:   There is reversal of the cervical lordosis. There is minimal degenerative retrolisthesis of C3 on C4 and C4 on C5. The marrow signal is overall within normal limits with no focal marrow replacing lesion identified. The vertebral body heights are maintained and there is no evidence of acute fracture or subluxation. There is no abnormal vertebral or paraspinal edema. The visualized paraspinal soft tissues appear grossly unremarkable.   No abnormal signal is identified in the cervical spinal cord. The visualized posterior fossa structures are unremarkable.   C2-3: No significant disc bulge or herniation. No significant central canal or foraminal stenosis.   C3-4: Central disc protrusion superimposed on mild disc bulge with marginal and uncovertebral osteophyte formation. No significant central canal stenosis, however disc herniation mildly impinges upon the ventral spinal cord. Moderate left foraminal stenosis and mild right foraminal stenosis.   C4-5: Right paracentral disc/osteophyte superimposed on a mild disc bulge with marginal osteophyte formation. No significant central canal stenosis, however disc/osteophyte mildly impinges upon the right ventral spinal cord. Moderate bilateral foraminal stenosis.   C5-6: Mild disc bulge with marginal and uncovertebral osteophyte formation. No significant central canal stenosis. Mild/moderate right foraminal stenosis and mild left foraminal stenosis.   C6-7: Mild disc bulge. No significant central canal or foraminal stenosis.   C7-T1: Minimal disc bulge. No significant central canal or foraminal stenosis.     IMPRESSION:   Cervical spondylosis with central disc herniation at C3-4 and right paracentral disc/osteophyte at C4-5. No significant central canal stenosis, however there is mild impingement of the ventral spinal cord at C3-4 and C4-5. Varying degrees of foraminal stenosis as above, most pronounced at C3-4 and the left and C4-5 bilaterally.

## 2024-03-08 NOTE — PHYSICAL EXAM
[Normal] : Well developed, in no acute distress, alert and oriented to person, place and time [Normal muscle bulk without asymmetry] : normal muscle bulk without asymmetry [Facet Tenderness] : no facet tenderness [Antalgic] : antalgic [] : Motor:

## 2024-03-08 NOTE — ASSESSMENT
[FreeTextEntry1] : >> Imaging and Other Studies  I personally reviewed the relevant imaging. Discussed and explained to patient the likely source of pathology and pain. Questions answered. MRI XR  >> Therapy and Other Modalities  restart PT  >> Medications  gabapentin 900mg TID cautioned change in mood. Encouraged to call with any worsening mood or depression/suicidal ideations  acetaminophen 650mg q8h prn pain (caution <3g daily)  >> Interventions  Significant component of axial back pain secondary to lumbar spondylosis and facet arthropathy demonstrated on MRI LS. Pain refractory to conservative treatments. sp BILATERAL L4-sacral ala diagnostic medial branch block (2 joints, 3 nerves on each side) r/b/a discussed (STEROID SPARING) without immediate improvement, but improvement in 12 hours and for 2 months  given efficacy of previous intervention that is >80% improvement in pain and improved ability to perform adls, and return of pain despite conservative treatment, sp repeat BILATERAL L4-sacral ala diagnostic medial branch block (2 joints, 3 nerves on each side) without immediate improvement but now improved  Significant component of back and leg pain likely secondary to lumbar spinal stenosis demonstrated on MRI LS. sp L4-5 interlaminar epidural steroid injection with significant improvement  given efficacy of previous intervention that is >80% improvement in pain and improved ability to perform adls, and return of pain despite conservative treatment, will schedule repeat L4-5 interlaminar epidural steroid injection r/b/a discusse  continue asa 81 for secondary prophylaxis   shoulder pain likely secondary to significant joint arthropathy demonstrated on imaging refractory to conservative treatments.sp LEFT glenohumeral joint steroid injection   MRI demonstrating disc herniation causing radiculopathy, significantly impactful to ability to perform ADL and refractory to conservative treatments, including physician directed exercises/PT ().  sp C7-T1 interlaminar epidural steroid injection    >> Consults  >> Discussion of Risks/Benefits/Alternatives   >Regarding any scheduled procedures:  I have discussed in detail with the patient that any interventional pain procedure is associated with potential risks. The procedure may include an injection of steroids and potentially other medications (local anesthetic and normal saline) into the epidural space or surrounding tissue of the spine. There are significant risks of this procedure which include and are not limited to infection, bleeding, worsening pain, dural puncture leading to postdural puncture headache, nerve damage, spinal cord injury, paralysis, stroke, and death.  There is a chance that the procedure does not improve their pain.  There are risks associated with the steroid being absorbed into the body systemically. These include dysphoria, difficulty sleeping, mood swings and personality changes. Premenopausal women may notice an irregularity in her menstrual cycle for 2-3 months following the injection. Steroids can specifically affect patients with hypertension, diabetes, and peptic ulcers. The procedure may cause a temporary increase in blood pressure and blood pressure, and may adversely affect a peptic ulcer. Other, more rare complications, include avascular necrosis of joints, glaucoma and worsening of osteoporosis.  I have discussed the risks of the procedure at length with the patient, and the potential benefits of pain relief. I have offered alternatives to the procedure. All questions were answered.  The patient expressed understanding and wishes to proceed with the procedure.   > Longitudinal management of Complex Painful condition   The patient is being managed for a complex condition that requires ongoing management.  The nature of this condition demands nuanced approach to treatment.  The seriousness of the condition necessitates an in-depth and focused approach to management and coordination with other healthcare professionals.    This visit involves intricate evaluation and management of the patient's condition.  The complexity of the visit was due to the need for detailed assessment of the current state, consideration of potential complications and a careful balancing of treatment options to management the chronic condition effectively.   As detailed above, the patient has a chronic significant painful condition that requires regular and detailed management.  The condition's impact on the patient's quality of life and health is substantial and necessitates a comprehensive and tailored approach   >> Conclusion   There were no barriers to communication. Informed patient that I would be available for any additional questions. Patient was instructed to call with any worsening symptoms including severe pain, new numbness/weakness, or changes in the bowel/bladder function. Discussed role of nsaids in pain management and all relevant risks, if patient is continuing to require after 4 weeks the patient should f/u for alternative treatment. Instructed patient to maintain pain diary to monitor pain level, mobility, and function.

## 2024-03-22 ENCOUNTER — APPOINTMENT (OUTPATIENT)
Dept: PAIN MANAGEMENT | Facility: CLINIC | Age: 69
End: 2024-03-22
Payer: MEDICARE

## 2024-03-22 VITALS
RESPIRATION RATE: 16 BRPM | HEART RATE: 103 BPM | DIASTOLIC BLOOD PRESSURE: 83 MMHG | SYSTOLIC BLOOD PRESSURE: 129 MMHG | OXYGEN SATURATION: 98 %

## 2024-03-22 PROCEDURE — 62323 NJX INTERLAMINAR LMBR/SAC: CPT

## 2024-03-22 RX ADMIN — TRIAMCINOLONE ACETONIDE 0 MG/ML: 80 INJECTION, SUSPENSION INTRA-ARTICULAR; INTRAMUSCULAR at 00:00

## 2024-03-22 RX ADMIN — IOHEXOL 0 MG/ML: 180 INJECTION INTRAVENOUS at 00:00

## 2024-03-22 RX ADMIN — LIDOCAINE HYDROCHLORIDE %: 10 INJECTION, SOLUTION INFILTRATION; PERINEURAL at 00:00

## 2024-03-23 LAB
ANION GAP SERPL CALC-SCNC: 13 MMOL/L
BUN SERPL-MCNC: 14 MG/DL
CALCIUM SERPL-MCNC: 9.9 MG/DL
CHLORIDE SERPL-SCNC: 99 MMOL/L
CO2 SERPL-SCNC: 28 MMOL/L
CREAT SERPL-MCNC: 0.67 MG/DL
EGFR: 95 ML/MIN/1.73M2
FRUCTOSAMINE SERPL-MCNC: 262 UMOL/L
GLUCOSE SERPL-MCNC: 179 MG/DL
POTASSIUM SERPL-SCNC: 4.1 MMOL/L
SODIUM SERPL-SCNC: 140 MMOL/L
TSH SERPL-ACNC: 0.87 UIU/ML

## 2024-03-26 RX ORDER — DULOXETINE HYDROCHLORIDE 60 MG/1
60 CAPSULE, DELAYED RELEASE PELLETS ORAL
Qty: 90 | Refills: 1 | Status: ACTIVE | COMMUNITY
Start: 2022-04-27 | End: 1900-01-01

## 2024-04-05 ENCOUNTER — APPOINTMENT (OUTPATIENT)
Dept: PAIN MANAGEMENT | Facility: CLINIC | Age: 69
End: 2024-04-05
Payer: MEDICARE

## 2024-04-05 VITALS
WEIGHT: 135 LBS | DIASTOLIC BLOOD PRESSURE: 72 MMHG | HEIGHT: 63 IN | SYSTOLIC BLOOD PRESSURE: 118 MMHG | BODY MASS INDEX: 23.92 KG/M2

## 2024-04-05 PROCEDURE — 99214 OFFICE O/P EST MOD 30 MIN: CPT

## 2024-04-05 PROCEDURE — G2211 COMPLEX E/M VISIT ADD ON: CPT

## 2024-04-05 NOTE — HISTORY OF PRESENT ILLNESS
[___ yrs] : [unfilled] year(s) ago [FreeTextEntry1] : Interval Note: sp  L4-5 interlaminar epidural steroid injection 3/22/24 with significant improvement in back and leg pain.  Patient is doing much better.  She has minimal back and neck pain.  Able to perform adls.   Continues gabapentin with improvement in pain   denies any worsening numbness, weakness, bowel/bladder dysfunction.      HPI     Ms. SMITH CUMMINGS is a 68 year F on baby ASA with pmhx of TIA, DM2 (Notes poor glucose control with episodes of hypo and hyperglycemia. (BG's 30's-300's- managed by Dr Gutierres and Dr Hellerman), HTN, RA, chronic lower back pain x 15 yrs. Lower back pain worsening over the past few months. Remains severe despite medication and physical therapy. Pain to lower back that radiates to anterolaterally, left greater than right. Worst at night when laying. In addition, continued scapular and shoulder pain that radiates down her arms. Pain makes it difficult to do ADL's. Denies any additional weakness, numbness, bowel/bladder dysfunction.    Previous and current pain medications/doses/effects: Gabapentin 600mg tid, Cymbalta 60mg.     Previous Pain Treatments:      Previous Pain Injections:   L4-5 interlaminar epidural steroid injection 3/22/24  L4-5 interlaminar epidural steroid injection 1/16/24  C7-T1 interlaminar epidural steroid injection 12/7/23  L4-5 interlaminar epidural steroid injection 7/14/23  L4-5 interlaminar epidural steroid injection 5/17/23  Cervical MINO (2004)- Dr Ramos     Previous Diagnostic Studies/Images:  Exam: MRI LUMBAR SPINE 11/2/22 Order#: [id number]     HISTORY: 67 years Female LUMBAR SPINAL STENOSIS MRI     PROCEDURE:MRI lumbar spine without contrast    COMPARISON:    TECHNIQUE:MRI lumbosacral spine 1.5 Liza magnet    FINDINGS: Please note that there is transitional lumbosacral anatomy. Please note that there may be discrepancies in spinal level labeling on prior/subsequent imaging as well as clinical/surgical  documentation and close attention on follow-up is strongly recommended especially in the setting of  procedural planning. For the purposes of today's exam the transitional level is labeled a  transitional L5 level.    The paraspinal musculature including the psoas muscle, multifidus muscles, and immediate para axial soft tissues appear within range of normal without evidence of mass or collection.    There is a maintenance of the normal lumbar lordosis. There is fairly uniform marrow signal on all  sequences.    The conus terminates at the T12-L1 level.    At L5-S1   The disc appears intact. There is no significant central or foraminal stenosis.    At L4-L5   There is disc desiccation, a midline annular fissure and shallow central disc protrusion mildly  effacing the ventral epidural space and flattening the ventral thecal sac. Bony overgrowth from  adjacent facet joints and ligamentum flavum thickening contribute to mild left greater than right  effacement of the lateral recesses. There is a prominent left-sided intraforaminal component of  circumferential disc bulging contributing to moderately severe left foraminal stenosis.    At L3-L4   There is loss of intervertebral disc space height, disc desiccation, with effacement of the lateral recesses more so on the right than the left. Mild left foraminal stenosis and moderate to severe  right foraminal stenosis is present. Bony overgrowth from adjacent facet arthropathy is present at  this level.    At L2-L3   There is disc desiccation but no focal disc protrusion. No significant central or foraminal  stenosis is present.    At L1-L2   There is mild disc desiccation but no focal disc protrusion    At T11-T12 there is evidence of degenerative disc disease with a shallow central disc bulge.     IMPRESSION: Please note that there is transitional lumbosacral anatomy. Please note that there may  be discrepancies in spinal level labeling on prior/subsequent imaging as well as clinical/surgical  documentation and close attention on follow-up is strongly recommended especially in the setting of  procedural planning.    Degenerative changes lumbosacral spine at L4-L5 include midline annular fissure and a shallow  central disc bulging asymmetrically prominent to the left mildly effacing the left lateral recess  and contributing to left foraminal stenosis    Moderately advanced degenerative disc changes at L3-L4 are associated with reactive Modic type  changes. Disc osteophyte complex results in moderate to severe right foraminal stenosis and mild  left foraminal stenosis further detailed above    Other findings outlined above    Exam: MRI CERVICAL SPINE 11/2/22 Order#: [id number]     HISTORY: 67 years Female CHRONIC CERVICAL RADICULOPATHY MRI    PROCEDURE: MRI cervical spine without contrast    COMPARISON: January 28, 2019    TECHNIQUE: MRI cervical spine performed 1.5 Liza magnet    FINDINGS:   There is maintenance of the normal cervical lordosis.    The prevertebral soft tissues appear within range of normal.    The craniocervical junction and clivus and C1-C2 distance appear within range of normal    There is uniform marrow signal on all sequences    The cervical cord is uniform in signal intensity without evidence of syrinx or suggestion of  myelomalacia.    At C2-C3 , there is no significant central or foraminal stenosis.    At C3-C4 there is broad-based disc osteophytic ridging which indents the thecal sac, decreases the  AP dimension of the thecal sac slightly and contributes to moderate right foraminal stenosis and  mild to moderate left foraminal stenosis. This may be slightly progressed as compared to the 2019  study.    At C4-A9dmqda is broad-based disc osteophytic ridging and a right para midline/right foraminal disc protrusion which indents the thecal sac and deforms the ventral surface of the cord. There is  significantly increased mass effect as compared to the 2019 study which now results in moderate to  severe right foraminal stenosis and moderate left foraminal stenosis with the AP dimension of the  thecal sac is reduced to 7 mm.    At C5-C6 , broad-based disc osteophytic ridging has progressed slightly since prior exam. Mild to  moderate right foraminal stenosis and mild left foraminal stenosis is present. Disc osteophyte comp sole asymmetrically prominent to the right.    At C6-C7 , there is no significant central or foraminal stenosis.    At C7-T1 , there is no significant central or foraminal stenosis.     IMPRESSION:   Multilevel degenerative changes as outlined above have progressed since the 2019 study most notably at C4-C5 where there is significantly increased mass effect associated with a right para  midline/right foraminal disc osteophyte complex    Other findings discussed above     Thank you for allowing us to participate in the evaluation of this patient.     MRI LS 11/22   Please note that there is transitional lumbosacral anatomy. Please note that there may be discrepancies in spinal level labeling on prior/subsequent imaging as well as clinical/surgical documentation and close attention on follow-up is strongly recommended especially in the setting of procedural planning. For the purposes of today's exam the transitional level is labeled a transitional L5 level.   The paraspinal musculature including the psoas muscle, multifidus muscles, and immediate para axial soft tissues appear within range of normal without evidence of mass or collection.   There is a maintenance of the normal lumbar lordosis. There is fairly uniform marrow signal on all sequences.   The conus terminates at the T12-L1 level.   At L5-S1  The disc appears intact. There is no significant central or foraminal stenosis.   At L4-L5  There is disc desiccation, a midline annular fissure and shallow central disc protrusion mildly effacing the ventral epidural space and flattening the ventral thecal sac. Bony overgrowth from adjacent facet joints and ligamentum flavum thickening contribute to mild left greater than right effacement of the lateral recesses. There is a prominent left-sided intraforaminal component of circumferential disc bulging contributing to moderately severe left foraminal stenosis.   At L3-L4  There is loss of intervertebral disc space height, disc desiccation, with effacement of the lateral recesses more so on the right than the left. Mild left foraminal stenosis and moderate to severe right foraminal stenosis is present. Bony overgrowth from adjacent facet arthropathy is present at this level.   At L2-L3  There is disc desiccation but no focal disc protrusion. No significant central or foraminal stenosis is present.   At L1-L2  There is mild disc desiccation but no focal disc protrusion   At T11-T12 there is evidence of degenerative disc disease with a shallow central disc bulge.    IMPRESSION: Please note that there is transitional lumbosacral anatomy. Please note that there may be discrepancies in spinal level labeling on prior/subsequent imaging as well as clinical/surgical documentation and close attention on follow-up is strongly recommended especially in the setting of procedural planning.   Degenerative changes lumbosacral spine at L4-L5 include midline annular fissure and a shallow central disc bulging asymmetrically prominent to the left mildly effacing the left lateral recess and contributing to left foraminal stenosis   Moderately advanced degenerative disc changes at L3-L4 are associated with reactive Modic type changes. Disc osteophyte complex results in moderate to severe right foraminal stenosis and mild left foraminal stenosis further detailed above  MRI CS 11/2/22  There is maintenance of the normal cervical lordosis.   The prevertebral soft tissues appear within range of normal.   The craniocervical junction and clivus and C1-C2 distance appear within range of normal   There is uniform marrow signal on all sequences   The cervical cord is uniform in signal intensity without evidence of syrinx or suggestion of myelomalacia.   At C2-C3 ,  there is no significant central or foraminal stenosis.   At C3-C4 there is broad-based disc osteophytic ridging which indents the thecal sac, decreases the AP dimension of the thecal sac slightly and contributes to moderate right foraminal stenosis and mild to moderate left foraminal stenosis. This may be slightly progressed as compared to the 2019 study.   At C4-N7wjpef is broad-based disc osteophytic ridging and a right para midline/right foraminal disc protrusion which indents the thecal sac and deforms the ventral surface of the cord. There is significantly increased mass effect as compared to the 2019 study which now results in moderate to severe right foraminal stenosis and moderate left foraminal stenosis with the AP dimension of the thecal sac is reduced to 7 mm.   At C5-C6 , broad-based disc osteophytic ridging has progressed slightly since prior exam. Mild to moderate right foraminal stenosis and mild left foraminal stenosis is present. Disc osteophyte comp sole asymmetrically prominent to the right.   At C6-C7 , there is no significant central or foraminal stenosis.   At C7-T1 , there is no significant central or foraminal stenosis.    IMPRESSION:  Multilevel degenerative changes as outlined above have progressed since the 2019 study most notably at C4-C5 where there is significantly increased mass effect associated with a right para midline/right foraminal disc osteophyte complex  1/28/2019  MRI CERVICAL SPINE Order#:   MRI [id number]    MRI OF THE CERVICAL SPINE WITHOUT CONTRAST   INDICATION:  Neck pain   TECHNIQUE: Noncontrast sagittal T1, T2, STIR, axial T2 and gradient echo, and sagittal T2 volumetric with axial and coronal reformats.   CONTRAST: None   COMPARISON:  No prior studies are available for comparison   FINDINGS:   There is reversal of the cervical lordosis. There is minimal degenerative retrolisthesis of C3 on C4 and C4 on C5. The marrow signal is overall within normal limits with no focal marrow replacing lesion identified. The vertebral body heights are maintained and there is no evidence of acute fracture or subluxation. There is no abnormal vertebral or paraspinal edema. The visualized paraspinal soft tissues appear grossly unremarkable.   No abnormal signal is identified in the cervical spinal cord. The visualized posterior fossa structures are unremarkable.   C2-3: No significant disc bulge or herniation. No significant central canal or foraminal stenosis.   C3-4: Central disc protrusion superimposed on mild disc bulge with marginal and uncovertebral osteophyte formation. No significant central canal stenosis, however disc herniation mildly impinges upon the ventral spinal cord. Moderate left foraminal stenosis and mild right foraminal stenosis.   C4-5: Right paracentral disc/osteophyte superimposed on a mild disc bulge with marginal osteophyte formation. No significant central canal stenosis, however disc/osteophyte mildly impinges upon the right ventral spinal cord. Moderate bilateral foraminal stenosis.   C5-6: Mild disc bulge with marginal and uncovertebral osteophyte formation. No significant central canal stenosis. Mild/moderate right foraminal stenosis and mild left foraminal stenosis.   C6-7: Mild disc bulge. No significant central canal or foraminal stenosis.   C7-T1: Minimal disc bulge. No significant central canal or foraminal stenosis.     IMPRESSION:   Cervical spondylosis with central disc herniation at C3-4 and right paracentral disc/osteophyte at C4-5. No significant central canal stenosis, however there is mild impingement of the ventral spinal cord at C3-4 and C4-5. Varying degrees of foraminal stenosis as above, most pronounced at C3-4 and the left and C4-5 bilaterally.

## 2024-04-05 NOTE — ASSESSMENT
[FreeTextEntry1] : >> Imaging and Other Studies  I personally reviewed the relevant imaging. Discussed and explained to patient the likely source of pathology and pain. Questions answered. MRI XR  >> Therapy and Other Modalities  restart PT  >> Medications  gabapentin 900mg TID cautioned change in mood. Encouraged to call with any worsening mood or depression/suicidal ideations  acetaminophen 650mg q8h prn pain (caution <3g daily)  >> Interventions  Significant component of axial back pain secondary to lumbar spondylosis and facet arthropathy demonstrated on MRI LS. Pain refractory to conservative treatments. sp BILATERAL L4-sacral ala diagnostic medial branch block (2 joints, 3 nerves on each side) r/b/a discussed (STEROID SPARING) without immediate improvement, but improvement in 12 hours and for 2 months  given efficacy of previous intervention that is >80% improvement in pain and improved ability to perform adls, and return of pain despite conservative treatment, sp repeat BILATERAL L4-sacral ala diagnostic medial branch block (2 joints, 3 nerves on each side) without immediate improvement but now improved  Significant component of back and leg pain likely secondary to lumbar spinal stenosis demonstrated on MRI LS. sp L4-5 interlaminar epidural steroid injection with significant improvement   continue asa 81 for secondary prophylaxis   shoulder pain likely secondary to significant joint arthropathy demonstrated on imaging refractory to conservative treatments.sp LEFT glenohumeral joint steroid injection   MRI demonstrating disc herniation causing radiculopathy, significantly impactful to ability to perform ADL and refractory to conservative treatments, including physician directed exercises/PT ().  sp C7-T1 interlaminar epidural steroid injection    >> Consults  >> Discussion of Risks/Benefits/Alternatives   >Regarding any scheduled procedures:  I have discussed in detail with the patient that any interventional pain procedure is associated with potential risks. The procedure may include an injection of steroids and potentially other medications (local anesthetic and normal saline) into the epidural space or surrounding tissue of the spine. There are significant risks of this procedure which include and are not limited to infection, bleeding, worsening pain, dural puncture leading to postdural puncture headache, nerve damage, spinal cord injury, paralysis, stroke, and death.  There is a chance that the procedure does not improve their pain.  There are risks associated with the steroid being absorbed into the body systemically. These include dysphoria, difficulty sleeping, mood swings and personality changes. Premenopausal women may notice an irregularity in her menstrual cycle for 2-3 months following the injection. Steroids can specifically affect patients with hypertension, diabetes, and peptic ulcers. The procedure may cause a temporary increase in blood pressure and blood pressure, and may adversely affect a peptic ulcer. Other, more rare complications, include avascular necrosis of joints, glaucoma and worsening of osteoporosis.  I have discussed the risks of the procedure at length with the patient, and the potential benefits of pain relief. I have offered alternatives to the procedure. All questions were answered.  The patient expressed understanding and wishes to proceed with the procedure.   > Longitudinal management of Complex Painful condition   The patient is being managed for a complex condition that requires ongoing management.  The nature of this condition demands nuanced approach to treatment.  The seriousness of the condition necessitates an in-depth and focused approach to management and coordination with other healthcare professionals.    This visit involves intricate evaluation and management of the patient's condition.  The complexity of the visit was due to the need for detailed assessment of the current state, consideration of potential complications and a careful balancing of treatment options to management the chronic condition effectively.   As detailed above, the patient has a chronic significant painful condition that requires regular and detailed management.  The condition's impact on the patient's quality of life and health is substantial and necessitates a comprehensive and tailored approach   >> Conclusion   There were no barriers to communication. Informed patient that I would be available for any additional questions. Patient was instructed to call with any worsening symptoms including severe pain, new numbness/weakness, or changes in the bowel/bladder function. Discussed role of nsaids in pain management and all relevant risks, if patient is continuing to require after 4 weeks the patient should f/u for alternative treatment. Instructed patient to maintain pain diary to monitor pain level, mobility, and function.

## 2024-05-10 RX ORDER — METFORMIN HYDROCHLORIDE 1000 MG/1
1000 TABLET, COATED ORAL
Qty: 180 | Refills: 3 | Status: ACTIVE | COMMUNITY
Start: 2019-12-16 | End: 1900-01-01

## 2024-05-10 RX ORDER — OMEPRAZOLE 20 MG/1
20 CAPSULE, DELAYED RELEASE ORAL
Qty: 90 | Refills: 3 | Status: ACTIVE | COMMUNITY
Start: 1900-01-01 | End: 1900-01-01

## 2024-05-13 ENCOUNTER — APPOINTMENT (OUTPATIENT)
Dept: RHEUMATOLOGY | Facility: CLINIC | Age: 69
End: 2024-05-13
Payer: MEDICARE

## 2024-05-13 VITALS
BODY MASS INDEX: 24.1 KG/M2 | DIASTOLIC BLOOD PRESSURE: 68 MMHG | OXYGEN SATURATION: 97 % | HEIGHT: 63 IN | WEIGHT: 136 LBS | HEART RATE: 95 BPM | SYSTOLIC BLOOD PRESSURE: 110 MMHG

## 2024-05-13 DIAGNOSIS — M79.7 FIBROMYALGIA: ICD-10-CM

## 2024-05-13 PROCEDURE — 99214 OFFICE O/P EST MOD 30 MIN: CPT

## 2024-05-13 RX ORDER — DICLOFENAC SODIUM 1% 10 MG/G
1 GEL TOPICAL
Qty: 3 | Refills: 3 | Status: ACTIVE | COMMUNITY
Start: 2021-04-27 | End: 1900-01-01

## 2024-05-13 RX ORDER — TRAMADOL HYDROCHLORIDE 50 MG/1
50 TABLET, COATED ORAL
Qty: 14 | Refills: 0 | Status: COMPLETED | COMMUNITY
Start: 2024-05-13 | End: 2024-05-20

## 2024-05-13 NOTE — HISTORY OF PRESENT ILLNESS
[___ Month(s) Ago] : [unfilled] month(s) ago [FreeTextEntry1] : Continues to have back pain today body aches persist but lower back pain predominant

## 2024-05-13 NOTE — PHYSICAL EXAM
[General Appearance - Alert] : alert [Neck Appearance] : the appearance of the neck was normal [] : no rash [No Focal Deficits] : no focal deficits [Oriented To Time, Place, And Person] : oriented to person, place, and time [FreeTextEntry1] : ttp over spinal and paraspinal muscle areas. straight leg test +

## 2024-05-16 ENCOUNTER — APPOINTMENT (OUTPATIENT)
Dept: VASCULAR SURGERY | Facility: CLINIC | Age: 69
End: 2024-05-16

## 2024-05-16 ENCOUNTER — RESULT REVIEW (OUTPATIENT)
Age: 69
End: 2024-05-16

## 2024-06-04 ENCOUNTER — APPOINTMENT (OUTPATIENT)
Dept: PAIN MANAGEMENT | Facility: CLINIC | Age: 69
End: 2024-06-04
Payer: MEDICARE

## 2024-06-04 VITALS
WEIGHT: 136 LBS | BODY MASS INDEX: 24.1 KG/M2 | SYSTOLIC BLOOD PRESSURE: 145 MMHG | DIASTOLIC BLOOD PRESSURE: 82 MMHG | HEIGHT: 63 IN

## 2024-06-04 DIAGNOSIS — M48.061 SPINAL STENOSIS, LUMBAR REGION WITHOUT NEUROGENIC CLAUDICATION: ICD-10-CM

## 2024-06-04 DIAGNOSIS — M47.812 SPONDYLOSIS W/OUT MYELOPATHY OR RADICULOPATHY, CERVICAL REGION: ICD-10-CM

## 2024-06-04 DIAGNOSIS — M19.012 PRIMARY OSTEOARTHRITIS, LEFT SHOULDER: ICD-10-CM

## 2024-06-04 DIAGNOSIS — M47.817 SPONDYLOSIS W/OUT MYELOPATHY OR RADICULOPATHY, LUMBOSACRAL REGION: ICD-10-CM

## 2024-06-04 DIAGNOSIS — M54.16 RADICULOPATHY, LUMBAR REGION: ICD-10-CM

## 2024-06-04 DIAGNOSIS — M54.2 CERVICALGIA: ICD-10-CM

## 2024-06-04 DIAGNOSIS — M79.10 MYALGIA, UNSPECIFIED SITE: ICD-10-CM

## 2024-06-04 PROCEDURE — G2211 COMPLEX E/M VISIT ADD ON: CPT

## 2024-06-04 PROCEDURE — 99214 OFFICE O/P EST MOD 30 MIN: CPT

## 2024-06-04 NOTE — HISTORY OF PRESENT ILLNESS
[___ yrs] : [unfilled] year(s) ago [FreeTextEntry1] : Interval Note:  Patient returns and continues to complain of axial back pain.  reports that Dr Benoit recommended evaluation for facet generated pain.  Pain is so bad that patient finds it difficult to perform adls and ambulate. Pain worse with transition.  Continues gabapentin with improvement in pain   denies any worsening numbness, weakness, bowel/bladder dysfunction.      HPI     Ms. SMITH CUMMINGS is a 68 year F on baby ASA with pmhx of TIA, DM2 (Notes poor glucose control with episodes of hypo and hyperglycemia. (BG's 30's-300's- managed by Dr Gutierres and Dr Hellerman), HTN, RA, chronic lower back pain x 15 yrs. Lower back pain worsening over the past few months. Remains severe despite medication and physical therapy. Pain to lower back that radiates to anterolaterally, left greater than right. Worst at night when laying. In addition, continued scapular and shoulder pain that radiates down her arms. Pain makes it difficult to do ADL's. Denies any additional weakness, numbness, bowel/bladder dysfunction.    Previous and current pain medications/doses/effects: Gabapentin 600mg tid, Cymbalta 60mg.     Previous Pain Treatments:      Previous Pain Injections:   L4-5 interlaminar epidural steroid injection 3/22/24  L4-5 interlaminar epidural steroid injection 1/16/24  C7-T1 interlaminar epidural steroid injection 12/7/23  L4-5 interlaminar epidural steroid injection 7/14/23  L4-5 interlaminar epidural steroid injection 5/17/23  Cervical MINO (2004)- Dr Ramos     Previous Diagnostic Studies/Images:  Exam: MRI LUMBAR SPINE 11/2/22 Order#: [id number]     HISTORY: 67 years Female LUMBAR SPINAL STENOSIS MRI     PROCEDURE:MRI lumbar spine without contrast    COMPARISON:    TECHNIQUE:MRI lumbosacral spine 1.5 Liza magnet    FINDINGS: Please note that there is transitional lumbosacral anatomy. Please note that there may be discrepancies in spinal level labeling on prior/subsequent imaging as well as clinical/surgical  documentation and close attention on follow-up is strongly recommended especially in the setting of  procedural planning. For the purposes of today's exam the transitional level is labeled a  transitional L5 level.    The paraspinal musculature including the psoas muscle, multifidus muscles, and immediate para axial soft tissues appear within range of normal without evidence of mass or collection.    There is a maintenance of the normal lumbar lordosis. There is fairly uniform marrow signal on all  sequences.    The conus terminates at the T12-L1 level.    At L5-S1   The disc appears intact. There is no significant central or foraminal stenosis.    At L4-L5   There is disc desiccation, a midline annular fissure and shallow central disc protrusion mildly  effacing the ventral epidural space and flattening the ventral thecal sac. Bony overgrowth from  adjacent facet joints and ligamentum flavum thickening contribute to mild left greater than right  effacement of the lateral recesses. There is a prominent left-sided intraforaminal component of  circumferential disc bulging contributing to moderately severe left foraminal stenosis.    At L3-L4   There is loss of intervertebral disc space height, disc desiccation, with effacement of the lateral recesses more so on the right than the left. Mild left foraminal stenosis and moderate to severe  right foraminal stenosis is present. Bony overgrowth from adjacent facet arthropathy is present at  this level.    At L2-L3   There is disc desiccation but no focal disc protrusion. No significant central or foraminal  stenosis is present.    At L1-L2   There is mild disc desiccation but no focal disc protrusion    At T11-T12 there is evidence of degenerative disc disease with a shallow central disc bulge.     IMPRESSION: Please note that there is transitional lumbosacral anatomy. Please note that there may  be discrepancies in spinal level labeling on prior/subsequent imaging as well as clinical/surgical  documentation and close attention on follow-up is strongly recommended especially in the setting of  procedural planning.    Degenerative changes lumbosacral spine at L4-L5 include midline annular fissure and a shallow  central disc bulging asymmetrically prominent to the left mildly effacing the left lateral recess  and contributing to left foraminal stenosis    Moderately advanced degenerative disc changes at L3-L4 are associated with reactive Modic type  changes. Disc osteophyte complex results in moderate to severe right foraminal stenosis and mild  left foraminal stenosis further detailed above    Other findings outlined above    Exam: MRI CERVICAL SPINE 11/2/22 Order#: [id number]     HISTORY: 67 years Female CHRONIC CERVICAL RADICULOPATHY MRI    PROCEDURE: MRI cervical spine without contrast    COMPARISON: January 28, 2019    TECHNIQUE: MRI cervical spine performed 1.5 Liza magnet    FINDINGS:   There is maintenance of the normal cervical lordosis.    The prevertebral soft tissues appear within range of normal.    The craniocervical junction and clivus and C1-C2 distance appear within range of normal    There is uniform marrow signal on all sequences    The cervical cord is uniform in signal intensity without evidence of syrinx or suggestion of  myelomalacia.    At C2-C3 , there is no significant central or foraminal stenosis.    At C3-C4 there is broad-based disc osteophytic ridging which indents the thecal sac, decreases the  AP dimension of the thecal sac slightly and contributes to moderate right foraminal stenosis and  mild to moderate left foraminal stenosis. This may be slightly progressed as compared to the 2019  study.    At C4-G5knieu is broad-based disc osteophytic ridging and a right para midline/right foraminal disc protrusion which indents the thecal sac and deforms the ventral surface of the cord. There is  significantly increased mass effect as compared to the 2019 study which now results in moderate to  severe right foraminal stenosis and moderate left foraminal stenosis with the AP dimension of the  thecal sac is reduced to 7 mm.    At C5-C6 , broad-based disc osteophytic ridging has progressed slightly since prior exam. Mild to  moderate right foraminal stenosis and mild left foraminal stenosis is present. Disc osteophyte comp sole asymmetrically prominent to the right.    At C6-C7 , there is no significant central or foraminal stenosis.    At C7-T1 , there is no significant central or foraminal stenosis.     IMPRESSION:   Multilevel degenerative changes as outlined above have progressed since the 2019 study most notably at C4-C5 where there is significantly increased mass effect associated with a right para  midline/right foraminal disc osteophyte complex    Other findings discussed above     Thank you for allowing us to participate in the evaluation of this patient.     MRI LS 11/22   Please note that there is transitional lumbosacral anatomy. Please note that there may be discrepancies in spinal level labeling on prior/subsequent imaging as well as clinical/surgical documentation and close attention on follow-up is strongly recommended especially in the setting of procedural planning. For the purposes of today's exam the transitional level is labeled a transitional L5 level.   The paraspinal musculature including the psoas muscle, multifidus muscles, and immediate para axial soft tissues appear within range of normal without evidence of mass or collection.   There is a maintenance of the normal lumbar lordosis. There is fairly uniform marrow signal on all sequences.   The conus terminates at the T12-L1 level.   At L5-S1  The disc appears intact. There is no significant central or foraminal stenosis.   At L4-L5  There is disc desiccation, a midline annular fissure and shallow central disc protrusion mildly effacing the ventral epidural space and flattening the ventral thecal sac. Bony overgrowth from adjacent facet joints and ligamentum flavum thickening contribute to mild left greater than right effacement of the lateral recesses. There is a prominent left-sided intraforaminal component of circumferential disc bulging contributing to moderately severe left foraminal stenosis.   At L3-L4  There is loss of intervertebral disc space height, disc desiccation, with effacement of the lateral recesses more so on the right than the left. Mild left foraminal stenosis and moderate to severe right foraminal stenosis is present. Bony overgrowth from adjacent facet arthropathy is present at this level.   At L2-L3  There is disc desiccation but no focal disc protrusion. No significant central or foraminal stenosis is present.   At L1-L2  There is mild disc desiccation but no focal disc protrusion   At T11-T12 there is evidence of degenerative disc disease with a shallow central disc bulge.    IMPRESSION: Please note that there is transitional lumbosacral anatomy. Please note that there may be discrepancies in spinal level labeling on prior/subsequent imaging as well as clinical/surgical documentation and close attention on follow-up is strongly recommended especially in the setting of procedural planning.   Degenerative changes lumbosacral spine at L4-L5 include midline annular fissure and a shallow central disc bulging asymmetrically prominent to the left mildly effacing the left lateral recess and contributing to left foraminal stenosis   Moderately advanced degenerative disc changes at L3-L4 are associated with reactive Modic type changes. Disc osteophyte complex results in moderate to severe right foraminal stenosis and mild left foraminal stenosis further detailed above  MRI CS 11/2/22  There is maintenance of the normal cervical lordosis.   The prevertebral soft tissues appear within range of normal.   The craniocervical junction and clivus and C1-C2 distance appear within range of normal   There is uniform marrow signal on all sequences   The cervical cord is uniform in signal intensity without evidence of syrinx or suggestion of myelomalacia.   At C2-C3 ,  there is no significant central or foraminal stenosis.   At C3-C4 there is broad-based disc osteophytic ridging which indents the thecal sac, decreases the AP dimension of the thecal sac slightly and contributes to moderate right foraminal stenosis and mild to moderate left foraminal stenosis. This may be slightly progressed as compared to the 2019 study.   At C4-G2qujhr is broad-based disc osteophytic ridging and a right para midline/right foraminal disc protrusion which indents the thecal sac and deforms the ventral surface of the cord. There is significantly increased mass effect as compared to the 2019 study which now results in moderate to severe right foraminal stenosis and moderate left foraminal stenosis with the AP dimension of the thecal sac is reduced to 7 mm.   At C5-C6 , broad-based disc osteophytic ridging has progressed slightly since prior exam. Mild to moderate right foraminal stenosis and mild left foraminal stenosis is present. Disc osteophyte comp sole asymmetrically prominent to the right.   At C6-C7 , there is no significant central or foraminal stenosis.   At C7-T1 , there is no significant central or foraminal stenosis.    IMPRESSION:  Multilevel degenerative changes as outlined above have progressed since the 2019 study most notably at C4-C5 where there is significantly increased mass effect associated with a right para midline/right foraminal disc osteophyte complex  1/28/2019  MRI CERVICAL SPINE Order#:   MRI [id number]    MRI OF THE CERVICAL SPINE WITHOUT CONTRAST   INDICATION:  Neck pain   TECHNIQUE: Noncontrast sagittal T1, T2, STIR, axial T2 and gradient echo, and sagittal T2 volumetric with axial and coronal reformats.   CONTRAST: None   COMPARISON:  No prior studies are available for comparison   FINDINGS:   There is reversal of the cervical lordosis. There is minimal degenerative retrolisthesis of C3 on C4 and C4 on C5. The marrow signal is overall within normal limits with no focal marrow replacing lesion identified. The vertebral body heights are maintained and there is no evidence of acute fracture or subluxation. There is no abnormal vertebral or paraspinal edema. The visualized paraspinal soft tissues appear grossly unremarkable.   No abnormal signal is identified in the cervical spinal cord. The visualized posterior fossa structures are unremarkable.   C2-3: No significant disc bulge or herniation. No significant central canal or foraminal stenosis.   C3-4: Central disc protrusion superimposed on mild disc bulge with marginal and uncovertebral osteophyte formation. No significant central canal stenosis, however disc herniation mildly impinges upon the ventral spinal cord. Moderate left foraminal stenosis and mild right foraminal stenosis.   C4-5: Right paracentral disc/osteophyte superimposed on a mild disc bulge with marginal osteophyte formation. No significant central canal stenosis, however disc/osteophyte mildly impinges upon the right ventral spinal cord. Moderate bilateral foraminal stenosis.   C5-6: Mild disc bulge with marginal and uncovertebral osteophyte formation. No significant central canal stenosis. Mild/moderate right foraminal stenosis and mild left foraminal stenosis.   C6-7: Mild disc bulge. No significant central canal or foraminal stenosis.   C7-T1: Minimal disc bulge. No significant central canal or foraminal stenosis.     IMPRESSION:   Cervical spondylosis with central disc herniation at C3-4 and right paracentral disc/osteophyte at C4-5. No significant central canal stenosis, however there is mild impingement of the ventral spinal cord at C3-4 and C4-5. Varying degrees of foraminal stenosis as above, most pronounced at C3-4 and the left and C4-5 bilaterally.

## 2024-06-04 NOTE — ASSESSMENT
[FreeTextEntry1] : >> Imaging and Other Studies  I personally reviewed the relevant imaging. Discussed and explained to patient the likely source of pathology and pain. Questions answered. MRI XR  >> Therapy and Other Modalities  restart PT  >> Medications  gabapentin 900mg TID cautioned change in mood. Encouraged to call with any worsening mood or depression/suicidal ideations  acetaminophen 650mg q8h prn pain (caution <3g daily)  >> Interventions  Significant component of axial back pain secondary to lumbar spondylosis and facet arthropathy demonstrated on MRI LS.  Pain refractory to conservative treatments.  Will schedule BILATERAL L4-sacral ala diagnostic medial branch block (2 joints, 3 nerves on each side) r/b/a discussed  May consider radiofreqency ablation  Significant component of back and leg pain likely secondary to lumbar spinal stenosis demonstrated on MRI LS. sp L4-5 interlaminar epidural steroid injection with significant improvement   continue asa 81 for secondary prophylaxis   shoulder pain likely secondary to significant joint arthropathy demonstrated on imaging refractory to conservative treatments.sp LEFT glenohumeral joint steroid injection   MRI demonstrating disc herniation causing radiculopathy, significantly impactful to ability to perform ADL and refractory to conservative treatments, including physician directed exercises/PT ().  sp C7-T1 interlaminar epidural steroid injection    >> Consults  >> Discussion of Risks/Benefits/Alternatives   >Regarding any scheduled procedures:  I have discussed in detail with the patient that any interventional pain procedure is associated with potential risks. The procedure may include an injection of steroids and potentially other medications (local anesthetic and normal saline) into the epidural space or surrounding tissue of the spine. There are significant risks of this procedure which include and are not limited to infection, bleeding, worsening pain, dural puncture leading to postdural puncture headache, nerve damage, spinal cord injury, paralysis, stroke, and death.  There is a chance that the procedure does not improve their pain.  There are risks associated with the steroid being absorbed into the body systemically. These include dysphoria, difficulty sleeping, mood swings and personality changes. Premenopausal women may notice an irregularity in her menstrual cycle for 2-3 months following the injection. Steroids can specifically affect patients with hypertension, diabetes, and peptic ulcers. The procedure may cause a temporary increase in blood pressure and blood pressure, and may adversely affect a peptic ulcer. Other, more rare complications, include avascular necrosis of joints, glaucoma and worsening of osteoporosis.  I have discussed the risks of the procedure at length with the patient, and the potential benefits of pain relief. I have offered alternatives to the procedure. All questions were answered.  The patient expressed understanding and wishes to proceed with the procedure.   > Longitudinal management of Complex Painful condition   The patient is being managed for a complex condition that requires ongoing management.  The nature of this condition demands nuanced approach to treatment.  The seriousness of the condition necessitates an in-depth and focused approach to management and coordination with other healthcare professionals.    This visit involves intricate evaluation and management of the patient's condition.  The complexity of the visit was due to the need for detailed assessment of the current state, consideration of potential complications and a careful balancing of treatment options to management the chronic condition effectively.   As detailed above, the patient has a chronic significant painful condition that requires regular and detailed management.  The condition's impact on the patient's quality of life and health is substantial and necessitates a comprehensive and tailored approach   >> Conclusion   There were no barriers to communication. Informed patient that I would be available for any additional questions. Patient was instructed to call with any worsening symptoms including severe pain, new numbness/weakness, or changes in the bowel/bladder function. Discussed role of nsaids in pain management and all relevant risks, if patient is continuing to require after 4 weeks the patient should f/u for alternative treatment. Instructed patient to maintain pain diary to monitor pain level, mobility, and function.

## 2024-06-19 ENCOUNTER — APPOINTMENT (OUTPATIENT)
Dept: PAIN MANAGEMENT | Facility: CLINIC | Age: 69
End: 2024-06-19

## 2024-06-20 ENCOUNTER — APPOINTMENT (OUTPATIENT)
Dept: VASCULAR SURGERY | Facility: CLINIC | Age: 69
End: 2024-06-20
Payer: MEDICARE

## 2024-06-20 VITALS — HEIGHT: 63 IN | BODY MASS INDEX: 24.1 KG/M2 | WEIGHT: 136 LBS

## 2024-06-20 DIAGNOSIS — M79.605 PAIN IN LEFT LEG: ICD-10-CM

## 2024-06-20 PROCEDURE — 99214 OFFICE O/P EST MOD 30 MIN: CPT

## 2024-06-20 NOTE — HISTORY OF PRESENT ILLNESS
[FreeTextEntry1] : 68 year-old female, referred by , presents for an initial evaluation of bilateral lower extremity pain L>R. The patient reports pain in her lower back that radiates down her legs L>R. The patient reports that her symptoms began 6-8 months ago. She has been taking Tylenol or Motrin to manage her pain in the past but it is no longer alleviated her symptoms. She has been seeing pain management without significant relief. Her pain persists throughout the day.  [de-identified] : 6/20/24 - She returns for follow-up.  She reports that she has worsening symptoms of leg burning pain and discomfort, and the symptoms are more present over the varicose veins of her legs, left greater than right.  The symptoms are more pronounced as the day progresses.  She reports that with the end of the day and her legs are swollen, again left greater than right.  She denies history of DVT.

## 2024-06-20 NOTE — DATA REVIEWED
[FreeTextEntry1] : CELIA/PVR testing performed at our [facility] vascular lab - Normal ABIs bilaterally.

## 2024-06-20 NOTE — PHYSICAL EXAM
[0] : right 0 [2+] : left 2+ [Ankle Swelling (On Exam)] : not present [Abdomen Masses] : No abdominal masses [Alert] : alert [Oriented to Person] : oriented to person [Oriented to Place] : oriented to place [Oriented to Time] : oriented to time [Calm] : calm [de-identified] : NAD [de-identified] : NCAT [FreeTextEntry1] : Bilateral leg small varicose veins, left greater than right [de-identified] : Soft, NT, ND.

## 2024-06-20 NOTE — ASSESSMENT
[FreeTextEntry1] : 68 year-old female with bilateral lower extremity pain L > R. The patient does not have evidence of arterial disease on physical exam. She underwent arterial testing that demonstrated normal ABIs bilaterally. On follow-up, she now reports discomfort associated with her varicose veins as well as a burning and heavy sensation of the legs associated with swelling.  Her symptoms are more pronounced on the left leg compared to the right. Will obtain venous duplex to assess for axial reflux as contributory.  I did prescribe her grade 2 compression garments which she will obtain and wear on a daily basis.  She will follow-up with me after testing performed.

## 2024-06-24 ENCOUNTER — APPOINTMENT (OUTPATIENT)
Dept: FAMILY MEDICINE | Facility: CLINIC | Age: 69
End: 2024-06-24
Payer: MEDICARE

## 2024-06-24 VITALS
OXYGEN SATURATION: 95 % | BODY MASS INDEX: 24.1 KG/M2 | DIASTOLIC BLOOD PRESSURE: 70 MMHG | HEART RATE: 92 BPM | SYSTOLIC BLOOD PRESSURE: 130 MMHG | HEIGHT: 63 IN | WEIGHT: 136 LBS

## 2024-06-24 DIAGNOSIS — R19.8 OTHER SPECIFIED SYMPTOMS AND SIGNS INVOLVING THE DIGESTIVE SYSTEM AND ABDOMEN: ICD-10-CM

## 2024-06-24 DIAGNOSIS — T78.40XA ALLERGY, UNSPECIFIED, INITIAL ENCOUNTER: ICD-10-CM

## 2024-06-24 DIAGNOSIS — D64.9 ANEMIA, UNSPECIFIED: ICD-10-CM

## 2024-06-24 DIAGNOSIS — I10 ESSENTIAL (PRIMARY) HYPERTENSION: ICD-10-CM

## 2024-06-24 PROCEDURE — 99496 TRANSJ CARE MGMT HIGH F2F 7D: CPT | Mod: 25

## 2024-06-24 PROCEDURE — 99214 OFFICE O/P EST MOD 30 MIN: CPT

## 2024-06-24 PROCEDURE — G2211 COMPLEX E/M VISIT ADD ON: CPT | Mod: NC

## 2024-06-24 RX ORDER — EPINEPHRINE 0.3 MG/.3ML
0.3 INJECTION INTRAMUSCULAR
Qty: 1 | Refills: 0 | Status: ACTIVE | COMMUNITY
Start: 2024-06-24 | End: 1900-01-01

## 2024-06-24 RX ORDER — DIPHENHYDRAMINE HCL 25 MG/1
25 CAPSULE ORAL 3 TIMES DAILY
Qty: 20 | Refills: 0 | Status: ACTIVE | COMMUNITY
Start: 2024-06-24 | End: 1900-01-01

## 2024-06-27 DIAGNOSIS — G89.29 CERVICALGIA: ICD-10-CM

## 2024-06-27 DIAGNOSIS — M54.9 CERVICALGIA: ICD-10-CM

## 2024-06-27 DIAGNOSIS — M54.2 CERVICALGIA: ICD-10-CM

## 2024-07-01 LAB
ALBUMIN SERPL ELPH-MCNC: 4.3 G/DL
ALP BLD-CCNC: 61 U/L
ALT SERPL-CCNC: 18 U/L
ANION GAP SERPL CALC-SCNC: 11 MMOL/L
AST SERPL-CCNC: 14 U/L
BASOPHILS # BLD AUTO: 0.02 K/UL
BASOPHILS NFR BLD AUTO: 0.3 %
BILIRUB SERPL-MCNC: 0.3 MG/DL
BUN SERPL-MCNC: 13 MG/DL
CALCIUM SERPL-MCNC: 10.1 MG/DL
CHLORIDE SERPL-SCNC: 98 MMOL/L
CO2 SERPL-SCNC: 27 MMOL/L
CREAT SERPL-MCNC: 0.72 MG/DL
EGFR: 90 ML/MIN/1.73M2
EOSINOPHIL # BLD AUTO: 0.05 K/UL
EOSINOPHIL NFR BLD AUTO: 0.7 %
FERRITIN SERPL-MCNC: 117 NG/ML
GLUCOSE SERPL-MCNC: 241 MG/DL
HCT VFR BLD CALC: 42.8 %
HGB BLD-MCNC: 13.6 G/DL
IMM GRANULOCYTES NFR BLD AUTO: 0.3 %
IRON SATN MFR SERPL: 30 %
IRON SERPL-MCNC: 81 UG/DL
LYMPHOCYTES # BLD AUTO: 1.93 K/UL
LYMPHOCYTES NFR BLD AUTO: 26.8 %
MAN DIFF?: NORMAL
MCHC RBC-ENTMCNC: 31.1 PG
MCHC RBC-ENTMCNC: 31.8 GM/DL
MCV RBC AUTO: 97.7 FL
MONOCYTES # BLD AUTO: 0.96 K/UL
MONOCYTES NFR BLD AUTO: 13.4 %
NEUTROPHILS # BLD AUTO: 4.21 K/UL
NEUTROPHILS NFR BLD AUTO: 58.5 %
PLATELET # BLD AUTO: 262 K/UL
POTASSIUM SERPL-SCNC: 3.5 MMOL/L
PROT SERPL-MCNC: 6.6 G/DL
RBC # BLD: 4.38 M/UL
RBC # FLD: 12.1 %
SODIUM SERPL-SCNC: 136 MMOL/L
TIBC SERPL-MCNC: 269 UG/DL
UIBC SERPL-MCNC: 188 UG/DL
WBC # FLD AUTO: 7.19 K/UL

## 2024-07-05 ENCOUNTER — APPOINTMENT (OUTPATIENT)
Dept: VASCULAR SURGERY | Facility: CLINIC | Age: 69
End: 2024-07-05
Payer: MEDICARE

## 2024-07-05 PROCEDURE — 93970 EXTREMITY STUDY: CPT

## 2024-07-09 ENCOUNTER — APPOINTMENT (OUTPATIENT)
Dept: NEUROLOGY | Facility: CLINIC | Age: 69
End: 2024-07-09
Payer: MEDICARE

## 2024-07-09 ENCOUNTER — APPOINTMENT (OUTPATIENT)
Dept: ENDOCRINOLOGY | Facility: CLINIC | Age: 69
End: 2024-07-09

## 2024-07-09 VITALS
BODY MASS INDEX: 25.54 KG/M2 | WEIGHT: 144.13 LBS | HEART RATE: 80 BPM | OXYGEN SATURATION: 99 % | SYSTOLIC BLOOD PRESSURE: 120 MMHG | HEIGHT: 63 IN | DIASTOLIC BLOOD PRESSURE: 72 MMHG

## 2024-07-09 VITALS
DIASTOLIC BLOOD PRESSURE: 68 MMHG | HEIGHT: 63 IN | SYSTOLIC BLOOD PRESSURE: 116 MMHG | OXYGEN SATURATION: 100 % | BODY MASS INDEX: 25.34 KG/M2 | HEART RATE: 77 BPM | WEIGHT: 143 LBS

## 2024-07-09 DIAGNOSIS — E11.9 TYPE 2 DIABETES MELLITUS W/OUT COMPLICATIONS: ICD-10-CM

## 2024-07-09 DIAGNOSIS — G89.29 CERVICALGIA: ICD-10-CM

## 2024-07-09 DIAGNOSIS — E06.1 SUBACUTE THYROIDITIS: ICD-10-CM

## 2024-07-09 DIAGNOSIS — M54.9 CERVICALGIA: ICD-10-CM

## 2024-07-09 DIAGNOSIS — G43.E09 CHRONIC MIGRAINE WITH AURA, NOT INTRACTABLE, WITHOUT STATUS MIGRAINOSUS: ICD-10-CM

## 2024-07-09 DIAGNOSIS — M54.2 CERVICALGIA: ICD-10-CM

## 2024-07-09 PROCEDURE — 99214 OFFICE O/P EST MOD 30 MIN: CPT

## 2024-07-09 PROCEDURE — G2211 COMPLEX E/M VISIT ADD ON: CPT | Mod: NC

## 2024-07-09 PROCEDURE — 99215 OFFICE O/P EST HI 40 MIN: CPT

## 2024-07-10 LAB
HBA1C MFR BLD HPLC: 8.4 %
TSH SERPL-ACNC: 1.52 UIU/ML

## 2024-08-01 ENCOUNTER — APPOINTMENT (OUTPATIENT)
Dept: VASCULAR SURGERY | Facility: CLINIC | Age: 69
End: 2024-08-01
Payer: MEDICARE

## 2024-08-01 VITALS — BODY MASS INDEX: 24.1 KG/M2 | WEIGHT: 136 LBS | HEIGHT: 63 IN

## 2024-08-01 DIAGNOSIS — M79.605 PAIN IN LEFT LEG: ICD-10-CM

## 2024-08-01 PROCEDURE — 99214 OFFICE O/P EST MOD 30 MIN: CPT

## 2024-08-01 NOTE — HISTORY OF PRESENT ILLNESS
[FreeTextEntry1] : 68 year-old female, referred by , presents for an initial evaluation of bilateral lower extremity pain L>R. The patient reports pain in her lower back that radiates down her legs L>R. The patient reports that her symptoms began 6-8 months ago. She has been taking Tylenol or Motrin to manage her pain in the past but it is no longer alleviated her symptoms. She has been seeing pain management without significant relief. Her pain persists throughout the day.  [de-identified] : 6/20/24 - She returns for follow-up.  She reports that she has worsening symptoms of leg burning pain and discomfort, and the symptoms are more present over the varicose veins of her legs, left greater than right.  The symptoms are more pronounced as the day progresses.  She reports that with the end of the day and her legs are swollen, again left greater than right.  She denies history of DVT.  8/1/24 - She returns for follow-up.  She reports that she continues to have significant pain overlying the veins of her legs, left greater than right.  She has heaviness and aching of the leg. This is despite the daily use of grade 2 compression garments without fail and frequent elevation of her legs.  She underwent venous ultrasound.

## 2024-08-01 NOTE — DATA REVIEWED
[FreeTextEntry1] : Bilateral lower extremity venous duplex performed No DVT or SVT There is superficial venous reflux in the GSV bilaterally  CELIA/PVR testing performed at our [facility] vascular lab - Normal ABIs bilaterally.

## 2024-08-01 NOTE — PHYSICAL EXAM
[0] : right 0 [2+] : left 2+ [Ankle Swelling (On Exam)] : not present [Abdomen Masses] : No abdominal masses [Alert] : alert [Oriented to Person] : oriented to person [Oriented to Place] : oriented to place [Oriented to Time] : oriented to time [Calm] : calm [de-identified] : NAD [de-identified] : NCAT [FreeTextEntry1] : Bilateral leg small varicose veins, left greater than right [de-identified] : Soft, NT, ND.

## 2024-08-01 NOTE — ASSESSMENT
[FreeTextEntry1] : 68 year-old female with bilateral lower extremity pain L > R. The patient does not have evidence of arterial disease on physical exam. She underwent arterial testing that demonstrated normal ABIs bilaterally. She reports discomfort associated with her varicose veins as well as a burning and heavy sensation of the legs associated with swelling.  Her symptoms are more pronounced on the left leg compared to the right.  On follow-up, she continues to have significant pain overlying the veins of her legs, left greater than right.  She has heaviness and aching of the leg. This is despite the daily use of grade 2 compression garments without fail and frequent elevation of her legs.  She underwent venous ultrasound, which reveals bilateral superficial venous reflux in the GSV's. We discussed closure of the GSV's to alleviate some of her symptoms, we discussed conduct of procedure, risk, benefits, anticipated outcomes.  She wished to proceed. Will schedule left leg GSV closure followed by the right.  She will continue use of compression garments on a daily basis.

## 2024-08-02 ENCOUNTER — APPOINTMENT (OUTPATIENT)
Dept: PAIN MANAGEMENT | Facility: CLINIC | Age: 69
End: 2024-08-02
Payer: MEDICARE

## 2024-08-02 DIAGNOSIS — M48.061 SPINAL STENOSIS, LUMBAR REGION WITHOUT NEUROGENIC CLAUDICATION: ICD-10-CM

## 2024-08-02 DIAGNOSIS — M19.012 PRIMARY OSTEOARTHRITIS, LEFT SHOULDER: ICD-10-CM

## 2024-08-02 DIAGNOSIS — M47.812 SPONDYLOSIS W/OUT MYELOPATHY OR RADICULOPATHY, CERVICAL REGION: ICD-10-CM

## 2024-08-02 DIAGNOSIS — M47.817 SPONDYLOSIS W/OUT MYELOPATHY OR RADICULOPATHY, LUMBOSACRAL REGION: ICD-10-CM

## 2024-08-02 DIAGNOSIS — M54.16 RADICULOPATHY, LUMBAR REGION: ICD-10-CM

## 2024-08-02 DIAGNOSIS — M54.2 CERVICALGIA: ICD-10-CM

## 2024-08-02 PROCEDURE — G2211 COMPLEX E/M VISIT ADD ON: CPT

## 2024-08-02 PROCEDURE — 99214 OFFICE O/P EST MOD 30 MIN: CPT

## 2024-08-02 NOTE — PHYSICAL EXAM
[Normal] : Well developed, in no acute distress, alert and oriented to person, place and time [Spinous Process Tenderness] : spinous process tenderness [Facet Tenderness] : facet tenderness [Spine: Flexion to ___ degrees, without pain] : spine: flexion to [unfilled] degrees, without pain [de-identified] : Facet loading +

## 2024-08-02 NOTE — ASSESSMENT
[FreeTextEntry1] : >> Imaging and Other Studies  I personally reviewed the relevant imaging. Discussed and explained to patient the likely source of pathology and pain. Questions answered. MRI XR  >> Therapy and Other Modalities  hold PT  >> Medications  gabapentin 900mg TID cautioned change in mood. Encouraged to call with any worsening mood or depression/suicidal ideations  acetaminophen 650mg q8h prn pain (caution <3g daily)  >> Interventions  Significant component of axial back pain secondary to lumbar spondylosis and facet arthropathy demonstrated on MRI LS.  Pain refractory to conservative treatments including PT from 6/2024-8/2024, symptoms including axial back pain with + facet loading on exam.  Will schedule BILATERAL L4-sacral ala diagnostic medial branch block (2 joints, 3 nerves on each side) r/b/a discussed  May consider radiofreqency ablation  Significant component of back and leg pain likely secondary to lumbar spinal stenosis demonstrated on MRI LS. sp L4-5 interlaminar epidural steroid injection with significant improvement   continue asa 81 for secondary prophylaxis   shoulder pain likely secondary to significant joint arthropathy demonstrated on imaging refractory to conservative treatments.sp LEFT glenohumeral joint steroid injection   MRI demonstrating disc herniation causing radiculopathy, significantly impactful to ability to perform ADL and refractory to conservative treatments, including physician directed exercises/PT ().  sp C7-T1 interlaminar epidural steroid injection    >> Consults  >> Discussion of Risks/Benefits/Alternatives   >Regarding any scheduled procedures:  I have discussed in detail with the patient that any interventional pain procedure is associated with potential risks. The procedure may include an injection of steroids and potentially other medications (local anesthetic and normal saline) into the epidural space or surrounding tissue of the spine. There are significant risks of this procedure which include and are not limited to infection, bleeding, worsening pain, dural puncture leading to postdural puncture headache, nerve damage, spinal cord injury, paralysis, stroke, and death.  There is a chance that the procedure does not improve their pain.  There are risks associated with the steroid being absorbed into the body systemically. These include dysphoria, difficulty sleeping, mood swings and personality changes. Premenopausal women may notice an irregularity in her menstrual cycle for 2-3 months following the injection. Steroids can specifically affect patients with hypertension, diabetes, and peptic ulcers. The procedure may cause a temporary increase in blood pressure and blood pressure, and may adversely affect a peptic ulcer. Other, more rare complications, include avascular necrosis of joints, glaucoma and worsening of osteoporosis.  I have discussed the risks of the procedure at length with the patient, and the potential benefits of pain relief. I have offered alternatives to the procedure. All questions were answered.  The patient expressed understanding and wishes to proceed with the procedure.   > Longitudinal management of Complex Painful condition   The patient is being managed for a complex condition that requires ongoing management.  The nature of this condition demands nuanced approach to treatment.  The seriousness of the condition necessitates an in-depth and focused approach to management and coordination with other healthcare professionals.    This visit involves intricate evaluation and management of the patient's condition.  The complexity of the visit was due to the need for detailed assessment of the current state, consideration of potential complications and a careful balancing of treatment options to management the chronic condition effectively.   As detailed above, the patient has a chronic significant painful condition that requires regular and detailed management.  The condition's impact on the patient's quality of life and health is substantial and necessitates a comprehensive and tailored approach   >> Conclusion   There were no barriers to communication. Informed patient that I would be available for any additional questions. Patient was instructed to call with any worsening symptoms including severe pain, new numbness/weakness, or changes in the bowel/bladder function. Discussed role of nsaids in pain management and all relevant risks, if patient is continuing to require after 4 weeks the patient should f/u for alternative treatment. Instructed patient to maintain pain diary to monitor pain level, mobility, and function.

## 2024-08-02 NOTE — HISTORY OF PRESENT ILLNESS
[___ yrs] : [unfilled] year(s) ago [FreeTextEntry1] : Interval Note:  Patient returns and continues to complain of axial back pain.  reports that Dr Benoit recommended evaluation for facet generated pain.  Pain is so bad that patient finds it difficult to perform adls and ambulate. Pain worse with transition. Continues to struggle with the pain.  She is participating in PT without any relief.  Continues gabapentin with improvement in pain   denies any worsening numbness, weakness, bowel/bladder dysfunction.      HPI     Ms. SMITH CUMMINGS is a 68 year F on baby ASA with pmhx of TIA, DM2 (Notes poor glucose control with episodes of hypo and hyperglycemia. (BG's 30's-300's- managed by Dr Gutierres and Dr Hellerman), HTN, RA, chronic lower back pain x 15 yrs. Lower back pain worsening over the past few months. Remains severe despite medication and physical therapy. Pain to lower back that radiates to anterolaterally, left greater than right. Worst at night when laying. In addition, continued scapular and shoulder pain that radiates down her arms. Pain makes it difficult to do ADL's. Denies any additional weakness, numbness, bowel/bladder dysfunction.    Previous and current pain medications/doses/effects: Gabapentin 600mg tid, Cymbalta 60mg.     Previous Pain Treatments:      Previous Pain Injections:   L4-5 interlaminar epidural steroid injection 3/22/24  L4-5 interlaminar epidural steroid injection 1/16/24  C7-T1 interlaminar epidural steroid injection 12/7/23  L4-5 interlaminar epidural steroid injection 7/14/23  L4-5 interlaminar epidural steroid injection 5/17/23  Cervical MINO (2004)- Dr Ramos     Previous Diagnostic Studies/Images:  Exam: MRI LUMBAR SPINE 11/2/22 Order#: [id number]     HISTORY: 67 years Female LUMBAR SPINAL STENOSIS MRI     PROCEDURE:MRI lumbar spine without contrast    COMPARISON:    TECHNIQUE:MRI lumbosacral spine 1.5 Liza magnet    FINDINGS: Please note that there is transitional lumbosacral anatomy. Please note that there may be discrepancies in spinal level labeling on prior/subsequent imaging as well as clinical/surgical  documentation and close attention on follow-up is strongly recommended especially in the setting of  procedural planning. For the purposes of today's exam the transitional level is labeled a  transitional L5 level.    The paraspinal musculature including the psoas muscle, multifidus muscles, and immediate para axial soft tissues appear within range of normal without evidence of mass or collection.    There is a maintenance of the normal lumbar lordosis. There is fairly uniform marrow signal on all  sequences.    The conus terminates at the T12-L1 level.    At L5-S1   The disc appears intact. There is no significant central or foraminal stenosis.    At L4-L5   There is disc desiccation, a midline annular fissure and shallow central disc protrusion mildly  effacing the ventral epidural space and flattening the ventral thecal sac. Bony overgrowth from  adjacent facet joints and ligamentum flavum thickening contribute to mild left greater than right  effacement of the lateral recesses. There is a prominent left-sided intraforaminal component of  circumferential disc bulging contributing to moderately severe left foraminal stenosis.    At L3-L4   There is loss of intervertebral disc space height, disc desiccation, with effacement of the lateral recesses more so on the right than the left. Mild left foraminal stenosis and moderate to severe  right foraminal stenosis is present. Bony overgrowth from adjacent facet arthropathy is present at  this level.    At L2-L3   There is disc desiccation but no focal disc protrusion. No significant central or foraminal  stenosis is present.    At L1-L2   There is mild disc desiccation but no focal disc protrusion    At T11-T12 there is evidence of degenerative disc disease with a shallow central disc bulge.     IMPRESSION: Please note that there is transitional lumbosacral anatomy. Please note that there may  be discrepancies in spinal level labeling on prior/subsequent imaging as well as clinical/surgical  documentation and close attention on follow-up is strongly recommended especially in the setting of  procedural planning.    Degenerative changes lumbosacral spine at L4-L5 include midline annular fissure and a shallow  central disc bulging asymmetrically prominent to the left mildly effacing the left lateral recess  and contributing to left foraminal stenosis    Moderately advanced degenerative disc changes at L3-L4 are associated with reactive Modic type  changes. Disc osteophyte complex results in moderate to severe right foraminal stenosis and mild  left foraminal stenosis further detailed above    Other findings outlined above    Exam: MRI CERVICAL SPINE 11/2/22 Order#: [id number]     HISTORY: 67 years Female CHRONIC CERVICAL RADICULOPATHY MRI    PROCEDURE: MRI cervical spine without contrast    COMPARISON: January 28, 2019    TECHNIQUE: MRI cervical spine performed 1.5 Liza magnet    FINDINGS:   There is maintenance of the normal cervical lordosis.    The prevertebral soft tissues appear within range of normal.    The craniocervical junction and clivus and C1-C2 distance appear within range of normal    There is uniform marrow signal on all sequences    The cervical cord is uniform in signal intensity without evidence of syrinx or suggestion of  myelomalacia.    At C2-C3 , there is no significant central or foraminal stenosis.    At C3-C4 there is broad-based disc osteophytic ridging which indents the thecal sac, decreases the  AP dimension of the thecal sac slightly and contributes to moderate right foraminal stenosis and  mild to moderate left foraminal stenosis. This may be slightly progressed as compared to the 2019  study.    At C4-B6twbfa is broad-based disc osteophytic ridging and a right para midline/right foraminal disc protrusion which indents the thecal sac and deforms the ventral surface of the cord. There is  significantly increased mass effect as compared to the 2019 study which now results in moderate to  severe right foraminal stenosis and moderate left foraminal stenosis with the AP dimension of the  thecal sac is reduced to 7 mm.    At C5-C6 , broad-based disc osteophytic ridging has progressed slightly since prior exam. Mild to  moderate right foraminal stenosis and mild left foraminal stenosis is present. Disc osteophyte comp sole asymmetrically prominent to the right.    At C6-C7 , there is no significant central or foraminal stenosis.    At C7-T1 , there is no significant central or foraminal stenosis.     IMPRESSION:   Multilevel degenerative changes as outlined above have progressed since the 2019 study most notably at C4-C5 where there is significantly increased mass effect associated with a right para  midline/right foraminal disc osteophyte complex    Other findings discussed above     Thank you for allowing us to participate in the evaluation of this patient.     MRI LS 11/22   Please note that there is transitional lumbosacral anatomy. Please note that there may be discrepancies in spinal level labeling on prior/subsequent imaging as well as clinical/surgical documentation and close attention on follow-up is strongly recommended especially in the setting of procedural planning. For the purposes of today's exam the transitional level is labeled a transitional L5 level.   The paraspinal musculature including the psoas muscle, multifidus muscles, and immediate para axial soft tissues appear within range of normal without evidence of mass or collection.   There is a maintenance of the normal lumbar lordosis. There is fairly uniform marrow signal on all sequences.   The conus terminates at the T12-L1 level.   At L5-S1  The disc appears intact. There is no significant central or foraminal stenosis.   At L4-L5  There is disc desiccation, a midline annular fissure and shallow central disc protrusion mildly effacing the ventral epidural space and flattening the ventral thecal sac. Bony overgrowth from adjacent facet joints and ligamentum flavum thickening contribute to mild left greater than right effacement of the lateral recesses. There is a prominent left-sided intraforaminal component of circumferential disc bulging contributing to moderately severe left foraminal stenosis.   At L3-L4  There is loss of intervertebral disc space height, disc desiccation, with effacement of the lateral recesses more so on the right than the left. Mild left foraminal stenosis and moderate to severe right foraminal stenosis is present. Bony overgrowth from adjacent facet arthropathy is present at this level.   At L2-L3  There is disc desiccation but no focal disc protrusion. No significant central or foraminal stenosis is present.   At L1-L2  There is mild disc desiccation but no focal disc protrusion   At T11-T12 there is evidence of degenerative disc disease with a shallow central disc bulge.    IMPRESSION: Please note that there is transitional lumbosacral anatomy. Please note that there may be discrepancies in spinal level labeling on prior/subsequent imaging as well as clinical/surgical documentation and close attention on follow-up is strongly recommended especially in the setting of procedural planning.   Degenerative changes lumbosacral spine at L4-L5 include midline annular fissure and a shallow central disc bulging asymmetrically prominent to the left mildly effacing the left lateral recess and contributing to left foraminal stenosis   Moderately advanced degenerative disc changes at L3-L4 are associated with reactive Modic type changes. Disc osteophyte complex results in moderate to severe right foraminal stenosis and mild left foraminal stenosis further detailed above  MRI CS 11/2/22  There is maintenance of the normal cervical lordosis.   The prevertebral soft tissues appear within range of normal.   The craniocervical junction and clivus and C1-C2 distance appear within range of normal   There is uniform marrow signal on all sequences   The cervical cord is uniform in signal intensity without evidence of syrinx or suggestion of myelomalacia.   At C2-C3 ,  there is no significant central or foraminal stenosis.   At C3-C4 there is broad-based disc osteophytic ridging which indents the thecal sac, decreases the AP dimension of the thecal sac slightly and contributes to moderate right foraminal stenosis and mild to moderate left foraminal stenosis. This may be slightly progressed as compared to the 2019 study.   At C4-T1ivwdp is broad-based disc osteophytic ridging and a right para midline/right foraminal disc protrusion which indents the thecal sac and deforms the ventral surface of the cord. There is significantly increased mass effect as compared to the 2019 study which now results in moderate to severe right foraminal stenosis and moderate left foraminal stenosis with the AP dimension of the thecal sac is reduced to 7 mm.   At C5-C6 , broad-based disc osteophytic ridging has progressed slightly since prior exam. Mild to moderate right foraminal stenosis and mild left foraminal stenosis is present. Disc osteophyte comp sole asymmetrically prominent to the right.   At C6-C7 , there is no significant central or foraminal stenosis.   At C7-T1 , there is no significant central or foraminal stenosis.    IMPRESSION:  Multilevel degenerative changes as outlined above have progressed since the 2019 study most notably at C4-C5 where there is significantly increased mass effect associated with a right para midline/right foraminal disc osteophyte complex  1/28/2019  MRI CERVICAL SPINE Order#:   MRI [id number]    MRI OF THE CERVICAL SPINE WITHOUT CONTRAST   INDICATION:  Neck pain   TECHNIQUE: Noncontrast sagittal T1, T2, STIR, axial T2 and gradient echo, and sagittal T2 volumetric with axial and coronal reformats.   CONTRAST: None   COMPARISON:  No prior studies are available for comparison   FINDINGS:   There is reversal of the cervical lordosis. There is minimal degenerative retrolisthesis of C3 on C4 and C4 on C5. The marrow signal is overall within normal limits with no focal marrow replacing lesion identified. The vertebral body heights are maintained and there is no evidence of acute fracture or subluxation. There is no abnormal vertebral or paraspinal edema. The visualized paraspinal soft tissues appear grossly unremarkable.   No abnormal signal is identified in the cervical spinal cord. The visualized posterior fossa structures are unremarkable.   C2-3: No significant disc bulge or herniation. No significant central canal or foraminal stenosis.   C3-4: Central disc protrusion superimposed on mild disc bulge with marginal and uncovertebral osteophyte formation. No significant central canal stenosis, however disc herniation mildly impinges upon the ventral spinal cord. Moderate left foraminal stenosis and mild right foraminal stenosis.   C4-5: Right paracentral disc/osteophyte superimposed on a mild disc bulge with marginal osteophyte formation. No significant central canal stenosis, however disc/osteophyte mildly impinges upon the right ventral spinal cord. Moderate bilateral foraminal stenosis.   C5-6: Mild disc bulge with marginal and uncovertebral osteophyte formation. No significant central canal stenosis. Mild/moderate right foraminal stenosis and mild left foraminal stenosis.   C6-7: Mild disc bulge. No significant central canal or foraminal stenosis.   C7-T1: Minimal disc bulge. No significant central canal or foraminal stenosis.     IMPRESSION:   Cervical spondylosis with central disc herniation at C3-4 and right paracentral disc/osteophyte at C4-5. No significant central canal stenosis, however there is mild impingement of the ventral spinal cord at C3-4 and C4-5. Varying degrees of foraminal stenosis as above, most pronounced at C3-4 and the left and C4-5 bilaterally.

## 2024-08-12 ENCOUNTER — APPOINTMENT (OUTPATIENT)
Dept: NEUROLOGY | Facility: CLINIC | Age: 69
End: 2024-08-12
Payer: MEDICARE

## 2024-08-12 ENCOUNTER — APPOINTMENT (OUTPATIENT)
Dept: FAMILY MEDICINE | Facility: CLINIC | Age: 69
End: 2024-08-12
Payer: MEDICARE

## 2024-08-12 VITALS
BODY MASS INDEX: 24.45 KG/M2 | OXYGEN SATURATION: 97 % | HEIGHT: 63 IN | DIASTOLIC BLOOD PRESSURE: 70 MMHG | WEIGHT: 138 LBS | HEART RATE: 83 BPM | SYSTOLIC BLOOD PRESSURE: 108 MMHG

## 2024-08-12 DIAGNOSIS — G43.E09 CHRONIC MIGRAINE WITH AURA, NOT INTRACTABLE, WITHOUT STATUS MIGRAINOSUS: ICD-10-CM

## 2024-08-12 DIAGNOSIS — M54.16 RADICULOPATHY, LUMBAR REGION: ICD-10-CM

## 2024-08-12 DIAGNOSIS — I10 ESSENTIAL (PRIMARY) HYPERTENSION: ICD-10-CM

## 2024-08-12 DIAGNOSIS — E78.5 HYPERLIPIDEMIA, UNSPECIFIED: ICD-10-CM

## 2024-08-12 DIAGNOSIS — K21.9 GASTRO-ESOPHAGEAL REFLUX DISEASE W/OUT ESOPHAGITIS: ICD-10-CM

## 2024-08-12 PROCEDURE — G2211 COMPLEX E/M VISIT ADD ON: CPT

## 2024-08-12 PROCEDURE — 99214 OFFICE O/P EST MOD 30 MIN: CPT

## 2024-08-12 PROCEDURE — 64615 CHEMODENERV MUSC MIGRAINE: CPT

## 2024-08-12 NOTE — HEALTH RISK ASSESSMENT
[No] : In the past 12 months have you used drugs other than those required for medical reasons? No [No falls in past year] : Patient reported no falls in the past year [0] : 2) Feeling down, depressed, or hopeless: Not at all (0) [PHQ-2 Negative - No further assessment needed] : PHQ-2 Negative - No further assessment needed [Never] : Never [NO] : No [de-identified] : walk, housework [de-identified] : Normal [[id number]] : 0 [MammogramDate] : 05/16/24 [BoneDensityDate] : 07/13/23

## 2024-08-12 NOTE — HISTORY OF PRESENT ILLNESS
[FreeTextEntry1] : Follow up on medical problems [de-identified] : Pt here for f/u. Had seen Dr. Hellerman.  Endo.  Has f/u appt to get results.  Hgba1c up from 8 to 8.4.  But had an injection of the back with steroids and believes that might have impacted the level.  Taking meds as directed. Working on diet/exercise. Saw Dr. Renteria.  Got injections for headaches today.  Needs them three times, three months apart.  Hoping for relief. Saw Dr. Baker.  Told that she could get a procedure of the left leg to help with the varicosities.  Scheduled for this month. Saw Dr. Abrams.  Pain medicine. Going to get an injection in the back to block a nerve.  Excited about the possibility of relief.  Got gabapentin refill from him. Taking BP meds as directed.  No SE. On statin.  Recent labs WNL.

## 2024-08-12 NOTE — HEALTH RISK ASSESSMENT
[No] : In the past 12 months have you used drugs other than those required for medical reasons? No [No falls in past year] : Patient reported no falls in the past year [0] : 2) Feeling down, depressed, or hopeless: Not at all (0) [PHQ-2 Negative - No further assessment needed] : PHQ-2 Negative - No further assessment needed [Never] : Never [NO] : No [de-identified] : walk, housework [de-identified] : Normal [[id number]] : 0 [MammogramDate] : 05/16/24 [BoneDensityDate] : 07/13/23

## 2024-08-12 NOTE — PROCEDURE
[FreeTextEntry1] : [id number] 2026/12 [id number] [FreeTextEntry3] : Diagnosis: Chronic migraine  Patient presents for treatment of chronic migraine with Botox injections.  Risk of facial weakness and neck weakness was discussed and the patient wishes to proceed.  Focused neurological exam: Cranial nerves- 2 through 12 are fully intact.  Botulinum toxin a was injected intramuscularly using a sterile 30-gauge 0.5 inch needle. 0.1 mL per site as below:  Muscle                              Units of Botox                         5 U bilaterally = 10 U Procerus                           5 U Frontalis                           10 U bilaterally = 20 U Temporalis                        20 U bilaterally = 40 U Occipitalis                         15 U bilaterally = 30 U Trapezius                         15 U bilaterally = 30 U Paraspinals                       10 U bilaterally = 20 U  Total units of Botox injected: 155 Unavoidable units of Botox wasted: 45  Patient tolerated the procedure well. There were no complaints. Patient will call in 3-4 weeks to report on efficacy and will followup for further injections in 3 months.

## 2024-08-12 NOTE — HISTORY OF PRESENT ILLNESS
[FreeTextEntry1] : Follow up on medical problems [de-identified] : Pt here for f/u. Had seen Dr. Hellerman.  Endo.  Has f/u appt to get results.  Hgba1c up from 8 to 8.4.  But had an injection of the back with steroids and believes that might have impacted the level.  Taking meds as directed. Working on diet/exercise. Saw Dr. Renteria.  Got injections for headaches today.  Needs them three times, three months apart.  Hoping for relief. Saw Dr. Baker.  Told that she could get a procedure of the left leg to help with the varicosities.  Scheduled for this month. Saw Dr. Abrams.  Pain medicine. Going to get an injection in the back to block a nerve.  Excited about the possibility of relief.  Got gabapentin refill from him. Taking BP meds as directed.  No SE. On statin.  Recent labs WNL.

## 2024-08-19 ENCOUNTER — APPOINTMENT (OUTPATIENT)
Dept: ENDOCRINOLOGY | Facility: CLINIC | Age: 69
End: 2024-08-19
Payer: MEDICARE

## 2024-08-19 VITALS
SYSTOLIC BLOOD PRESSURE: 108 MMHG | DIASTOLIC BLOOD PRESSURE: 64 MMHG | HEIGHT: 63 IN | WEIGHT: 137 LBS | OXYGEN SATURATION: 97 % | RESPIRATION RATE: 17 BRPM | HEART RATE: 78 BPM | BODY MASS INDEX: 24.27 KG/M2

## 2024-08-19 DIAGNOSIS — E06.1 SUBACUTE THYROIDITIS: ICD-10-CM

## 2024-08-19 DIAGNOSIS — E11.9 TYPE 2 DIABETES MELLITUS W/OUT COMPLICATIONS: ICD-10-CM

## 2024-08-19 PROCEDURE — 99215 OFFICE O/P EST HI 40 MIN: CPT

## 2024-08-19 RX ORDER — BLOOD-GLUCOSE SENSOR
EACH MISCELLANEOUS
Qty: 2 | Refills: 3 | Status: ACTIVE | COMMUNITY
Start: 2024-08-19 | End: 1900-01-01

## 2024-08-19 NOTE — HISTORY OF PRESENT ILLNESS
[FreeTextEntry1] : Aug 19, 2024    in person - unaccompanied  PCP:  Dr. Meenu Weaver           Neuro:  Dr. Jarad Renteria (for HA)           Card:  Dr. Adrienne Pugh (palpitations, wake her from sleep)           GI:  Dr. Cisco Bull (anemia)           Rheum:  Dr. Gera Benoit (arthritis - ?Heberden's)                     Gyn:  Dr. Gladys Moran           Pod:  Dr. Lázaro LAUGHLIN achilles tendon swelling           Eyes:  Dr. Montoya    CC:  Tender swollen thyroid -now resolved- [started ~ May 2020] (her daughter is treated for Graves disease)             6/15/2020  US thyroid:  3 x 1.3 x 1.3 cm poorly define heterogeneous hypoechoic area (during subacute thyroiditis)               2/23/2024 US thyroid:  "Gland is of normal size and echogenicity"           (Type 2 diabetes, 1/2/2017 A1c 8.3 %;  7/2024 8.4)           (WAC:  7/13/23 T scores:  LS + 1.9;  FN + 3.3;   TH + 3.1)        69yo mother of six.  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  Patient presented with pain in area of L thyroid lobe. TSH never suppressed but ESR went up and TgAb went up.    She also has diabetes. On 6/21/2024 seen at [place] [facility] for allergic reaction.   Discharge summary: "64-year-old female presents to the emergency room with acute allergic reaction. Patient complains of itching, swelling of the face and lips, and difficulty speaking . Family member mentions patient may have been exposed to Clorox powder recently used for cleaning. Patient also reports having hypertension and taking Atenolol 100mg daily, hypercholesterolemia, diabetes, and allergic rhinitis for which she takes cetirizine daily. No difficulty swallowing saliva.  There is swelling of the uvula noted on evaluation.  No previous history of angioedema.  Does not take ACE inhibitor Patient was given IV antihistamine, corticosteroid, and H2 blocker along with IV fluids. Patient was observed for 6 hours and symptoms resolved. Advised patient to make a list of potential allergens including Clorox powder, foods, and medications for allergist evaluation.  Discussed via interpretation reason of increased swelling.  Advised on allergy.  Discussed angioedema.  Discussed with patient signs and symptoms to monitor.  Advised on side effects of steroids including patient's sugar.  Needs to monitor her carbohydrate intake.  And recommend patient to check fingerstick frequently.  Patient is having difficulty understanding why she had allergies.  Patient was advised she needs to write everything she has ingested for 24 hours to make sure she is able to mention it to allergy or nausea.  Advised patient on possible distention of EpiPen.  Reviewed use of EpiPen with patient.  Patient was instructed to follow up with allergist/immunologist and primary care physician for further management of allergies, hypertension, hypercholesterolemia, and diabetes. ... Prescriptions Diphenhydramine Hcl (Benadryl) 25 Mg Capsule 25 MG PO TID MDD 3 for 3 Days, #10 CAP 	Prov: Chapito Jamison			6/22/24  PredniSONE * (PredniSONE *) 10 Mg Tablet 30 MG PO DAILY for 4 Days, #12 TABLET 	Prov: Chapito Jamison"	  On 6/24/2024  (after Rx with prednisone for allergies)  	  COMPARISON: Prior exams last dated 5/23   TECHNIQUE: Sonography of the thyroid.   FINDINGS:  Right Lobe: 4.8 cm x 1.3 cm x 1.8 cm. The gland is of normal size and echogenicity with small benign spongiform nodule measuring 0.5 cm   Left Lobe: 3.9 cm x 1.0 cm x 1.6 cm. The gland is of normal size with small spongiform subcentimeter nodules again identified without worrisome solid lesion   Isthmus: 3 mm.   Cervical Lymph Nodes: No enlarged or abnormal morphology cervical nodes.   IMPRESSION:   Stable thyroid sonogram"  : : Constitutional:  Alert, well nourished, healthy appearance, no acute distress  Eyes:  No proptosis, no stare Thyroid: normal to palpation Pulmonary:  No respiratory distress, no accessory muscle use; normal rate and effort Cardiac:  Normal rate Vascular:  Endocrine:  No stigmata of Cushings Syndrome Musculoskeletal:  Normal gait, no involuntary movements Neurology:  No tremors Affect/Mood/Psych:  Oriented x 3; normal affect, normal insight/judgement, normal mood  .  Impression:  History of atypical subacute thyroiditis.  US thyroid reassuring on 2/23/24.  TFTs in good range:  7/9/24 TSH   1.52   So now can pay more attention to the diabetes - for which she is taking: metformin 1000 mg BID Januvia 100 mg daily glipizide ER 5 mg in PM  7/9/24 A1c 8.4 %  She would do well with CGM; however,her current Android phone does not accomodate a CGM. Moreover, she is not yet onboard with insulin.     Plan:  She will think about possibility of trying out CGM     Jul 09, 2024   in person accompanied by her daughter     [phone] [phone]                                                                               PCP:  Dr. Meenu Weaver           Neuro:  Dr. Jarad Renteria (for HA)           Card:  Dr. Adrienne Pugh (palpitations, wake her from sleep)           GI:  Dr. Cisco Bull (anemia)           Rheum:  Dr. Sophia Moncada (arthritis - ?Heberden's)                     Gyn:  Dr. Gladys Moran           Pod:  Dr. Lázaro LAUGHLIN achilles tendon swelling           Eyes:  Dr. Montoya    CC:  Tender swollen thyroid -now resolved- [started ~ May 2020] (her daughter is treated for Graves disease)             6/15/2020  US thyroid:  3 x 1.3 x 1.3 cm poorly define heterogeneous hypoechoic area (during subacute thyroiditis)               2/23/2024 US thyroid:  "Gland is of normal size and echogenicity"           (Type 2 diabetes, 1/2/2017 A1c 8.3 %)           (WAC:  7/13/23 T scores:  LS + 1.9;  FN + 3.3;   TH + 3.1)        69yo mother of six.  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  Patient presented with pain in area of L thyroid lobe. TSH never suppressed but ESR went up and TgAb went up.    She also has diabetes. On 6/21/2024 seen at [place] [facility] for allergic reaction.   Discharge summary: "64-year-old female presents to the emergency room with acute allergic reaction. Patient complains of itching, swelling of the face and lips, and difficulty speaking . Family member mentions patient may have been exposed to Clorox powder recently used for cleaning. Patient also reports having hypertension and taking Atenolol 100mg daily, hypercholesterolemia, diabetes, and allergic rhinitis for which she takes cetirizine daily. No difficulty swallowing saliva.  There is swelling of the uvula noted on evaluation.  No previous history of angioedema.  Does not take ACE inhibitor Patient was given IV antihistamine, corticosteroid, and H2 blocker along with IV fluids. Patient was observed for 6 hours and symptoms resolved. Advised patient to make a list of potential allergens including Clorox powder, foods, and medications for allergist evaluation.  Discussed via interpretation reason of increased swelling.  Advised on allergy.  Discussed angioedema.  Discussed with patient signs and symptoms to monitor.  Advised on side effects of steroids including patient's sugar.  Needs to monitor her carbohydrate intake.  And recommend patient to check fingerstick frequently.  Patient is having difficulty understanding why she had allergies.  Patient was advised she needs to write everything she has ingested for 24 hours to make sure she is able to mention it to allergy or nausea.  Advised patient on possible distention of EpiPen.  Reviewed use of EpiPen with patient.  Patient was instructed to follow up with allergist/immunologist and primary care physician for further management of allergies, hypertension, hypercholesterolemia, and diabetes. ... Prescriptions Diphenhydramine Hcl (Benadryl) 25 Mg Capsule 25 MG PO TID MDD 3 for 3 Days, #10 CAP 	Prov: Chapito Jamison			6/22/24  PredniSONE * (PredniSONE *) 10 Mg Tablet 30 MG PO DAILY for 4 Days, #12 TABLET 	Prov: Chapito Jamison"	  On 6/24/2024  (after Rx with prednisone for allergies)  	  COMPARISON: Prior exams last dated 5/23   TECHNIQUE: Sonography of the thyroid.   FINDINGS:  Right Lobe: 4.8 cm x 1.3 cm x 1.8 cm. The gland is of normal size and echogenicity with small benign spongiform nodule measuring 0.5 cm   Left Lobe: 3.9 cm x 1.0 cm x 1.6 cm. The gland is of normal size with small spongiform subcentimeter nodules again identified without worrisome solid lesion   Isthmus: 3 mm.   Cervical Lymph Nodes: No enlarged or abnormal morphology cervical nodes.   IMPRESSION:   Stable thyroid sonogram  : : Constitutional:  Alert, well nourished, healthy appearance, no acute distress  Eyes:  No proptosis, no stare Thyroid:  normal to palpation Pulmonary:  No respiratory distress, no accessory muscle use; normal rate and effort Cardiac:  Normal rate Vascular:  Endocrine:  No stigmata of Cushings Syndrome Musculoskeletal:  Normal gait, no involuntary movements Neurology:  No tremors Affect/Mood/Psych:  Oriented x 3; normal affect, normal insight/judgement, normal mood  .  Imp/Plan:  # Subacute thyroiditis - atypical in that TSH never suppressed - but associated with tender, swollen L lobe, elevated ESR, elevated Tg Ab - all have resolved.   Thyroid remains under surveillance - including TFTs, US and both recently have been reassuring.  #  A1c trending up - she would likely benefit from small doses of insulin before starchy meals, if she agrees.   Will negotiate with her next visit.    Today, blood sugar records are not available, but she reports FBS today was 130.   Her android phone does not have the capacity to run CGM apps.   She prefers to take the glipizide ER 5 mg at noon.

## 2024-08-20 ENCOUNTER — NON-APPOINTMENT (OUTPATIENT)
Age: 69
End: 2024-08-20

## 2024-09-03 ENCOUNTER — APPOINTMENT (OUTPATIENT)
Dept: PAIN MANAGEMENT | Facility: CLINIC | Age: 69
End: 2024-09-03
Payer: MEDICARE

## 2024-09-03 VITALS
DIASTOLIC BLOOD PRESSURE: 64 MMHG | SYSTOLIC BLOOD PRESSURE: 100 MMHG | HEART RATE: 77 BPM | RESPIRATION RATE: 16 BRPM | OXYGEN SATURATION: 98 %

## 2024-09-03 PROCEDURE — 64494 INJ PARAVERT F JNT L/S 2 LEV: CPT | Mod: 50

## 2024-09-03 PROCEDURE — 64493 INJ PARAVERT F JNT L/S 1 LEV: CPT | Mod: 50

## 2024-09-03 RX ADMIN — BUPIVACAINE HYDROCHLORIDE 0 %: 7.5 INJECTION, SOLUTION EPIDURAL; RETROBULBAR at 00:00

## 2024-09-03 RX ADMIN — LIDOCAINE HYDROCHLORIDE %: 10 INJECTION, SOLUTION INFILTRATION; PERINEURAL at 00:00

## 2024-09-03 RX ADMIN — TRIAMCINOLONE ACETONIDE 0 MG/ML: 10 INJECTION, SUSPENSION INTRA-ARTICULAR; INTRALESIONAL at 00:00

## 2024-09-04 ENCOUNTER — APPOINTMENT (OUTPATIENT)
Dept: PAIN MANAGEMENT | Facility: CLINIC | Age: 69
End: 2024-09-04

## 2024-09-04 DIAGNOSIS — M54.16 RADICULOPATHY, LUMBAR REGION: ICD-10-CM

## 2024-09-04 DIAGNOSIS — M54.2 CERVICALGIA: ICD-10-CM

## 2024-09-04 DIAGNOSIS — M48.061 SPINAL STENOSIS, LUMBAR REGION WITHOUT NEUROGENIC CLAUDICATION: ICD-10-CM

## 2024-09-04 DIAGNOSIS — M47.817 SPONDYLOSIS W/OUT MYELOPATHY OR RADICULOPATHY, LUMBOSACRAL REGION: ICD-10-CM

## 2024-09-04 DIAGNOSIS — M19.012 PRIMARY OSTEOARTHRITIS, LEFT SHOULDER: ICD-10-CM

## 2024-09-04 DIAGNOSIS — M47.812 SPONDYLOSIS W/OUT MYELOPATHY OR RADICULOPATHY, CERVICAL REGION: ICD-10-CM

## 2024-09-04 DIAGNOSIS — M79.10 MYALGIA, UNSPECIFIED SITE: ICD-10-CM

## 2024-09-04 PROCEDURE — 99212 OFFICE O/P EST SF 10 MIN: CPT | Mod: 95

## 2024-09-04 NOTE — PHYSICAL EXAM
[Normal] : Well developed, in no acute distress, alert and oriented to person, place and time [Normal muscle bulk without asymmetry] : normal muscle bulk without asymmetry [GreaterTrochanteric bursa tenderness] : greater trochanteric bursa tenderness [] : Motor: [Facet Tenderness] : no facet tenderness

## 2024-09-04 NOTE — ASSESSMENT
[FreeTextEntry1] : >> Imaging and Other Studies  I personally reviewed the relevant imaging. Discussed and explained to patient the likely source of pathology and pain. Questions answered. MRI XR  >> Therapy and Other Modalities  hold PT  >> Medications  gabapentin 900mg TID cautioned change in mood. Encouraged to call with any worsening mood or depression/suicidal ideations  acetaminophen 650mg q8h prn pain (caution <3g daily)  >> Interventions  Significant component of axial back pain secondary to lumbar spondylosis and facet arthropathy demonstrated on MRI LS.  Pain refractory to conservative treatments including PT from 6/2024-8/2024, symptoms including axial back pain with + facet loading on exam.  sp BILATERAL L4-sacral ala diagnostic medial branch block (2 joints, 3 nerves on each side) with 100% improvement for 12 hours  May consider radiofreqency ablation  Significant component of back and leg pain likely secondary to lumbar spinal stenosis demonstrated on MRI LS. sp L4-5 interlaminar epidural steroid injection with significant improvement   continue asa 81 for secondary prophylaxis   shoulder pain likely secondary to significant joint arthropathy demonstrated on imaging refractory to conservative treatments.sp LEFT glenohumeral joint steroid injection   MRI demonstrating disc herniation causing radiculopathy, significantly impactful to ability to perform ADL and refractory to conservative treatments, including physician directed exercises/PT ().  sp C7-T1 interlaminar epidural steroid injection    >> Consults  >> Discussion of Risks/Benefits/Alternatives   >Regarding any scheduled procedures:  I have discussed in detail with the patient that any interventional pain procedure is associated with potential risks. The procedure may include an injection of steroids and potentially other medications (local anesthetic and normal saline) into the epidural space or surrounding tissue of the spine. There are significant risks of this procedure which include and are not limited to infection, bleeding, worsening pain, dural puncture leading to postdural puncture headache, nerve damage, spinal cord injury, paralysis, stroke, and death.  There is a chance that the procedure does not improve their pain.  There are risks associated with the steroid being absorbed into the body systemically. These include dysphoria, difficulty sleeping, mood swings and personality changes. Premenopausal women may notice an irregularity in her menstrual cycle for 2-3 months following the injection. Steroids can specifically affect patients with hypertension, diabetes, and peptic ulcers. The procedure may cause a temporary increase in blood pressure and blood pressure, and may adversely affect a peptic ulcer. Other, more rare complications, include avascular necrosis of joints, glaucoma and worsening of osteoporosis.  I have discussed the risks of the procedure at length with the patient, and the potential benefits of pain relief. I have offered alternatives to the procedure. All questions were answered.  The patient expressed understanding and wishes to proceed with the procedure.   > Longitudinal management of Complex Painful condition   The patient is being managed for a complex condition that requires ongoing management.  The nature of this condition demands nuanced approach to treatment.  The seriousness of the condition necessitates an in-depth and focused approach to management and coordination with other healthcare professionals.    This visit involves intricate evaluation and management of the patient's condition.  The complexity of the visit was due to the need for detailed assessment of the current state, consideration of potential complications and a careful balancing of treatment options to management the chronic condition effectively.   As detailed above, the patient has a chronic significant painful condition that requires regular and detailed management.  The condition's impact on the patient's quality of life and health is substantial and necessitates a comprehensive and tailored approach   >> Conclusion   There were no barriers to communication. Informed patient that I would be available for any additional questions. Patient was instructed to call with any worsening symptoms including severe pain, new numbness/weakness, or changes in the bowel/bladder function. Discussed role of nsaids in pain management and all relevant risks, if patient is continuing to require after 4 weeks the patient should f/u for alternative treatment. Instructed patient to maintain pain diary to monitor pain level, mobility, and function.

## 2024-09-04 NOTE — HISTORY OF PRESENT ILLNESS
[___ yrs] : [unfilled] year(s) ago [Home] : at home, [unfilled] , at the time of the visit. [Medical Office: ([facility])___] : at the medical office located in  [Verbal consent obtained from patient] : the patient, [unfilled] [FreeTextEntry1] : Interval Note: sp  BILATERAL L4-sacral ala diagnostic medial branch block (2 joints, 3 nerves on each side)  100% improvement in back pain for 12 hours and then now 80% sustained improvement in back pain.  Able to perform adls with minimal discomfort. Doing very well.    denies any worsening numbness, weakness, bowel/bladder dysfunction.      HPI     Ms. SMITH CUMMINGS is a 68 year F on baby ASA with pmhx of TIA, DM2 (Notes poor glucose control with episodes of hypo and hyperglycemia. (BG's 30's-300's- managed by Dr Gutierres and Dr Hellerman), HTN, RA, chronic lower back pain x 15 yrs. Lower back pain worsening over the past few months. Remains severe despite medication and physical therapy. Pain to lower back that radiates to anterolaterally, left greater than right. Worst at night when laying. In addition, continued scapular and shoulder pain that radiates down her arms. Pain makes it difficult to do ADL's. Denies any additional weakness, numbness, bowel/bladder dysfunction.    Previous and current pain medications/doses/effects: Gabapentin 600mg tid, Cymbalta 60mg.     Previous Pain Treatments:      Previous Pain Injections: BILATERAL L4-sacral ala diagnostic medial branch block (2 joints, 3 nerves on each side)  100% improvement in back pain for 12 hours 9/3/24  L4-5 interlaminar epidural steroid injection 3/22/24  L4-5 interlaminar epidural steroid injection 1/16/24  C7-T1 interlaminar epidural steroid injection 12/7/23  L4-5 interlaminar epidural steroid injection 7/14/23  L4-5 interlaminar epidural steroid injection 5/17/23  Cervical MINO (2004)- Dr Ramos     Previous Diagnostic Studies/Images:  Exam: MRI LUMBAR SPINE 11/2/22 Order#: [id number]     HISTORY: 67 years Female LUMBAR SPINAL STENOSIS MRI     PROCEDURE:MRI lumbar spine without contrast    COMPARISON:    TECHNIQUE:MRI lumbosacral spine 1.5 Liza magnet    FINDINGS: Please note that there is transitional lumbosacral anatomy. Please note that there may be discrepancies in spinal level labeling on prior/subsequent imaging as well as clinical/surgical  documentation and close attention on follow-up is strongly recommended especially in the setting of  procedural planning. For the purposes of today's exam the transitional level is labeled a  transitional L5 level.    The paraspinal musculature including the psoas muscle, multifidus muscles, and immediate para axial soft tissues appear within range of normal without evidence of mass or collection.    There is a maintenance of the normal lumbar lordosis. There is fairly uniform marrow signal on all  sequences.    The conus terminates at the T12-L1 level.    At L5-S1   The disc appears intact. There is no significant central or foraminal stenosis.    At L4-L5   There is disc desiccation, a midline annular fissure and shallow central disc protrusion mildly  effacing the ventral epidural space and flattening the ventral thecal sac. Bony overgrowth from  adjacent facet joints and ligamentum flavum thickening contribute to mild left greater than right  effacement of the lateral recesses. There is a prominent left-sided intraforaminal component of  circumferential disc bulging contributing to moderately severe left foraminal stenosis.    At L3-L4   There is loss of intervertebral disc space height, disc desiccation, with effacement of the lateral recesses more so on the right than the left. Mild left foraminal stenosis and moderate to severe  right foraminal stenosis is present. Bony overgrowth from adjacent facet arthropathy is present at  this level.    At L2-L3   There is disc desiccation but no focal disc protrusion. No significant central or foraminal  stenosis is present.    At L1-L2   There is mild disc desiccation but no focal disc protrusion    At T11-T12 there is evidence of degenerative disc disease with a shallow central disc bulge.     IMPRESSION: Please note that there is transitional lumbosacral anatomy. Please note that there may  be discrepancies in spinal level labeling on prior/subsequent imaging as well as clinical/surgical  documentation and close attention on follow-up is strongly recommended especially in the setting of  procedural planning.    Degenerative changes lumbosacral spine at L4-L5 include midline annular fissure and a shallow  central disc bulging asymmetrically prominent to the left mildly effacing the left lateral recess  and contributing to left foraminal stenosis    Moderately advanced degenerative disc changes at L3-L4 are associated with reactive Modic type  changes. Disc osteophyte complex results in moderate to severe right foraminal stenosis and mild  left foraminal stenosis further detailed above    Other findings outlined above    Exam: MRI CERVICAL SPINE 11/2/22 Order#: [id number]     HISTORY: 67 years Female CHRONIC CERVICAL RADICULOPATHY MRI    PROCEDURE: MRI cervical spine without contrast    COMPARISON: January 28, 2019    TECHNIQUE: MRI cervical spine performed 1.5 Liza magnet    FINDINGS:   There is maintenance of the normal cervical lordosis.    The prevertebral soft tissues appear within range of normal.    The craniocervical junction and clivus and C1-C2 distance appear within range of normal    There is uniform marrow signal on all sequences    The cervical cord is uniform in signal intensity without evidence of syrinx or suggestion of  myelomalacia.    At C2-C3 , there is no significant central or foraminal stenosis.    At C3-C4 there is broad-based disc osteophytic ridging which indents the thecal sac, decreases the  AP dimension of the thecal sac slightly and contributes to moderate right foraminal stenosis and  mild to moderate left foraminal stenosis. This may be slightly progressed as compared to the 2019  study.    At C4-[id number] is broad-based disc osteophytic ridging and a right para midline/right foraminal disc protrusion which indents the thecal sac and deforms the ventral surface of the cord. There is  significantly increased mass effect as compared to the 2019 study which now results in moderate to  severe right foraminal stenosis and moderate left foraminal stenosis with the AP dimension of the  thecal sac is reduced to 7 mm.    At C5-C6 , broad-based disc osteophytic ridging has progressed slightly since prior exam. Mild to  moderate right foraminal stenosis and mild left foraminal stenosis is present. Disc osteophyte comp sole asymmetrically prominent to the right.    At C6-C7 , there is no significant central or foraminal stenosis.    At C7-T1 , there is no significant central or foraminal stenosis.     IMPRESSION:   Multilevel degenerative changes as outlined above have progressed since the 2019 study most notably at C4-C5 where there is significantly increased mass effect associated with a right para  midline/right foraminal disc osteophyte complex    Other findings discussed above     Thank you for allowing us to participate in the evaluation of this patient.     MRI LS 11/22   Please note that there is transitional lumbosacral anatomy. Please note that there may be discrepancies in spinal level labeling on prior/subsequent imaging as well as clinical/surgical documentation and close attention on follow-up is strongly recommended especially in the setting of procedural planning. For the purposes of today's exam the transitional level is labeled a transitional L5 level.   The paraspinal musculature including the psoas muscle, multifidus muscles, and immediate para axial soft tissues appear within range of normal without evidence of mass or collection.   There is a maintenance of the normal lumbar lordosis. There is fairly uniform marrow signal on all sequences.   The conus terminates at the T12-L1 level.   At L5-S1  The disc appears intact. There is no significant central or foraminal stenosis.   At L4-L5  There is disc desiccation, a midline annular fissure and shallow central disc protrusion mildly effacing the ventral epidural space and flattening the ventral thecal sac. Bony overgrowth from adjacent facet joints and ligamentum flavum thickening contribute to mild left greater than right effacement of the lateral recesses. There is a prominent left-sided intraforaminal component of circumferential disc bulging contributing to moderately severe left foraminal stenosis.   At L3-L4  There is loss of intervertebral disc space height, disc desiccation, with effacement of the lateral recesses more so on the right than the left. Mild left foraminal stenosis and moderate to severe right foraminal stenosis is present. Bony overgrowth from adjacent facet arthropathy is present at this level.   At L2-L3  There is disc desiccation but no focal disc protrusion. No significant central or foraminal stenosis is present.   At L1-L2  There is mild disc desiccation but no focal disc protrusion   At T11-T12 there is evidence of degenerative disc disease with a shallow central disc bulge.    IMPRESSION: Please note that there is transitional lumbosacral anatomy. Please note that there may be discrepancies in spinal level labeling on prior/subsequent imaging as well as clinical/surgical documentation and close attention on follow-up is strongly recommended especially in the setting of procedural planning.   Degenerative changes lumbosacral spine at L4-L5 include midline annular fissure and a shallow central disc bulging asymmetrically prominent to the left mildly effacing the left lateral recess and contributing to left foraminal stenosis   Moderately advanced degenerative disc changes at L3-L4 are associated with reactive Modic type changes. Disc osteophyte complex results in moderate to severe right foraminal stenosis and mild left foraminal stenosis further detailed above  MRI CS 11/2/22  There is maintenance of the normal cervical lordosis.   The prevertebral soft tissues appear within range of normal.   The craniocervical junction and clivus and C1-C2 distance appear within range of normal   There is uniform marrow signal on all sequences   The cervical cord is uniform in signal intensity without evidence of syrinx or suggestion of myelomalacia.   At C2-C3 ,  there is no significant central or foraminal stenosis.   At C3-C4 there is broad-based disc osteophytic ridging which indents the thecal sac, decreases the AP dimension of the thecal sac slightly and contributes to moderate right foraminal stenosis and mild to moderate left foraminal stenosis. This may be slightly progressed as compared to the 2019 study.   At C4-C5eucbj is broad-based disc osteophytic ridging and a right para midline/right foraminal disc protrusion which indents the thecal sac and deforms the ventral surface of the cord. There is significantly increased mass effect as compared to the 2019 study which now results in moderate to severe right foraminal stenosis and moderate left foraminal stenosis with the AP dimension of the thecal sac is reduced to 7 mm.   At C5-C6 , broad-based disc osteophytic ridging has progressed slightly since prior exam. Mild to moderate right foraminal stenosis and mild left foraminal stenosis is present. Disc osteophyte comp sole asymmetrically prominent to the right.   At C6-C7 , there is no significant central or foraminal stenosis.   At C7-T1 , there is no significant central or foraminal stenosis.    IMPRESSION:  Multilevel degenerative changes as outlined above have progressed since the 2019 study most notably at C4-C5 where there is significantly increased mass effect associated with a right para midline/right foraminal disc osteophyte complex  1/28/2019  MRI CERVICAL SPINE Order#:   MRI [id number]    MRI OF THE CERVICAL SPINE WITHOUT CONTRAST   INDICATION:  Neck pain   TECHNIQUE: Noncontrast sagittal T1, T2, STIR, axial T2 and gradient echo, and sagittal T2 volumetric with axial and coronal reformats.   CONTRAST: None   COMPARISON:  No prior studies are available for comparison   FINDINGS:   There is reversal of the cervical lordosis. There is minimal degenerative retrolisthesis of C3 on C4 and C4 on C5. The marrow signal is overall within normal limits with no focal marrow replacing lesion identified. The vertebral body heights are maintained and there is no evidence of acute fracture or subluxation. There is no abnormal vertebral or paraspinal edema. The visualized paraspinal soft tissues appear grossly unremarkable.   No abnormal signal is identified in the cervical spinal cord. The visualized posterior fossa structures are unremarkable.   C2-3: No significant disc bulge or herniation. No significant central canal or foraminal stenosis.   C3-4: Central disc protrusion superimposed on mild disc bulge with marginal and uncovertebral osteophyte formation. No significant central canal stenosis, however disc herniation mildly impinges upon the ventral spinal cord. Moderate left foraminal stenosis and mild right foraminal stenosis.   C4-5: Right paracentral disc/osteophyte superimposed on a mild disc bulge with marginal osteophyte formation. No significant central canal stenosis, however disc/osteophyte mildly impinges upon the right ventral spinal cord. Moderate bilateral foraminal stenosis.   C5-6: Mild disc bulge with marginal and uncovertebral osteophyte formation. No significant central canal stenosis. Mild/moderate right foraminal stenosis and mild left foraminal stenosis.   C6-7: Mild disc bulge. No significant central canal or foraminal stenosis.   C7-T1: Minimal disc bulge. No significant central canal or foraminal stenosis.     IMPRESSION:   Cervical spondylosis with central disc herniation at C3-4 and right paracentral disc/osteophyte at C4-5. No significant central canal stenosis, however there is mild impingement of the ventral spinal cord at C3-4 and C4-5. Varying degrees of foraminal stenosis as above, most pronounced at C3-4 and the left and C4-5 bilaterally.

## 2024-09-05 ENCOUNTER — APPOINTMENT (OUTPATIENT)
Dept: FAMILY MEDICINE | Facility: CLINIC | Age: 69
End: 2024-09-05

## 2024-09-09 ENCOUNTER — APPOINTMENT (OUTPATIENT)
Dept: RHEUMATOLOGY | Facility: CLINIC | Age: 69
End: 2024-09-09

## 2024-11-13 ENCOUNTER — APPOINTMENT (OUTPATIENT)
Dept: NEUROLOGY | Facility: CLINIC | Age: 69
End: 2024-11-13

## 2024-11-27 ENCOUNTER — APPOINTMENT (OUTPATIENT)
Dept: PAIN MANAGEMENT | Facility: CLINIC | Age: 69
End: 2024-11-27
Payer: MEDICARE

## 2024-11-27 ENCOUNTER — APPOINTMENT (OUTPATIENT)
Dept: FAMILY MEDICINE | Facility: CLINIC | Age: 69
End: 2024-11-27
Payer: MEDICARE

## 2024-11-27 VITALS
OXYGEN SATURATION: 100 % | HEIGHT: 63 IN | DIASTOLIC BLOOD PRESSURE: 80 MMHG | HEART RATE: 77 BPM | BODY MASS INDEX: 0.09 KG/M2 | SYSTOLIC BLOOD PRESSURE: 120 MMHG | WEIGHT: 0.51 LBS

## 2024-11-27 VITALS — SYSTOLIC BLOOD PRESSURE: 140 MMHG | HEART RATE: 75 BPM | DIASTOLIC BLOOD PRESSURE: 82 MMHG

## 2024-11-27 DIAGNOSIS — M19.012 PRIMARY OSTEOARTHRITIS, LEFT SHOULDER: ICD-10-CM

## 2024-11-27 DIAGNOSIS — M47.812 SPONDYLOSIS W/OUT MYELOPATHY OR RADICULOPATHY, CERVICAL REGION: ICD-10-CM

## 2024-11-27 DIAGNOSIS — M48.061 SPINAL STENOSIS, LUMBAR REGION WITHOUT NEUROGENIC CLAUDICATION: ICD-10-CM

## 2024-11-27 DIAGNOSIS — R53.83 OTHER FATIGUE: ICD-10-CM

## 2024-11-27 DIAGNOSIS — M54.2 CERVICALGIA: ICD-10-CM

## 2024-11-27 DIAGNOSIS — E78.5 HYPERLIPIDEMIA, UNSPECIFIED: ICD-10-CM

## 2024-11-27 DIAGNOSIS — M47.817 SPONDYLOSIS W/OUT MYELOPATHY OR RADICULOPATHY, LUMBOSACRAL REGION: ICD-10-CM

## 2024-11-27 DIAGNOSIS — M54.16 RADICULOPATHY, LUMBAR REGION: ICD-10-CM

## 2024-11-27 DIAGNOSIS — D64.9 ANEMIA, UNSPECIFIED: ICD-10-CM

## 2024-11-27 DIAGNOSIS — E11.9 TYPE 2 DIABETES MELLITUS W/OUT COMPLICATIONS: ICD-10-CM

## 2024-11-27 PROCEDURE — 99214 OFFICE O/P EST MOD 30 MIN: CPT

## 2024-11-27 PROCEDURE — G2211 COMPLEX E/M VISIT ADD ON: CPT

## 2024-12-02 ENCOUNTER — APPOINTMENT (OUTPATIENT)
Dept: VASCULAR SURGERY | Facility: CLINIC | Age: 69
End: 2024-12-02
Payer: MEDICARE

## 2024-12-02 LAB
ALBUMIN SERPL ELPH-MCNC: 4.2 G/DL
ALP BLD-CCNC: 64 U/L
ALT SERPL-CCNC: 20 U/L
ANION GAP SERPL CALC-SCNC: 14 MMOL/L
AST SERPL-CCNC: 19 U/L
BASOPHILS # BLD AUTO: 0.02 K/UL
BASOPHILS NFR BLD AUTO: 0.2 %
BILIRUB SERPL-MCNC: 0.3 MG/DL
BUN SERPL-MCNC: 12 MG/DL
CALCIUM SERPL-MCNC: 10.3 MG/DL
CHLORIDE SERPL-SCNC: 101 MMOL/L
CHOLEST SERPL-MCNC: 135 MG/DL
CO2 SERPL-SCNC: 25 MMOL/L
CREAT SERPL-MCNC: 0.58 MG/DL
EGFR: 97 ML/MIN/1.73M2
EOSINOPHIL # BLD AUTO: 0.09 K/UL
EOSINOPHIL NFR BLD AUTO: 1.1 %
ESTIMATED AVERAGE GLUCOSE: 174 MG/DL
FERRITIN SERPL-MCNC: 100 NG/ML
GLUCOSE SERPL-MCNC: 158 MG/DL
HBA1C MFR BLD HPLC: 7.7 %
HCT VFR BLD CALC: 40.2 %
HDLC SERPL-MCNC: 37 MG/DL
HGB BLD-MCNC: 12.8 G/DL
IMM GRANULOCYTES NFR BLD AUTO: 0.6 %
IRON SATN MFR SERPL: 24 %
IRON SERPL-MCNC: 65 UG/DL
LDLC SERPL CALC-MCNC: 64 MG/DL
LYMPHOCYTES # BLD AUTO: 2.17 K/UL
LYMPHOCYTES NFR BLD AUTO: 25.4 %
MAN DIFF?: NORMAL
MCHC RBC-ENTMCNC: 31.3 PG
MCHC RBC-ENTMCNC: 31.8 G/DL
MCV RBC AUTO: 98.3 FL
MONOCYTES # BLD AUTO: 1.22 K/UL
MONOCYTES NFR BLD AUTO: 14.3 %
NEUTROPHILS # BLD AUTO: 5 K/UL
NEUTROPHILS NFR BLD AUTO: 58.4 %
NONHDLC SERPL-MCNC: 97 MG/DL
PLATELET # BLD AUTO: 344 K/UL
POTASSIUM SERPL-SCNC: 4.4 MMOL/L
PROT SERPL-MCNC: 6.6 G/DL
RBC # BLD: 4.09 M/UL
RBC # FLD: 13.3 %
SODIUM SERPL-SCNC: 140 MMOL/L
T4 FREE SERPL-MCNC: 1.8 NG/DL
TIBC SERPL-MCNC: 272 UG/DL
TRIGL SERPL-MCNC: 206 MG/DL
TSH SERPL-ACNC: 1.51 UIU/ML
UIBC SERPL-MCNC: 207 UG/DL
WBC # FLD AUTO: 8.55 K/UL

## 2024-12-02 PROCEDURE — 36475 ENDOVENOUS RF 1ST VEIN: CPT | Mod: LT

## 2024-12-10 ENCOUNTER — APPOINTMENT (OUTPATIENT)
Dept: VASCULAR SURGERY | Facility: CLINIC | Age: 69
End: 2024-12-10

## 2024-12-10 PROCEDURE — 93971 EXTREMITY STUDY: CPT

## 2024-12-12 ENCOUNTER — APPOINTMENT (OUTPATIENT)
Dept: FAMILY MEDICINE | Facility: CLINIC | Age: 69
End: 2024-12-12
Payer: MEDICARE

## 2024-12-12 VITALS
BODY MASS INDEX: 23.74 KG/M2 | SYSTOLIC BLOOD PRESSURE: 140 MMHG | HEART RATE: 80 BPM | WEIGHT: 134 LBS | OXYGEN SATURATION: 98 % | DIASTOLIC BLOOD PRESSURE: 70 MMHG | HEIGHT: 63 IN | TEMPERATURE: 97.1 F

## 2024-12-12 DIAGNOSIS — R19.8 OTHER SPECIFIED SYMPTOMS AND SIGNS INVOLVING THE DIGESTIVE SYSTEM AND ABDOMEN: ICD-10-CM

## 2024-12-12 DIAGNOSIS — R10.9 UNSPECIFIED ABDOMINAL PAIN: ICD-10-CM

## 2024-12-12 LAB
BILIRUB UR QL STRIP: NEGATIVE
GLUCOSE UR-MCNC: NORMAL
HCG UR QL: 0.2 EU/DL
HGB UR QL STRIP.AUTO: NORMAL
KETONES UR-MCNC: NORMAL
LEUKOCYTE ESTERASE UR QL STRIP: NEGATIVE
NITRITE UR QL STRIP: NEGATIVE
PH UR STRIP: 5.5
PROT UR STRIP-MCNC: NORMAL
SP GR UR STRIP: 1.02

## 2024-12-12 PROCEDURE — 99214 OFFICE O/P EST MOD 30 MIN: CPT | Mod: 25

## 2024-12-12 PROCEDURE — 81003 URINALYSIS AUTO W/O SCOPE: CPT | Mod: QW

## 2024-12-13 ENCOUNTER — APPOINTMENT (OUTPATIENT)
Dept: OBGYN | Facility: CLINIC | Age: 69
End: 2024-12-13
Payer: MEDICARE

## 2024-12-13 DIAGNOSIS — Z01.419 ENCOUNTER FOR GYNECOLOGICAL EXAMINATION (GENERAL) (ROUTINE) W/OUT ABNORMAL FINDINGS: ICD-10-CM

## 2024-12-13 DIAGNOSIS — Z12.39 ENCOUNTER FOR OTHER SCREENING FOR MALIGNANT NEOPLASM OF BREAST: ICD-10-CM

## 2024-12-13 PROCEDURE — 99397 PER PM REEVAL EST PAT 65+ YR: CPT | Mod: GY

## 2024-12-16 ENCOUNTER — APPOINTMENT (OUTPATIENT)
Dept: VASCULAR SURGERY | Facility: CLINIC | Age: 69
End: 2024-12-16
Payer: MEDICARE

## 2024-12-16 PROCEDURE — 36475 ENDOVENOUS RF 1ST VEIN: CPT | Mod: RT

## 2024-12-24 PROBLEM — F10.90 ALCOHOL USE: Status: ACTIVE | Noted: 2018-11-15

## 2024-12-26 ENCOUNTER — APPOINTMENT (OUTPATIENT)
Dept: OBGYN | Facility: CLINIC | Age: 69
End: 2024-12-26

## 2024-12-31 ENCOUNTER — APPOINTMENT (OUTPATIENT)
Dept: VASCULAR SURGERY | Facility: CLINIC | Age: 69
End: 2024-12-31
Payer: MEDICARE

## 2024-12-31 PROCEDURE — 93971 EXTREMITY STUDY: CPT

## 2025-01-03 ENCOUNTER — APPOINTMENT (OUTPATIENT)
Dept: PAIN MANAGEMENT | Facility: CLINIC | Age: 70
End: 2025-01-03
Payer: MEDICARE

## 2025-01-03 VITALS
SYSTOLIC BLOOD PRESSURE: 138 MMHG | DIASTOLIC BLOOD PRESSURE: 74 MMHG | HEART RATE: 71 BPM | OXYGEN SATURATION: 98 % | RESPIRATION RATE: 16 BRPM

## 2025-01-03 PROCEDURE — 64493 INJ PARAVERT F JNT L/S 1 LEV: CPT | Mod: 50

## 2025-01-03 PROCEDURE — 64494 INJ PARAVERT F JNT L/S 2 LEV: CPT | Mod: 50

## 2025-01-03 RX ADMIN — BUPIVACAINE HYDROCHLORIDE 0 %: 7.5 INJECTION, SOLUTION EPIDURAL; RETROBULBAR at 00:00

## 2025-01-07 ENCOUNTER — APPOINTMENT (OUTPATIENT)
Dept: PAIN MANAGEMENT | Facility: CLINIC | Age: 70
End: 2025-01-07
Payer: MEDICARE

## 2025-01-07 ENCOUNTER — RESULT REVIEW (OUTPATIENT)
Age: 70
End: 2025-01-07

## 2025-01-07 DIAGNOSIS — M54.2 CERVICALGIA: ICD-10-CM

## 2025-01-07 DIAGNOSIS — M54.16 RADICULOPATHY, LUMBAR REGION: ICD-10-CM

## 2025-01-07 DIAGNOSIS — M19.012 PRIMARY OSTEOARTHRITIS, LEFT SHOULDER: ICD-10-CM

## 2025-01-07 DIAGNOSIS — M47.812 SPONDYLOSIS W/OUT MYELOPATHY OR RADICULOPATHY, CERVICAL REGION: ICD-10-CM

## 2025-01-07 DIAGNOSIS — M48.061 SPINAL STENOSIS, LUMBAR REGION WITHOUT NEUROGENIC CLAUDICATION: ICD-10-CM

## 2025-01-07 DIAGNOSIS — M47.817 SPONDYLOSIS W/OUT MYELOPATHY OR RADICULOPATHY, LUMBOSACRAL REGION: ICD-10-CM

## 2025-01-07 DIAGNOSIS — M79.10 MYALGIA, UNSPECIFIED SITE: ICD-10-CM

## 2025-01-07 PROCEDURE — G2211 COMPLEX E/M VISIT ADD ON: CPT | Mod: 95

## 2025-01-07 PROCEDURE — 99214 OFFICE O/P EST MOD 30 MIN: CPT | Mod: 95

## 2025-01-30 ENCOUNTER — APPOINTMENT (OUTPATIENT)
Dept: NEUROLOGY | Facility: CLINIC | Age: 70
End: 2025-01-30
Payer: MEDICARE

## 2025-01-30 DIAGNOSIS — G43.E09 CHRONIC MIGRAINE WITH AURA, NOT INTRACTABLE, WITHOUT STATUS MIGRAINOSUS: ICD-10-CM

## 2025-01-30 PROCEDURE — 64615 CHEMODENERV MUSC MIGRAINE: CPT

## 2025-01-31 ENCOUNTER — APPOINTMENT (OUTPATIENT)
Dept: PAIN MANAGEMENT | Facility: HOSPITAL | Age: 70
End: 2025-01-31

## 2025-02-05 ENCOUNTER — APPOINTMENT (OUTPATIENT)
Dept: GASTROENTEROLOGY | Facility: CLINIC | Age: 70
End: 2025-02-05
Payer: MEDICARE

## 2025-02-05 ENCOUNTER — RX RENEWAL (OUTPATIENT)
Age: 70
End: 2025-02-05

## 2025-02-05 VITALS
SYSTOLIC BLOOD PRESSURE: 140 MMHG | BODY MASS INDEX: 23.74 KG/M2 | WEIGHT: 134 LBS | HEART RATE: 60 BPM | DIASTOLIC BLOOD PRESSURE: 72 MMHG | OXYGEN SATURATION: 98 % | HEIGHT: 63 IN

## 2025-02-05 DIAGNOSIS — R68.81 EARLY SATIETY: ICD-10-CM

## 2025-02-05 PROCEDURE — 99214 OFFICE O/P EST MOD 30 MIN: CPT

## 2025-02-05 PROCEDURE — G2211 COMPLEX E/M VISIT ADD ON: CPT

## 2025-02-05 RX ORDER — PANTOPRAZOLE 40 MG/1
40 TABLET, DELAYED RELEASE ORAL
Qty: 90 | Refills: 0 | Status: ACTIVE | COMMUNITY
Start: 2025-02-05 | End: 1900-01-01

## 2025-02-10 ENCOUNTER — APPOINTMENT (OUTPATIENT)
Dept: FAMILY MEDICINE | Facility: CLINIC | Age: 70
End: 2025-02-10
Payer: MEDICARE

## 2025-02-10 DIAGNOSIS — G43.E09 CHRONIC MIGRAINE WITH AURA, NOT INTRACTABLE, WITHOUT STATUS MIGRAINOSUS: ICD-10-CM

## 2025-02-10 DIAGNOSIS — R10.9 UNSPECIFIED ABDOMINAL PAIN: ICD-10-CM

## 2025-02-10 DIAGNOSIS — E11.9 TYPE 2 DIABETES MELLITUS W/OUT COMPLICATIONS: ICD-10-CM

## 2025-02-10 DIAGNOSIS — R19.8 OTHER SPECIFIED SYMPTOMS AND SIGNS INVOLVING THE DIGESTIVE SYSTEM AND ABDOMEN: ICD-10-CM

## 2025-02-10 PROCEDURE — 99214 OFFICE O/P EST MOD 30 MIN: CPT

## 2025-02-10 PROCEDURE — G2211 COMPLEX E/M VISIT ADD ON: CPT

## 2025-03-03 ENCOUNTER — APPOINTMENT (OUTPATIENT)
Dept: ENDOCRINOLOGY | Facility: CLINIC | Age: 70
End: 2025-03-03
Payer: MEDICARE

## 2025-03-03 VITALS
HEIGHT: 63 IN | WEIGHT: 130 LBS | BODY MASS INDEX: 23.04 KG/M2 | OXYGEN SATURATION: 97 % | SYSTOLIC BLOOD PRESSURE: 120 MMHG | DIASTOLIC BLOOD PRESSURE: 70 MMHG | HEART RATE: 68 BPM

## 2025-03-03 DIAGNOSIS — E11.9 TYPE 2 DIABETES MELLITUS W/OUT COMPLICATIONS: ICD-10-CM

## 2025-03-03 DIAGNOSIS — E06.1 SUBACUTE THYROIDITIS: ICD-10-CM

## 2025-03-03 PROCEDURE — 99214 OFFICE O/P EST MOD 30 MIN: CPT

## 2025-03-03 RX ORDER — GLIPIZIDE AND METFORMIN HYDROCHLORIDE 2.5; 25 MG/1; MG/1
2.5-25 TABLET, FILM COATED ORAL
Qty: 30 | Refills: 11 | Status: ACTIVE | COMMUNITY
Start: 2025-03-03 | End: 1900-01-01

## 2025-03-25 ENCOUNTER — APPOINTMENT (OUTPATIENT)
Dept: PAIN MANAGEMENT | Facility: HOSPITAL | Age: 70
End: 2025-03-25

## 2025-03-25 ENCOUNTER — TRANSCRIPTION ENCOUNTER (OUTPATIENT)
Age: 70
End: 2025-03-25

## 2025-04-02 ENCOUNTER — APPOINTMENT (OUTPATIENT)
Dept: PAIN MANAGEMENT | Facility: CLINIC | Age: 70
End: 2025-04-02
Payer: MEDICARE

## 2025-04-02 VITALS
DIASTOLIC BLOOD PRESSURE: 74 MMHG | HEIGHT: 63 IN | OXYGEN SATURATION: 99 % | WEIGHT: 135.8 LBS | SYSTOLIC BLOOD PRESSURE: 123 MMHG | BODY MASS INDEX: 24.06 KG/M2 | HEART RATE: 69 BPM

## 2025-04-02 DIAGNOSIS — M47.817 SPONDYLOSIS W/OUT MYELOPATHY OR RADICULOPATHY, LUMBOSACRAL REGION: ICD-10-CM

## 2025-04-02 DIAGNOSIS — M54.16 RADICULOPATHY, LUMBAR REGION: ICD-10-CM

## 2025-04-02 DIAGNOSIS — M48.061 SPINAL STENOSIS, LUMBAR REGION WITHOUT NEUROGENIC CLAUDICATION: ICD-10-CM

## 2025-04-02 DIAGNOSIS — M79.10 MYALGIA, UNSPECIFIED SITE: ICD-10-CM

## 2025-04-02 DIAGNOSIS — M47.812 SPONDYLOSIS W/OUT MYELOPATHY OR RADICULOPATHY, CERVICAL REGION: ICD-10-CM

## 2025-04-02 DIAGNOSIS — M19.012 PRIMARY OSTEOARTHRITIS, LEFT SHOULDER: ICD-10-CM

## 2025-04-02 DIAGNOSIS — M54.2 CERVICALGIA: ICD-10-CM

## 2025-04-02 PROCEDURE — G2211 COMPLEX E/M VISIT ADD ON: CPT

## 2025-04-02 PROCEDURE — 99214 OFFICE O/P EST MOD 30 MIN: CPT | Mod: 24

## 2025-04-09 ENCOUNTER — RX RENEWAL (OUTPATIENT)
Age: 70
End: 2025-04-09

## 2025-04-09 ENCOUNTER — RESULT REVIEW (OUTPATIENT)
Age: 70
End: 2025-04-09

## 2025-04-30 ENCOUNTER — APPOINTMENT (OUTPATIENT)
Dept: BARIATRICS/WEIGHT MGMT | Facility: CLINIC | Age: 70
End: 2025-04-30
Payer: MEDICARE

## 2025-04-30 VITALS
DIASTOLIC BLOOD PRESSURE: 64 MMHG | SYSTOLIC BLOOD PRESSURE: 110 MMHG | BODY MASS INDEX: 24.88 KG/M2 | HEIGHT: 63 IN | WEIGHT: 140.38 LBS

## 2025-04-30 DIAGNOSIS — I10 ESSENTIAL (PRIMARY) HYPERTENSION: ICD-10-CM

## 2025-04-30 DIAGNOSIS — R68.2 DRY MOUTH, UNSPECIFIED: ICD-10-CM

## 2025-04-30 PROCEDURE — 99204 OFFICE O/P NEW MOD 45 MIN: CPT

## 2025-04-30 PROCEDURE — 99214 OFFICE O/P EST MOD 30 MIN: CPT

## 2025-05-01 ENCOUNTER — NON-APPOINTMENT (OUTPATIENT)
Age: 70
End: 2025-05-01

## 2025-05-01 ENCOUNTER — APPOINTMENT (OUTPATIENT)
Dept: NEUROLOGY | Facility: CLINIC | Age: 70
End: 2025-05-01
Payer: MEDICARE

## 2025-05-01 DIAGNOSIS — G43.E09 CHRONIC MIGRAINE WITH AURA, NOT INTRACTABLE, WITHOUT STATUS MIGRAINOSUS: ICD-10-CM

## 2025-05-01 PROCEDURE — 64615 CHEMODENERV MUSC MIGRAINE: CPT

## 2025-05-06 ENCOUNTER — APPOINTMENT (OUTPATIENT)
Dept: PAIN MANAGEMENT | Facility: CLINIC | Age: 70
End: 2025-05-06
Payer: MEDICARE

## 2025-05-06 VITALS
HEIGHT: 63 IN | HEART RATE: 72 BPM | BODY MASS INDEX: 24.8 KG/M2 | DIASTOLIC BLOOD PRESSURE: 71 MMHG | SYSTOLIC BLOOD PRESSURE: 114 MMHG | WEIGHT: 140 LBS

## 2025-05-06 DIAGNOSIS — M47.812 SPONDYLOSIS W/OUT MYELOPATHY OR RADICULOPATHY, CERVICAL REGION: ICD-10-CM

## 2025-05-06 DIAGNOSIS — M54.2 CERVICALGIA: ICD-10-CM

## 2025-05-06 DIAGNOSIS — M19.012 PRIMARY OSTEOARTHRITIS, LEFT SHOULDER: ICD-10-CM

## 2025-05-06 DIAGNOSIS — M48.061 SPINAL STENOSIS, LUMBAR REGION WITHOUT NEUROGENIC CLAUDICATION: ICD-10-CM

## 2025-05-06 DIAGNOSIS — M54.16 RADICULOPATHY, LUMBAR REGION: ICD-10-CM

## 2025-05-06 DIAGNOSIS — M47.817 SPONDYLOSIS W/OUT MYELOPATHY OR RADICULOPATHY, LUMBOSACRAL REGION: ICD-10-CM

## 2025-05-06 DIAGNOSIS — M79.10 MYALGIA, UNSPECIFIED SITE: ICD-10-CM

## 2025-05-06 PROCEDURE — 99214 OFFICE O/P EST MOD 30 MIN: CPT

## 2025-05-06 PROCEDURE — G2211 COMPLEX E/M VISIT ADD ON: CPT

## 2025-05-14 RX ORDER — GLIPIZIDE 2.5 MG/1
2.5 TABLET, FILM COATED, EXTENDED RELEASE ORAL
Qty: 90 | Refills: 3 | Status: ACTIVE | COMMUNITY
Start: 2025-05-14 | End: 1900-01-01

## 2025-05-14 RX ORDER — GLIPIZIDE 2.5 MG/1
2.5 TABLET ORAL
Qty: 30 | Refills: 3 | Status: COMPLETED | COMMUNITY
Start: 2025-05-14 | End: 2025-05-14

## 2025-05-17 ENCOUNTER — RESULT REVIEW (OUTPATIENT)
Age: 70
End: 2025-05-17

## 2025-05-22 ENCOUNTER — RESULT REVIEW (OUTPATIENT)
Age: 70
End: 2025-05-22

## 2025-05-22 ENCOUNTER — APPOINTMENT (OUTPATIENT)
Dept: PAIN MANAGEMENT | Facility: CLINIC | Age: 70
End: 2025-05-22
Payer: MEDICARE

## 2025-05-22 VITALS
HEART RATE: 72 BPM | BODY MASS INDEX: 24.45 KG/M2 | OXYGEN SATURATION: 100 % | DIASTOLIC BLOOD PRESSURE: 72 MMHG | WEIGHT: 138 LBS | SYSTOLIC BLOOD PRESSURE: 121 MMHG | HEIGHT: 63 IN

## 2025-05-22 DIAGNOSIS — M54.12 RADICULOPATHY, CERVICAL REGION: ICD-10-CM

## 2025-05-22 DIAGNOSIS — M47.812 SPONDYLOSIS W/OUT MYELOPATHY OR RADICULOPATHY, CERVICAL REGION: ICD-10-CM

## 2025-05-22 DIAGNOSIS — M25.512 PAIN IN RIGHT SHOULDER: ICD-10-CM

## 2025-05-22 DIAGNOSIS — M19.012 PRIMARY OSTEOARTHRITIS, LEFT SHOULDER: ICD-10-CM

## 2025-05-22 DIAGNOSIS — M47.817 SPONDYLOSIS W/OUT MYELOPATHY OR RADICULOPATHY, LUMBOSACRAL REGION: ICD-10-CM

## 2025-05-22 DIAGNOSIS — M25.511 PAIN IN RIGHT SHOULDER: ICD-10-CM

## 2025-05-22 DIAGNOSIS — M48.061 SPINAL STENOSIS, LUMBAR REGION WITHOUT NEUROGENIC CLAUDICATION: ICD-10-CM

## 2025-05-22 DIAGNOSIS — M79.10 MYALGIA, UNSPECIFIED SITE: ICD-10-CM

## 2025-05-22 DIAGNOSIS — G89.29 PAIN IN RIGHT SHOULDER: ICD-10-CM

## 2025-05-22 DIAGNOSIS — M54.16 RADICULOPATHY, LUMBAR REGION: ICD-10-CM

## 2025-05-22 DIAGNOSIS — M54.2 CERVICALGIA: ICD-10-CM

## 2025-05-22 PROCEDURE — 99214 OFFICE O/P EST MOD 30 MIN: CPT

## 2025-05-22 PROCEDURE — G2211 COMPLEX E/M VISIT ADD ON: CPT

## 2025-05-28 ENCOUNTER — RX RENEWAL (OUTPATIENT)
Age: 70
End: 2025-05-28

## 2025-06-11 ENCOUNTER — APPOINTMENT (OUTPATIENT)
Dept: PAIN MANAGEMENT | Facility: CLINIC | Age: 70
End: 2025-06-11
Payer: MEDICARE

## 2025-06-11 VITALS
DIASTOLIC BLOOD PRESSURE: 77 MMHG | SYSTOLIC BLOOD PRESSURE: 125 MMHG | RESPIRATION RATE: 16 BRPM | OXYGEN SATURATION: 98 % | HEART RATE: 80 BPM

## 2025-06-11 PROCEDURE — 62321 NJX INTERLAMINAR CRV/THRC: CPT

## 2025-06-11 RX ADMIN — TRIAMCINOLONE ACETONIDE 0 MG/ML: 80 INJECTION, SUSPENSION INTRA-ARTICULAR; INTRAMUSCULAR at 00:00

## 2025-06-11 RX ADMIN — Medication %: at 00:00

## 2025-06-11 RX ADMIN — IOHEXOL 0 MG/ML: 180 INJECTION INTRAVENOUS at 00:00

## 2025-06-23 ENCOUNTER — APPOINTMENT (OUTPATIENT)
Dept: PAIN MANAGEMENT | Facility: CLINIC | Age: 70
End: 2025-06-23
Payer: MEDICARE

## 2025-06-23 VITALS
WEIGHT: 138 LBS | BODY MASS INDEX: 24.45 KG/M2 | SYSTOLIC BLOOD PRESSURE: 126 MMHG | DIASTOLIC BLOOD PRESSURE: 77 MMHG | HEIGHT: 63 IN

## 2025-06-23 PROCEDURE — 99214 OFFICE O/P EST MOD 30 MIN: CPT

## 2025-06-23 PROCEDURE — G2211 COMPLEX E/M VISIT ADD ON: CPT

## 2025-07-02 ENCOUNTER — NON-APPOINTMENT (OUTPATIENT)
Age: 70
End: 2025-07-02

## 2025-07-16 ENCOUNTER — APPOINTMENT (OUTPATIENT)
Dept: ENDOCRINOLOGY | Facility: CLINIC | Age: 70
End: 2025-07-16

## 2025-07-16 VITALS
SYSTOLIC BLOOD PRESSURE: 122 MMHG | HEIGHT: 63 IN | HEART RATE: 78 BPM | BODY MASS INDEX: 23.92 KG/M2 | OXYGEN SATURATION: 99 % | DIASTOLIC BLOOD PRESSURE: 76 MMHG | WEIGHT: 135 LBS

## 2025-07-16 PROBLEM — D86.9 SARCOID: Status: ACTIVE | Noted: 2025-07-16

## 2025-07-16 PROBLEM — E83.52 HYPERCALCEMIA: Status: ACTIVE | Noted: 2025-07-16

## 2025-07-16 PROBLEM — R79.89 ELEVATED PARATHYROID HORMONE: Status: ACTIVE | Noted: 2025-07-16

## 2025-07-16 PROBLEM — R79.89 ELEVATED TSH: Status: ACTIVE | Noted: 2025-07-16

## 2025-07-16 PROBLEM — E55.9 VITAMIN D DEFICIENCY: Status: ACTIVE | Noted: 2025-07-16

## 2025-07-16 PROCEDURE — 99215 OFFICE O/P EST HI 40 MIN: CPT

## 2025-07-19 ENCOUNTER — RX RENEWAL (OUTPATIENT)
Age: 70
End: 2025-07-19

## 2025-07-23 ENCOUNTER — APPOINTMENT (OUTPATIENT)
Dept: HEMATOLOGY ONCOLOGY | Facility: CLINIC | Age: 70
End: 2025-07-23

## 2025-07-23 ENCOUNTER — APPOINTMENT (OUTPATIENT)
Dept: PAIN MANAGEMENT | Facility: CLINIC | Age: 70
End: 2025-07-23
Payer: MEDICARE

## 2025-07-23 ENCOUNTER — RESULT REVIEW (OUTPATIENT)
Age: 70
End: 2025-07-23

## 2025-07-23 ENCOUNTER — NON-APPOINTMENT (OUTPATIENT)
Age: 70
End: 2025-07-23

## 2025-07-23 VITALS
HEART RATE: 77 BPM | WEIGHT: 138 LBS | SYSTOLIC BLOOD PRESSURE: 116 MMHG | DIASTOLIC BLOOD PRESSURE: 61 MMHG | OXYGEN SATURATION: 98 % | RESPIRATION RATE: 16 BRPM | TEMPERATURE: 97 F | HEIGHT: 63 IN | BODY MASS INDEX: 24.45 KG/M2

## 2025-07-23 DIAGNOSIS — M75.02 ADHESIVE CAPSULITIS OF LEFT SHOULDER: ICD-10-CM

## 2025-07-23 PROCEDURE — G2211 COMPLEX E/M VISIT ADD ON: CPT

## 2025-07-23 PROCEDURE — 99214 OFFICE O/P EST MOD 30 MIN: CPT

## 2025-07-29 ENCOUNTER — APPOINTMENT (OUTPATIENT)
Dept: PAIN MANAGEMENT | Facility: CLINIC | Age: 70
End: 2025-07-29
Payer: MEDICARE

## 2025-07-29 VITALS
HEIGHT: 63 IN | BODY MASS INDEX: 24.45 KG/M2 | SYSTOLIC BLOOD PRESSURE: 129 MMHG | DIASTOLIC BLOOD PRESSURE: 79 MMHG | WEIGHT: 138 LBS

## 2025-07-29 PROCEDURE — 20611 DRAIN/INJ JOINT/BURSA W/US: CPT | Mod: LT

## 2025-07-29 RX ADMIN — TRIAMCINOLONE ACETONIDE 0 MG/ML: 40 INJECTION, SUSPENSION INTRA-ARTICULAR; INTRAMUSCULAR at 00:00

## 2025-07-29 RX ADMIN — Medication %: at 00:00

## 2025-07-29 RX ADMIN — BUPIVACAINE HYDROCHLORIDE 0 %: 5 INJECTION, SOLUTION EPIDURAL; INTRACAUDAL; PERINEURAL at 00:00

## 2025-08-05 ENCOUNTER — NON-APPOINTMENT (OUTPATIENT)
Age: 70
End: 2025-08-05

## 2025-08-07 ENCOUNTER — APPOINTMENT (OUTPATIENT)
Dept: NEUROLOGY | Facility: CLINIC | Age: 70
End: 2025-08-07
Payer: MEDICARE

## 2025-08-07 DIAGNOSIS — G43.E09 CHRONIC MIGRAINE WITH AURA, NOT INTRACTABLE, WITHOUT STATUS MIGRAINOSUS: ICD-10-CM

## 2025-08-07 PROCEDURE — 64615 CHEMODENERV MUSC MIGRAINE: CPT

## 2025-08-13 ENCOUNTER — APPOINTMENT (OUTPATIENT)
Dept: PAIN MANAGEMENT | Facility: CLINIC | Age: 70
End: 2025-08-13
Payer: MEDICARE

## 2025-08-13 VITALS
HEIGHT: 63 IN | WEIGHT: 138 LBS | SYSTOLIC BLOOD PRESSURE: 125 MMHG | BODY MASS INDEX: 24.45 KG/M2 | DIASTOLIC BLOOD PRESSURE: 72 MMHG

## 2025-08-13 DIAGNOSIS — M54.16 RADICULOPATHY, LUMBAR REGION: ICD-10-CM

## 2025-08-13 DIAGNOSIS — M19.012 PRIMARY OSTEOARTHRITIS, LEFT SHOULDER: ICD-10-CM

## 2025-08-13 DIAGNOSIS — M48.061 SPINAL STENOSIS, LUMBAR REGION WITHOUT NEUROGENIC CLAUDICATION: ICD-10-CM

## 2025-08-13 DIAGNOSIS — M54.2 CERVICALGIA: ICD-10-CM

## 2025-08-13 DIAGNOSIS — M47.812 SPONDYLOSIS W/OUT MYELOPATHY OR RADICULOPATHY, CERVICAL REGION: ICD-10-CM

## 2025-08-13 DIAGNOSIS — M47.817 SPONDYLOSIS W/OUT MYELOPATHY OR RADICULOPATHY, LUMBOSACRAL REGION: ICD-10-CM

## 2025-08-13 DIAGNOSIS — M79.10 MYALGIA, UNSPECIFIED SITE: ICD-10-CM

## 2025-08-13 PROCEDURE — 99214 OFFICE O/P EST MOD 30 MIN: CPT

## 2025-08-13 PROCEDURE — G2211 COMPLEX E/M VISIT ADD ON: CPT

## 2025-08-15 ENCOUNTER — RX RENEWAL (OUTPATIENT)
Age: 70
End: 2025-08-15

## 2025-08-22 ENCOUNTER — RESULT REVIEW (OUTPATIENT)
Age: 70
End: 2025-08-22

## 2025-08-25 ENCOUNTER — APPOINTMENT (OUTPATIENT)
Dept: ENDOCRINOLOGY | Facility: CLINIC | Age: 70
End: 2025-08-25

## 2025-08-25 ENCOUNTER — APPOINTMENT (OUTPATIENT)
Dept: RHEUMATOLOGY | Facility: CLINIC | Age: 70
End: 2025-08-25
Payer: MEDICARE

## 2025-08-25 VITALS
DIASTOLIC BLOOD PRESSURE: 70 MMHG | BODY MASS INDEX: 23.74 KG/M2 | SYSTOLIC BLOOD PRESSURE: 130 MMHG | WEIGHT: 134 LBS | HEART RATE: 75 BPM | HEIGHT: 63 IN | OXYGEN SATURATION: 99 %

## 2025-08-25 DIAGNOSIS — E06.1 SUBACUTE THYROIDITIS: ICD-10-CM

## 2025-08-25 DIAGNOSIS — M35.3 POLYMYALGIA RHEUMATICA: ICD-10-CM

## 2025-08-25 DIAGNOSIS — M79.7 FIBROMYALGIA: ICD-10-CM

## 2025-08-25 DIAGNOSIS — E83.52 HYPERCALCEMIA: ICD-10-CM

## 2025-08-25 PROCEDURE — 99214 OFFICE O/P EST MOD 30 MIN: CPT

## 2025-08-25 PROCEDURE — G2211 COMPLEX E/M VISIT ADD ON: CPT

## 2025-08-25 RX ORDER — CYCLOBENZAPRINE 10 MG/1
10 TABLET ORAL
Qty: 30 | Refills: 3 | Status: ACTIVE | COMMUNITY
Start: 2025-08-25 | End: 2025-12-23

## 2025-08-25 RX ORDER — MELOXICAM 15 MG/1
15 TABLET ORAL
Qty: 21 | Refills: 2 | Status: ACTIVE | COMMUNITY
Start: 2025-08-25 | End: 2025-10-27

## 2025-09-02 ENCOUNTER — RX RENEWAL (OUTPATIENT)
Age: 70
End: 2025-09-02